# Patient Record
Sex: FEMALE | Race: WHITE | Employment: FULL TIME | URBAN - METROPOLITAN AREA
[De-identification: names, ages, dates, MRNs, and addresses within clinical notes are randomized per-mention and may not be internally consistent; named-entity substitution may affect disease eponyms.]

---

## 2017-01-12 RX ORDER — ATORVASTATIN CALCIUM 40 MG/1
TABLET, FILM COATED ORAL
Qty: 90 TABLET | Refills: 0 | Status: SHIPPED | OUTPATIENT
Start: 2017-01-12 | End: 2017-05-13

## 2017-02-02 RX ORDER — OMEPRAZOLE 20 MG/1
CAPSULE, DELAYED RELEASE ORAL
Qty: 180 CAPSULE | Refills: 3 | Status: SHIPPED | OUTPATIENT
Start: 2017-02-02 | End: 2017-10-25

## 2017-02-02 NOTE — TELEPHONE ENCOUNTER
[place], [place] - [address] [place] [address] RD. [phone], [phone]        Current Outpatient Prescriptions:  omeprazole (PRILOSEC) 20 MG Oral Capsule Delayed Release TAKE ONE CAPSULE BY MOUTH TWICE EVERY DAY BEFORE A MEAL Disp: 180 capsule Rfl: 3

## 2017-03-09 ENCOUNTER — OFFICE VISIT (OUTPATIENT)
Dept: INTERNAL MEDICINE CLINIC | Facility: CLINIC | Age: 57
End: 2017-03-09

## 2017-03-09 VITALS
SYSTOLIC BLOOD PRESSURE: 154 MMHG | DIASTOLIC BLOOD PRESSURE: 83 MMHG | HEART RATE: 80 BPM | TEMPERATURE: 98 F | HEIGHT: 63.5 IN | BODY MASS INDEX: 23.8 KG/M2 | WEIGHT: 136 LBS

## 2017-03-09 DIAGNOSIS — H81.10 BPV (BENIGN POSITIONAL VERTIGO), UNSPECIFIED LATERALITY: Primary | ICD-10-CM

## 2017-03-09 PROCEDURE — 99213 OFFICE O/P EST LOW 20 MIN: CPT | Performed by: INTERNAL MEDICINE

## 2017-03-09 PROCEDURE — 99212 OFFICE O/P EST SF 10 MIN: CPT | Performed by: INTERNAL MEDICINE

## 2017-03-09 RX ORDER — MECLIZINE HYDROCHLORIDE 25 MG/1
25 TABLET ORAL 3 TIMES DAILY PRN
Qty: 30 TABLET | Refills: 2 | Status: SHIPPED | OUTPATIENT
Start: 2017-03-09 | End: 2020-09-15

## 2017-03-09 NOTE — PROGRESS NOTES
Dizzy if standing up and changing positions  Blood pressure 154/83, pulse 80, temperature 98.2 °F (36.8 °C), temperature source Oral, height 5' 3.5\" (1.613 m), weight 136 lb (61.689 kg), not currently breastfeeding. +baronyi maneuver  1.  BPV (benign posi

## 2017-04-06 ENCOUNTER — TELEPHONE (OUTPATIENT)
Dept: INTERNAL MEDICINE CLINIC | Facility: CLINIC | Age: 57
End: 2017-04-06

## 2017-04-06 NOTE — TELEPHONE ENCOUNTER
Patient is eligible for [facility] [place] [facility]. Left message to call back [phone].

## 2017-04-17 ENCOUNTER — OFFICE VISIT (OUTPATIENT)
Dept: INTERNAL MEDICINE CLINIC | Facility: CLINIC | Age: 57
End: 2017-04-17

## 2017-04-17 VITALS
OXYGEN SATURATION: 98 % | SYSTOLIC BLOOD PRESSURE: 146 MMHG | BODY MASS INDEX: 23.1 KG/M2 | HEIGHT: 63.5 IN | HEART RATE: 71 BPM | DIASTOLIC BLOOD PRESSURE: 80 MMHG | WEIGHT: 132 LBS | TEMPERATURE: 98 F

## 2017-04-17 DIAGNOSIS — J01.90 ACUTE SINUSITIS, RECURRENCE NOT SPECIFIED, UNSPECIFIED LOCATION: Primary | ICD-10-CM

## 2017-04-17 PROCEDURE — 99212 OFFICE O/P EST SF 10 MIN: CPT | Performed by: INTERNAL MEDICINE

## 2017-04-17 PROCEDURE — 99213 OFFICE O/P EST LOW 20 MIN: CPT | Performed by: INTERNAL MEDICINE

## 2017-04-17 RX ORDER — AMOXICILLIN AND CLAVULANATE POTASSIUM 875; 125 MG/1; MG/1
1 TABLET, FILM COATED ORAL 2 TIMES DAILY WITH MEALS
Qty: 20 TABLET | Refills: 0 | Status: SHIPPED | OUTPATIENT
Start: 2017-04-17 | End: 2017-04-27

## 2017-04-17 NOTE — PROGRESS NOTES
Debora Montgomery is a 61year old female. Patient presents with:  URI    HPI:   For the past 10 days, she has had worsening symptoms of nasal congestion with green drainage, sinus pressure and pain, sore throat and cough productive of brown sputum.   No feve (17.5 mg total) by mouth once a week.  Disp: 105 tablet Rfl: 1       Adhesive Tape              Past Medical History   Diagnosis Date   • Psoriatic arthritis ([facility] [address]    • Hypercholesterolemia    • Colon polyps February 2010      Social History:    Smoking Sta

## 2017-05-12 ENCOUNTER — OFFICE VISIT (OUTPATIENT)
Dept: RHEUMATOLOGY | Facility: CLINIC | Age: 57
End: 2017-05-12

## 2017-05-12 ENCOUNTER — TELEPHONE (OUTPATIENT)
Dept: RHEUMATOLOGY | Facility: CLINIC | Age: 57
End: 2017-05-12

## 2017-05-12 ENCOUNTER — APPOINTMENT (OUTPATIENT)
Dept: LAB | Facility: HOSPITAL | Age: 57
End: 2017-05-12
Attending: INTERNAL MEDICINE
Payer: COMMERCIAL

## 2017-05-12 ENCOUNTER — OFFICE VISIT (OUTPATIENT)
Dept: INTERNAL MEDICINE CLINIC | Facility: CLINIC | Age: 57
End: 2017-05-12

## 2017-05-12 VITALS
HEIGHT: 63.5 IN | TEMPERATURE: 98 F | SYSTOLIC BLOOD PRESSURE: 142 MMHG | HEART RATE: 69 BPM | BODY MASS INDEX: 23.1 KG/M2 | WEIGHT: 132 LBS | DIASTOLIC BLOOD PRESSURE: 90 MMHG

## 2017-05-12 VITALS
DIASTOLIC BLOOD PRESSURE: 80 MMHG | SYSTOLIC BLOOD PRESSURE: 142 MMHG | BODY MASS INDEX: 23.1 KG/M2 | WEIGHT: 132 LBS | HEIGHT: 63.5 IN | HEART RATE: 75 BPM

## 2017-05-12 DIAGNOSIS — Z00.00 ANNUAL PHYSICAL EXAM: ICD-10-CM

## 2017-05-12 DIAGNOSIS — L40.50 PSORIATIC ARTHRITIS (HCC): Primary | ICD-10-CM

## 2017-05-12 DIAGNOSIS — D12.6 ADENOMATOUS POLYP OF COLON, UNSPECIFIED PART OF COLON: ICD-10-CM

## 2017-05-12 DIAGNOSIS — L40.50 PSA (PSORIATIC ARTHRITIS) (HCC): Primary | ICD-10-CM

## 2017-05-12 DIAGNOSIS — Z51.81 THERAPEUTIC DRUG MONITORING: ICD-10-CM

## 2017-05-12 DIAGNOSIS — E78.00 HYPERCHOLESTEROLEMIA: ICD-10-CM

## 2017-05-12 DIAGNOSIS — Z51.81 ENCOUNTER FOR THERAPEUTIC DRUG MONITORING: ICD-10-CM

## 2017-05-12 DIAGNOSIS — L40.50 PSA (PSORIATIC ARTHRITIS) (HCC): ICD-10-CM

## 2017-05-12 DIAGNOSIS — Z00.00 ANNUAL PHYSICAL EXAM: Primary | ICD-10-CM

## 2017-05-12 DIAGNOSIS — L40.50 PSORIATIC ARTHRITIS (HCC): Chronic | ICD-10-CM

## 2017-05-12 DIAGNOSIS — Z11.1 SCREENING-PULMONARY TB: ICD-10-CM

## 2017-05-12 DIAGNOSIS — Z00.00 ENCOUNTER FOR ANNUAL HEALTH EXAMINATION: ICD-10-CM

## 2017-05-12 PROCEDURE — 99213 OFFICE O/P EST LOW 20 MIN: CPT | Performed by: INTERNAL MEDICINE

## 2017-05-12 PROCEDURE — 99396 PREV VISIT EST AGE 40-64: CPT | Performed by: INTERNAL MEDICINE

## 2017-05-12 PROCEDURE — 86140 C-REACTIVE PROTEIN: CPT

## 2017-05-12 PROCEDURE — 85027 COMPLETE CBC AUTOMATED: CPT

## 2017-05-12 PROCEDURE — 86480 TB TEST CELL IMMUN MEASURE: CPT

## 2017-05-12 PROCEDURE — 80061 LIPID PANEL: CPT

## 2017-05-12 PROCEDURE — 80053 COMPREHEN METABOLIC PANEL: CPT

## 2017-05-12 PROCEDURE — 36415 COLL VENOUS BLD VENIPUNCTURE: CPT

## 2017-05-12 PROCEDURE — 99214 OFFICE O/P EST MOD 30 MIN: CPT | Performed by: INTERNAL MEDICINE

## 2017-05-12 PROCEDURE — G0439 PPPS, SUBSEQ VISIT: HCPCS | Performed by: INTERNAL MEDICINE

## 2017-05-12 PROCEDURE — 85652 RBC SED RATE AUTOMATED: CPT

## 2017-05-12 RX ORDER — ONDANSETRON 4 MG/1
4 TABLET, ORALLY DISINTEGRATING ORAL
Qty: 30 TABLET | Refills: 0 | Status: SHIPPED | OUTPATIENT
Start: 2017-05-12 | End: 2017-06-27

## 2017-05-12 RX ORDER — METHOTREXATE 25 MG/ML
17.5 INJECTION, SOLUTION INTRA-ARTERIAL; INTRAMUSCULAR; INTRAVENOUS
Qty: 4 VIAL | Refills: 4 | Status: SHIPPED | OUTPATIENT
Start: 2017-05-12 | End: 2017-05-26

## 2017-05-12 RX ORDER — ATORVASTATIN CALCIUM 40 MG/1
TABLET, FILM COATED ORAL
Qty: 90 TABLET | Refills: 1 | Status: SHIPPED | OUTPATIENT
Start: 2017-05-12 | End: 2018-07-14 | Stop reason: ALTCHOICE

## 2017-05-12 NOTE — PATIENT INSTRUCTIONS
1. methotrexate  7 tablets a week  - zofran given - switch to 17.5mg a week   2. Cont. enbrel 50mg a week   3. Cont. Low dose naltrexone 4.5mg a day   4.  Ok to take - prednisoen 10mg - x 1-2 weeks - helps -  Also can take naproxen 500mg twice a day - take

## 2017-05-12 NOTE — TELEPHONE ENCOUNTER
Patient here and asking for a new SORD and TB test.  Per Dr. Feng Tovar no TB test is needed. New SORD made.

## 2017-05-12 NOTE — PROGRESS NOTES
Name and  verified with patient. Patient provided with educational materials on learning to self inject methotrexate. Discussed with patient proper storage, handling, and disposal of medications. Pt informed of proper injection and aseptic technique.  Pa

## 2017-05-12 NOTE — PATIENT INSTRUCTIONS
Await results of blood tests. Please schedule mammogram.  Please schedule appointment with GI for repeat colonoscopy. Please try to exercise regularly. Remember to get a flu shot every fall. Return visit in 3 months for a blood pressure check.     [facility] Medicare Visit    Abdominal aortic aneurysm screening (once between ages 73-68) IPPE only No results found for this or any previous visit.  Limited to patients who meet one of the following criteria:   • Men who are 73-68 years old and have smoked more than needed after [address] due on 11/14/2014 Please get this Mammogram regularly   Immunizations      Influenza  Covered Annually   Orders placed or performed in visit on 10/17/14  -FLU VAC NO PRSV 4 LANDEN 3 YRS+    Please get every year    Pneumococcal [facility] regarding Advance Directives.

## 2017-05-12 NOTE — PROGRESS NOTES
Noah Christensen is a 61year old female. HPI:   Patient presents with:  Psoriatic Arthritis  Hand Pain: forearm and toe pain      I had the pleasure of seeing your patient Mrs. Christelle Traore on 5/12/2017. I had last seen her on 12/9/2016.  I had last see prednisone 5mg every other day. But normally it's every other day. She feels priobiotics is helping her has well. She has left thumb and left 2nd finger goes numb on and off. She's not getting chest pressure as much .  She needss her c scope, chest xray, an 12/9/2016  She has 7/10 pain in her fore arems and arms. She feels her elbows are the area where her flares start. She has some bony areas over her right 4th fdip that hurts and sherry her left knee. Her right elbow and her foot.    She is really fatigued into the skin once a week. Disp: 4 Syringe Rfl: 11   Ondansetron HCl (ZOFRAN) 4 mg tablet Take 1 tablet (4 mg total) by mouth every 8 (eight) hours as needed for Nausea.  Disp: 30 tablet Rfl: 0   folic acid 1 MG Oral Tab Take 2 tablets (2 mg total) by mouth 1.8-7.7 K/UL 3.9   Lymphocytes Absolute      1.0-4.0 K/UL 3.1   Monocytes Absolute      0.0-1.0 K/UL 0.5   Eosinophils Absolute      0.0-0.7 K/UL 0.3   Basophils Absolute      0.0-0.2 K/UL 0.1   CREATININE      0.50-1.50 mg/dL 0.66   GFR, Non- Ameri Summary:  1.  methotrexate  7 tablets a week  - zofran given  2. Cont. enbrel 50mg a week   3. Cont. Low dose naltrexone 4.5mg a day   4.  Ok to take - prednisoen 10mg - x 1-2 weeks - helps -  Also can take naproxen 500mg twice a day - take with food -

## 2017-05-12 NOTE — PROGRESS NOTES
HPI:   Oumar Zavala is a 61year old female who presents this morning for a [employer] physical.    She feels well this morning, and has no specific issues for discussion.   She follows regularly with Rheumatology for treatment of psoriatic arthrit (three) times daily as needed. omeprazole 20 MG Oral Capsule Delayed Release TAKE ONE CAPSULE BY MOUTH TWICE EVERY DAY BEFORE A MEAL   methotrexate 2.5 MG Oral Tab Take 7 tablets (17.5 mg total) by mouth once a week.    leflunomide 20 MG Oral Tab Take 1 t No fever  LUNGS: No cough wheezing or shortness of breath  CARDIAC: No lightheadedness palpitations or chest pain  GI: Occasional nausea when taking methotrexate.   No anorexia heartburn dysphagia vomiting abdominal pain diarrhea constipation or rectal blee lipid profile today. Order sent. Schedule screening mammogram.  Order sent. Recommend follow-up visit with GI for repeat colonoscopy. Referral completed and given to her. She will schedule. Recommend annual influenza vaccine.   Continue healthy diet, No    Vision Problems? : No    Difficulty walking?: No    Difficulty dressing or bathing?: No    Problems with daily activities? : No    Memory Problems?: No      Fall/Risk Assessment     Do you have 3 or more medical conditions?: 1-Yes    Have you fallen Screen every 10 years Colonoscopy,5 Years due on 02/12/2015 Update Health Maintenance if applicable    Flex Sigmoidoscopy Screen every 10 years No results found for this or any previous visit. No flowsheet data found.      Fecal Occult Blood Annually No res Medicare Part B No vaccine history found This may be covered with your pharmacy  prescription benefits        SPECIFIC DISEASE MONITORING Internal Lab or Procedure External Lab or Procedure   Annual Monitoring of Persistent     Medications (ACE/ARB, digoxi

## 2017-05-13 RX ORDER — ATORVASTATIN CALCIUM 40 MG/1
TABLET, FILM COATED ORAL
Qty: 90 TABLET | Refills: 3 | Status: SHIPPED | OUTPATIENT
Start: 2017-05-13 | End: 2017-05-19

## 2017-05-19 ENCOUNTER — OFFICE VISIT (OUTPATIENT)
Dept: GASTROENTEROLOGY | Facility: CLINIC | Age: 57
End: 2017-05-19

## 2017-05-19 ENCOUNTER — OFFICE VISIT (OUTPATIENT)
Dept: INTERNAL MEDICINE CLINIC | Facility: CLINIC | Age: 57
End: 2017-05-19

## 2017-05-19 ENCOUNTER — TELEPHONE (OUTPATIENT)
Dept: GASTROENTEROLOGY | Facility: CLINIC | Age: 57
End: 2017-05-19

## 2017-05-19 VITALS
SYSTOLIC BLOOD PRESSURE: 142 MMHG | OXYGEN SATURATION: 97 % | DIASTOLIC BLOOD PRESSURE: 78 MMHG | TEMPERATURE: 98 F | HEART RATE: 90 BPM | WEIGHT: 128.5 LBS | HEIGHT: 63.5 IN | BODY MASS INDEX: 22.49 KG/M2

## 2017-05-19 VITALS
HEIGHT: 64 IN | SYSTOLIC BLOOD PRESSURE: 144 MMHG | BODY MASS INDEX: 21.82 KG/M2 | WEIGHT: 127.81 LBS | DIASTOLIC BLOOD PRESSURE: 96 MMHG | HEART RATE: 66 BPM

## 2017-05-19 DIAGNOSIS — J98.01 BRONCHOSPASM: ICD-10-CM

## 2017-05-19 DIAGNOSIS — Z80.0 FAMILY HISTORY OF COLON CANCER: ICD-10-CM

## 2017-05-19 DIAGNOSIS — Z80.0 FAMILY HISTORY OF COLON CANCER IN MOTHER: ICD-10-CM

## 2017-05-19 DIAGNOSIS — Z86.010 HISTORY OF COLONIC POLYPS: ICD-10-CM

## 2017-05-19 DIAGNOSIS — Z86.010 PERSONAL HISTORY OF COLONIC POLYPS: ICD-10-CM

## 2017-05-19 DIAGNOSIS — J06.9 ACUTE URI: Primary | ICD-10-CM

## 2017-05-19 DIAGNOSIS — Z12.11 SCREENING FOR COLON CANCER: Primary | ICD-10-CM

## 2017-05-19 DIAGNOSIS — Z12.11 ENCOUNTER FOR SCREENING COLONOSCOPY: Primary | ICD-10-CM

## 2017-05-19 PROCEDURE — 99243 OFF/OP CNSLTJ NEW/EST LOW 30: CPT | Performed by: PHYSICIAN ASSISTANT

## 2017-05-19 PROCEDURE — 99212 OFFICE O/P EST SF 10 MIN: CPT | Performed by: PHYSICIAN ASSISTANT

## 2017-05-19 PROCEDURE — 99212 OFFICE O/P EST SF 10 MIN: CPT | Performed by: INTERNAL MEDICINE

## 2017-05-19 PROCEDURE — 99213 OFFICE O/P EST LOW 20 MIN: CPT | Performed by: INTERNAL MEDICINE

## 2017-05-19 RX ORDER — AZITHROMYCIN 250 MG/1
TABLET, FILM COATED ORAL
Qty: 6 TABLET | Refills: 0 | Status: SHIPPED | OUTPATIENT
Start: 2017-05-19 | End: 2017-05-31

## 2017-05-19 RX ORDER — ALBUTEROL SULFATE 90 UG/1
2 AEROSOL, METERED RESPIRATORY (INHALATION) EVERY 4 HOURS PRN
Qty: 1 INHALER | Refills: 0 | Status: SHIPPED | OUTPATIENT
Start: 2017-05-19 | End: 2017-06-01

## 2017-05-19 RX ORDER — PREDNISONE 20 MG/1
TABLET ORAL
Qty: 10 TABLET | Refills: 0 | Status: SHIPPED | OUTPATIENT
Start: 2017-05-19 | End: 2017-05-31 | Stop reason: ALTCHOICE

## 2017-05-19 NOTE — PATIENT INSTRUCTIONS
Please take Zithromax and prednisone daily for 5 days. Use albuterol when needed for wheezing. Call if not soon better.

## 2017-05-19 NOTE — PROGRESS NOTES
Mac Marshall is a 61year old female.  Patient presents with:  Cough  Chest Congestion    HPI:   Since Thursday, 8 days ago, she has had persistent symptoms of subjective fever, nasal congestion with clear drainage, sore throat, chest congestion and coug needed for Pain. Disp: 60 tablet Rfl: 0   Etanercept (ENBREL SURECLICK) 50 MG/ML Subcutaneous Solution Auto-injector Inject 50 mg into the skin once a week.  Disp: 4 Syringe Rfl: 11   Ondansetron HCl (ZOFRAN) 4 mg tablet Take 1 tablet (4 mg total) by mouth inspiratory crackles    ASSESSMENT AND PLAN:   1. Acute URI  Zithromax 250 mg 2 pills today then 1 pill daily ×4 days beginning tomorrow. Prescription sent to pharmacy. Prednisone 20 mg 2 pills daily with food for 5 days. Prescription sent to pharmacy.

## 2017-05-19 NOTE — H&P
[address] [facility] [address] Gastroenterology                                                                                                  Clinic History and Physical     Pa Medications, Allergies, ROS:      Past Medical History   Diagnosis Date   • Psoriatic arthritis ([facility] [address]    • Hypercholesterolemia    • Colon polyps February 2010          Past Surgical History    COLONOSCOPY,BIOPSY  2/12/10    Comment Performed by MICHAEL BRITO TWICE EVERY DAY BEFORE A MEAL Disp: 180 capsule Rfl: 3   methotrexate 2.5 MG Oral Tab Take 7 tablets (17.5 mg total) by mouth once a week. Disp: 105 tablet Rfl: 1   leflunomide 20 MG Oral Tab Take 1 tablet (20 mg total) by mouth daily.  Disp: 90 tablet Rfl: m), weight 127 lb 12.8 oz (57.97 kg), not currently breastfeeding. High blood pressure reading today.     Gen: patient is masked with coughing fits, non-toxic, mild discomfort 2/2 to coughing but otherwise pleasant and conversational   HEENT: +glasses; con if MAC indicated in this otherwise healthy patient   -Prep: Miralax and Gatorade   -Continue all medications for the procedure  -Follow up with Dr. Levon Sparrow re: high blood pressure and her coughing/lungs (URI)    Colonoscopy consent: I have discussed the risk

## 2017-05-19 NOTE — PATIENT INSTRUCTIONS
1. Schedule colonoscopy with Dr. Janice Reddy with IV twilight sedation. 2. Miralax and Gatorade prep. 3. Continue all medications for procedure    4. Read all bowel prep instructions carefully    5.  AVOID seeds, nuts, popcorn, raw fruits and vegetables (

## 2017-05-22 ENCOUNTER — HOSPITAL ENCOUNTER (OUTPATIENT)
Dept: GENERAL RADIOLOGY | Facility: HOSPITAL | Age: 57
Discharge: HOME | End: 2017-05-22
Attending: INTERNAL MEDICINE
Payer: COMMERCIAL

## 2017-05-22 ENCOUNTER — TELEPHONE (OUTPATIENT)
Dept: INTERNAL MEDICINE CLINIC | Facility: CLINIC | Age: 57
End: 2017-05-22

## 2017-05-22 DIAGNOSIS — R06.2 WHEEZING: ICD-10-CM

## 2017-05-22 DIAGNOSIS — R05.9 COUGH: ICD-10-CM

## 2017-05-22 DIAGNOSIS — R05.9 COUGH: Primary | ICD-10-CM

## 2017-05-22 PROCEDURE — 71020 XR CHEST PA + LAT CHEST (CPT=71020): CPT | Performed by: INTERNAL MEDICINE

## 2017-05-22 NOTE — TELEPHONE ENCOUNTER
Pt contacted and reviewed her medication list. She states she is NOT on any benzodiazepines or narcotics. She states the naltrexone 4.5mg that she uses daily is for her arthritis pain, since she is no longer using prednisone chronically for that.   Pt sched

## 2017-05-22 NOTE — TELEPHONE ENCOUNTER
Routed to [facility] to reschedule, pt currently scheduled on 7/7/17 (see TE 5/19/17).

## 2017-05-22 NOTE — TELEPHONE ENCOUNTER
Thank you, Dr. Jose Hernández, for advising. Patient denies use of narcotic x many months and states that she is taking the naltrexone for psoriatic arthritis. I wanted to ensure that this patient could safely undergo a conscious sedation.

## 2017-05-22 NOTE — TELEPHONE ENCOUNTER
Pt stts she continues to have cough and wheezing, mostly at night. Pt stts she was informed to call office if not better and request xray. Please advise.

## 2017-05-22 NOTE — TELEPHONE ENCOUNTER
Because of the naltrexone, she should be switched to MAC. Naltrexone counteracts fentanyl, so MAC would be better. Thank you !

## 2017-05-22 NOTE — TELEPHONE ENCOUNTER
Actions Requested: Pt requesting chest xray to be ordered for ongoing cough/wheezing.   Situation/Background   Problem:productive cough   Onset: 12 days ago   Associated Symptoms: wheezing, chest congestion, denies headaches, fever, nasal congestion   Histo

## 2017-05-22 NOTE — TELEPHONE ENCOUNTER
It appears that as per Bertha Sen's evaluation ( see consultation) , the patient can proceed with conscious sedation , unless she is on narcotic or benzodiazepine not listed in our med list. For some reason, she had a prescription for naloxone on our list,

## 2017-05-23 NOTE — TELEPHONE ENCOUNTER
Noted the counteraction between naltrexone and fentanyl.  Thank you for the recommendation, Dr. Abraham Public

## 2017-05-24 ENCOUNTER — TELEPHONE (OUTPATIENT)
Dept: INTERNAL MEDICINE CLINIC | Facility: CLINIC | Age: 57
End: 2017-05-24

## 2017-05-24 ENCOUNTER — TELEPHONE (OUTPATIENT)
Dept: PULMONOLOGY | Facility: CLINIC | Age: 57
End: 2017-05-24

## 2017-05-24 DIAGNOSIS — R05.9 COUGH: Primary | ICD-10-CM

## 2017-05-24 RX ORDER — BENZONATATE 100 MG/1
100 CAPSULE ORAL 3 TIMES DAILY PRN
Qty: 20 CAPSULE | Refills: 0 | OUTPATIENT
Start: 2017-05-24

## 2017-05-24 NOTE — TELEPHONE ENCOUNTER
RN calling from Dr Selam Kennedy office requesting for pt. To be seen sooner then 1st avail for consult for Coughing and wheezing, which pt. Has had for about 1 week. Pt has been on inhalers, oral steroids and Z-Pack, which did not help.   Pts phone # [phone]

## 2017-05-24 NOTE — TELEPHONE ENCOUNTER
Actions Requested: recommendation from doctor. Situation/Background   Problem: still has a cough and wheezing   Onset: 1 week   Associated Symptoms: Pt stts was sent home from work due to coughing too much and wheezing.  Inhaler and Prednisone  not helping

## 2017-05-24 NOTE — TELEPHONE ENCOUNTER
Pt stts she continues to have cough. Pt stts she was sent home from work today due to wheezing and coughing. Pt asking to speak with a nurse. Please advise.

## 2017-05-24 NOTE — TELEPHONE ENCOUNTER
Call Documentation      Reji Crisostomo LPN at 4/00/7277  3:76 PM      Status: Signed : Reji Crisostomo LPN (Licensed Practical Nurse)     Expand All Collapse All    T/E placed in [address] [place] [address] folder for review.                   Luisito Cavazos at 5/24/

## 2017-05-24 NOTE — TELEPHONE ENCOUNTER
Pulmonology department contacted and the are no appt available until 6/20/17. Message will be forwarded to Pulmonology RN for possible sooner appt with any Pulmonolgist.      Please transfer [phone] until 5pm today or [phone] anytime. Thank you.

## 2017-05-24 NOTE — TELEPHONE ENCOUNTER
Please try to get patient in sooner with Pulmonary.   I have already prescribed Zithromax, prednisone and albuterol, yet her symptoms persist.  We can try benzonatate 100 mg #20 one pill 3 times daily as needed for coughing but it will not help her wheezing

## 2017-05-24 NOTE — TELEPHONE ENCOUNTER
pt stated that she called to schedule a appt to see a pulmonologist  and the soonest that they can see her is June 22, 2017 with the first Dr available. Pt stated that she did not make a appt as that will not do her any good.  She wanted to know if

## 2017-05-25 NOTE — TELEPHONE ENCOUNTER
Lazarus Carwin, RN at 5/25/2017 10:02 AM      Status: Signed : Lazarus Carwin, RN (Registered Nurse)     Expand All Collapse All    Pt offered appt 5/31 @ 12:30 pm, pt accepted.

## 2017-05-31 ENCOUNTER — OFFICE VISIT (OUTPATIENT)
Dept: PULMONOLOGY | Facility: CLINIC | Age: 57
End: 2017-05-31

## 2017-05-31 VITALS
DIASTOLIC BLOOD PRESSURE: 72 MMHG | SYSTOLIC BLOOD PRESSURE: 120 MMHG | WEIGHT: 129.38 LBS | RESPIRATION RATE: 18 BRPM | HEIGHT: 63.5 IN | HEART RATE: 85 BPM | OXYGEN SATURATION: 99 % | BODY MASS INDEX: 22.64 KG/M2

## 2017-05-31 DIAGNOSIS — R05.9 COUGH: ICD-10-CM

## 2017-05-31 DIAGNOSIS — J98.01 BRONCHOSPASM: ICD-10-CM

## 2017-05-31 DIAGNOSIS — J42 CHRONIC BRONCHITIS, UNSPECIFIED CHRONIC BRONCHITIS TYPE (HCC): ICD-10-CM

## 2017-05-31 DIAGNOSIS — J20.9 ACUTE BRONCHITIS, UNSPECIFIED ORGANISM: Primary | ICD-10-CM

## 2017-05-31 PROCEDURE — 99213 OFFICE O/P EST LOW 20 MIN: CPT | Performed by: INTERNAL MEDICINE

## 2017-05-31 PROCEDURE — 99244 OFF/OP CNSLTJ NEW/EST MOD 40: CPT | Performed by: INTERNAL MEDICINE

## 2017-05-31 RX ORDER — AZITHROMYCIN 250 MG/1
TABLET, FILM COATED ORAL
Qty: 6 TABLET | Refills: 0 | Status: SHIPPED | OUTPATIENT
Start: 2017-05-31 | End: 2017-05-31

## 2017-05-31 RX ORDER — PREDNISONE 10 MG/1
TABLET ORAL
Qty: 18 TABLET | Refills: 0 | Status: SHIPPED | OUTPATIENT
Start: 2017-05-31 | End: 2017-08-16

## 2017-05-31 RX ORDER — AZITHROMYCIN 250 MG/1
TABLET, FILM COATED ORAL
Qty: 6 TABLET | Refills: 0 | Status: SHIPPED | OUTPATIENT
Start: 2017-05-31 | End: 2017-11-03

## 2017-05-31 RX ORDER — HYDROCODONE POLISTIREX AND CHLORPHENIRAMINE POLISTIREX 10; 8 MG/5ML; MG/5ML
5 SUSPENSION, EXTENDED RELEASE ORAL 2 TIMES DAILY PRN
Qty: 60 ML | Refills: 0 | Status: SHIPPED | OUTPATIENT
Start: 2017-05-31 | End: 2017-11-03

## 2017-05-31 RX ORDER — PREDNISONE 10 MG/1
TABLET ORAL
Qty: 18 TABLET | Refills: 0 | Status: SHIPPED | OUTPATIENT
Start: 2017-05-31 | End: 2017-05-31

## 2017-05-31 NOTE — PROGRESS NOTES
[facility], [facility], [facility]    Report of Consultation    Marsha Hutchins Patient Status:  Outpatient    1960 MRN [id number]   Location [facility], [facility] Mother    • Heart Disease Mother      CABG age 67   • Diabetes Maternal Grandmother    • Hypertension Maternal Grandmother    • Heart Disease Maternal Grandfather       MI age 28   • Heart Disease Maternal Aunt      Per NG: CAD   • Bladder Cancer [Othe 05/12/2017   ALKPHO 71 05/12/2017   TP 6.9 05/12/2017   AST 24 05/12/2017   ALT 12* 05/12/2017   TSH 1.09 05/01/2015   ESRML 9 05/12/2017   CRP <0.5 05/12/2017         Imaging:           Impression:     1– URI with acute bronchitis with bronchospasm  2– pr

## 2017-06-01 ENCOUNTER — TELEPHONE (OUTPATIENT)
Dept: PULMONOLOGY | Facility: CLINIC | Age: 57
End: 2017-06-01

## 2017-06-01 NOTE — TELEPHONE ENCOUNTER
Pt states her cough and breathing is better since last week . Pt states she woke up last night rolled onto her right side and felt pain 9/10 when breathing deeply and when bending over.   Pt states she started the prednisone 30 mg this am which has made th

## 2017-06-01 NOTE — TELEPHONE ENCOUNTER
Dr. Aminata Lizarraga notified of below. Per Dr. Aminata Lizarraga pt to give it some time as it sounds like a pulled muscle. Pt notified of this. Pt notified to call office back if symptoms persist.  Pt notified to call 911/go to ER if SOB develops.   Pt verbalized underst

## 2017-06-01 NOTE — TELEPHONE ENCOUNTER
Contacted [facility] in endo and she states that MAC is available at 1:30pm for Dr. Monik Yost on 7/7/17.   Pt contacted and rescheduled for the following time:    Scheduled for:  Colonoscopy 13933  Provider Name:  Dr. Monik Yost  Date:  7/7/17  Location:  [facility]  Sedation:  I

## 2017-06-01 NOTE — TELEPHONE ENCOUNTER
Pt states after taking inhaler and cough syrup with codine last night she has pain on rt side - cannot breathe deeply - steroids helped this morning - asking if it could be reaction to meds

## 2017-06-06 ENCOUNTER — TELEPHONE (OUTPATIENT)
Dept: RHEUMATOLOGY | Facility: CLINIC | Age: 57
End: 2017-06-06

## 2017-06-06 NOTE — TELEPHONE ENCOUNTER
[employer] calling to report that they require an ICD 10 diagnosis code for the following medication:    Current Outpatient Prescriptions:  Etanercept (ENBREL SURECLICK) 50 MG/ML Subcutaneous Solution Auto-injector Inject 50 mg into the skin once a

## 2017-06-27 ENCOUNTER — TELEPHONE (OUTPATIENT)
Dept: PULMONOLOGY | Facility: CLINIC | Age: 57
End: 2017-06-27

## 2017-06-27 RX ORDER — FOLIC ACID 1 MG/1
TABLET ORAL
Qty: 180 TABLET | Refills: 3 | Status: SHIPPED | OUTPATIENT
Start: 2017-06-27 | End: 2018-09-24

## 2017-06-27 RX ORDER — ONDANSETRON 4 MG/1
TABLET, ORALLY DISINTEGRATING ORAL
Qty: 30 TABLET | Refills: 0 | Status: SHIPPED | OUTPATIENT
Start: 2017-06-27 | End: 2017-11-03

## 2017-06-27 NOTE — TELEPHONE ENCOUNTER
LOV:5-12  Last Filled:Odansetron #30 with 0 on 3-97 and Folic Acid #782 with 3 refills on 6-17-16  Labs:   Future Appointments  Date Time Provider [place]   8/18/2017 9:10 AM Shailesh Jernigan MD [facility]       Please Advise

## 2017-06-28 RX ORDER — FLUTICASONE FUROATE AND VILANTEROL TRIFENATATE 100; 25 UG/1; UG/1
POWDER RESPIRATORY (INHALATION)
Qty: 1 EACH | Refills: 5 | Status: SHIPPED | OUTPATIENT
Start: 2017-06-28 | End: 2017-11-03

## 2017-06-28 NOTE — TELEPHONE ENCOUNTER
Dr. Bre Moreirach no record of Ursula Alejandro being prescribed, if ok with you please sign prescription.

## 2017-07-06 ENCOUNTER — TELEPHONE (OUTPATIENT)
Dept: GASTROENTEROLOGY | Facility: CLINIC | Age: 57
End: 2017-07-06

## 2017-07-06 NOTE — TELEPHONE ENCOUNTER
Pt called to confirm that it is OK to drink clear liquids up until 4 1/2 hrs before her procedure on 7/7/17. Please call.

## 2017-07-06 NOTE — TELEPHONE ENCOUNTER
Pt contacted and confirmed that she may have clear liquids up to 4 hours prior. This was cleared in our office previously with the anesthesia dept with emh and eosc.

## 2017-07-07 ENCOUNTER — ANESTHESIA (OUTPATIENT)
Dept: ENDOSCOPY | Facility: HOSPITAL | Age: 57
End: 2017-07-07
Payer: COMMERCIAL

## 2017-07-07 ENCOUNTER — ANESTHESIA EVENT (OUTPATIENT)
Dept: ENDOSCOPY | Facility: HOSPITAL | Age: 57
End: 2017-07-07
Payer: COMMERCIAL

## 2017-07-07 ENCOUNTER — SURGERY (OUTPATIENT)
Age: 57
End: 2017-07-07

## 2017-07-07 ENCOUNTER — HOSPITAL ENCOUNTER (OUTPATIENT)
Facility: HOSPITAL | Age: 57
Setting detail: HOSPITAL OUTPATIENT SURGERY
Discharge: HOME | End: 2017-07-07
Attending: INTERNAL MEDICINE | Admitting: INTERNAL MEDICINE
Payer: COMMERCIAL

## 2017-07-07 VITALS
DIASTOLIC BLOOD PRESSURE: 77 MMHG | SYSTOLIC BLOOD PRESSURE: 145 MMHG | WEIGHT: 128 LBS | HEIGHT: 63.5 IN | HEART RATE: 59 BPM | OXYGEN SATURATION: 98 % | RESPIRATION RATE: 19 BRPM | BODY MASS INDEX: 22.4 KG/M2

## 2017-07-07 DIAGNOSIS — K64.9 HEMORRHOIDS: ICD-10-CM

## 2017-07-07 DIAGNOSIS — Z12.11 SCREENING FOR MALIGNANT NEOPLASM OF COLON: Primary | ICD-10-CM

## 2017-07-07 DIAGNOSIS — K57.90 DIVERTICULOSIS: ICD-10-CM

## 2017-07-07 PROBLEM — Q43.8 TORTUOUS COLON: Status: ACTIVE | Noted: 2017-07-07

## 2017-07-07 PROBLEM — R55 VAGAL REACTION: Status: ACTIVE | Noted: 2017-07-07

## 2017-07-07 PROBLEM — K64.8 INTERNAL HEMORRHOIDS: Status: ACTIVE | Noted: 2017-07-07

## 2017-07-07 PROCEDURE — 45378 DIAGNOSTIC COLONOSCOPY: CPT | Performed by: INTERNAL MEDICINE

## 2017-07-07 PROCEDURE — 0DJD8ZZ INSPECTION OF LOWER INTESTINAL TRACT, VIA NATURAL OR ARTIFICIAL OPENING ENDOSCOPIC: ICD-10-PCS | Performed by: INTERNAL MEDICINE

## 2017-07-07 RX ORDER — SODIUM CHLORIDE, SODIUM LACTATE, POTASSIUM CHLORIDE, CALCIUM CHLORIDE 600; 310; 30; 20 MG/100ML; MG/100ML; MG/100ML; MG/100ML
INJECTION, SOLUTION INTRAVENOUS CONTINUOUS
Status: DISCONTINUED | OUTPATIENT
Start: 2017-07-07 | End: 2017-07-07

## 2017-07-07 RX ORDER — NALOXONE HYDROCHLORIDE 0.4 MG/ML
80 INJECTION, SOLUTION INTRAMUSCULAR; INTRAVENOUS; SUBCUTANEOUS AS NEEDED
Status: DISCONTINUED | OUTPATIENT
Start: 2017-07-07 | End: 2017-07-07

## 2017-07-07 RX ORDER — SODIUM CHLORIDE, SODIUM LACTATE, POTASSIUM CHLORIDE, CALCIUM CHLORIDE 600; 310; 30; 20 MG/100ML; MG/100ML; MG/100ML; MG/100ML
INJECTION, SOLUTION INTRAVENOUS CONTINUOUS PRN
Status: DISCONTINUED | OUTPATIENT
Start: 2017-07-07 | End: 2017-07-07 | Stop reason: SURG

## 2017-07-07 RX ORDER — LIDOCAINE HYDROCHLORIDE 10 MG/ML
INJECTION, SOLUTION EPIDURAL; INFILTRATION; INTRACAUDAL; PERINEURAL AS NEEDED
Status: DISCONTINUED | OUTPATIENT
Start: 2017-07-07 | End: 2017-07-07 | Stop reason: SURG

## 2017-07-07 RX ORDER — GLUCOSAMINE HCL 500 MG
TABLET ORAL
COMMUNITY
End: 2017-11-03 | Stop reason: DRUGHIGH

## 2017-07-07 RX ORDER — GLYCOPYRROLATE 0.2 MG/ML
INJECTION INTRAMUSCULAR; INTRAVENOUS AS NEEDED
Status: DISCONTINUED | OUTPATIENT
Start: 2017-07-07 | End: 2017-07-07 | Stop reason: SURG

## 2017-07-07 RX ADMIN — LIDOCAINE HYDROCHLORIDE 50 MG: 10 INJECTION, SOLUTION EPIDURAL; INFILTRATION; INTRACAUDAL; PERINEURAL at 13:45:00

## 2017-07-07 RX ADMIN — SODIUM CHLORIDE, SODIUM LACTATE, POTASSIUM CHLORIDE, CALCIUM CHLORIDE: 600; 310; 30; 20 INJECTION, SOLUTION INTRAVENOUS at 14:06:00

## 2017-07-07 RX ADMIN — SODIUM CHLORIDE, SODIUM LACTATE, POTASSIUM CHLORIDE, CALCIUM CHLORIDE: 600; 310; 30; 20 INJECTION, SOLUTION INTRAVENOUS at 13:42:00

## 2017-07-07 RX ADMIN — GLYCOPYRROLATE 0.2 MG: 0.2 INJECTION INTRAMUSCULAR; INTRAVENOUS at 13:54:00

## 2017-07-07 NOTE — H&P
History & Physical Examination    Patient Name: Susie Bah  MRN: [id number]  [facility]: [id number]  YOB: 1960    Diagnosis: Family history of colon cancer, history of colon polyps    Prescriptions Prior to Admission:  Cholecalciferol (VITAMIN each Rfl: 0    FOLIC ACID 1 MG Oral Tab TAKE 2 (2MG TOTAL) BY MOUTH ONCE DAILY Disp: 180 tablet Rfl: 3 7/6/2017 at 0600   PROAIR  (90 Base) MCG/ACT Inhalation Aero Soln INHALE 2 PUFFS INTO THE LUNGS EVERY 4 HOURS AS NEEDED FOR WHEEZING Disp: 8.5 g R date: HYSTERECTOMY  1987:  VAGINAL HYSTERECTOMY      Comment: With cystocele repair  Family History   Problem Relation Age of Onset   • Colon Cancer Mother    • Heart Disease Mother      CABG age 67   • Hypertension Mother    • Bladder Cancer Suni Cage Mother

## 2017-07-07 NOTE — BRIEF OP NOTE
Pre-Operative Diagnosis: Special screening for malignant neoplasm of colon, Family history of colon cancer, and Hx of colonic polyps     Post-Operative Diagnosis: Diverticulosis, tortuous sigmoid colon, transient bradycardia with vagal reaction, interna

## 2017-07-07 NOTE — ANESTHESIA PREPROCEDURE EVALUATION
Anesthesia PreOp Note    HPI:     Erendira Pizarro is a 61year old female who presents for preoperative consultation requested by: Malu Kimball MD    Date of Surgery: 7/7/2017    Procedure(s):  COLONOSCOPY  Indication: Special screening for Tab Take 1 tablet (20 mg total) by mouth daily.  Disp: 90 tablet Rfl: 1 7/6/2017 at 0600   Naltrexone HCl Does not apply Powder 4.5mg a day , compounded (Do not apply powder) Disp: 25 g Rfl: 3 7/7/2017 at 2200   Ondansetron HCl (ZOFRAN) 4 mg tablet Take 1 t 60 tablet Rfl: 0 Taking   Etanercept (ENBREL SURECLICK) 50 MG/ML Subcutaneous Solution Auto-injector Inject 50 mg into the skin once a week.  Disp: 4 Syringe Rfl: 11 7/3/2017 at 1945       Current Facility-Administered Medications Ordered in Epic:  lactated kg/m². height is 1.613 m (5' 3.5\") and weight is 58.1 kg (128 lb). Her blood pressure is 138/68 and her pulse is 60. Her respiration is 16 and oxygen saturation is 97%.     07/07/17  1319   BP: 138/68   BP Location: Left arm   Pulse: 60   Resp: 16   SpO2

## 2017-07-07 NOTE — ANESTHESIA POSTPROCEDURE EVALUATION
Patient: Robby Childress    Procedure Summary     Date:  07/07/17 Room / Location:  [facility] ENDOSCOPY 05 / [facility] ENDOSCOPY    Anesthesia Start:  9652 Anesthesia Stop:  1168    Procedure:  COLONOSCOPY (N/A ) Diagnosis:       Special screening for malignant neopla

## 2017-07-08 NOTE — OPERATIVE REPORT
[facility]    PATIENT'S NAME: Joselo Tavares   ATTENDING PHYSICIAN: Natty Roberts MD   OPERATING PHYSICIAN: Natty Roberts MD   PATIENT ACCOUNT#:   [de-identified]    LOCATION:  [facility] ENDO POOL ROOM 11 [address] inflammatory changes. There was moderate to severe tortuosity of the sigmoid colon. Retroflexion in the rectum showed small internal hemorrhoids. The patient tolerated the procedure well without immediate complication. IMPRESSION:    1.    Diverticula

## 2017-07-09 ENCOUNTER — TELEPHONE (OUTPATIENT)
Dept: GASTROENTEROLOGY | Facility: CLINIC | Age: 57
End: 2017-07-09

## 2017-08-07 NOTE — TELEPHONE ENCOUNTER
LOV: 5-12-17  Last Refilled:#4 syringes, 11rfs 8/11/16    Future Appointments  Date Time Provider [place]   8/18/2017 9:10 AM Denise Rhodes MD [facility]       Please advise.

## 2017-08-08 NOTE — TELEPHONE ENCOUNTER
Tried calling patient's work number; rings then turns to busy signal.  Left voice message on patient's home number advising that prescription refill was approved and put through to patient's pharmacy.

## 2017-08-16 ENCOUNTER — TELEPHONE (OUTPATIENT)
Dept: RHEUMATOLOGY | Facility: CLINIC | Age: 57
End: 2017-08-16

## 2017-08-16 RX ORDER — PREDNISONE 10 MG/1
TABLET ORAL
Qty: 22 TABLET | Refills: 0 | Status: SHIPPED | OUTPATIENT
Start: 2017-08-16 | End: 2017-09-14

## 2017-08-16 NOTE — TELEPHONE ENCOUNTER
Please see below. Reports flare up started about a month ago. She took prednisone 10 mg daily for 2 weeks which helped. Pain started to worsen yesterday in right arm/hand with swelling. Her feet are bothering her as well.  She took ibuprofen yesterday and p

## 2017-08-16 NOTE — TELEPHONE ENCOUNTER
Pt calling and is having a flare up. Pt stts her right arm is swollen her whole body hurts. Please advise.

## 2017-08-16 NOTE — TELEPHONE ENCOUNTER
Pt aware of provider's directions below. Pt agreeable with plan. Pt scheduled follow up appointment for 9/1/17.

## 2017-08-16 NOTE — TELEPHONE ENCOUNTER
Please ask her to take 30mg a day x 3 days, then 20mg a day x 3 days, then 10mg a day x 3 days.  ,  Will send in script to pharmacy -

## 2017-09-13 ENCOUNTER — TELEPHONE (OUTPATIENT)
Dept: INTERNAL MEDICINE CLINIC | Facility: CLINIC | Age: 57
End: 2017-09-13

## 2017-09-13 NOTE — TELEPHONE ENCOUNTER
Called and left message to call back.    Left message ok to try another 10mg prednisone and see if that helps - sometimes taking higher dose burst can help neck pain radiating down -

## 2017-09-13 NOTE — TELEPHONE ENCOUNTER
Please see below and advise. Pt reports a bad night and prednisone is not working. She rescheduled her 9/1 appt to 9/29 due to it being a holiday weekend.

## 2017-09-13 NOTE — TELEPHONE ENCOUNTER
Pt is calling state that she is having some pain on her left side pt state that she is having some numbness on her left side of neck to finger tips   Pt state that she took prednison 10mg and it not helping   Requesting to speak with a RN

## 2017-09-14 ENCOUNTER — LAB ENCOUNTER (OUTPATIENT)
Dept: LAB | Facility: HOSPITAL | Age: 57
End: 2017-09-14
Attending: INTERNAL MEDICINE
Payer: COMMERCIAL

## 2017-09-14 DIAGNOSIS — Z51.81 ENCOUNTER FOR THERAPEUTIC DRUG MONITORING: ICD-10-CM

## 2017-09-14 DIAGNOSIS — L40.50 PSORIATIC ARTHRITIS (HCC): ICD-10-CM

## 2017-09-14 LAB
ALBUMIN SERPL BCP-MCNC: 4 G/DL (ref 3.5–4.8)
ALT SERPL-CCNC: 69 U/L (ref 14–54)
AST SERPL-CCNC: 78 U/L (ref 15–41)
BASOPHILS # BLD: 0 K/UL (ref 0–0.2)
BASOPHILS NFR BLD: 0 %
CREAT SERPL-MCNC: 0.62 MG/DL (ref 0.5–1.5)
CRP SERPL-MCNC: <0.5 MG/DL (ref 0–0.9)
EOSINOPHIL # BLD: 0.1 K/UL (ref 0–0.7)
EOSINOPHIL NFR BLD: 1 %
ERYTHROCYTE [DISTWIDTH] IN BLOOD BY AUTOMATED COUNT: 14.5 % (ref 11–15)
ERYTHROCYTE [SEDIMENTATION RATE] IN BLOOD: 8 MM/HR (ref 0–30)
HCT VFR BLD AUTO: 37 % (ref 35–48)
HGB BLD-MCNC: 12.3 G/DL (ref 12–16)
LYMPHOCYTES # BLD: 3.9 K/UL (ref 1–4)
LYMPHOCYTES NFR BLD: 25 %
MCH RBC QN AUTO: 29.8 PG (ref 27–32)
MCHC RBC AUTO-ENTMCNC: 33.4 G/DL (ref 32–37)
MCV RBC AUTO: 89.2 FL (ref 80–100)
MONOCYTES # BLD: 1.9 K/UL (ref 0–1)
MONOCYTES NFR BLD: 12 %
NEUTROPHILS # BLD AUTO: 10 K/UL (ref 1.8–7.7)
NEUTROPHILS NFR BLD: 63 %
PLATELET # BLD AUTO: 372 K/UL (ref 140–400)
PMV BLD AUTO: 9 FL (ref 7.4–10.3)
RBC # BLD AUTO: 4.15 M/UL (ref 3.7–5.4)
WBC # BLD AUTO: 15.9 K/UL (ref 4–11)

## 2017-09-14 PROCEDURE — 85025 COMPLETE CBC W/AUTO DIFF WBC: CPT

## 2017-09-14 PROCEDURE — 82040 ASSAY OF SERUM ALBUMIN: CPT

## 2017-09-14 PROCEDURE — 84460 ALANINE AMINO (ALT) (SGPT): CPT

## 2017-09-14 PROCEDURE — 82565 ASSAY OF CREATININE: CPT

## 2017-09-14 PROCEDURE — 85652 RBC SED RATE AUTOMATED: CPT

## 2017-09-14 PROCEDURE — 86140 C-REACTIVE PROTEIN: CPT

## 2017-09-14 PROCEDURE — 36415 COLL VENOUS BLD VENIPUNCTURE: CPT

## 2017-09-14 PROCEDURE — 84450 TRANSFERASE (AST) (SGOT): CPT

## 2017-09-14 RX ORDER — PREDNISONE 10 MG/1
TABLET ORAL
Qty: 42 TABLET | Refills: 0 | Status: SHIPPED | OUTPATIENT
Start: 2017-09-14 | End: 2017-10-25

## 2017-09-14 RX ORDER — TRAMADOL HYDROCHLORIDE 50 MG/1
50 TABLET ORAL EVERY 8 HOURS PRN
Qty: 90 TABLET | Refills: 0 | OUTPATIENT
Start: 2017-09-14 | End: 2017-10-25

## 2017-09-14 NOTE — TELEPHONE ENCOUNTER
Called pt. And asked her to increase prednisone   - her back and chest hurt down her to down to her left arm -   Hurts from shoulder down to elbow -   She took prednisoen 50mg a day -   She is takign ibuporfen and heat.    She is having

## 2017-09-15 ENCOUNTER — TELEPHONE (OUTPATIENT)
Dept: RHEUMATOLOGY | Facility: CLINIC | Age: 57
End: 2017-09-15

## 2017-09-15 NOTE — TELEPHONE ENCOUNTER
Talked to pt.  - let her know that liver tests are elevated   She is still not feeling right -   Told her to Skip methotrexate -   She has some nausea with the enbrel injections -   She's not feelin better yet with the prednisone -   She took the tramadol i

## 2017-09-20 ENCOUNTER — OFFICE VISIT (OUTPATIENT)
Dept: RHEUMATOLOGY | Facility: CLINIC | Age: 57
End: 2017-09-20

## 2017-09-20 VITALS
BODY MASS INDEX: 23.45 KG/M2 | WEIGHT: 134 LBS | HEIGHT: 63.5 IN | SYSTOLIC BLOOD PRESSURE: 154 MMHG | DIASTOLIC BLOOD PRESSURE: 79 MMHG | HEART RATE: 70 BPM | TEMPERATURE: 99 F

## 2017-09-20 DIAGNOSIS — L40.50 PSORIATIC ARTHRITIS (HCC): Primary | ICD-10-CM

## 2017-09-20 DIAGNOSIS — R79.89 ELEVATED LIVER FUNCTION TESTS: ICD-10-CM

## 2017-09-20 DIAGNOSIS — M79.7 FIBROMYALGIA: ICD-10-CM

## 2017-09-20 DIAGNOSIS — M54.12 CERVICAL RADICULOPATHY: ICD-10-CM

## 2017-09-20 DIAGNOSIS — Z51.81 ENCOUNTER FOR THERAPEUTIC DRUG MONITORING: ICD-10-CM

## 2017-09-20 PROCEDURE — 99212 OFFICE O/P EST SF 10 MIN: CPT | Performed by: INTERNAL MEDICINE

## 2017-09-20 PROCEDURE — 99214 OFFICE O/P EST MOD 30 MIN: CPT | Performed by: INTERNAL MEDICINE

## 2017-09-20 RX ORDER — CYCLOBENZAPRINE HCL 5 MG
5 TABLET ORAL NIGHTLY
Qty: 30 TABLET | Refills: 0 | Status: SHIPPED | OUTPATIENT
Start: 2017-09-20 | End: 2017-09-20

## 2017-09-20 RX ORDER — CYCLOBENZAPRINE HCL 5 MG
5 TABLET ORAL NIGHTLY
Qty: 30 TABLET | Refills: 0 | Status: SHIPPED | OUTPATIENT
Start: 2017-09-20 | End: 2017-10-25

## 2017-09-20 RX ORDER — HYDROCODONE BITARTRATE AND ACETAMINOPHEN 5; 325 MG/1; MG/1
1 TABLET ORAL EVERY 12 HOURS PRN
Qty: 60 TABLET | Refills: 0 | Status: SHIPPED | OUTPATIENT
Start: 2017-09-20 | End: 2017-09-20

## 2017-09-20 RX ORDER — GABAPENTIN 300 MG/1
CAPSULE ORAL
Qty: 60 CAPSULE | Refills: 1 | Status: SHIPPED | OUTPATIENT
Start: 2017-09-20 | End: 2017-10-06

## 2017-09-20 RX ORDER — HYDROCODONE BITARTRATE AND ACETAMINOPHEN 5; 325 MG/1; MG/1
1 TABLET ORAL EVERY 12 HOURS PRN
Qty: 60 TABLET | Refills: 0 | Status: SHIPPED | OUTPATIENT
Start: 2017-09-20 | End: 2017-12-29

## 2017-09-20 RX ORDER — GABAPENTIN 300 MG/1
CAPSULE ORAL
Qty: 180 CAPSULE | Refills: 1 | OUTPATIENT
Start: 2017-09-20

## 2017-09-20 NOTE — PROGRESS NOTES
Marva Devi is a 61year old female. HPI:   Patient presents with:  Rheumatoid Arthritis      I had the pleasure of seeing your patient Mrs. Karla Vilchis on 9/20/2017. I had last seen her on 5/12/2017. I had last seen her on 12/9/2016.  I had last s burst 60mg over 6 days. She has bad pain in her right forearm and right 3rd finger. Her left 2-4th mtps are still hurting. It's better on prednisoen right now. The right 3rd finger bothers her for a while. She has 6/10 pain.      12/9/2016  She has 7/ Social Hx Reviewed   Family Hx Reviewed     Medications (Active prior to today's visit):    Current Outpatient Prescriptions:  predniSONE 10 MG Oral Tab Take 6 tabsx 2 day, take 5 tabsx 2 day, take 4 tabsx 2 day,take 3 tabsx 2 day, take 2 tabsx 2 day, 20 MG Oral Capsule Delayed Release TAKE ONE CAPSULE BY MOUTH TWICE EVERY DAY BEFORE A MEAL Disp: 180 capsule Rfl: 3   methotrexate 2.5 MG Oral Tab Take 7 tablets (17.5 mg total) by mouth once a week.  Disp: 105 tablet Rfl: 1   leflunomide 20 MG Oral Tab [facility] 48.0 % 37.0   MCV      80.0 - 100.0 fL 89.2   MCH      27.0 - 32.0 pg 29.8   MCHC      32.0 - 37.0 g/dl 33.4   RDW      11.0 - 15.0 % 14.5   Platelet Count      342 - 400 K/   MEAN PLATELET VOLUME      7.4 - 10.3 fL 9.0   Neutrophils %      % 63   Ly oral mtx -- zofran prn  - and can split for 2 days -   - stop for now low dose naltrexone 4.5mg a day   - she's been off prednisone for 2-3 months.  Until now -   - Topical steroid for rash - dermatitis over hands improving with mtx,    - cp is better - can 6. Check liver funciotn tests in 1-2 weeks. 7. Cont. leflunomdie 20mg  aday -   8. Check xray c spine - and mri c spine   9. Hydrocodone 5/325mg 1-2 tablets a day as needed. - #60  - will refer to dr. davis/esme as well -       2.  Therapeutic drug monit

## 2017-09-20 NOTE — PATIENT INSTRUCTIONS
1.  Stay off methotrexate  7 tablets a week  - zofran given - until liver tests better - then will decrease to 0.5ml a week   2. Cont. enbrel 50mg a week   3. Stop naltresxone  4. Ok to taper off prednisone - -  5.  Start gabepntin 300mg at night - 1-2 tabl

## 2017-09-22 ENCOUNTER — HOSPITAL ENCOUNTER (OUTPATIENT)
Dept: MRI IMAGING | Facility: HOSPITAL | Age: 57
Discharge: HOME | End: 2017-09-22
Attending: INTERNAL MEDICINE
Payer: COMMERCIAL

## 2017-09-22 DIAGNOSIS — M54.12 CERVICAL RADICULOPATHY: ICD-10-CM

## 2017-09-22 PROCEDURE — 72141 MRI NECK SPINE W/O DYE: CPT | Performed by: INTERNAL MEDICINE

## 2017-09-25 ENCOUNTER — TELEPHONE (OUTPATIENT)
Dept: RHEUMATOLOGY | Facility: CLINIC | Age: 57
End: 2017-09-25

## 2017-09-26 NOTE — TELEPHONE ENCOUNTER
Called pt. - d/w her about the mri c spine -   She takes 2 gabpentin at night - 600mg  - she is sleeping with this   The pian in back of neck is there - no numnbess   - she uses tiger balm.    She is going to see physiatry on Friday -   D/w her llikely not

## 2017-09-29 ENCOUNTER — OFFICE VISIT (OUTPATIENT)
Dept: NEUROLOGY | Facility: CLINIC | Age: 57
End: 2017-09-29

## 2017-09-29 VITALS
HEIGHT: 63 IN | RESPIRATION RATE: 16 BRPM | DIASTOLIC BLOOD PRESSURE: 66 MMHG | HEART RATE: 88 BPM | SYSTOLIC BLOOD PRESSURE: 120 MMHG | BODY MASS INDEX: 23.92 KG/M2 | WEIGHT: 135 LBS

## 2017-09-29 DIAGNOSIS — M54.12 CERVICAL RADICULOPATHY AT C7: Primary | ICD-10-CM

## 2017-09-29 PROCEDURE — 99203 OFFICE O/P NEW LOW 30 MIN: CPT | Performed by: PHYSICAL MEDICINE & REHABILITATION

## 2017-09-29 NOTE — PATIENT INSTRUCTIONS
1) Take gabapentin 300mg twice a day on Saturday. If this does not cause any side effects during the day then increase the dose to 300mg three times a day.  Take 300mg three times a day for one week, and if needed you can increase the nighttime dose to 600m and they must present an ID as well. • The name of the person picking up your prescription must be documented in your chart.   Scheduling Tests    If your physician has ordered radiology tests such as MRI or CT scans, do not schedule the test until this of

## 2017-09-29 NOTE — H&P
Biju Angeles MD  [facility] [address] [place], [place] [address]  Kleber Kate MD     PHYSICAL MEDICINE and REHABILITATION CONSULTATION    Chief Complaint: left sided neck pain    HPI: Mary Larry is a 61year old female who presents with [employer] affected:  Supposed to take a family vacation a week from tomorrow. The vacation is about a week long. In general she tries to keep moving to prevent stiffness.     Previous diagnostic tests:  MRI C-spine  Previous treatments: Medi predniSONE 10 MG Oral Tab Take 6 tabsx 2 day, take 5 tabsx 2 day, take 4 tabsx 2 day,take 3 tabsx 2 day, take 2 tabsx 2 day, take 2  tabsx 2 day, then off Disp: 42 tablet Rfl: 0   TraMADol HCl 50 MG Oral Tab Take 1 tablet (50 mg total) by mouth every 8 (ei methotrexate 2.5 MG Oral Tab Take 7 tablets (17.5 mg total) by mouth once a week. Disp: 105 tablet Rfl: 1   leflunomide 20 MG Oral Tab Take 1 tablet (20 mg total) by mouth daily.  Disp: 90 tablet Rfl: 1   Naltrexone HCl Does not apply Powder 4.5mg a day , c Pain Score: The pain is currently  10/10. General/Mental Status: Middle aged  female of normal body habitus. Patient appears stated age, answers my questions appropriately, alert and oriented to person, place, and time.   Skin: No rash and ulcer Patient Active Problem List:     Psoriatic arthritis ([address]     Hypercholesterolemia     Colon polyps     Diverticulosis     Internal hemorrhoids     Tortuous colon     Vagal reaction       Impression/plan:    #1 cervical radiculopathy.   She has mixed findi

## 2017-10-02 ENCOUNTER — APPOINTMENT (OUTPATIENT)
Dept: LAB | Facility: HOSPITAL | Age: 57
End: 2017-10-02
Attending: INTERNAL MEDICINE
Payer: COMMERCIAL

## 2017-10-02 DIAGNOSIS — R79.89 ELEVATED LIVER FUNCTION TESTS: ICD-10-CM

## 2017-10-02 PROCEDURE — 80076 HEPATIC FUNCTION PANEL: CPT

## 2017-10-02 PROCEDURE — 36415 COLL VENOUS BLD VENIPUNCTURE: CPT

## 2017-10-02 NOTE — TELEPHONE ENCOUNTER
Spoke to patient, states no relief from taking extra Neurontin. LOV 9/29/17 and instructed to increase dose. Patient wants to verify should be taking 300 mg TID. Patient states she took 500 mg last pm and had no relief.  Takes hydrocodone 5 mg HS, does no

## 2017-10-02 NOTE — TELEPHONE ENCOUNTER
I contacted the patient, she has already taken doses of 900mg at nighttime without any side effects. She does not have numbness and tingling in the left arm anymore but continues to have neck, upper trap, bicep and axillary pain.  She has not been taking No

## 2017-10-02 NOTE — TELEPHONE ENCOUNTER
Patient states she is following the recommenced treatment plan from Dr. Felix Calles and is having side effects  From medication stomach upset and was in intolerable pain yesterday evening.  Would like to call to change treatment plan

## 2017-10-06 RX ORDER — GABAPENTIN 300 MG/1
900 CAPSULE ORAL NIGHTLY
Qty: 90 CAPSULE | Refills: 1 | Status: SHIPPED | OUTPATIENT
Start: 2017-10-06 | End: 2018-01-09

## 2017-10-06 NOTE — TELEPHONE ENCOUNTER
Dr Brenda Espino told patient to take 900mg daily. Her previous script was from Dr Garcia Listen, she is asking for a new script from Dr Brenda Espino with the new dosage. She said she called twice yesterday to see if her script was ready.  Sundar Antonellahaja is OUT of medication an

## 2017-10-25 RX ORDER — TRAMADOL HYDROCHLORIDE 50 MG/1
TABLET ORAL
Qty: 90 TABLET | Refills: 0 | Status: SHIPPED | OUTPATIENT
Start: 2017-10-25 | End: 2017-10-27

## 2017-10-25 RX ORDER — PREDNISONE 10 MG/1
TABLET ORAL
Qty: 42 TABLET | Refills: 0 | Status: SHIPPED | OUTPATIENT
Start: 2017-10-25 | End: 2017-11-03

## 2017-10-25 RX ORDER — CYCLOBENZAPRINE HCL 5 MG
TABLET ORAL
Qty: 30 TABLET | Refills: 0 | Status: SHIPPED | OUTPATIENT
Start: 2017-10-25 | End: 2017-12-29 | Stop reason: ALTCHOICE

## 2017-10-25 RX ORDER — ETANERCEPT 50 MG/ML
SOLUTION SUBCUTANEOUS
Qty: 3.92 ML | Refills: 0 | OUTPATIENT
Start: 2017-10-25

## 2017-10-25 RX ORDER — AMOXICILLIN AND CLAVULANATE POTASSIUM 875; 125 MG/1; MG/1
TABLET, FILM COATED ORAL
Qty: 20 TABLET | Refills: 0 | OUTPATIENT
Start: 2017-10-25

## 2017-10-25 RX ORDER — OMEPRAZOLE 20 MG/1
CAPSULE, DELAYED RELEASE ORAL
Qty: 180 CAPSULE | Refills: 3 | Status: SHIPPED | OUTPATIENT
Start: 2017-10-25 | End: 2020-04-23

## 2017-10-25 NOTE — TELEPHONE ENCOUNTER
Pt calling states she is currently taking norco for pain for bulging disc. Pt states she does not need a refill on tramadol she does not find it helps with her pain as well as the norco. Pt states please do not refill it.

## 2017-10-25 NOTE — TELEPHONE ENCOUNTER
LOV:9-20  Last Filled:8-7, #4 with 6 refills was sent to [place] [id number] with pharmacist Vicki Dumont and they have RX for Enbrel from 8-7.   Future Appointments  Date Time Provider [place]   11/3/2017 9:00 AM DO Roshan Cobb

## 2017-10-25 NOTE — TELEPHONE ENCOUNTER
LOV:9-20  Last Filled:Tramadol 50mg #90 with 0 refill on 9-14, Prednisone 10mg # 42 with 0 refill on 9-14,  Cyclobenzaprine #30 with 0 refill on 9-20,Omeprazole 2-2, #180 with 3 refills  Labs:   Future Appointments  Date Time Provider [place] Romo

## 2017-10-25 NOTE — TELEPHONE ENCOUNTER
Need to call in tramadol 50mg every 8 hours with 1 refill - ok to fax it to her pharmacy - there is duplicate script - cancel one.    Thanks

## 2017-10-26 RX ORDER — PREDNISONE 20 MG/1
TABLET ORAL
Qty: 10 TABLET | Refills: 0 | OUTPATIENT
Start: 2017-10-26

## 2017-10-27 RX ORDER — AZITHROMYCIN 250 MG/1
TABLET, FILM COATED ORAL
Qty: 6 TABLET | Refills: 0 | OUTPATIENT
Start: 2017-10-27

## 2017-10-30 RX ORDER — AZITHROMYCIN 250 MG/1
TABLET, FILM COATED ORAL
Qty: 6 TABLET | Refills: 0 | OUTPATIENT
Start: 2017-10-30

## 2017-11-02 ENCOUNTER — TELEPHONE (OUTPATIENT)
Dept: RHEUMATOLOGY | Facility: CLINIC | Age: 57
End: 2017-11-02

## 2017-11-02 NOTE — TELEPHONE ENCOUNTER
Pt's pharmacy called in requesting clarification on the instructions for following medication:      Current Outpatient Prescriptions:   •  PREDNISONE 10 MG Oral Tab, TAKE 6 BY MOUTH X 2 DAYS, 5 BY MOUTH X 2 DAYS, 4 BY MOUTH X 2 DAYS, 3 X 2 DAYS, 2 X 2 DAYS

## 2017-11-03 ENCOUNTER — TELEPHONE (OUTPATIENT)
Dept: RHEUMATOLOGY | Facility: CLINIC | Age: 57
End: 2017-11-03

## 2017-11-03 ENCOUNTER — OFFICE VISIT (OUTPATIENT)
Dept: NEUROLOGY | Facility: CLINIC | Age: 57
End: 2017-11-03

## 2017-11-03 VITALS
RESPIRATION RATE: 16 BRPM | BODY MASS INDEX: 24 KG/M2 | HEART RATE: 80 BPM | SYSTOLIC BLOOD PRESSURE: 148 MMHG | WEIGHT: 135 LBS | DIASTOLIC BLOOD PRESSURE: 72 MMHG

## 2017-11-03 DIAGNOSIS — M54.12 CERVICAL RADICULOPATHY AT C7: Primary | ICD-10-CM

## 2017-11-03 DIAGNOSIS — M25.511 TRIGGER POINT OF RIGHT SHOULDER REGION: ICD-10-CM

## 2017-11-03 PROCEDURE — 99213 OFFICE O/P EST LOW 20 MIN: CPT | Performed by: PHYSICAL MEDICINE & REHABILITATION

## 2017-11-03 RX ORDER — GABAPENTIN 600 MG/1
600 TABLET ORAL NIGHTLY
Qty: 60 TABLET | Refills: 3 | Status: SHIPPED | OUTPATIENT
Start: 2017-11-03 | End: 2017-12-29 | Stop reason: ALTCHOICE

## 2017-11-03 RX ORDER — PREDNISONE 1 MG/1
1 TABLET ORAL
COMMUNITY
End: 2017-12-11 | Stop reason: CLARIF

## 2017-11-03 NOTE — TELEPHONE ENCOUNTER
Pt does not need tramadol refilled at this time. Pt changed pharmacy to mail order. She only needs daily medications refilled and prednisone.

## 2017-11-03 NOTE — PATIENT INSTRUCTIONS
1) Perform exercises 1 and 2, 5 reps per set, 2 sets. Do this once or twice a day while you are waiting to get into physical therapy. 2) You may continue gabapentin 300mg 2 tabs at nighttime.     3) Focus on improving your posture    4) Go ahead and get

## 2017-11-03 NOTE — TELEPHONE ENCOUNTER
LOV: 9/20/174  No future appointments. Requesting tramadol 50 mg 1 PO every 8 hours #90 last refill 9/14/17. At last office pt was given script for Norco. Please advise.

## 2017-11-03 NOTE — PROGRESS NOTES
No referring provider defined for this encounter. José Manuel Ochoa MD    Chief Complaint: Neck pain  HPI:  Erendira Pizarro is a 61year old female with h/o RA who presents with complaints of Neck Pain (LOV 9/29/17. Patient has not done physical therapy.  She Musculoskeletal Examination  Ambulation: Normal  Observation: No obvious atrophy in the shoulder, upper extremity, or lower extremity muscles  Palpation: Pain with palpation of the left upper traps with multiple trigger points.  No trigger points in the L c We also discussed the role of epidural steroid injections.   I instructed her to follow-up with me as needed at the completion of therapy, and the deciding factor to proceeding with a epidural steroid injection will be if she has worsening pain that interfe

## 2017-11-06 RX ORDER — PREDNISONE 10 MG/1
TABLET ORAL
Qty: 42 TABLET | Refills: 0 | Status: SHIPPED | OUTPATIENT
Start: 2017-11-06 | End: 2017-11-17

## 2017-11-10 ENCOUNTER — OFFICE VISIT (OUTPATIENT)
Dept: PHYSICAL THERAPY | Facility: HOSPITAL | Age: 57
End: 2017-11-10
Attending: PHYSICAL MEDICINE & REHABILITATION
Payer: COMMERCIAL

## 2017-11-10 DIAGNOSIS — M54.12 CERVICAL RADICULOPATHY AT C7: ICD-10-CM

## 2017-11-10 PROCEDURE — 97162 PT EVAL MOD COMPLEX 30 MIN: CPT

## 2017-11-10 PROCEDURE — 97110 THERAPEUTIC EXERCISES: CPT

## 2017-11-10 NOTE — PROGRESS NOTES
CERVICAL SPINE EVALUATION:   Referring Physician: Denilson Bravo DO    Date of Onset: 1-2 months ago  Date of Service: 11/10/17   Diagnosis: Cervical radiculopathy at C7 (M54.12)   SUBJECTIVE:   PATIENT SUMMARY:  Marian Worrell is a 61year old y/o fem Observation/Posture: Poor posture - rounded shoulders forward head    Cervical AROM:  Pain (+/-)   Flexion Min loss + B neck   Extension Mod loss + L neck   R Sidebend Mod loss + L neck   L Sidebend Mod loss + L neck   R Rotation Mod loss + L neck   L Ro difficulty. Frequency/Duration: Patient will be seen for 2x/week or a total of 8 visits over a 90 day period. Treatment will include: Manual Therapy; Therapeutic Exercises; Neuromuscular Re-education;  Therapeutic Activity; Traction; Patient education; H

## 2017-11-13 ENCOUNTER — OFFICE VISIT (OUTPATIENT)
Dept: PHYSICAL THERAPY | Facility: HOSPITAL | Age: 57
End: 2017-11-13
Attending: PHYSICAL MEDICINE & REHABILITATION
Payer: COMMERCIAL

## 2017-11-13 DIAGNOSIS — M54.12 CERVICAL RADICULOPATHY AT C7: ICD-10-CM

## 2017-11-13 PROCEDURE — 97110 THERAPEUTIC EXERCISES: CPT

## 2017-11-13 NOTE — PROGRESS NOTES
Diagnosis: Cervical radiculopathy at C7 (M54.12)  Authorized # of Visits:  2/8         Next MD visit: none scheduled  Fall Risk: standard         Precautions:  cervical radiculopathy, fibromyalgia,  psoriatic arthritis colon polyps, hypercholesterolemia, R table using own hand under head due to arthritis, used sling to support head instead  for HEP . Goals:  1. Patient will be independent with HEP, it's progression, and self-management of their symptoms  2.  Patient will demo cervical AROM min loss or bett

## 2017-11-15 NOTE — TELEPHONE ENCOUNTER
LOV: 9/20/17 LR 11/9/16 25g with 3 refills  Future Appointments  Date Time Provider [place]   11/28/2017 7:30 AM Rahat Adam, PT [facility]   12/1/2017 7:30 AM Rahat Adam, PT [facility]   12/4/2017 6:45 PM Markie Contreras

## 2017-11-17 ENCOUNTER — APPOINTMENT (OUTPATIENT)
Dept: PHYSICAL THERAPY | Facility: HOSPITAL | Age: 57
End: 2017-11-17
Attending: PHYSICAL MEDICINE & REHABILITATION
Payer: COMMERCIAL

## 2017-11-20 RX ORDER — PREDNISONE 10 MG/1
TABLET ORAL
Qty: 42 TABLET | Refills: 0 | Status: SHIPPED | OUTPATIENT
Start: 2017-11-20 | End: 2017-12-11

## 2017-11-21 ENCOUNTER — APPOINTMENT (OUTPATIENT)
Dept: PHYSICAL THERAPY | Facility: HOSPITAL | Age: 57
End: 2017-11-21
Attending: PHYSICAL MEDICINE & REHABILITATION
Payer: COMMERCIAL

## 2017-11-21 NOTE — TELEPHONE ENCOUNTER
LOV: 9-20-17  Last Refilled:#42, 0rfs 11/6/17    Future Appointments  Date Time Provider [place]one   11/28/2017 7:30 AM Jj Cardenas, PT [address] [facility] [address]   12/1/2017 7:30 AM Jj Cardenas, PT [address] [facility] [address]   12/4/2017 6:45 PM Juventino Chaney, PT [address] [facility] [address]   12/6/2017 6:45 AM Jj Cardenas, PT [facility]       Please advise.

## 2017-11-28 ENCOUNTER — OFFICE VISIT (OUTPATIENT)
Dept: PHYSICAL THERAPY | Facility: HOSPITAL | Age: 57
End: 2017-11-28
Attending: PHYSICAL MEDICINE & REHABILITATION
Payer: COMMERCIAL

## 2017-11-28 DIAGNOSIS — M54.12 CERVICAL RADICULOPATHY AT C7: ICD-10-CM

## 2017-11-28 PROCEDURE — 97110 THERAPEUTIC EXERCISES: CPT | Performed by: PHYSICAL THERAPIST

## 2017-11-28 NOTE — PROGRESS NOTES
Diagnosis: Cervical radiculopathy at C7 (M54.12)  Authorized # of Visits:  3/8         Next MD visit: none scheduled  Fall Risk: standard         Precautions:  cervical radiculopathy, fibromyalgia,  psoriatic arthritis colon polyps, hypercholesterolemia, R visit    Charges: TEx3       Total Timed Treatment: 40 min  Total Treatment Time: 40 min

## 2017-12-01 ENCOUNTER — OFFICE VISIT (OUTPATIENT)
Dept: PHYSICAL THERAPY | Facility: HOSPITAL | Age: 57
End: 2017-12-01
Attending: PHYSICAL MEDICINE & REHABILITATION
Payer: COMMERCIAL

## 2017-12-01 DIAGNOSIS — M54.12 CERVICAL RADICULOPATHY AT C7: ICD-10-CM

## 2017-12-01 PROCEDURE — 97110 THERAPEUTIC EXERCISES: CPT | Performed by: PHYSICAL THERAPIST

## 2017-12-01 NOTE — PROGRESS NOTES
Diagnosis: Cervical radiculopathy at C7 (M54.12)  Authorized # of Visits:  4/8         Next MD visit: none scheduled  Fall Risk: standard         Precautions:  cervical radiculopathy, fibromyalgia,  psoriatic arthritis colon polyps, hypercholesterolemia, R

## 2017-12-04 RX ORDER — LEFLUNOMIDE 20 MG/1
20 TABLET ORAL DAILY
Qty: 90 TABLET | Refills: 1 | Status: SHIPPED | OUTPATIENT
Start: 2017-12-04 | End: 2017-12-11

## 2017-12-04 NOTE — TELEPHONE ENCOUNTER
LOV: 9/20/17  Future Appointments  Date Time Provider [place]   12/4/2017 6:45 PM Parveen Muro, PT [facility]   12/6/2017 6:45 AM Marilin Osborne, PT [facility] PT [facility]   12/12/2017 6:00 AM Marilin Osborne, PT [facility] PT [facility]   12/15/201

## 2017-12-04 NOTE — TELEPHONE ENCOUNTER
Pt would like a refill on leflupmide 20 milligrams. Pharmacy: [facility] [phone] per pt she needs a 90 day supply. Please, call pt with any questions. Per pt she has been out of medication for a month.        Cu  leflunomide 20 MG Oral Tab Take

## 2017-12-05 ENCOUNTER — TELEPHONE (OUTPATIENT)
Dept: NEUROLOGY | Facility: CLINIC | Age: 57
End: 2017-12-05

## 2017-12-05 RX ORDER — GABAPENTIN 300 MG/1
CAPSULE ORAL
Qty: 90 CAPSULE | Refills: 0 | OUTPATIENT
Start: 2017-12-05

## 2017-12-06 ENCOUNTER — APPOINTMENT (OUTPATIENT)
Dept: PHYSICAL THERAPY | Facility: HOSPITAL | Age: 57
End: 2017-12-06
Attending: PHYSICAL MEDICINE & REHABILITATION
Payer: COMMERCIAL

## 2017-12-11 ENCOUNTER — OFFICE VISIT (OUTPATIENT)
Dept: RHEUMATOLOGY | Facility: CLINIC | Age: 57
End: 2017-12-11

## 2017-12-11 ENCOUNTER — LAB ENCOUNTER (OUTPATIENT)
Dept: LAB | Facility: HOSPITAL | Age: 57
End: 2017-12-11
Attending: INTERNAL MEDICINE
Payer: COMMERCIAL

## 2017-12-11 VITALS
HEART RATE: 81 BPM | BODY MASS INDEX: 23.39 KG/M2 | SYSTOLIC BLOOD PRESSURE: 128 MMHG | WEIGHT: 132 LBS | HEIGHT: 63 IN | DIASTOLIC BLOOD PRESSURE: 82 MMHG

## 2017-12-11 DIAGNOSIS — Z51.81 ENCOUNTER FOR THERAPEUTIC DRUG MONITORING: ICD-10-CM

## 2017-12-11 DIAGNOSIS — L40.50 PSORIATIC ARTHRITIS (HCC): Primary | ICD-10-CM

## 2017-12-11 DIAGNOSIS — L40.50 PSORIATIC ARTHRITIS (HCC): ICD-10-CM

## 2017-12-11 PROCEDURE — 82565 ASSAY OF CREATININE: CPT

## 2017-12-11 PROCEDURE — 86140 C-REACTIVE PROTEIN: CPT

## 2017-12-11 PROCEDURE — 85652 RBC SED RATE AUTOMATED: CPT

## 2017-12-11 PROCEDURE — 99214 OFFICE O/P EST MOD 30 MIN: CPT | Performed by: INTERNAL MEDICINE

## 2017-12-11 PROCEDURE — 84450 TRANSFERASE (AST) (SGOT): CPT

## 2017-12-11 PROCEDURE — 84460 ALANINE AMINO (ALT) (SGPT): CPT

## 2017-12-11 PROCEDURE — 82040 ASSAY OF SERUM ALBUMIN: CPT

## 2017-12-11 PROCEDURE — 85025 COMPLETE CBC W/AUTO DIFF WBC: CPT

## 2017-12-11 PROCEDURE — 36415 COLL VENOUS BLD VENIPUNCTURE: CPT

## 2017-12-11 PROCEDURE — 99212 OFFICE O/P EST SF 10 MIN: CPT | Performed by: INTERNAL MEDICINE

## 2017-12-11 RX ORDER — LEFLUNOMIDE 20 MG/1
20 TABLET ORAL DAILY
Qty: 90 TABLET | Refills: 1 | Status: SHIPPED | OUTPATIENT
Start: 2017-12-11 | End: 2019-02-08

## 2017-12-11 RX ORDER — PREDNISONE 10 MG/1
TABLET ORAL AS NEEDED
Refills: 0 | COMMUNITY
Start: 2017-11-20 | End: 2018-02-12

## 2017-12-12 ENCOUNTER — APPOINTMENT (OUTPATIENT)
Dept: PHYSICAL THERAPY | Facility: HOSPITAL | Age: 57
End: 2017-12-12
Attending: PHYSICAL THERAPIST
Payer: COMMERCIAL

## 2017-12-12 NOTE — PATIENT INSTRUCTIONS
1. Cont. methotrexate   0.5ml a week   2. Cont. enbrel 50mg a week   3. Stop naltresxone  4. Prednisone burst as needed. 5. Cont. gabepntin 300mg at night - 2 tablets at night - start only 1 tablet at night   6. Restart  leflunomdie 20mg  aday -   7.  Ch

## 2017-12-12 NOTE — PROGRESS NOTES
Chandnikennedi Eid is a 61year old female. HPI:   Patient presents with:  Psoriatic Arthritis  Fatigue      I had the pleasure of seeing your patient Mrs. Joaquín Cain on 12/11/2017. I had last seen her on 9/20/2017. I had last seen her on 5/12/2017.  I h called 2 days ago and started prednisoen burst 60mg over 6 days. She has bad pain in her right forearm and right 3rd finger. Her left 2-4th mtps are still hurting. It's better on prednisoen right now. The right 3rd finger bothers her for a while.    She found out the they had a script in November that wasn't sent to me to be refille. if the height of her computer can be better. She felt her left shoulder pain got better. She did get better with gabpentin and pain killers. She is really tired.  Her f SURECLICK) 50 MG/ML Subcutaneous Solution Auto-injector Inject 50 mg into the skin once a week.  Disp: 4 Syringe Rfl: 6   FOLIC ACID 1 MG Oral Tab TAKE 2 (2MG TOTAL) BY MOUTH ONCE DAILY Disp: 180 tablet Rfl: 3   atorvastatin 40 MG Oral Tab TAKE 1 TABLET BY 80.0 - 100.0 fL  89.2   MCH      27.0 - 32.0 pg  29.8   MCHC      32.0 - 37.0 g/dl  33.4   RDW      11.0 - 15.0 %  14.5   Platelet Count      124 - 400 K/UL  372   MEAN PLATELET VOLUME      7.4 - 10.3 fL  9.0   Neutrophils %      %  63   Lymphocytes % - d/w her - she is seeing dr. Jessica Daugherty - she will talk to him about this -she's better now - better after taking cholesterol medication -   She was on lanoxin b/c she has hx of SVT when her kids were little. She's fine with that for years now.    - f/u with gi

## 2017-12-15 ENCOUNTER — TELEPHONE (OUTPATIENT)
Dept: NEUROLOGY | Facility: CLINIC | Age: 57
End: 2017-12-15

## 2017-12-15 ENCOUNTER — OFFICE VISIT (OUTPATIENT)
Dept: PHYSICAL THERAPY | Facility: HOSPITAL | Age: 57
End: 2017-12-15
Attending: PHYSICAL THERAPIST
Payer: COMMERCIAL

## 2017-12-15 NOTE — PROGRESS NOTES
Pt arrives at clinic, declines treatment, stating she is feeling better and controlling symptoms well with HEP. She also states her employer is refusing to accommodate her work station per PT recommendations, so she is requesting a note from Dr Malinda Plata.  I

## 2017-12-18 ENCOUNTER — TELEPHONE (OUTPATIENT)
Dept: RHEUMATOLOGY | Facility: CLINIC | Age: 57
End: 2017-12-18

## 2017-12-18 NOTE — TELEPHONE ENCOUNTER
PATIENT DROPPED OFF [employer] FORM AND PATIENT IS REQUESTING THAT DR. SAENZ WRITE A LETTER STATING SHE CANNOT MEDICALLY SIT THROUGH [employer] DUE TO HER ILLNESS. PLEASE FAX TO THE NUMBER ON THE FRONT OF SHEET AND ADD THE JURY NUMBER ON IT ALSO.

## 2017-12-19 ENCOUNTER — APPOINTMENT (OUTPATIENT)
Dept: PHYSICAL THERAPY | Facility: HOSPITAL | Age: 57
End: 2017-12-19
Attending: PHYSICAL THERAPIST
Payer: COMMERCIAL

## 2017-12-20 ENCOUNTER — APPOINTMENT (OUTPATIENT)
Dept: PHYSICAL THERAPY | Facility: HOSPITAL | Age: 57
End: 2017-12-20
Attending: PHYSICAL THERAPIST
Payer: COMMERCIAL

## 2017-12-29 ENCOUNTER — OFFICE VISIT (OUTPATIENT)
Dept: INTERNAL MEDICINE CLINIC | Facility: CLINIC | Age: 57
End: 2017-12-29

## 2017-12-29 ENCOUNTER — APPOINTMENT (OUTPATIENT)
Dept: PHYSICAL THERAPY | Facility: HOSPITAL | Age: 57
End: 2017-12-29
Attending: PHYSICAL THERAPIST
Payer: COMMERCIAL

## 2017-12-29 VITALS
BODY MASS INDEX: 24.27 KG/M2 | SYSTOLIC BLOOD PRESSURE: 136 MMHG | HEART RATE: 78 BPM | WEIGHT: 137 LBS | HEIGHT: 63 IN | DIASTOLIC BLOOD PRESSURE: 68 MMHG

## 2017-12-29 DIAGNOSIS — L98.9 SKIN LESION: Primary | ICD-10-CM

## 2017-12-29 PROCEDURE — 99212 OFFICE O/P EST SF 10 MIN: CPT | Performed by: INTERNAL MEDICINE

## 2017-12-29 PROCEDURE — 99213 OFFICE O/P EST LOW 20 MIN: CPT | Performed by: INTERNAL MEDICINE

## 2017-12-29 NOTE — PROGRESS NOTES
Claire Grover is a 61year old female. Patient presents with:  Spot: Pt c/o a brown spot left side of her top lip for about 6 months with some bleeding     HPI:   For approximately 6 months she has had a small skin lesion above her left upper lip.   After tablet (4 mg total) by mouth every 8 (eight) hours as needed for Nausea.  Disp: 30 tablet Rfl: 0       Adhesive Tape              Past Medical History:   Diagnosis Date   • Cervical radiculopathy at C7 9/29/2017   • Colon polyps February 2010   • Hyperchole

## 2018-01-02 ENCOUNTER — APPOINTMENT (OUTPATIENT)
Dept: PHYSICAL THERAPY | Facility: HOSPITAL | Age: 58
End: 2018-01-02
Payer: COMMERCIAL

## 2018-01-02 ENCOUNTER — APPOINTMENT (OUTPATIENT)
Dept: PHYSICAL THERAPY | Facility: HOSPITAL | Age: 58
End: 2018-01-02
Attending: PHYSICAL MEDICINE & REHABILITATION
Payer: COMMERCIAL

## 2018-01-05 ENCOUNTER — APPOINTMENT (OUTPATIENT)
Dept: PHYSICAL THERAPY | Facility: HOSPITAL | Age: 58
End: 2018-01-05
Payer: COMMERCIAL

## 2018-01-09 RX ORDER — GABAPENTIN 300 MG/1
CAPSULE ORAL
Qty: 90 CAPSULE | Refills: 5 | Status: SHIPPED | OUTPATIENT
Start: 2018-01-09 | End: 2019-02-03

## 2018-01-09 NOTE — TELEPHONE ENCOUNTER
Pt calling requesting refill, 90 day supply if possible. Pt is out of meds. Current Outpatient Prescriptions:  gabapentin 300 MG Oral Cap Take 3 capsules (900 mg total) by mouth nightly. 1-2 tablets at night as needed. (Patient taking differently:  Ta

## 2018-01-09 NOTE — TELEPHONE ENCOUNTER
LOV: 12/11/17  Future Appointments  Date Time Provider [place]alyi   1/26/2018 10:20 AM Didier Georges MD [facility]   2/1/2018 8:00 AM Yared Fonseca MD [facility]     Labs:

## 2018-01-10 ENCOUNTER — TELEPHONE (OUTPATIENT)
Dept: RHEUMATOLOGY | Facility: CLINIC | Age: 58
End: 2018-01-10

## 2018-01-10 NOTE — TELEPHONE ENCOUNTER
Received a fax from [facility] that they have been trying to reach the patient for the next Enbrel delivery. Left message on her voice mail to call them at [phone].

## 2018-01-26 ENCOUNTER — OFFICE VISIT (OUTPATIENT)
Dept: OBGYN CLINIC | Facility: CLINIC | Age: 58
End: 2018-01-26

## 2018-01-26 ENCOUNTER — TELEPHONE (OUTPATIENT)
Dept: OBGYN CLINIC | Facility: CLINIC | Age: 58
End: 2018-01-26

## 2018-01-26 VITALS
SYSTOLIC BLOOD PRESSURE: 138 MMHG | WEIGHT: 134 LBS | DIASTOLIC BLOOD PRESSURE: 77 MMHG | BODY MASS INDEX: 24 KG/M2 | HEART RATE: 64 BPM

## 2018-01-26 DIAGNOSIS — Z87.448 HISTORY OF CYSTOCELE: Primary | ICD-10-CM

## 2018-01-26 DIAGNOSIS — N81.12 CYSTOCELE, LATERAL: Primary | ICD-10-CM

## 2018-01-26 PROCEDURE — 99203 OFFICE O/P NEW LOW 30 MIN: CPT | Performed by: OBSTETRICS & GYNECOLOGY

## 2018-01-26 NOTE — PROGRESS NOTES
Charlette Guajardo is a 61year old female  No LMP recorded. Patient is not currently having periods (Reason: Partial Hysterectomy).  Patient presents with:  Gyn Exam: Pt says that she had a Cystoseal about 30 years ago and she has noticed urinary issues,u Number of children: N/A     Occupational History  None on file     Social History Main Topics   Smoking status: Former Smoker  0.50 Packs/day  For 37.00 Years     Types: Cigarettes    Quit date: 8/16/2012    Smokeless tobacco: Former User    Comment: [address] 30 tablet, Rfl: 2    ALLERGIES:    Adhesive Tape                 Review of Systems:  Constitutional:  Denies fevers or chills   Gastrointestinal:  denies nausea, vomiting, diarrhea or constipation  Genitourinary:  denies dysuria, incontinence, abnormal vag

## 2018-01-29 NOTE — TELEPHONE ENCOUNTER
Lmtcb. Patient's stat referral in process. Patient can monitor the approval via my chart. Info routed via [facility].

## 2018-02-01 ENCOUNTER — OFFICE VISIT (OUTPATIENT)
Dept: DERMATOLOGY CLINIC | Facility: CLINIC | Age: 58
End: 2018-02-01

## 2018-02-01 ENCOUNTER — TELEPHONE (OUTPATIENT)
Dept: DERMATOLOGY CLINIC | Facility: CLINIC | Age: 58
End: 2018-02-01

## 2018-02-01 DIAGNOSIS — D48.5 NEOPLASM OF UNCERTAIN BEHAVIOR OF SKIN: Primary | ICD-10-CM

## 2018-02-01 DIAGNOSIS — L57.8 SUN-DAMAGED SKIN: ICD-10-CM

## 2018-02-01 PROCEDURE — 99212 OFFICE O/P EST SF 10 MIN: CPT | Performed by: DERMATOLOGY

## 2018-02-01 PROCEDURE — 88305 TISSUE EXAM BY PATHOLOGIST: CPT | Performed by: DERMATOLOGY

## 2018-02-01 PROCEDURE — 11100 BIOPSY OF SKIN LESION: CPT | Performed by: DERMATOLOGY

## 2018-02-01 PROCEDURE — 99201 OFFICE/OUTPT VISIT,NEW,LEVL I: CPT | Performed by: DERMATOLOGY

## 2018-02-01 RX ORDER — METHOTREXATE 25 MG/ML
INJECTION, SOLUTION INTRA-ARTERIAL; INTRAMUSCULAR; INTRAVENOUS
Refills: 3 | COMMUNITY
Start: 2017-12-28 | End: 2018-06-07

## 2018-02-01 NOTE — TELEPHONE ENCOUNTER
Pt contacted. Pt had shave bx done today near lip and pt is having trouble keeping bandaid in place.  Advised pt to try to keep area covered with bandaid ([place] bandaid cut in half) for at least 24 hours and after 24 hours she can use thick layer of vaseli

## 2018-02-01 NOTE — PROGRESS NOTES
HPI:     Chief Complaint     Lesion        HPI     Lesion    Additional comments: Former pt of [place]- Pt presenting with red pigmented lesion to upper lip that tends to get bigger at times and will bleed.  Pt notes had precancer moles removed in the past. Leroy Mayfield Delayed Release TAKE ONE CAPSULE BY MOUTH TWICE DAILY BEFORE A MEAL Disp: 180 capsule Rfl: 3   Etanercept (ENBREL SURECLICK) 50 MG/ML Subcutaneous Solution Auto-injector Inject 50 mg into the skin once a week.  Disp: 4 Syringe Rfl: 6   FOLIC ACID 1 MG Oral Sexual activity: Not on file     Other Topics Concern    Pt has a pacemaker No    Pt has a defibrillator No    Reaction to local anesthetic No     Social History Narrative   None on file     Family History   Problem Relation Age of Onset   • Colon Cancer M

## 2018-02-01 NOTE — PROGRESS NOTES
Past Medical History:   Diagnosis Date   • Cervical radiculopathy at C7 9/29/2017   • Colon polyps February 2010   • Hypercholesterolemia    • Psoriatic arthritis [facility])    Past Surgical History:  No date: COLONOSCOPY  7/7/2017: COLONOSCOPY N/A      Comment

## 2018-02-05 NOTE — PROGRESS NOTES
2018 derm path report result logged in log book and in [address].  Copy of path report was faxed to Dr. Bhavana Barrios office with pt's name,  and contact information (address and telephone number)

## 2018-02-12 RX ORDER — PREDNISONE 10 MG/1
TABLET ORAL
Qty: 42 TABLET | Refills: 0 | Status: SHIPPED | OUTPATIENT
Start: 2018-02-12 | End: 2018-02-16

## 2018-02-16 RX ORDER — PREDNISONE 10 MG/1
TABLET ORAL
Qty: 42 TABLET | Refills: 0 | Status: SHIPPED | OUTPATIENT
Start: 2018-02-16 | End: 2018-09-15

## 2018-02-16 NOTE — TELEPHONE ENCOUNTER
LOV: 12/11/17  Last Refilled:#25g, 3rfs 11/15/17    Future Appointments  Date Time Provider [place]   4/27/2018 9:00 AM Omaira Vaughn DO [address]       Please advise.

## 2018-02-26 RX ORDER — ETANERCEPT 50 MG/ML
SOLUTION SUBCUTANEOUS
Qty: 4 ML | Refills: 6 | Status: SHIPPED | OUTPATIENT
Start: 2018-02-26 | End: 2018-05-21

## 2018-02-26 NOTE — TELEPHONE ENCOUNTER
LOV:12-11  Last Filled:8-7, #4 with 6 refills  Labs:   Future Appointments  Date Time Provider [place]   4/27/2018 9:00 AM Neli Ramos DO [address]       Please Advise

## 2018-03-27 ENCOUNTER — TELEPHONE (OUTPATIENT)
Dept: INTERNAL MEDICINE CLINIC | Facility: CLINIC | Age: 58
End: 2018-03-27

## 2018-03-27 DIAGNOSIS — C44.90 SKIN CANCER: Primary | ICD-10-CM

## 2018-03-27 NOTE — TELEPHONE ENCOUNTER
Dr Tho Moreland office called requesting a referral for pt's upcoming viist r/t skin cx. Dr Tho Moreland office is requesting CPT codes [id number], 25625, and [phone] be added to the pt's referral. Please sign referral if you agree.  Thank you, Managed Care

## 2018-03-28 RX ORDER — ONDANSETRON 4 MG/1
4 TABLET, FILM COATED ORAL EVERY 8 HOURS PRN
Qty: 30 TABLET | Refills: 0 | Status: SHIPPED | OUTPATIENT
Start: 2018-03-28 | End: 2019-09-13

## 2018-03-28 NOTE — TELEPHONE ENCOUNTER
Please see below and advise.     LOV: 12/11/17  Future Appointments  Date Time Provider [place]   4/19/2018 3:10 PM Leah Whitley MD [facility] [place]   4/27/2018 9:00 AM Kelsie Pereyra DO [facility] [address] [place] [address]

## 2018-03-28 NOTE — TELEPHONE ENCOUNTER
Pt requesting refill on meds, melt away, dissolvable tablet  below and states out of meds and would like meds to be refill at the [facility] in [place] on [address] on file.     •  Ondansetron HCl (ZOFRAN) 4 mg tablet, Take 1 tablet (4 mg total) by mouth ever

## 2018-04-18 ENCOUNTER — LAB ENCOUNTER (OUTPATIENT)
Dept: LAB | Facility: HOSPITAL | Age: 58
End: 2018-04-18
Attending: INTERNAL MEDICINE
Payer: COMMERCIAL

## 2018-04-18 DIAGNOSIS — L40.50 PSORIATIC ARTHRITIS (HCC): ICD-10-CM

## 2018-04-18 DIAGNOSIS — Z51.81 ENCOUNTER FOR THERAPEUTIC DRUG MONITORING: ICD-10-CM

## 2018-04-18 PROCEDURE — 82040 ASSAY OF SERUM ALBUMIN: CPT

## 2018-04-18 PROCEDURE — 86140 C-REACTIVE PROTEIN: CPT

## 2018-04-18 PROCEDURE — 36415 COLL VENOUS BLD VENIPUNCTURE: CPT

## 2018-04-18 PROCEDURE — 85652 RBC SED RATE AUTOMATED: CPT

## 2018-04-18 PROCEDURE — 84460 ALANINE AMINO (ALT) (SGPT): CPT

## 2018-04-18 PROCEDURE — 84450 TRANSFERASE (AST) (SGOT): CPT

## 2018-04-18 PROCEDURE — 82565 ASSAY OF CREATININE: CPT

## 2018-04-18 PROCEDURE — 85025 COMPLETE CBC W/AUTO DIFF WBC: CPT

## 2018-04-19 ENCOUNTER — OFFICE VISIT (OUTPATIENT)
Dept: RHEUMATOLOGY | Facility: CLINIC | Age: 58
End: 2018-04-19

## 2018-04-19 VITALS
HEART RATE: 79 BPM | WEIGHT: 135 LBS | BODY MASS INDEX: 23.92 KG/M2 | HEIGHT: 63 IN | DIASTOLIC BLOOD PRESSURE: 95 MMHG | SYSTOLIC BLOOD PRESSURE: 166 MMHG

## 2018-04-19 DIAGNOSIS — Z51.81 ENCOUNTER FOR THERAPEUTIC DRUG MONITORING: ICD-10-CM

## 2018-04-19 DIAGNOSIS — L40.50 PSORIATIC ARTHRITIS (HCC): Primary | ICD-10-CM

## 2018-04-19 DIAGNOSIS — M79.7 FIBROMYALGIA: ICD-10-CM

## 2018-04-19 DIAGNOSIS — M54.12 CERVICAL RADICULOPATHY: ICD-10-CM

## 2018-04-19 PROCEDURE — 99212 OFFICE O/P EST SF 10 MIN: CPT | Performed by: INTERNAL MEDICINE

## 2018-04-19 PROCEDURE — 99214 OFFICE O/P EST MOD 30 MIN: CPT | Performed by: INTERNAL MEDICINE

## 2018-04-19 RX ORDER — ONDANSETRON 4 MG/1
TABLET, ORALLY DISINTEGRATING ORAL AS NEEDED
Refills: 0 | COMMUNITY
Start: 2018-03-28 | End: 2018-07-14 | Stop reason: ALTCHOICE

## 2018-04-19 RX ORDER — CEPHALEXIN 250 MG/1
CAPSULE ORAL
Refills: 0 | COMMUNITY
Start: 2018-04-13 | End: 2018-05-21

## 2018-04-19 NOTE — PATIENT INSTRUCTIONS
1. Cont. methotrexate   0.5ml sq a week   2. Stop enbrel 50mg a week will see how you do on this   3. Cont. Naltrexone -   4. Prednisone burst as needed. 5. Cont. gabepntin 200mg at night -  3 tablets of 100mg at night   6.cont.   leflunomdie 20mg  aday

## 2018-04-19 NOTE — PROGRESS NOTES
Eve Lockwood is a 61year old female. HPI:   Patient presents with:  Psoriatic Arthritis       I had the pleasure of seeing your patient Mrs. Eduin Mirza on 4/19/2018. I had last seen her on 12/11/2017. I had last seen her on 9/20/2017.  I had last some neck pain    She hurst all over and she feels that her fibromyalgia. 12/11/2017  She's been off leflunomide for 4-5 weeks.  No refill was requested before 12/4 -- but she found out the they had a script in November that wasn't sent to me to be refi EVERY 7 DAYS Disp:  Rfl: 3   gabapentin 300 MG Oral Cap Take 3 PO Q HS. Disp: 90 capsule Rfl: 5   leflunomide 20 MG Oral Tab Take 1 tablet (20 mg total) by mouth daily. Disp: 90 tablet Rfl: 1   Ergocalciferol (VITAMIN D OR) Take by mouth.  One 5,000unit tab hand -     Component      Latest Ref Rng & Units 10/2/2017 9/14/2017   WBC      4.0 - 11.0 K/UL  15.9 (H)   RBC      3.70 - 5.40 M/UL  4.15   Hemoglobin      12.0 - 16.0 g/dL  12.3   Hematocrit      35.0 - 48.0 %  37.0   MCV      80.0 - 100.0 fL  89.2   MC not this onoe    - asked her to stop methotrexaet 17.mg a week sub cu - b/c of elevated liver funtion tests - asked her to check lfts in 10 days - when normal -     - Topical steroid for rash - dermatitis over hands improving with mtx,    2.  Intermittent -

## 2018-04-26 ENCOUNTER — TELEPHONE (OUTPATIENT)
Dept: RHEUMATOLOGY | Facility: CLINIC | Age: 58
End: 2018-04-26

## 2018-04-26 NOTE — TELEPHONE ENCOUNTER
Phoned patient and left a message on her voice mail that the form is ready for  at the [facility] .

## 2018-04-27 ENCOUNTER — OFFICE VISIT (OUTPATIENT)
Dept: UROLOGY | Facility: HOSPITAL | Age: 58
End: 2018-04-27
Attending: OBSTETRICS & GYNECOLOGY
Payer: COMMERCIAL

## 2018-04-27 VITALS
SYSTOLIC BLOOD PRESSURE: 116 MMHG | HEIGHT: 63 IN | BODY MASS INDEX: 23.92 KG/M2 | WEIGHT: 135 LBS | DIASTOLIC BLOOD PRESSURE: 78 MMHG

## 2018-04-27 DIAGNOSIS — N81.84 PELVIC MUSCLE WASTING: ICD-10-CM

## 2018-04-27 DIAGNOSIS — N95.2 POSTMENOPAUSAL ATROPHIC VAGINITIS: Primary | ICD-10-CM

## 2018-04-27 DIAGNOSIS — N99.3 PROLAPSE OF VAGINAL VAULT AFTER HYSTERECTOMY: ICD-10-CM

## 2018-04-27 DIAGNOSIS — R35.1 NOCTURIA: ICD-10-CM

## 2018-04-27 PROCEDURE — 87086 URINE CULTURE/COLONY COUNT: CPT | Performed by: OBSTETRICS & GYNECOLOGY

## 2018-04-27 PROCEDURE — 99201 HC OUTPT EVAL AND MGNT NEW PT LEVEL 1: CPT

## 2018-04-27 PROCEDURE — 81002 URINALYSIS NONAUTO W/O SCOPE: CPT

## 2018-04-27 RX ORDER — ESTRADIOL 0.1 MG/G
CREAM VAGINAL
Qty: 1 TUBE | Refills: 3 | Status: SHIPPED | OUTPATIENT
Start: 2018-04-27 | End: 2021-04-02 | Stop reason: ALTCHOICE

## 2018-04-27 NOTE — PATIENT INSTRUCTIONS
[facility]    PELVIC MUSCLE EXERCISES [facility])    The muscles that surround the vagina help to support the pelvic organs and maintain bladder and bowel control are called the “pelvic floor muscles”.   Exercises for these musc effort in an active squeeze in order to strengthen the muscles. It is better to maintain a strong active squeeze for a short period of time that to flick the muscle on and off for any period of time.   You will need to work up to a full 10-second squeeze g

## 2018-04-27 NOTE — PROGRESS NOTES
Brandon Navy,   4/27/2018     Referred by Dr. Clifford Justice    Patient presents with:  Prolapse: noticed 6 months ago      HPI:  Some YASMANI  +UUI, increased urinary urgency & nocturia  No sense of incomplete emptying  +prolapse, bulge - feels opening expanding sometimes less than 1/2 pk day  Alcohol use: Yes           0.0 oz/week     Comment: Occasional       Allergies:    Adhesive Tape               Medications:    Outpatient Medications Prior to Visit:  Ondansetron HCl (ZOFRAN) 4 mg tablet Take 1 tablet (4 mg facility-administered medications prior to visit.      Urogynecology Summary:  Urogynecology Summary  Prolapse: Yes  YASMANI: Yes  Urge Incontinence: Yes  Nocturia Frequency: 3  Frequency: 2 - 3 hours  Constipation: No (daily BM)  Wears pad day?:  (\"not yet\") pelvic organ damage, recurrent prolapse, recurrent stress incontinence, de josé miguel OAB, pain, sexual dysfunction, voiding dysfunction, long-term catheterization and re-operation. Post-op restrictions and expectations reviewed.   Discussed dietary and behavior

## 2018-05-18 ENCOUNTER — TELEPHONE (OUTPATIENT)
Dept: RHEUMATOLOGY | Facility: CLINIC | Age: 58
End: 2018-05-18

## 2018-05-18 NOTE — TELEPHONE ENCOUNTER
Pt called and states Dr Christiano Pickens had pt stop taking Enbrel   Because she had developed some skin cancer  Pt states Dr was going to talk to her colleges and see what alternative might work   Pt states she hates taking the prednisone   But states she will

## 2018-05-21 ENCOUNTER — TELEPHONE (OUTPATIENT)
Dept: OBGYN CLINIC | Facility: CLINIC | Age: 58
End: 2018-05-21

## 2018-05-21 ENCOUNTER — OFFICE VISIT (OUTPATIENT)
Dept: UROLOGY | Facility: HOSPITAL | Age: 58
End: 2018-05-21
Attending: OBSTETRICS & GYNECOLOGY
Payer: COMMERCIAL

## 2018-05-21 VITALS
BODY MASS INDEX: 23.92 KG/M2 | HEIGHT: 63 IN | DIASTOLIC BLOOD PRESSURE: 78 MMHG | SYSTOLIC BLOOD PRESSURE: 120 MMHG | WEIGHT: 135 LBS

## 2018-05-21 DIAGNOSIS — N39.41 URGE INCONTINENCE: ICD-10-CM

## 2018-05-21 DIAGNOSIS — R35.1 NOCTURIA: ICD-10-CM

## 2018-05-21 DIAGNOSIS — N95.2 POSTMENOPAUSAL ATROPHIC VAGINITIS: Primary | ICD-10-CM

## 2018-05-21 DIAGNOSIS — N99.3 PROLAPSE OF VAGINAL VAULT AFTER HYSTERECTOMY: ICD-10-CM

## 2018-05-21 DIAGNOSIS — N81.84 PELVIC MUSCLE WASTING: Primary | ICD-10-CM

## 2018-05-21 DIAGNOSIS — N39.3 FEMALE STRESS INCONTINENCE: ICD-10-CM

## 2018-05-21 DIAGNOSIS — N81.12 LATERAL CYSTOCELE: ICD-10-CM

## 2018-05-21 PROCEDURE — 51729 CYSTOMETROGRAM W/VP&UP: CPT

## 2018-05-21 PROCEDURE — 51797 INTRAABDOMINAL PRESSURE TEST: CPT

## 2018-05-21 PROCEDURE — 51784 ANAL/URINARY MUSCLE STUDY: CPT

## 2018-05-21 PROCEDURE — 51741 ELECTRO-UROFLOWMETRY FIRST: CPT

## 2018-05-21 NOTE — PROCEDURES
Patient here for urodynamic testing. Procedure explained and confirmed by patient. See evaluation form for results. Both verbal and written discharge instructions were given.   Patient tolerated procedure well and will follow up with Dr. Edouard Doe TWICE DAILY BEFORE A MEAL Disp: 180 capsule Rfl: 3   FOLIC ACID 1 MG Oral Tab TAKE 2 (2MG TOTAL) BY MOUTH ONCE DAILY Disp: 180 tablet Rfl: 3   atorvastatin 40 MG Oral Tab TAKE 1 TABLET BY MOUTH EVERY NIGHT AT BEDTIME Disp: 90 tablet Rfl: 1   Tuberculin-All Coughing  [x]  Filling  []  Heel Bounce  []  Running Water [x]  Valsalva  []  Hand washing []  Other    Additional Notes:  After first detrusor contraction, bladder was filled at a slower rate of 30mL/min.  Pt did better with filling after this, but showed

## 2018-05-21 NOTE — PATIENT INSTRUCTIONS
[facility]  [address] [place] [address],[address]  [place], [address] [place]  Office: [phone]      Urodynamic Testing Discharge Instructions: There are NO dietary or activity restrictions. You may resume your normal schedule.       You may hav drive after 1-2 weeks.     Please call us for any of the following:  · Temperature above 100.5 for 4 hours or above 101.0 at any time  · Chest pain or trouble breathing  · Vaginal bleeding heavier than a period  · Redness, tenderness or swelling of your leg up.  · Drink plenty of water (6-8 glasses) slowly throughout the day. · Avoid liquids containing caffeine. · Continue with any pain medications (Motrin) as directed by the nurse. · Continue with your current bowel regime; avoid constipation.     What if

## 2018-05-24 NOTE — TELEPHONE ENCOUNTER
D/w her that ok to restart enbrel - even after the basal cell cancer -   She will make a follow up with dr. Madonna Guerrero -   She want to hold for right now and do ketogenic diet

## 2018-05-29 ENCOUNTER — OFFICE VISIT (OUTPATIENT)
Dept: UROLOGY | Facility: HOSPITAL | Age: 58
End: 2018-05-29
Attending: OBSTETRICS & GYNECOLOGY
Payer: COMMERCIAL

## 2018-05-29 VITALS
WEIGHT: 135 LBS | DIASTOLIC BLOOD PRESSURE: 64 MMHG | HEIGHT: 63 IN | SYSTOLIC BLOOD PRESSURE: 102 MMHG | BODY MASS INDEX: 23.92 KG/M2

## 2018-05-29 DIAGNOSIS — N39.3 FEMALE STRESS INCONTINENCE: ICD-10-CM

## 2018-05-29 DIAGNOSIS — N32.81 DETRUSOR INSTABILITY OF BLADDER: Primary | ICD-10-CM

## 2018-05-29 DIAGNOSIS — R35.1 NOCTURIA: ICD-10-CM

## 2018-05-29 DIAGNOSIS — N39.41 URGE INCONTINENCE: ICD-10-CM

## 2018-05-29 DIAGNOSIS — N99.3 PROLAPSE OF VAGINAL VAULT AFTER HYSTERECTOMY: ICD-10-CM

## 2018-05-29 PROCEDURE — 99211 OFF/OP EST MAY X REQ PHY/QHP: CPT

## 2018-05-29 RX ORDER — OXYBUTYNIN CHLORIDE 5 MG/1
5 TABLET, EXTENDED RELEASE ORAL DAILY
Qty: 90 TABLET | Refills: 3 | Status: SHIPPED | OUTPATIENT
Start: 2018-05-29 | End: 2019-09-13

## 2018-05-29 NOTE — PROGRESS NOTES
Pt presents w/ initial c/o bulge, UI  Urodynamic testing undergone without complication.   Results reviewed with patient  471/70cc & 329/4cc  + DO @ 45cc  jail 153cc  + YASMANI @100cc    Discussed with patient mgmt options for POP, DO, YASMANI  She is most bothered

## 2018-06-07 RX ORDER — METHOTREXATE 25 MG/ML
INJECTION, SOLUTION INTRA-ARTERIAL; INTRAMUSCULAR; INTRAVENOUS
Qty: 4 VIAL | Refills: 3 | Status: SHIPPED | OUTPATIENT
Start: 2018-06-07 | End: 2020-02-27

## 2018-06-07 NOTE — TELEPHONE ENCOUNTER
Please see message below about Enbrel, request not rec'd at this time.     LOV: 4/19/18  Future Appointments  Date Time Provider [place]   8/23/2018 3:45 PM Yefri Bowman MD [facility]     Labs:   Component      Latest Ref Rng & Units

## 2018-06-07 NOTE — TELEPHONE ENCOUNTER
Pt kathy she received her Enbrel but pt's daughter put medication in the freezer. Pt [employer] company will be contacting office for a new replacement prescription.  Pt is also requesting script for Methotrexate Sodium 50 MG/2ML Injection Solution

## 2018-06-11 NOTE — TELEPHONE ENCOUNTER
[employer] calling to get 4 Enbrel SureClicks prescriptions replaced due to defective products and states can be faxed to .

## 2018-06-14 RX ORDER — METHOTREXATE 25 MG/ML
INJECTION INTRA-ARTERIAL; INTRAMUSCULAR; INTRATHECAL; INTRAVENOUS
Refills: 3 | OUTPATIENT
Start: 2018-06-14

## 2018-06-22 RX ORDER — ATORVASTATIN CALCIUM 40 MG/1
TABLET, FILM COATED ORAL
Qty: 90 TABLET | Refills: 0 | Status: SHIPPED | OUTPATIENT
Start: 2018-06-22 | End: 2018-09-18

## 2018-07-03 ENCOUNTER — TELEPHONE (OUTPATIENT)
Dept: RHEUMATOLOGY | Facility: CLINIC | Age: 58
End: 2018-07-03

## 2018-07-03 NOTE — TELEPHONE ENCOUNTER
Pharmacy requesting one time replacement for QTY 4 ENBREL SURECLICK 50 MG/ML     Last ones  were defective        FAX: [phone]    Confirmed [address]  Pt out of meds

## 2018-07-14 ENCOUNTER — APPOINTMENT (OUTPATIENT)
Dept: LAB | Facility: HOSPITAL | Age: 58
End: 2018-07-14
Attending: INTERNAL MEDICINE
Payer: COMMERCIAL

## 2018-07-14 ENCOUNTER — OFFICE VISIT (OUTPATIENT)
Dept: INTERNAL MEDICINE CLINIC | Facility: CLINIC | Age: 58
End: 2018-07-14

## 2018-07-14 VITALS
BODY MASS INDEX: 23.14 KG/M2 | TEMPERATURE: 98 F | WEIGHT: 130.63 LBS | DIASTOLIC BLOOD PRESSURE: 79 MMHG | HEIGHT: 63 IN | HEART RATE: 65 BPM | SYSTOLIC BLOOD PRESSURE: 116 MMHG

## 2018-07-14 DIAGNOSIS — D12.6 ADENOMATOUS POLYP OF COLON, UNSPECIFIED PART OF COLON: ICD-10-CM

## 2018-07-14 DIAGNOSIS — Z00.00 ANNUAL PHYSICAL EXAM: Primary | ICD-10-CM

## 2018-07-14 DIAGNOSIS — Z00.00 ANNUAL PHYSICAL EXAM: ICD-10-CM

## 2018-07-14 DIAGNOSIS — Z00.00 ENCOUNTER FOR ANNUAL HEALTH EXAMINATION: ICD-10-CM

## 2018-07-14 DIAGNOSIS — E78.00 HYPERCHOLESTEROLEMIA: ICD-10-CM

## 2018-07-14 DIAGNOSIS — L40.50 PSORIATIC ARTHRITIS (HCC): Chronic | ICD-10-CM

## 2018-07-14 PROBLEM — Q43.8 TORTUOUS COLON: Status: RESOLVED | Noted: 2017-07-07 | Resolved: 2018-07-14

## 2018-07-14 PROBLEM — R55 VAGAL REACTION: Status: RESOLVED | Noted: 2017-07-07 | Resolved: 2018-07-14

## 2018-07-14 PROBLEM — K64.8 INTERNAL HEMORRHOIDS: Status: RESOLVED | Noted: 2017-07-07 | Resolved: 2018-07-14

## 2018-07-14 LAB
ALBUMIN SERPL BCP-MCNC: 3.9 G/DL (ref 3.5–4.8)
ALBUMIN/GLOB SERPL: 1.4 {RATIO} (ref 1–2)
ALP SERPL-CCNC: 68 U/L (ref 32–100)
ALT SERPL-CCNC: 11 U/L (ref 14–54)
ANION GAP SERPL CALC-SCNC: 8 MMOL/L (ref 0–18)
AST SERPL-CCNC: 21 U/L (ref 15–41)
BILIRUB SERPL-MCNC: 0.5 MG/DL (ref 0.3–1.2)
BUN SERPL-MCNC: 10 MG/DL (ref 8–20)
BUN/CREAT SERPL: 14.7 (ref 10–20)
CALCIUM SERPL-MCNC: 9.5 MG/DL (ref 8.5–10.5)
CHLORIDE SERPL-SCNC: 104 MMOL/L (ref 95–110)
CHOLEST SERPL-MCNC: 173 MG/DL (ref 110–200)
CO2 SERPL-SCNC: 28 MMOL/L (ref 22–32)
CREAT SERPL-MCNC: 0.68 MG/DL (ref 0.5–1.5)
GLOBULIN PLAS-MCNC: 2.8 G/DL (ref 2.5–3.7)
GLUCOSE SERPL-MCNC: 90 MG/DL (ref 70–99)
HDLC SERPL-MCNC: 48 MG/DL
LDLC SERPL CALC-MCNC: 99 MG/DL (ref 0–99)
NONHDLC SERPL-MCNC: 125 MG/DL
OSMOLALITY UR CALC.SUM OF ELEC: 289 MOSM/KG (ref 275–295)
PATIENT FASTING: YES
POTASSIUM SERPL-SCNC: 4.4 MMOL/L (ref 3.3–5.1)
PROT SERPL-MCNC: 6.7 G/DL (ref 5.9–8.4)
SODIUM SERPL-SCNC: 140 MMOL/L (ref 136–144)
TRIGL SERPL-MCNC: 129 MG/DL (ref 1–149)

## 2018-07-14 PROCEDURE — 99396 PREV VISIT EST AGE 40-64: CPT | Performed by: INTERNAL MEDICINE

## 2018-07-14 PROCEDURE — 80053 COMPREHEN METABOLIC PANEL: CPT

## 2018-07-14 PROCEDURE — 80061 LIPID PANEL: CPT

## 2018-07-14 PROCEDURE — 36415 COLL VENOUS BLD VENIPUNCTURE: CPT

## 2018-07-14 RX ORDER — METHOTREXATE 25 MG/ML
INJECTION INTRA-ARTERIAL; INTRAMUSCULAR; INTRATHECAL; INTRAVENOUS
Refills: 2 | COMMUNITY
Start: 2018-07-11 | End: 2018-07-14

## 2018-07-14 NOTE — PATIENT INSTRUCTIONS
Await results of today's blood tests. Please schedule mammogram.  Continue current medications. Continue follow-up with Rheumatology and with Dermatology. Remember to get a flu shot every fall. Continue healthy diet and regular exercise.   Annual physic non-screening if indicated for medical reasons Electrocardiogram date Routine EKG is not a screening covered service except at the [place] to Medicare Visit    Abdominal aortic aneurysm screening (once between ages 73-68) IPPE only No results found for [facility] results found for: CHLAMYDIA No flowsheet data found.     Screening Mammogram      Mammogram    Recommend Annually to at least age 76, and as needed after 76 Mammogram due on 11/14/2014 Please get this Mammogram regularly   Immunizations      Influenza  Cov home computer and printer. (the forms are also available in [address])  [email or web address]  This link also has information from the [facility] regarding Advance Directives.

## 2018-07-14 NOTE — PROGRESS NOTES
HPI:   Debora Montgomery is a 61year old female who presents this morning for a [employer] physical.    She feels well. No specific issues for discussion today.   She follows regularly with Rheumatology for treatment of psoriatic arthritis, and will beg She has difficulties Shopping for Groceries based on screening of functional status. She has difficulties Taking Meds as Rx'd based on screening of functional status.        She has difficulties Affording Meds based on screening of functional status Lab Results  Component Value Date   AST 19 04/18/2018   ALT 11 (L) 04/18/2018   CA 9.4 05/12/2017   ALB 4.1 04/18/2018   TSH 1.09 05/01/2015   CREATSERUM 0.87 04/18/2018   GLU 74 05/12/2017        CBC  (most recent labs)     Lab Results  Component Valu 25 MG Oral Tab Take 1 tablet (25 mg total) by mouth 3 (three) times daily as needed. MEDICAL INFORMATION:   She  has a past medical history of Basal cell carcinoma (02/01/2018); Cervical radiculopathy at C7 (9/29/2017); Colon polyps (February 2010);  H AWV/SWV)           Visual Acuity                             EXAM:   GENERAL: Pleasant female appearing well in no distress  /79 (BP Location: Right arm, Cuff Size: adult)   Pulse 65   Temp 98.1 °F (36.7 °C) (Oral)   Ht 5' 3\" (1.6 m)   Wt 130 lb 9.6 unspecified part of colon  Up-to-date on colonoscopy         Diet assessment: good     PLAN:  The patient indicates understanding of these issues and agrees to the plan. Reinforced healthy diet, lifestyle, and exercise. No Follow-up on file.      Virginia Veliz CHLAMYDIA No flowsheet data found.     Screening Mammogram      Mammogram Annually to 75, then as discussed Mammogram due on 11/14/2014 Update Health Maintenance if applicable     Immunizations (Update Immunization Activity if applicable)     Influenza  Cov [address] [[id number]]

## 2018-08-23 ENCOUNTER — OFFICE VISIT (OUTPATIENT)
Dept: DERMATOLOGY CLINIC | Facility: CLINIC | Age: 58
End: 2018-08-23

## 2018-08-23 DIAGNOSIS — L57.8 SUN-DAMAGED SKIN: ICD-10-CM

## 2018-08-23 DIAGNOSIS — Z85.828 HISTORY OF BASAL CELL CARCINOMA (BCC): ICD-10-CM

## 2018-08-23 DIAGNOSIS — D48.5 NEOPLASM OF UNCERTAIN BEHAVIOR OF SKIN: Primary | ICD-10-CM

## 2018-08-23 DIAGNOSIS — D23.60 BENIGN NEOPLASM OF SKIN OF UPPER EXTREMITY AND SHOULDER, UNSPECIFIED LATERALITY: ICD-10-CM

## 2018-08-23 DIAGNOSIS — D23.5 BENIGN NEOPLASM OF SKIN OF TRUNK, EXCEPT SCROTUM: ICD-10-CM

## 2018-08-23 DIAGNOSIS — L82.1 SEBORRHEIC KERATOSES: ICD-10-CM

## 2018-08-23 DIAGNOSIS — D23.4 BENIGN NEOPLASM OF SCALP AND SKIN OF NECK: ICD-10-CM

## 2018-08-23 DIAGNOSIS — D23.30 BENIGN NEOPLASM OF SKIN OF FACE: ICD-10-CM

## 2018-08-23 DIAGNOSIS — D23.70 BENIGN NEOPLASM OF SKIN OF LOWER EXTREMITY, INCLUDING HIP, UNSPECIFIED LATERALITY: ICD-10-CM

## 2018-08-23 PROCEDURE — 99213 OFFICE O/P EST LOW 20 MIN: CPT | Performed by: DERMATOLOGY

## 2018-08-23 PROCEDURE — 11100 BIOPSY OF SKIN LESION: CPT | Performed by: DERMATOLOGY

## 2018-08-23 PROCEDURE — 88305 TISSUE EXAM BY PATHOLOGIST: CPT | Performed by: DERMATOLOGY

## 2018-08-23 PROCEDURE — 99212 OFFICE O/P EST SF 10 MIN: CPT | Performed by: DERMATOLOGY

## 2018-08-23 RX ORDER — METHOTREXATE 25 MG/ML
INJECTION INTRA-ARTERIAL; INTRAMUSCULAR; INTRATHECAL; INTRAVENOUS
Refills: 2 | COMMUNITY
Start: 2018-08-07 | End: 2018-10-29

## 2018-08-23 RX ORDER — ETANERCEPT 50 MG/ML
SOLUTION SUBCUTANEOUS WEEKLY
Refills: 5 | COMMUNITY
Start: 2018-08-07 | End: 2019-02-06

## 2018-08-23 RX ORDER — DESONIDE 0.5 MG/G
CREAM TOPICAL
Qty: 30 G | Refills: 2 | Status: SHIPPED | OUTPATIENT
Start: 2018-08-23 | End: 2019-09-13 | Stop reason: ALTCHOICE

## 2018-08-23 NOTE — PROGRESS NOTES
HPI:     Chief Complaint     Full Skin Exam        HPI     Full Skin Exam    Additional comments: LOV: 02/01/18. Pt presents for a full skin exam. Pt has personal hx of BCC.         Last edited by Arjnu Boyd on 8/23/2018  3:45 PM. (History) AS DIRECTED EVERY 7 DAYS Disp: 4 vial Rfl: 3   Oxybutynin Chloride ER 5 MG Oral Tablet 24 Hr Take 1 tablet (5 mg total) by mouth daily. Disp: 90 tablet Rfl: 3   Estradiol (ESTRACE) 0.1 MG/GM Vaginal Cream Apply 1/2 gram vaginally 2 times per week.  Disp: 1 Hesham Barbosa MD;  Location: [address] [place] [address]                ENDOSCOPY  2/12/10: Costa Yanez      Comment: [facility] Right      Comment: Carole Gan  03/2018: OTHER      Comment lip  - melanocytic nevi are mostly uniform in color, shape and borders.   There is however a 6 mm pigmented macule at the right sacral area just above the buttock that does have an irregular upper edge as well as some asymmetry on dermoscopy.  -there are sc unspecified laterality  Benign neoplasm of skin of lower extremity, including hip, unspecified laterality    No orders of the defined types were placed in this encounter.       Results From Past 48 Hours:  No results found for this or any previous visit (fr

## 2018-09-14 ENCOUNTER — TELEPHONE (OUTPATIENT)
Dept: RHEUMATOLOGY | Facility: CLINIC | Age: 58
End: 2018-09-14

## 2018-09-14 NOTE — TELEPHONE ENCOUNTER
Pt states she would like Dr to know she is in pain and is now using a compression sleeve. Pt states she is taking prednisone for a week.  Please advise, pt would like to speak with RN

## 2018-09-14 NOTE — TELEPHONE ENCOUNTER
Please see below. Pt reports right elbow pain in has been going on for a while. . She cannot lift, it is extremely difficult. She is having difficulty lifting and grasping objects. Reports she had to take prednisone 20 mg for a few days last week.  She took

## 2018-09-15 RX ORDER — PREDNISONE 1 MG/1
TABLET ORAL
Qty: 63 TABLET | Refills: 0 | Status: SHIPPED | OUTPATIENT
Start: 2018-09-15 | End: 2019-09-13 | Stop reason: ALTCHOICE

## 2018-09-15 NOTE — TELEPHONE ENCOUNTER
Talked to pt. - her right forearm is hurting - tendonitis -it's going on for a couple ofmonths - it's bothering her.    D/w her to take prednisone 5mg = Take 6 tabsx 1 day, take 5 tabsx 1 day, take 4 tabsx 1 day,take 3 tabsx 1 day,  Then stay on 2 tablets a

## 2018-09-18 RX ORDER — ATORVASTATIN CALCIUM 40 MG/1
TABLET, FILM COATED ORAL
Qty: 90 TABLET | Refills: 3 | Status: SHIPPED | OUTPATIENT
Start: 2018-09-18 | End: 2019-09-13

## 2018-09-24 ENCOUNTER — TELEPHONE (OUTPATIENT)
Dept: RHEUMATOLOGY | Facility: CLINIC | Age: 58
End: 2018-09-24

## 2018-09-24 RX ORDER — FOLIC ACID 1 MG/1
TABLET ORAL
Qty: 180 TABLET | Refills: 3 | Status: SHIPPED | OUTPATIENT
Start: 2018-09-24 | End: 2020-09-15

## 2018-09-24 RX ORDER — PREDNISONE 10 MG/1
TABLET ORAL
Qty: 21 TABLET | Refills: 1 | Status: SHIPPED | OUTPATIENT
Start: 2018-09-24 | End: 2020-09-15

## 2018-09-24 NOTE — TELEPHONE ENCOUNTER
Pt states she is still having flares up on her right elbow and both feet would like to speak to Rn in regards to this. Per pt the 10mg is not helping her needs to be taking the 20mg medication to help her flare up.   Pt is able to talk after 7p.m. this is t

## 2018-09-24 NOTE — TELEPHONE ENCOUNTER
Pt reports she is experiencing pain in both feet and right forearm since tapering down on steroid 'burst'. She reports throbbing, aching pain in feet; she denies swelling, redness, and warmth to feet.  Pt states she is having some swelling and warmth to rig

## 2018-09-24 NOTE — TELEPHONE ENCOUNTER
Attempted to contact pt to follow up on phone call. [phone]

## 2018-09-25 NOTE — TELEPHONE ENCOUNTER
When she tapered the prednisone 30mg to 20mg a day - she fetl more elbow pain - the 10mg wasn' tdoing anything. The forearm is tender and swollen. Her fingernails are looking more psoriatic.    She  Is good with trying another burst - prednisone 10mg -

## 2018-10-29 ENCOUNTER — TELEPHONE (OUTPATIENT)
Dept: RHEUMATOLOGY | Facility: CLINIC | Age: 58
End: 2018-10-29

## 2018-10-29 ENCOUNTER — OFFICE VISIT (OUTPATIENT)
Dept: INTERNAL MEDICINE CLINIC | Facility: CLINIC | Age: 58
End: 2018-10-29

## 2018-10-29 VITALS
SYSTOLIC BLOOD PRESSURE: 144 MMHG | HEIGHT: 63 IN | BODY MASS INDEX: 23.74 KG/M2 | HEART RATE: 63 BPM | DIASTOLIC BLOOD PRESSURE: 78 MMHG | WEIGHT: 134 LBS

## 2018-10-29 DIAGNOSIS — L40.50 PSORIATIC ARTHRITIS (HCC): Primary | ICD-10-CM

## 2018-10-29 DIAGNOSIS — Z51.81 ENCOUNTER FOR THERAPEUTIC DRUG MONITORING: ICD-10-CM

## 2018-10-29 DIAGNOSIS — L02.213 CUTANEOUS ABSCESS OF CHEST WALL: Primary | ICD-10-CM

## 2018-10-29 PROCEDURE — 99212 OFFICE O/P EST SF 10 MIN: CPT | Performed by: INTERNAL MEDICINE

## 2018-10-29 PROCEDURE — 99213 OFFICE O/P EST LOW 20 MIN: CPT | Performed by: INTERNAL MEDICINE

## 2018-10-29 RX ORDER — CLINDAMYCIN HYDROCHLORIDE 300 MG/1
300 CAPSULE ORAL 2 TIMES DAILY
Qty: 6 CAPSULE | Refills: 0 | Status: SHIPPED | OUTPATIENT
Start: 2018-10-29 | End: 2018-11-01

## 2018-10-29 NOTE — PROGRESS NOTES
Eve Lockwood is a 61year old female.  Patient presents with:  Abscess (integumentary): C/o of an abscess on the right breast since Oct 1st.  Also, another abscess on the upper part of the abdomen    HPI:   About 4 weeks ago, she developed a \"boil\" on Methotrexate Sodium 50 MG/2ML Injection Solution INJECT 17.5MG AS DIRECTED EVERY 7 DAYS Disp: 4 vial Rfl: 3   Oxybutynin Chloride ER 5 MG Oral Tablet 24 Hr Take 1 tablet (5 mg total) by mouth daily.  Disp: 90 tablet Rfl: 3   Estradiol (ESTRACE) 0.1 MG/GM Performed by Essence Murrell MD at [facility]   • [facility]  2/12/10    Performed by Nanette Bob at [facility]   • OTHER  03/2018    Mohs surgery left upper lip BCCa   • VAGINAL

## 2018-10-29 NOTE — PATIENT INSTRUCTIONS
Please apply a warm compress twice daily to the affected areas. Please take clindamycin 300 mg twice daily for 3 days. Call if no better.

## 2018-11-02 ENCOUNTER — OFFICE VISIT (OUTPATIENT)
Dept: RHEUMATOLOGY | Facility: CLINIC | Age: 58
End: 2018-11-02

## 2018-11-02 VITALS
SYSTOLIC BLOOD PRESSURE: 179 MMHG | WEIGHT: 135 LBS | DIASTOLIC BLOOD PRESSURE: 105 MMHG | BODY MASS INDEX: 23.92 KG/M2 | HEIGHT: 63 IN | HEART RATE: 55 BPM

## 2018-11-02 DIAGNOSIS — L40.50 PSORIATIC ARTHRITIS (HCC): Primary | ICD-10-CM

## 2018-11-02 DIAGNOSIS — M77.11 LATERAL EPICONDYLITIS OF RIGHT ELBOW: ICD-10-CM

## 2018-11-02 DIAGNOSIS — Z51.81 ENCOUNTER FOR THERAPEUTIC DRUG MONITORING: ICD-10-CM

## 2018-11-02 DIAGNOSIS — M79.7 FIBROMYALGIA: ICD-10-CM

## 2018-11-02 PROCEDURE — 99212 OFFICE O/P EST SF 10 MIN: CPT | Performed by: INTERNAL MEDICINE

## 2018-11-02 PROCEDURE — 20551 NJX 1 TENDON ORIGIN/INSJ: CPT | Performed by: INTERNAL MEDICINE

## 2018-11-02 PROCEDURE — 99214 OFFICE O/P EST MOD 30 MIN: CPT | Performed by: INTERNAL MEDICINE

## 2018-11-02 RX ORDER — TRIAMCINOLONE ACETONIDE 40 MG/ML
20 INJECTION, SUSPENSION INTRA-ARTICULAR; INTRAMUSCULAR ONCE
Status: COMPLETED | OUTPATIENT
Start: 2018-11-02 | End: 2018-11-02

## 2018-11-02 RX ADMIN — TRIAMCINOLONE ACETONIDE 20 MG: 40 INJECTION, SUSPENSION INTRA-ARTICULAR; INTRAMUSCULAR at 15:57:00

## 2018-11-02 NOTE — PATIENT INSTRUCTIONS
1. Cont. methotrexate   0.5ml sq a week   2. Stop enbrel 50mg a week  3. Cont. Naltrexone -   4. Prednisone burst as needed. 5. Cont. gabepntin 200mg at night -  3 tablets of 100mg at night   6.cont. leflunomdie 20mg  aday -   7.  Check labs every 3 mon

## 2018-11-02 NOTE — PROGRESS NOTES
Ramirez Torres is a 61year old female. HPI:   Patient presents with:  Psoriatic Arthritis  Elbow Pain: right, x2 months       I had the pleasure of seeing your patient Mrs. Juve Greer on 11/2/2018. I had last seen her on 4/19/2018.  I had last seen acid 1 MG Oral Tab TAKE 2 (2MG TOTAL) BY MOUTH ONCE DAILY Disp: 180 tablet Rfl: 3   ATORVASTATIN 40 MG Oral Tab TAKE 1 TABLET BY MOUTH EVERY NIGHT AT BEDTIME Disp: 90 tablet Rfl: 3   ENBREL SURECLICK 50 MG/ML Subcutaneous Solution Auto-injector once a week Disp: 21 tablet Rfl: 1   predniSONE 5 MG Oral Tab Take 6 tabs x 1 day, take 5 tabs x 1 day, take 4 tabs x 1 day, take 3 tabs x 1 day, then stay on 2 tablets a day Disp: 63 tablet Rfl: 0       Allergies:    Adhesive Tape                 ROS:   All other ROS mg/dL 173   Triglycerides      1 - 149 mg/dL 129   NON HDL CHOL      <130 mg/dL 125   LDL Cholesterol Calc      0 - 99 mg/dL 99     ASSESSMENT/PLAN:     1. Psoriatic arthritis ([facility] [address]  1. psoriatic arthritis -  Stable, doing well now   - cont.  enbrel 50mg a quanitferon gold neg  5/2017 - quantiferon gold negaitve. Got flu shot - 10/2017 -     5. Gastritis/ibs - on probiotics - will renew her VSL #3 daily - seems to really help her be off steroids.    6. Fibromyalgia - was controlled with low dose LDN - off t

## 2018-11-02 NOTE — PROCEDURES
With  consent, I injected the patient's right lateral epicondylitis  with 0.5ml lidocaine  1% and 0.5ml kenalog 40. It was under sterile technique using iodine and alcohol swabs and ethyl chloride was used as an anaesthetic spray. Pt.  tolerated it well.

## 2018-12-07 NOTE — TELEPHONE ENCOUNTER
LOV: 11/2/18  Last Refilled:#90, 1rf 12/11/17  Labs:AST 21  ALT 11  7/14/18      Future Appointments   Date Time Provider [place]   3/1/2019  8:40 AM Michael Zee MD [facility]   8/26/2019  8:15 AM Brandi Olivares MD [facility]

## 2018-12-10 RX ORDER — LEFLUNOMIDE 20 MG/1
TABLET ORAL
Qty: 90 TABLET | Refills: 1 | Status: SHIPPED | OUTPATIENT
Start: 2018-12-10 | End: 2019-09-13 | Stop reason: ALTCHOICE

## 2019-01-25 ENCOUNTER — LAB ENCOUNTER (OUTPATIENT)
Dept: LAB | Facility: HOSPITAL | Age: 59
End: 2019-01-25
Attending: INTERNAL MEDICINE
Payer: COMMERCIAL

## 2019-01-25 DIAGNOSIS — L40.50 PSORIATIC ARTHRITIS (HCC): ICD-10-CM

## 2019-01-25 DIAGNOSIS — Z51.81 ENCOUNTER FOR THERAPEUTIC DRUG MONITORING: ICD-10-CM

## 2019-01-25 LAB
ALBUMIN SERPL BCP-MCNC: 4.1 G/DL (ref 3.5–4.8)
ALT SERPL-CCNC: 13 U/L (ref 14–54)
AST SERPL-CCNC: 27 U/L (ref 15–41)
BASOPHILS # BLD: 0.1 K/UL (ref 0–0.2)
BASOPHILS NFR BLD: 1 %
CREAT SERPL-MCNC: 0.98 MG/DL (ref 0.5–1.5)
CRP SERPL-MCNC: 1 MG/DL (ref 0–0.9)
EOSINOPHIL # BLD: 0.3 K/UL (ref 0–0.7)
EOSINOPHIL NFR BLD: 3 %
ERYTHROCYTE [DISTWIDTH] IN BLOOD BY AUTOMATED COUNT: 13.4 % (ref 11–15)
ERYTHROCYTE [SEDIMENTATION RATE] IN BLOOD: 9 MM/HR (ref 0–30)
HCT VFR BLD AUTO: 37.9 % (ref 35–48)
HGB BLD-MCNC: 12.9 G/DL (ref 12–16)
LYMPHOCYTES # BLD: 2.5 K/UL (ref 1–4)
LYMPHOCYTES NFR BLD: 28 %
MCH RBC QN AUTO: 29.7 PG (ref 27–32)
MCHC RBC AUTO-ENTMCNC: 34.1 G/DL (ref 32–37)
MCV RBC AUTO: 87.2 FL (ref 80–100)
MONOCYTES # BLD: 0.8 K/UL (ref 0–1)
MONOCYTES NFR BLD: 9 %
NEUTROPHILS # BLD AUTO: 5.5 K/UL (ref 1.8–7.7)
NEUTROPHILS NFR BLD: 60 %
PLATELET # BLD AUTO: 304 K/UL (ref 140–400)
PMV BLD AUTO: 9.4 FL (ref 7.4–10.3)
RBC # BLD AUTO: 4.35 M/UL (ref 3.7–5.4)
WBC # BLD AUTO: 9.2 K/UL (ref 4–11)

## 2019-01-25 PROCEDURE — 85652 RBC SED RATE AUTOMATED: CPT

## 2019-01-25 PROCEDURE — 82565 ASSAY OF CREATININE: CPT

## 2019-01-25 PROCEDURE — 86140 C-REACTIVE PROTEIN: CPT

## 2019-01-25 PROCEDURE — 36415 COLL VENOUS BLD VENIPUNCTURE: CPT

## 2019-01-25 PROCEDURE — 84460 ALANINE AMINO (ALT) (SGPT): CPT

## 2019-01-25 PROCEDURE — 82040 ASSAY OF SERUM ALBUMIN: CPT

## 2019-01-25 PROCEDURE — 85025 COMPLETE CBC W/AUTO DIFF WBC: CPT

## 2019-01-25 PROCEDURE — 84450 TRANSFERASE (AST) (SGOT): CPT

## 2019-01-25 RX ORDER — ONDANSETRON 4 MG/1
4 TABLET, ORALLY DISINTEGRATING ORAL EVERY 8 HOURS PRN
Qty: 30 TABLET | Refills: 0 | Status: SHIPPED | OUTPATIENT
Start: 2019-01-25 | End: 2019-02-08

## 2019-01-25 NOTE — TELEPHONE ENCOUNTER
Current Outpatient Medications:  Ondansetron HCl (ZOFRAN) 4 mg tablet Take 1 tablet (4 mg total) by mouth every 8 (eight) hours as needed for Nausea. Disp: 30 tablet Rfl: 0     Patient states she doesn't take the pill.

## 2019-01-25 NOTE — TELEPHONE ENCOUNTER
Pt requesting refill for Zofran; however, she states she would like sublingual formulation. Pt states she only has 2 pills left and is experiencing nausea after methotrexate.  Pt aware Dr. Patricia Alcantara is out of office today and request will be routed to on-call physi

## 2019-02-04 RX ORDER — GABAPENTIN 300 MG/1
CAPSULE ORAL
Qty: 90 CAPSULE | Refills: 1 | Status: SHIPPED | OUTPATIENT
Start: 2019-02-04 | End: 2019-06-26

## 2019-02-04 NOTE — TELEPHONE ENCOUNTER
LOV:11-2  Last Filled:1-9-18, #90 with 5 refills  Labs:   Future Appointments   Date Time Provider [place]   3/1/2019  8:40 AM Kenji Gregory MD [address] [place] [address]   8/26/2019  8:15 AM Maria Teresa Mckeon MD [facility]       Please Advise

## 2019-02-06 ENCOUNTER — TELEPHONE (OUTPATIENT)
Dept: RHEUMATOLOGY | Facility: CLINIC | Age: 59
End: 2019-02-06

## 2019-02-06 ENCOUNTER — OFFICE VISIT (OUTPATIENT)
Dept: RHEUMATOLOGY | Facility: CLINIC | Age: 59
End: 2019-02-06

## 2019-02-06 VITALS
HEIGHT: 63 IN | BODY MASS INDEX: 24.45 KG/M2 | DIASTOLIC BLOOD PRESSURE: 100 MMHG | HEART RATE: 82 BPM | SYSTOLIC BLOOD PRESSURE: 161 MMHG | WEIGHT: 138 LBS

## 2019-02-06 DIAGNOSIS — M77.11 RIGHT LATERAL EPICONDYLITIS: ICD-10-CM

## 2019-02-06 DIAGNOSIS — L40.50 PSORIATIC ARTHRITIS (HCC): Primary | ICD-10-CM

## 2019-02-06 DIAGNOSIS — Z51.81 ENCOUNTER FOR THERAPEUTIC DRUG MONITORING: ICD-10-CM

## 2019-02-06 PROCEDURE — 99212 OFFICE O/P EST SF 10 MIN: CPT | Performed by: INTERNAL MEDICINE

## 2019-02-06 PROCEDURE — 20551 NJX 1 TENDON ORIGIN/INSJ: CPT | Performed by: INTERNAL MEDICINE

## 2019-02-06 PROCEDURE — 99214 OFFICE O/P EST MOD 30 MIN: CPT | Performed by: INTERNAL MEDICINE

## 2019-02-06 RX ORDER — TRIAMCINOLONE ACETONIDE 40 MG/ML
20 INJECTION, SUSPENSION INTRA-ARTICULAR; INTRAMUSCULAR ONCE
Status: COMPLETED | OUTPATIENT
Start: 2019-02-06 | End: 2019-02-06

## 2019-02-06 NOTE — PROGRESS NOTES
Patient came in for [facility] mini teaching. Patient has been using the Enbrel Sure Click pen. Patient name and  identified. Patient educated on proper handling/storage/diposal of medications. Patient informed of proper injection and aseptic technique.  Jessi Zambrano

## 2019-02-06 NOTE — TELEPHONE ENCOUNTER
Spoke with pt and she reported her injection worked well until last night. Her pain returned and she is having pain, swelling and difficulty with movement due to her elbow.  Pt given appointment for today at 2:50 pm [facility] with Dr. Cesia Steel. Pt to call office back if

## 2019-02-06 NOTE — PROGRESS NOTES
Robby Childress is a 60year old female. HPI:   Patient presents with:  Psoriatic Arthritis       I had the pleasure of seeing your patient Mrs. Sadaf Castillo on 11/2/2018. I had last seen her on 4/19/2018. I had last seen her on 12/11/2017.  I had last 02/01/2018    Skin, left cutaneous lip   • Cervical radiculopathy at C7 9/29/2017   • Colon polyps February 2010   • Hypercholesterolemia    • Psoriatic arthritis [facility])       Social Hx Reviewed   Family Hx Reviewed     Medications (Active prior to today's MOUTH TWICE DAILY BEFORE A MEAL (Patient taking differently: TAKE ONE CAPSULE BY MOUTH EVERY OTHER DAY) Disp: 180 capsule Rfl: 3   Tuberculin-Allergy Syringes 27G X 1/2\" 1 ML Does not apply Misc Use as directed with methotrexate Disp: 50 each Rfl: 1   Mec ALKALINE PHOSPHATASE      32 - 100 U/L 68   Total Bilirubin      0.3 - 1.2 mg/dL 0.5   TOTAL PROTEIN      5.9 - 8.4 g/dL 6.7   Albumin      3.5 - 4.8 g/dL 3.9   GLOBULIN, TOTAL      2.5 - 3.7 g/dL 2.8   A/G RATIO      1.0 - 2.0 1.4   ANION GAP      0 - 1 needs an EKG then if normal can say that her left arm pain is musculskeletal - d/w her - she is seeing dr. Rayo Monroy - she will talk to him about this -she's better now - better after taking cholesterol medication -   She was on lanoxin b/c she has hx of SVT w

## 2019-02-06 NOTE — PATIENT INSTRUCTIONS
1. Cont. methotrexate   0.5ml sq a week   2. Switch to enbrel 50mg a week MINI   3. Cont. Naltrexone -   4. Prednisone burst as needed. 5. Cont. gabepntin 200mg at night -  3 tablets of 100mg at night   6.cont. leflunomdie 20mg  aday -   7.  Check labs

## 2019-02-08 ENCOUNTER — OFFICE VISIT (OUTPATIENT)
Dept: INTERNAL MEDICINE CLINIC | Facility: CLINIC | Age: 59
End: 2019-02-08

## 2019-02-08 VITALS
WEIGHT: 136 LBS | SYSTOLIC BLOOD PRESSURE: 160 MMHG | BODY MASS INDEX: 24.1 KG/M2 | HEIGHT: 63 IN | HEART RATE: 56 BPM | DIASTOLIC BLOOD PRESSURE: 84 MMHG

## 2019-02-08 DIAGNOSIS — I10 ESSENTIAL HYPERTENSION: Primary | ICD-10-CM

## 2019-02-08 PROCEDURE — 99214 OFFICE O/P EST MOD 30 MIN: CPT | Performed by: INTERNAL MEDICINE

## 2019-02-08 PROCEDURE — 99212 OFFICE O/P EST SF 10 MIN: CPT | Performed by: INTERNAL MEDICINE

## 2019-02-08 RX ORDER — LISINOPRIL AND HYDROCHLOROTHIAZIDE 12.5; 1 MG/1; MG/1
1 TABLET ORAL DAILY
Qty: 30 TABLET | Refills: 3 | Status: SHIPPED | OUTPATIENT
Start: 2019-02-08 | End: 2019-05-12

## 2019-02-08 NOTE — PROGRESS NOTES
Marie Eid is a 60year old female. Patient presents with:  Hypertension  Lightheadedness    HPI:   Mohamud Cristine presents this morning with concerns about elevated blood pressure.     At her office visit 2 days ago with Rheumatology, BP was elevated at 161/10 Methotrexate Sodium 50 MG/2ML Injection Solution INJECT 17.5MG AS DIRECTED EVERY 7 DAYS Disp: 4 vial Rfl: 3   Oxybutynin Chloride ER 5 MG Oral Tablet 24 Hr Take 1 tablet (5 mg total) by mouth daily.  Disp: 90 tablet Rfl: 3   Estradiol (ESTRACE) 0.1 MG/GM 0.50        Years: 37.00        Pack years: 18.5        Types: Cigarettes        Quit date: 2012        Years since quittin.4      Smokeless tobacco: Never Used      Tobacco comment: sometimes less than 1/2 pk day    Alcohol use:  Yes      Alcohol/

## 2019-02-08 NOTE — PATIENT INSTRUCTIONS
Please begin lisinopril/HCTZ 10/12.5 mg 1 pill daily. Return in 4-6 weeks for a blood pressure check.

## 2019-02-11 ENCOUNTER — TELEPHONE (OUTPATIENT)
Dept: RHEUMATOLOGY | Facility: CLINIC | Age: 59
End: 2019-02-11

## 2019-02-11 NOTE — TELEPHONE ENCOUNTER
PA completed via Cover My Meds: Your information has been submitted to [employer]. [employer] is reviewing the PA request and you will receive an electronic response.  You may check for the updated outcome later by reopening this request. The standard fa

## 2019-02-21 NOTE — TELEPHONE ENCOUNTER
Spoke with pharmacist, Vic Kamara, and clarification given for Enbrel mini. Claim was process by insurance.

## 2019-02-21 NOTE — TELEPHONE ENCOUNTER
Sharon/Kellen Calling for clarification on Rx    Cartridge or pen    Current Outpatient Medications:     •  Etanercept (ENBREL MINI) 50 MG/ML Subcutaneous Solution Cartridge, Inject 50 mg into the skin once a week., Disp: 4 Cartridge, Rfl: 11

## 2019-03-01 ENCOUNTER — HOSPITAL ENCOUNTER (OUTPATIENT)
Dept: MAMMOGRAPHY | Facility: HOSPITAL | Age: 59
Discharge: HOME | End: 2019-03-01
Attending: INTERNAL MEDICINE
Payer: COMMERCIAL

## 2019-03-01 DIAGNOSIS — Z00.00 ANNUAL PHYSICAL EXAM: ICD-10-CM

## 2019-03-01 PROCEDURE — 77063 BREAST TOMOSYNTHESIS BI: CPT | Performed by: INTERNAL MEDICINE

## 2019-03-01 PROCEDURE — 77067 SCR MAMMO BI INCL CAD: CPT | Performed by: INTERNAL MEDICINE

## 2019-03-08 ENCOUNTER — OFFICE VISIT (OUTPATIENT)
Dept: INTERNAL MEDICINE CLINIC | Facility: CLINIC | Age: 59
End: 2019-03-08

## 2019-03-08 VITALS
WEIGHT: 126 LBS | DIASTOLIC BLOOD PRESSURE: 66 MMHG | SYSTOLIC BLOOD PRESSURE: 122 MMHG | HEART RATE: 79 BPM | BODY MASS INDEX: 22.32 KG/M2 | HEIGHT: 63 IN

## 2019-03-08 DIAGNOSIS — I10 ESSENTIAL HYPERTENSION: Primary | ICD-10-CM

## 2019-03-08 PROCEDURE — 99213 OFFICE O/P EST LOW 20 MIN: CPT | Performed by: INTERNAL MEDICINE

## 2019-03-08 PROCEDURE — 99212 OFFICE O/P EST SF 10 MIN: CPT | Performed by: INTERNAL MEDICINE

## 2019-03-08 NOTE — PATIENT INSTRUCTIONS
Your blood pressure today was excellent. Continue lisinopril/HCTZ. Please schedule a physical in July.

## 2019-03-08 NOTE — PROGRESS NOTES
Noah Christensen is a 60year old female. Patient presents with:  Hypertension    HPI:   Ms. Debbie León presents this morning for follow-up of hypertension. Lisinopril/HCTZ was begun at her visit 1 month ago.     She notes that she feels better since beginning Ondansetron HCl (ZOFRAN) 4 mg tablet Take 1 tablet (4 mg total) by mouth every 8 (eight) hours as needed for Nausea.  Disp: 30 tablet Rfl: 0   Naltrexone HCl Does not apply Powder 4.5mg a day , compounded (Do not apply powder) Disp: 25 g Rfl: 6   Ergocalc Pleasant female appearing well in no distress  /66   Pulse 79   Ht 5' 3\" (1.6 m)   Wt 126 lb (57.2 kg)   BMI 22.32 kg/m²   LUNGS: Resonant to percussion and clear to auscultation  CARDIAC: Rhythm regular S1 S2 normal without murmur   ABDOMEN: Bowel

## 2019-04-09 ENCOUNTER — TELEPHONE (OUTPATIENT)
Dept: DERMATOLOGY CLINIC | Facility: CLINIC | Age: 59
End: 2019-04-09

## 2019-04-09 NOTE — TELEPHONE ENCOUNTER
2/1/18 -ASKING FOR PATH REPORT FOR THIS DATE FROM [facility]. PLEASE FAX TO SECURE FAX ONLY HER AROUND [phone] ATTENTION:VICENTA ROMEROAY TO LEAVE MESSAGE ON VM IS NEEDED.

## 2019-05-08 ENCOUNTER — TELEPHONE (OUTPATIENT)
Dept: OTHER | Age: 59
End: 2019-05-08

## 2019-05-08 NOTE — TELEPHONE ENCOUNTER
Received a call from the patient and she needs a Naltrexone refill. Has been out for one and one half weeks, due to her pharmacy being busy. Would like Rx faxed to a new compounding pharmacy called [facility] fax# [phone]. LOV 2-6 last refill 2-16-18 25g with 6 refills.  Please advise

## 2019-05-13 RX ORDER — LISINOPRIL AND HYDROCHLOROTHIAZIDE 12.5; 1 MG/1; MG/1
1 TABLET ORAL DAILY
Qty: 90 TABLET | Refills: 1 | Status: SHIPPED | OUTPATIENT
Start: 2019-05-13 | End: 2019-08-22

## 2019-05-15 ENCOUNTER — TELEPHONE (OUTPATIENT)
Dept: RHEUMATOLOGY | Facility: CLINIC | Age: 59
End: 2019-05-15

## 2019-05-15 NOTE — TELEPHONE ENCOUNTER
Pt called in requesting an order for a TENS Unit for pain. Pt is requesting to have the order sent to [address] [place] [address].   Please advise

## 2019-05-16 NOTE — TELEPHONE ENCOUNTER
Pt called in to f/u on her request,  Pt stated that if she is not available it is ok to leave a detailed voicemail.   Please advise

## 2019-05-16 NOTE — TELEPHONE ENCOUNTER
Talked to pt.  For shoulders and neck -   She has been on TENS unit in the past   She wants one with 4 leads , 2 leads   She is sore in that area   - had long d/w her that never prescribed it for this and I dont' know how to adjust this -     Sent in

## 2019-06-20 RX ORDER — LISINOPRIL AND HYDROCHLOROTHIAZIDE 12.5; 1 MG/1; MG/1
1 TABLET ORAL DAILY
Qty: 30 TABLET | Refills: 0 | OUTPATIENT
Start: 2019-06-20 | End: 2020-06-14

## 2019-06-26 RX ORDER — GABAPENTIN 300 MG/1
CAPSULE ORAL
Qty: 90 CAPSULE | Refills: 1 | Status: SHIPPED | OUTPATIENT
Start: 2019-06-26 | End: 2020-09-15

## 2019-06-26 NOTE — TELEPHONE ENCOUNTER
Last filled: 2/4/19 #90 cap with 1 refill  LOV:  2/6/19   Future Appointments   Date Time Provider [place]   7/19/2019  8:50 AM Kumar Fabian MD [facility]   8/26/2019  8:15 AM Jessica Esparza MD [facility]     Labs:  1/25/19

## 2019-07-13 RX ORDER — LISINOPRIL AND HYDROCHLOROTHIAZIDE 12.5; 1 MG/1; MG/1
1 TABLET ORAL DAILY
Qty: 30 TABLET | Refills: 0 | OUTPATIENT
Start: 2019-07-13 | End: 2020-07-07

## 2019-07-20 RX ORDER — LISINOPRIL AND HYDROCHLOROTHIAZIDE 12.5; 1 MG/1; MG/1
1 TABLET ORAL DAILY
Qty: 30 TABLET | Refills: 0 | OUTPATIENT
Start: 2019-07-20 | End: 2020-07-14

## 2019-07-29 RX ORDER — LISINOPRIL AND HYDROCHLOROTHIAZIDE 12.5; 1 MG/1; MG/1
1 TABLET ORAL DAILY
Qty: 30 TABLET | Refills: 0 | OUTPATIENT
Start: 2019-07-29 | End: 2020-07-23

## 2019-08-21 ENCOUNTER — LAB ENCOUNTER (OUTPATIENT)
Dept: LAB | Facility: HOSPITAL | Age: 59
End: 2019-08-21
Attending: INTERNAL MEDICINE
Payer: COMMERCIAL

## 2019-08-21 DIAGNOSIS — Z51.81 ENCOUNTER FOR THERAPEUTIC DRUG MONITORING: ICD-10-CM

## 2019-08-21 DIAGNOSIS — L40.50 PSORIATIC ARTHRITIS (HCC): ICD-10-CM

## 2019-08-21 LAB
ALBUMIN SERPL-MCNC: 3.7 G/DL (ref 3.4–5)
ALT SERPL-CCNC: 13 U/L (ref 13–56)
AST SERPL-CCNC: 13 U/L (ref 15–37)
BASOPHILS # BLD AUTO: 0.08 X10(3) UL (ref 0–0.2)
BASOPHILS NFR BLD AUTO: 0.7 %
CREAT BLD-MCNC: 0.75 MG/DL (ref 0.55–1.02)
CRP SERPL-MCNC: <0.29 MG/DL (ref ?–0.3)
DEPRECATED RDW RBC AUTO: 43.8 FL (ref 35.1–46.3)
EOSINOPHIL # BLD AUTO: 0.32 X10(3) UL (ref 0–0.7)
EOSINOPHIL NFR BLD AUTO: 2.9 %
ERYTHROCYTE [DISTWIDTH] IN BLOOD BY AUTOMATED COUNT: 13.3 % (ref 11–15)
ERYTHROCYTE [SEDIMENTATION RATE] IN BLOOD: 8 MM/HR (ref 0–30)
HCT VFR BLD AUTO: 38.2 % (ref 35–48)
HGB BLD-MCNC: 12.4 G/DL (ref 12–16)
IMM GRANULOCYTES # BLD AUTO: 0.03 X10(3) UL (ref 0–1)
IMM GRANULOCYTES NFR BLD: 0.3 %
LYMPHOCYTES # BLD AUTO: 3.47 X10(3) UL (ref 1–4)
LYMPHOCYTES NFR BLD AUTO: 31.4 %
MCH RBC QN AUTO: 29.2 PG (ref 26–34)
MCHC RBC AUTO-ENTMCNC: 32.5 G/DL (ref 31–37)
MCV RBC AUTO: 89.9 FL (ref 80–100)
MONOCYTES # BLD AUTO: 0.81 X10(3) UL (ref 0.1–1)
MONOCYTES NFR BLD AUTO: 7.3 %
NEUTROPHILS # BLD AUTO: 6.33 X10 (3) UL (ref 1.5–7.7)
NEUTROPHILS # BLD AUTO: 6.33 X10(3) UL (ref 1.5–7.7)
NEUTROPHILS NFR BLD AUTO: 57.4 %
PLATELET # BLD AUTO: 324 10(3)UL (ref 150–450)
RBC # BLD AUTO: 4.25 X10(6)UL (ref 3.8–5.3)
WBC # BLD AUTO: 11 X10(3) UL (ref 4–11)

## 2019-08-21 PROCEDURE — 84450 TRANSFERASE (AST) (SGOT): CPT

## 2019-08-21 PROCEDURE — 82565 ASSAY OF CREATININE: CPT

## 2019-08-21 PROCEDURE — 82040 ASSAY OF SERUM ALBUMIN: CPT

## 2019-08-21 PROCEDURE — 86140 C-REACTIVE PROTEIN: CPT

## 2019-08-21 PROCEDURE — 36415 COLL VENOUS BLD VENIPUNCTURE: CPT

## 2019-08-21 PROCEDURE — 85025 COMPLETE CBC W/AUTO DIFF WBC: CPT

## 2019-08-21 PROCEDURE — 85652 RBC SED RATE AUTOMATED: CPT

## 2019-08-21 PROCEDURE — 84460 ALANINE AMINO (ALT) (SGPT): CPT

## 2019-08-21 NOTE — TELEPHONE ENCOUNTER
Current Outpatient Medications:  LISINOPRIL-HYDROCHLOROTHIAZIDE 10-12.5 MG Oral Tab TAKE 1 TABLET BY MOUTH DAILY Disp: 90 tablet Rfl: 1       Patient says shes been out for a month now.

## 2019-08-23 RX ORDER — LISINOPRIL AND HYDROCHLOROTHIAZIDE 12.5; 1 MG/1; MG/1
1 TABLET ORAL DAILY
Qty: 90 TABLET | Refills: 0 | Status: SHIPPED | OUTPATIENT
Start: 2019-08-23 | End: 2019-09-13

## 2019-09-13 ENCOUNTER — OFFICE VISIT (OUTPATIENT)
Dept: INTERNAL MEDICINE CLINIC | Facility: CLINIC | Age: 59
End: 2019-09-13

## 2019-09-13 VITALS
DIASTOLIC BLOOD PRESSURE: 72 MMHG | BODY MASS INDEX: 23.18 KG/M2 | HEART RATE: 72 BPM | HEIGHT: 63 IN | SYSTOLIC BLOOD PRESSURE: 134 MMHG | WEIGHT: 130.81 LBS

## 2019-09-13 DIAGNOSIS — D12.6 ADENOMATOUS POLYP OF COLON, UNSPECIFIED PART OF COLON: ICD-10-CM

## 2019-09-13 DIAGNOSIS — E78.00 HYPERCHOLESTEROLEMIA: ICD-10-CM

## 2019-09-13 DIAGNOSIS — Z00.00 ANNUAL PHYSICAL EXAM: Primary | ICD-10-CM

## 2019-09-13 DIAGNOSIS — L40.50 PSORIATIC ARTHRITIS (HCC): ICD-10-CM

## 2019-09-13 DIAGNOSIS — I10 ESSENTIAL HYPERTENSION: ICD-10-CM

## 2019-09-13 PROCEDURE — 90471 IMMUNIZATION ADMIN: CPT | Performed by: INTERNAL MEDICINE

## 2019-09-13 PROCEDURE — 90686 IIV4 VACC NO PRSV 0.5 ML IM: CPT | Performed by: INTERNAL MEDICINE

## 2019-09-13 PROCEDURE — 99396 PREV VISIT EST AGE 40-64: CPT | Performed by: INTERNAL MEDICINE

## 2019-09-13 RX ORDER — ONDANSETRON 4 MG/1
4 TABLET, FILM COATED ORAL EVERY 8 HOURS PRN
Qty: 30 TABLET | Refills: 0 | Status: SHIPPED | OUTPATIENT
Start: 2019-09-13 | End: 2020-07-17

## 2019-09-13 RX ORDER — ATORVASTATIN CALCIUM 40 MG/1
TABLET, FILM COATED ORAL
Qty: 90 TABLET | Refills: 3 | Status: SHIPPED | OUTPATIENT
Start: 2019-09-13 | End: 2021-02-16

## 2019-09-13 RX ORDER — OXYBUTYNIN CHLORIDE 5 MG/1
5 TABLET, EXTENDED RELEASE ORAL DAILY
Qty: 90 TABLET | Refills: 3 | Status: SHIPPED | OUTPATIENT
Start: 2019-09-13 | End: 2021-01-24

## 2019-09-13 RX ORDER — LISINOPRIL AND HYDROCHLOROTHIAZIDE 12.5; 1 MG/1; MG/1
1 TABLET ORAL DAILY
Qty: 90 TABLET | Refills: 3 | Status: SHIPPED | OUTPATIENT
Start: 2019-09-13 | End: 2020-08-13 | Stop reason: ALTCHOICE

## 2019-09-13 NOTE — H&P
Abram Smalls is a 60year old female who presents for a complete physical exam.  HPI:   Her last physical was July 2018. Lately she has been feeling well. No specific issues for discussion today.   She follows regularly with Rheumatology and Dermato (2MG TOTAL) BY MOUTH ONCE DAILY Disp: 180 tablet Rfl: 3   PREDNISONE 10 MG Oral Tab Take 6 tabsx 1 day, take 5 tabsx 1 day, take 4 tabsx 1 day,take 3 tabsx 1 day, take 2 tabsx 1 day, take 1 tabsx 1 day, then off Disp: 21 tablet Rfl: 1   Methotrexate Sodium Maternal Grandmother    • Hypertension Maternal Grandmother    • Cancer Maternal Grandmother         hx of skin cancer    • Heart Disease Maternal Grandfather          MI age 28   • Heart Disease Maternal Aunt         Per NG: CAD      Social History: sent to pharmacy. Reinforced healthy diet and continued regular activity, maintaining current body weight. Anticipate next colonoscopy 2022. Annual physical.  Return visit in 6 months.  - COMP METABOLIC PANEL (14); Future  - LIPID PANEL;  Future  - INFLU

## 2019-09-13 NOTE — PATIENT INSTRUCTIONS
You received an influenza vaccine today. Please obtain blood tests soon when you can. Continue current medication  Continue healthy diet and regular exercise. Return visit in 6 months.

## 2020-01-10 ENCOUNTER — TELEPHONE (OUTPATIENT)
Dept: RHEUMATOLOGY | Facility: CLINIC | Age: 60
End: 2020-01-10

## 2020-01-10 NOTE — TELEPHONE ENCOUNTER
PA completed via covermymeds, awaiting outcome. Your information has been submitted to [employer]. [employer] is reviewing the PA request and you will receive an electronic response.  You may check for the updated outcome later by reopening this reque

## 2020-01-27 NOTE — PROGRESS NOTES
Past Medical History:   Diagnosis Date   • Basal cell carcinoma 02/01/2018    Skin, left cutaneous lip   • Cervical radiculopathy at C7 9/29/2017   • Colon polyps February 2010   • Hypercholesterolemia    • Psoriatic arthritis [facility])      Past Surgical Hist Detail Level: Detailed Render Note In Bullet Format When Appropriate: No Consent: The patient's consent was obtained including but not limited to risks of crusting, scabbing, blistering, scarring, darker or lighter pigmentary change, recurrence, incomplete removal and infection. Post-Care Instructions: I reviewed with the patient in detail post-care instructions. Patient is to wear sunprotection, and avoid picking at any of the treated lesions. Pt may apply Vaseline to crusted or scabbing areas. Duration Of Freeze Thaw-Cycle (Seconds): 0

## 2020-02-12 ENCOUNTER — TELEPHONE (OUTPATIENT)
Dept: INTERNAL MEDICINE CLINIC | Facility: CLINIC | Age: 60
End: 2020-02-12

## 2020-02-12 DIAGNOSIS — Z12.83 SKIN EXAM, SCREENING FOR CANCER: Primary | ICD-10-CM

## 2020-02-12 NOTE — TELEPHONE ENCOUNTER
Dr. Naun Ceron, patient is requesting a referral for Dr. Ann Tejeda. Referral has been pended, please advise.      Please reply to pool: EM TRIAGE SUPPORT

## 2020-02-12 NOTE — TELEPHONE ENCOUNTER
Patient is requesting for two different types for referral for Dr. Pepito Delacruz (Dermatology). Patient is requesting referral for full body scan and has an appointment scheduled with Dr. Joaquina Powers 03/17/2020.  Patient states she is also getting a spot check

## 2020-02-27 ENCOUNTER — OFFICE VISIT (OUTPATIENT)
Dept: DERMATOLOGY CLINIC | Facility: CLINIC | Age: 60
End: 2020-02-27

## 2020-02-27 DIAGNOSIS — Z85.828 HISTORY OF BASAL CELL CARCINOMA (BCC): ICD-10-CM

## 2020-02-27 DIAGNOSIS — D48.5 NEOPLASM OF UNCERTAIN BEHAVIOR OF SKIN: Primary | ICD-10-CM

## 2020-02-27 PROCEDURE — 11102 TANGNTL BX SKIN SINGLE LES: CPT | Performed by: DERMATOLOGY

## 2020-02-27 PROCEDURE — 99212 OFFICE O/P EST SF 10 MIN: CPT | Performed by: DERMATOLOGY

## 2020-02-27 PROCEDURE — 88305 TISSUE EXAM BY PATHOLOGIST: CPT | Performed by: DERMATOLOGY

## 2020-02-27 NOTE — PROGRESS NOTES
HPI:     Chief Complaint     Lesion        HPI     Lesion      Additional comments: LOV 8/23/2018 - **Add on per LSS - Pt presenting for spot of concern on left arm. Pt states she noticed spot about two weeks ago.   Pt states it started as bright ried, the 1 tablet (4 mg total) by mouth every 8 (eight) hours as needed for Nausea.  (Patient not taking: Reported on 2/27/2020 ) 30 tablet 0   • GABAPENTIN 300 MG Oral Cap TAKE 3 CAPSULES BY MOUTH EVERY NIGHT AT BEDTIME (Patient not taking: Reported on 2/27/2020) 9 arthritis [facility])      Past Surgical History:   Procedure Laterality Date   • COLONOSCOPY N/A 7/7/2017    Performed by Blanca Peterson MD at [facility] ENDOSCOPY   • COLONOSCOPY, POSSIBLE BIOPSY, POSSIBLE POLYPECTOMY 71178 N/A 2/12/2010    Performed by abused: Not on file        Forced sexual activity: Not on file    Other Topics      Concerns:        Grew up on a farm: Not Asked        History of tanning: Not Asked        Outdoor occupation: Not Asked        Pt has a pacemaker: No        Pt has a defibr Orders:  None     Referral Orders:  No orders of the defined types were placed in this encounter.         2/27/2020  Johnson Delacruzr

## 2020-03-11 ENCOUNTER — TELEPHONE (OUTPATIENT)
Dept: RHEUMATOLOGY | Facility: CLINIC | Age: 60
End: 2020-03-11

## 2020-03-11 DIAGNOSIS — L40.50 PSORIATIC ARTHROPATHY (HCC): ICD-10-CM

## 2020-03-11 DIAGNOSIS — Z51.81 ENCOUNTER FOR THERAPEUTIC DRUG MONITORING: ICD-10-CM

## 2020-03-11 DIAGNOSIS — M79.641 RIGHT HAND PAIN: Primary | ICD-10-CM

## 2020-03-11 NOTE — TELEPHONE ENCOUNTER
Pt is experiencing pain to right palm, wrist, and pinky. There is some swelling to the palm. Pain is constant, worse with movement of hand. Affected area feels tender to touch.  She injured her pinky cleaning her car a few days ago and is wondering if xray

## 2020-03-11 NOTE — TELEPHONE ENCOUNTER
Unable to reach pt. Left message to call back or respond via [employer]. Additional questions sent via [address] Ext [phone] today.

## 2020-03-11 NOTE — TELEPHONE ENCOUNTER
Patient states she has a right hand flare up from top of her pinky going down to her wrist. She stated it hurts more in the middle area of wrist. Patient unsure if an x-ray is needed to rule out a fracture.  She is requesting medication but would not prefer

## 2020-03-11 NOTE — TELEPHONE ENCOUNTER
Please let her know and ask her  - Ordered right hand xray - she can get it prior to her appt.    I am not sure what pain med is best with medical marijuana - probably an anti inflammatory - I can send in naproxen 500mg bid or celelbrex b/c of her gastirits

## 2020-03-11 NOTE — TELEPHONE ENCOUNTER
Pt was informed of recommendations below. She declines naproxen and Celebrex at this time. Stts she will continue ibuprofen as needed, CBD oil, and an ACE bandage to stabilize hand. She will call back if she needs different NSAID.      Pt will complete xray

## 2020-03-12 ENCOUNTER — HOSPITAL ENCOUNTER (OUTPATIENT)
Dept: GENERAL RADIOLOGY | Facility: HOSPITAL | Age: 60
Discharge: HOME | End: 2020-03-12
Attending: INTERNAL MEDICINE
Payer: COMMERCIAL

## 2020-03-12 DIAGNOSIS — M79.641 RIGHT HAND PAIN: ICD-10-CM

## 2020-03-12 PROCEDURE — 73130 X-RAY EXAM OF HAND: CPT | Performed by: INTERNAL MEDICINE

## 2020-03-17 ENCOUNTER — NURSE TRIAGE (OUTPATIENT)
Dept: INTERNAL MEDICINE CLINIC | Facility: CLINIC | Age: 60
End: 2020-03-17

## 2020-03-17 ENCOUNTER — OFFICE VISIT (OUTPATIENT)
Dept: DERMATOLOGY CLINIC | Facility: CLINIC | Age: 60
End: 2020-03-17

## 2020-03-17 DIAGNOSIS — Z85.828 HISTORY OF BASAL CELL CARCINOMA (BCC): Primary | ICD-10-CM

## 2020-03-17 DIAGNOSIS — D23.70 BENIGN NEOPLASM OF SKIN OF LOWER EXTREMITY, INCLUDING HIP, UNSPECIFIED LATERALITY: ICD-10-CM

## 2020-03-17 DIAGNOSIS — L57.8 SUN-DAMAGED SKIN: ICD-10-CM

## 2020-03-17 DIAGNOSIS — L82.1 SEBORRHEIC KERATOSES: ICD-10-CM

## 2020-03-17 DIAGNOSIS — D23.5 BENIGN NEOPLASM OF SKIN OF TRUNK, EXCEPT SCROTUM: ICD-10-CM

## 2020-03-17 DIAGNOSIS — L71.9 ROSACEA: ICD-10-CM

## 2020-03-17 DIAGNOSIS — D23.60 BENIGN NEOPLASM OF SKIN OF UPPER EXTREMITY AND SHOULDER, UNSPECIFIED LATERALITY: ICD-10-CM

## 2020-03-17 DIAGNOSIS — D23.30 BENIGN NEOPLASM OF SKIN OF FACE: ICD-10-CM

## 2020-03-17 DIAGNOSIS — D23.4 BENIGN NEOPLASM OF SCALP AND SKIN OF NECK: ICD-10-CM

## 2020-03-17 PROCEDURE — 99213 OFFICE O/P EST LOW 20 MIN: CPT | Performed by: DERMATOLOGY

## 2020-03-17 RX ORDER — AMOXICILLIN 875 MG/1
875 TABLET, COATED ORAL 2 TIMES DAILY
Qty: 14 TABLET | Refills: 0 | Status: SHIPPED | OUTPATIENT
Start: 2020-03-17 | End: 2020-03-24

## 2020-03-17 NOTE — PROGRESS NOTES
HPI:     Chief Complaint     Full Skin Exam        HPI     Full Skin Exam      Additional comments: [id number] Patient present for full body skin exam .Patient has hx of [facility]          Last edited by Arjun Egan Walker Carin on 3/17/2020  8:10 AM. (History) 2/27/2020 ) 90 tablet 3   • Ondansetron HCl (ZOFRAN) 4 mg tablet Take 1 tablet (4 mg total) by mouth every 8 (eight) hours as needed for Nausea.  (Patient not taking: Reported on 2/27/2020 ) 30 tablet 0   • GABAPENTIN 300 MG Oral Cap TAKE 3 CAPSULES BY MOUT Ruth   • FOOT SURGERY Right 1990    Bunion   • OTHER  03/2018    Mohs surgery left upper lip BCCa   • VAGINAL HYSTERECTOMY  1987    With cystocele repair     Social History    Socioeconomic History      Marital status:       Spouse name: Not Asked        Reaction to local anesthetic: No    Social History Narrative      Not on file    Family History   Problem Relation Age of Onset   • Colon Cancer Mother    • Heart Disease Mother         CABG age 67   • Hypertension Mother    • Other (Bladder C patient is to follow up yearly. Monthly self exams. The patient will come sooner should they note  anything new or changing.   Proper sunblock use  of SPF 30 or higher, hats, discussed  Benign neoplasm of scalp and skin of neck  Benign neoplasm of skin of f

## 2020-03-17 NOTE — TELEPHONE ENCOUNTER
Prescription for amoxicillin 875 mg twice daily for 7 days sent to her pharmacy. She should follow-up with her dentist for further dental care.

## 2020-03-17 NOTE — TELEPHONE ENCOUNTER
Action Requested: Summary for Provider     []  Critical Lab, Recommendations Needed  [x] Need Additional Advice  []   FYI    []   Need Orders  [x] Need Medications Sent to Pharmacy  []  Other     SUMMARY: Patient requesting antibiotic for concerns of tooth

## 2020-04-23 ENCOUNTER — PATIENT MESSAGE (OUTPATIENT)
Dept: RHEUMATOLOGY | Facility: CLINIC | Age: 60
End: 2020-04-23

## 2020-04-23 RX ORDER — OMEPRAZOLE 20 MG/1
20 CAPSULE, DELAYED RELEASE ORAL
Qty: 180 CAPSULE | Refills: 3 | Status: SHIPPED | OUTPATIENT
Start: 2020-04-23

## 2020-04-23 NOTE — TELEPHONE ENCOUNTER
Pt agreeable to video appt tomorrow. Instructions for joining video sent to pt via [employer].      Future Appointments   Date Time Provider [place]   4/24/2020 12:20 PM Leah Whitley MD 2014 [facility]

## 2020-04-23 NOTE — TELEPHONE ENCOUNTER
From: Robby Childress  To: Carolynn Sanches MD  Sent: 4/23/2020 6:15 AM CDT  Subject: Prescription Question    I need my stomach medicine refilled please send it to [facility] on [facility].  Thank you

## 2020-04-23 NOTE — TELEPHONE ENCOUNTER
Pt. Needs video visit - follow up or telephone visi t- since it's been one year - please ask pt.  To schedule

## 2020-04-24 ENCOUNTER — TELEMEDICINE (OUTPATIENT)
Dept: RHEUMATOLOGY | Facility: CLINIC | Age: 60
End: 2020-04-24

## 2020-04-24 DIAGNOSIS — M79.7 FIBROMYALGIA: ICD-10-CM

## 2020-04-24 DIAGNOSIS — L40.50 PSORIATIC ARTHRITIS (HCC): Primary | ICD-10-CM

## 2020-04-24 DIAGNOSIS — Z51.81 ENCOUNTER FOR THERAPEUTIC DRUG MONITORING: ICD-10-CM

## 2020-04-24 PROCEDURE — 99214 OFFICE O/P EST MOD 30 MIN: CPT | Performed by: INTERNAL MEDICINE

## 2020-04-24 NOTE — PATIENT INSTRUCTIONS
1. Cont. pred burst as needed  2. Cont. Legal medical marijuana. 3. Return to clinic in 6-12 months as needed.

## 2020-04-24 NOTE — TELEPHONE ENCOUNTER
Patient states she is trying to get her MyChart to work but if she does not, can she get regular phone call instead.

## 2020-04-24 NOTE — TELEPHONE ENCOUNTER
Spoke with patient and guided her though the steps to be ready for video visit. Patient successfully entered in the virtual waiting room.

## 2020-04-24 NOTE — PROGRESS NOTES
Char Shultz is a 60year old female. HPI:   No chief complaint on file. She is a plesasnt 63year old old with psoriatic arthritis and fibromyalgia. She was on  enbrel 50mg weekly and   methotrexate weekly.    She is now off since 6/2019 Delayed Release Take 1 capsule (20 mg total) by mouth 2 (two) times daily before meals. 180 capsule 3   • Oxybutynin Chloride ER 5 MG Oral Tablet 24 Hr Take 1 tablet (5 mg total) by mouth daily.  (Patient not taking: Reported on 2/27/2020 ) 90 tablet 3   • ROS are negative. PHYSICAL EXAM:    pleasant, no acute distress, no CAD,  Malar rash - none now   Hands not swollen. Moving them easily   Can close both hands. Moving neck much more easily. No rash on her palms. , no psoriasis.        Component a day  And mtx 0.5ml subcu injction     2.  Intermittent - cp -  better -   -can caome and go - hold on ct chest b/c she's better - but she likely needs an EKG then if normal can say that her left arm pain is musculskeletal - d/w her - she is seeing dr. Natalie Cobian

## 2020-07-17 ENCOUNTER — TELEPHONE (OUTPATIENT)
Dept: INTERNAL MEDICINE CLINIC | Facility: CLINIC | Age: 60
End: 2020-07-17

## 2020-07-17 RX ORDER — ONDANSETRON 4 MG/1
4 TABLET, FILM COATED ORAL EVERY 8 HOURS PRN
Qty: 90 TABLET | Refills: 0 | Status: SHIPPED | OUTPATIENT
Start: 2020-07-17

## 2020-07-17 RX ORDER — ONDANSETRON 4 MG/1
TABLET, FILM COATED ORAL
Qty: 30 TABLET | Refills: 0 | Status: SHIPPED | OUTPATIENT
Start: 2020-07-17 | End: 2020-07-17

## 2020-07-17 NOTE — TELEPHONE ENCOUNTER
Pt would like a 3 month supply of her Zofran medication. Per pt she is out of  Medication.  Pharmacy: [facility] (listed)     Current Outpatient Medications   Medication Sig Dispense Refill   • Ondansetron HCl (ZOFRAN) 4 mg tablet Take 1 tablet (4 mg

## 2020-07-17 NOTE — TELEPHONE ENCOUNTER
Please see below a 30 day supply was approved for today but pt is requesting 90 day supply. Please advise.

## 2020-07-21 NOTE — TELEPHONE ENCOUNTER
Spoke with Ladonna Rodriguez pharmacist and relayed Dr. Rigo Carlisle message below--he verbalizes understanding and agreement and will change 7/17/2020 Rx to ODT.     Left message to call back for Dr. Rigo Carlisle message below--sent MyChart message of same

## 2020-08-13 ENCOUNTER — TELEPHONE (OUTPATIENT)
Dept: INTERNAL MEDICINE CLINIC | Facility: CLINIC | Age: 60
End: 2020-08-13

## 2020-08-13 RX ORDER — HYDROCHLOROTHIAZIDE 12.5 MG/1
12.5 TABLET ORAL DAILY
Qty: 90 TABLET | Refills: 0 | Status: SHIPPED | OUTPATIENT
Start: 2020-08-13 | End: 2021-02-16

## 2020-08-13 RX ORDER — LISINOPRIL 10 MG/1
10 TABLET ORAL DAILY
Qty: 90 TABLET | Refills: 0 | Status: SHIPPED | OUTPATIENT
Start: 2020-08-13 | End: 2021-02-16

## 2020-08-13 NOTE — TELEPHONE ENCOUNTER
Please contact patient and assist on scheduling appt with Dr. Natalee Mueller for physical and labs.

## 2020-09-10 NOTE — TELEPHONE ENCOUNTER
1. Acute URI  Zithromax 250 mg 2 pills today then 1 pill daily ×4 days beginning tomorrow.  Prescription sent to pharmacy. Prednisone 20 mg 2 pills daily with food for 5 days.  Prescription sent to pharmacy.   Albuterol 2 puffs every 4 hours as needed.  Pr Yes

## 2020-09-15 ENCOUNTER — OFFICE VISIT (OUTPATIENT)
Dept: INTERNAL MEDICINE CLINIC | Facility: CLINIC | Age: 60
End: 2020-09-15

## 2020-09-15 VITALS
BODY MASS INDEX: 25.16 KG/M2 | HEART RATE: 67 BPM | HEIGHT: 63 IN | DIASTOLIC BLOOD PRESSURE: 66 MMHG | WEIGHT: 142 LBS | SYSTOLIC BLOOD PRESSURE: 130 MMHG

## 2020-09-15 DIAGNOSIS — Z85.828 HISTORY OF BASAL CELL CARCINOMA (BCC): ICD-10-CM

## 2020-09-15 DIAGNOSIS — I10 ESSENTIAL HYPERTENSION: ICD-10-CM

## 2020-09-15 DIAGNOSIS — E78.00 HYPERCHOLESTEROLEMIA: ICD-10-CM

## 2020-09-15 DIAGNOSIS — D12.6 ADENOMATOUS POLYP OF COLON, UNSPECIFIED PART OF COLON: ICD-10-CM

## 2020-09-15 DIAGNOSIS — L40.50 PSORIATIC ARTHRITIS (HCC): ICD-10-CM

## 2020-09-15 DIAGNOSIS — Z00.00 ANNUAL PHYSICAL EXAM: Primary | ICD-10-CM

## 2020-09-15 PROCEDURE — 90471 IMMUNIZATION ADMIN: CPT | Performed by: INTERNAL MEDICINE

## 2020-09-15 PROCEDURE — 90686 IIV4 VACC NO PRSV 0.5 ML IM: CPT | Performed by: INTERNAL MEDICINE

## 2020-09-15 PROCEDURE — 3008F BODY MASS INDEX DOCD: CPT | Performed by: INTERNAL MEDICINE

## 2020-09-15 PROCEDURE — 3078F DIAST BP <80 MM HG: CPT | Performed by: INTERNAL MEDICINE

## 2020-09-15 PROCEDURE — G0438 PPPS, INITIAL VISIT: HCPCS | Performed by: INTERNAL MEDICINE

## 2020-09-15 PROCEDURE — 3075F SYST BP GE 130 - 139MM HG: CPT | Performed by: INTERNAL MEDICINE

## 2020-09-15 PROCEDURE — 99396 PREV VISIT EST AGE 40-64: CPT | Performed by: INTERNAL MEDICINE

## 2020-09-15 NOTE — PATIENT INSTRUCTIONS
Your blood pressure and physical today were normal.  You received a flu shot today. Please schedule routine labs and a mammogram.  Continue healthy diet and regular physical activity. Return visit in 6 months.

## 2020-09-15 NOTE — H&P
Abram Smalls is a 60year old female who presents for a complete physical exam.  HPI:   Her last visit here was September 2019 for a physical.  Labs ordered at that time were not done. Lately she has been feeling well.   No specific issues for discus Oral Tab TAKE 1 TABLET BY MOUTH EVERY NIGHT AT BEDTIME 90 tablet 3   • Estradiol (ESTRACE) 0.1 MG/GM Vaginal Cream Apply 1/2 gram vaginally 2 times per week.  1 Tube 3   • Nerve Stimulator (TENS THERAPY PAIN RELIEF) Does not apply Device 1 Application by Do drinks      Comment: Occasional    Drug use: No         REVIEW OF SYSTEMS:   GENERAL: No fever  LUNGS: No cough wheezing or shortness of breath  CARDIAC: No lightheadedness palpitations or chest pain  GI: Infrequent heartburn.   No anorexia dysphagia nausea Psoriatic arthritis ([facility] [address]  Ongoing follow-up with Rheumatology    6.  History of basal cell carcinoma (BCC)  Continue regular follow-up with Dermatology      Rosa Coombs MD  9/15/2020  9:24 AM

## 2020-11-30 ENCOUNTER — TELEPHONE (OUTPATIENT)
Dept: RHEUMATOLOGY | Facility: CLINIC | Age: 60
End: 2020-11-30

## 2020-11-30 RX ORDER — PREDNISONE 10 MG/1
TABLET ORAL
Qty: 21 TABLET | Refills: 0 | Status: SHIPPED | OUTPATIENT
Start: 2020-11-30 | End: 2021-04-02 | Stop reason: ALTCHOICE

## 2020-11-30 NOTE — TELEPHONE ENCOUNTER
Patient, states that she is in extreme pain. Patient states that she did not sleep last night. Patient, states that it is hard for her to walk and she is limping. Per nurse she will call the patient back.  Please, leave a  Message if the patient does not an

## 2020-11-30 NOTE — TELEPHONE ENCOUNTER
Shefali Segal yes - will send in a prednisone burst - if pain doesn't get better ask her to schedule a visit - come in or video

## 2020-11-30 NOTE — TELEPHONE ENCOUNTER
Please see below. Reports pain started in right hip last week and into thigh, reports swelling and inflammation in thigh. She reports taking prednisone 20 mg for 3 days last week, her symptoms improved but returned yesterday.  She could not sleep last night

## 2020-11-30 NOTE — TELEPHONE ENCOUNTER
Patient contacted and aware of provider's message below. Pt verbalized understanding and agreeable with plan.

## 2020-12-16 ENCOUNTER — HOSPITAL ENCOUNTER (OUTPATIENT)
Dept: GENERAL RADIOLOGY | Facility: HOSPITAL | Age: 60
Discharge: HOME | End: 2020-12-16
Attending: INTERNAL MEDICINE
Payer: COMMERCIAL

## 2020-12-16 ENCOUNTER — LAB ENCOUNTER (OUTPATIENT)
Dept: LAB | Facility: HOSPITAL | Age: 60
End: 2020-12-16
Attending: INTERNAL MEDICINE
Payer: COMMERCIAL

## 2020-12-16 ENCOUNTER — TELEPHONE (OUTPATIENT)
Dept: RHEUMATOLOGY | Facility: CLINIC | Age: 60
End: 2020-12-16

## 2020-12-16 ENCOUNTER — OFFICE VISIT (OUTPATIENT)
Dept: RHEUMATOLOGY | Facility: CLINIC | Age: 60
End: 2020-12-16

## 2020-12-16 VITALS
SYSTOLIC BLOOD PRESSURE: 130 MMHG | HEIGHT: 63 IN | WEIGHT: 145 LBS | BODY MASS INDEX: 25.69 KG/M2 | RESPIRATION RATE: 16 BRPM | HEART RATE: 73 BPM | DIASTOLIC BLOOD PRESSURE: 78 MMHG

## 2020-12-16 DIAGNOSIS — M25.551 RIGHT HIP PAIN: ICD-10-CM

## 2020-12-16 DIAGNOSIS — L40.50 PSORIATIC ARTHRITIS (HCC): Primary | ICD-10-CM

## 2020-12-16 DIAGNOSIS — L40.50 PSORIATIC ARTHROPATHY (HCC): ICD-10-CM

## 2020-12-16 DIAGNOSIS — M70.61 GREATER TROCHANTERIC BURSITIS OF RIGHT HIP: ICD-10-CM

## 2020-12-16 DIAGNOSIS — L40.50 PSORIATIC ARTHRITIS (HCC): ICD-10-CM

## 2020-12-16 DIAGNOSIS — Z51.81 ENCOUNTER FOR THERAPEUTIC DRUG MONITORING: ICD-10-CM

## 2020-12-16 PROCEDURE — 73502 X-RAY EXAM HIP UNI 2-3 VIEWS: CPT | Performed by: INTERNAL MEDICINE

## 2020-12-16 PROCEDURE — 85025 COMPLETE CBC W/AUTO DIFF WBC: CPT

## 2020-12-16 PROCEDURE — 96372 THER/PROPH/DIAG INJ SC/IM: CPT | Performed by: INTERNAL MEDICINE

## 2020-12-16 PROCEDURE — 84460 ALANINE AMINO (ALT) (SGPT): CPT

## 2020-12-16 PROCEDURE — 36415 COLL VENOUS BLD VENIPUNCTURE: CPT

## 2020-12-16 PROCEDURE — 3078F DIAST BP <80 MM HG: CPT | Performed by: INTERNAL MEDICINE

## 2020-12-16 PROCEDURE — 82565 ASSAY OF CREATININE: CPT

## 2020-12-16 PROCEDURE — 86140 C-REACTIVE PROTEIN: CPT

## 2020-12-16 PROCEDURE — 85652 RBC SED RATE AUTOMATED: CPT

## 2020-12-16 PROCEDURE — 84450 TRANSFERASE (AST) (SGOT): CPT

## 2020-12-16 PROCEDURE — 82040 ASSAY OF SERUM ALBUMIN: CPT

## 2020-12-16 PROCEDURE — 99214 OFFICE O/P EST MOD 30 MIN: CPT | Performed by: INTERNAL MEDICINE

## 2020-12-16 PROCEDURE — 3008F BODY MASS INDEX DOCD: CPT | Performed by: INTERNAL MEDICINE

## 2020-12-16 PROCEDURE — 20610 DRAIN/INJ JOINT/BURSA W/O US: CPT | Performed by: INTERNAL MEDICINE

## 2020-12-16 PROCEDURE — 3075F SYST BP GE 130 - 139MM HG: CPT | Performed by: INTERNAL MEDICINE

## 2020-12-16 RX ORDER — TRIAMCINOLONE ACETONIDE 40 MG/ML
40 INJECTION, SUSPENSION INTRA-ARTICULAR; INTRAMUSCULAR ONCE
Status: COMPLETED | OUTPATIENT
Start: 2020-12-16 | End: 2020-12-16

## 2020-12-16 RX ORDER — KETOROLAC TROMETHAMINE 30 MG/ML
30 INJECTION, SOLUTION INTRAMUSCULAR; INTRAVENOUS ONCE
Status: COMPLETED | OUTPATIENT
Start: 2020-12-16 | End: 2020-12-16

## 2020-12-16 RX ADMIN — TRIAMCINOLONE ACETONIDE 40 MG: 40 INJECTION, SUSPENSION INTRA-ARTICULAR; INTRAMUSCULAR at 17:00:00

## 2020-12-16 RX ADMIN — KETOROLAC TROMETHAMINE 30 MG: 30 INJECTION, SOLUTION INTRAMUSCULAR; INTRAVENOUS at 17:09:00

## 2020-12-16 NOTE — PROGRESS NOTES
Patient receive today a Ketorolac IM injection given on her Left Deltoid. Patient tolerated well with no complications.

## 2020-12-16 NOTE — PATIENT INSTRUCTIONS
1. Rest right hip x 3 days   2. Check labs   3. Check righ thip xray   4. ketoralac shot 30mg x 1   2. Cont. Legal medical marijuana. 3. Return to clinic in 6-12 months as needed.

## 2020-12-16 NOTE — TELEPHONE ENCOUNTER
Left message provider will see her today at 4:30 at the [place] [phone]. Call office back if there are any issues.

## 2020-12-16 NOTE — PROGRESS NOTES
Antoni Sotelo is a 60year old female. HPI:   Patient presents with:  Psoriatic Arthritis  Hip Pain: Right  Leg Pain: Right       She is a plesasnt 63year old old with psoriatic arthritis and fibromyalgia.     She was on  enbrel 50mg weekly and   metho \    HISTORY:  Past Medical History:   Diagnosis Date   • Basal cell carcinoma 02/01/2018    Skin, left cutaneous lip   • Cervical radiculopathy at C7 9/29/2017   • Colon polyps February 2010   • Essential hypertension 2019   • Hypercholesterolemia psoriasis.    Right hip tender , decreased ext rotaiton   Right trochanteric bursitis   Right pretibial tenderness  Right biceps         Component      Latest Ref Rng & Units 8/21/2019   WBC      4.0 - 11.0 x10(3) uL 11.0   RBC      3.80 - 5.30 x10(6)uL 4.2 steroid injection - 11/2018 and 2/2019   - off naproxen  - Topical steroid for rash - dermatitis over hands improving with mtx,    -  been off   methotrexaet 17.mg a week sub cu - b/c of elevated liver funtion tests   - off  Enbrel,  leflunomdie 20mg a day

## 2020-12-16 NOTE — TELEPHONE ENCOUNTER
Patient contacted and advise provider did want to see her for follow up if pain continued. Pt agreeable to schedule appointment. Pt given telephonic appointment for today at 4:30 pm but she prefers to come into the office.  Please advise on pt's request.

## 2020-12-16 NOTE — TELEPHONE ENCOUNTER
Patient states Dr. Nicholas Mercado prescribed her prednisone recently, however, patient states she is experiencing pain in her right hip and leg. Patient states pain never went away and would like to discuss this with the nurse.

## 2020-12-23 ENCOUNTER — TELEPHONE (OUTPATIENT)
Dept: RHEUMATOLOGY | Facility: CLINIC | Age: 60
End: 2020-12-23

## 2020-12-23 NOTE — TELEPHONE ENCOUNTER
Patient states she had an appointment with Dr Zayra Taylor on 12/16. She had a few injections. After that she started having a few GI symptoms but then her son tested positive for covid. she did get tested and was negative. Doesn't know if the symptoms came from the injections. Patient is on quarantine until 12/30. She is requesting a call back to discuss her next steps.  Please advise

## 2020-12-23 NOTE — PROCEDURES
With paitent's consent, I injected pt's right  trochanteric bursa  with 1ml lidocaine 1 % and 1 ml kenalog 40. It was done under sterile technique using iodine and alcohol swabs and ethyl chloride was used as an anaesthetic spray. Pt.  tolerated it well.

## 2020-12-23 NOTE — TELEPHONE ENCOUNTER
2 days later after the shot -she got nausea and vomitting   - her son was having sore throat on Thursday  And everyone at his work has covid   -  She doenst' have taste or smell and she also tested negative - felt bad thrusday , Friday. She gets headaches on and off. So she went to get a 2nd test yesterday - and was negative - her headache on and off. She tested negative for work -   Her hip is better.    She is going to [facility] til 12/30 -   She donest' need another test.

## 2020-12-23 NOTE — TELEPHONE ENCOUNTER
Please see patient's message below. Pt reports having N/V and HA early Friday morning after injection. Pt reports son tested Covid positive along with his co-workers at [employer]. She stated she will need a note to return to work and should she schedule a video visit with you. Please advise. She has tested negative but is in quarantine.

## 2020-12-29 ENCOUNTER — TELEPHONE (OUTPATIENT)
Dept: RHEUMATOLOGY | Facility: CLINIC | Age: 60
End: 2020-12-29

## 2020-12-29 NOTE — TELEPHONE ENCOUNTER
Patient is requesting a note to return to work. Patient is planning on returning on 01/04/20 and works Monday's, [address], and [address] [employer] only. Patient is requesting note to be sent to her MyChart. Please advise.

## 2021-01-24 RX ORDER — OXYBUTYNIN CHLORIDE 5 MG/1
TABLET, EXTENDED RELEASE ORAL
Qty: 90 TABLET | Refills: 3 | Status: SHIPPED | OUTPATIENT
Start: 2021-01-24 | End: 2021-04-02 | Stop reason: ALTCHOICE

## 2021-02-16 RX ORDER — LISINOPRIL 10 MG/1
10 TABLET ORAL DAILY
Qty: 90 TABLET | Refills: 0 | Status: SHIPPED | OUTPATIENT
Start: 2021-02-16 | End: 2021-03-15

## 2021-02-16 RX ORDER — ATORVASTATIN CALCIUM 40 MG/1
TABLET, FILM COATED ORAL
Qty: 90 TABLET | Refills: 0 | Status: SHIPPED | OUTPATIENT
Start: 2021-02-16 | End: 2021-05-28

## 2021-02-16 RX ORDER — HYDROCHLOROTHIAZIDE 12.5 MG/1
12.5 TABLET ORAL DAILY
Qty: 90 TABLET | Refills: 0 | Status: SHIPPED | OUTPATIENT
Start: 2021-02-16 | End: 2021-04-03

## 2021-02-16 NOTE — TELEPHONE ENCOUNTER
Current Outpatient Medications   Medication Sig Dispense Refill   • lisinopril 10 MG Oral Tab Take 1 tablet (10 mg total) by mouth daily. 90 tablet 0   • hydrochlorothiazide 12.5 MG Oral Tab Take 1 tablet (12.5 mg total) by mouth daily.  90 tablet 0       L

## 2021-02-16 NOTE — TELEPHONE ENCOUNTER
Refill sent.   Please remind patient to have labs ordered at her physical in September done as soon as possible

## 2021-02-23 NOTE — ADDENDUM NOTE
Addended by: Nathan Armendariz on: 5/29/2018 09:42 AM     Modules accepted: Darrius I have reviewed the history, physical exam, assessment and management plans.  I concur with or have edited all elements of her note.

## 2021-03-15 ENCOUNTER — PATIENT MESSAGE (OUTPATIENT)
Dept: RHEUMATOLOGY | Facility: CLINIC | Age: 61
End: 2021-03-15

## 2021-03-15 RX ORDER — LISINOPRIL 10 MG/1
10 TABLET ORAL DAILY
Qty: 90 TABLET | Refills: 0 | Status: SHIPPED | OUTPATIENT
Start: 2021-03-15 | End: 2021-04-07 | Stop reason: ALTCHOICE

## 2021-03-15 NOTE — TELEPHONE ENCOUNTER
From: Marie Eid  To: Rossy Mcelroy MD  Sent: 3/15/2021 3:19 PM CDT  Subject: Other    Hi Dr Cesia Steel I was wondering when I will be able to get the covid vaccine .  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

## 2021-03-16 ENCOUNTER — HOSPITAL ENCOUNTER (OUTPATIENT)
Dept: MAMMOGRAPHY | Facility: HOSPITAL | Age: 61
Discharge: HOME | End: 2021-03-16
Attending: INTERNAL MEDICINE
Payer: COMMERCIAL

## 2021-03-16 DIAGNOSIS — Z00.00 ANNUAL PHYSICAL EXAM: ICD-10-CM

## 2021-03-16 PROCEDURE — 77067 SCR MAMMO BI INCL CAD: CPT | Performed by: INTERNAL MEDICINE

## 2021-03-16 PROCEDURE — 77063 BREAST TOMOSYNTHESIS BI: CPT | Performed by: INTERNAL MEDICINE

## 2021-03-17 ENCOUNTER — IMMUNIZATION (OUTPATIENT)
Dept: LAB | Age: 61
End: 2021-03-17
Attending: HOSPITALIST
Payer: COMMERCIAL

## 2021-03-17 DIAGNOSIS — Z23 NEED FOR VACCINATION: Primary | ICD-10-CM

## 2021-03-17 PROCEDURE — 0001A SARSCOV2 VAC 30MCG/0.3ML IM: CPT

## 2021-04-02 ENCOUNTER — TELEPHONE (OUTPATIENT)
Dept: FAMILY MEDICINE CLINIC | Facility: CLINIC | Age: 61
End: 2021-04-02

## 2021-04-02 ENCOUNTER — OFFICE VISIT (OUTPATIENT)
Dept: FAMILY MEDICINE CLINIC | Facility: CLINIC | Age: 61
End: 2021-04-02

## 2021-04-02 ENCOUNTER — NURSE TRIAGE (OUTPATIENT)
Dept: INTERNAL MEDICINE CLINIC | Facility: CLINIC | Age: 61
End: 2021-04-02

## 2021-04-02 VITALS
HEIGHT: 63 IN | HEART RATE: 92 BPM | BODY MASS INDEX: 26.05 KG/M2 | DIASTOLIC BLOOD PRESSURE: 76 MMHG | TEMPERATURE: 98 F | SYSTOLIC BLOOD PRESSURE: 175 MMHG | WEIGHT: 147 LBS

## 2021-04-02 DIAGNOSIS — Z80.0 FHX: COLON CANCER: ICD-10-CM

## 2021-04-02 DIAGNOSIS — K64.8 INTERNAL HEMORRHOID: ICD-10-CM

## 2021-04-02 DIAGNOSIS — R19.7 BLOODY DIARRHEA: Primary | ICD-10-CM

## 2021-04-02 PROCEDURE — 36416 COLLJ CAPILLARY BLOOD SPEC: CPT | Performed by: STUDENT IN AN ORGANIZED HEALTH CARE EDUCATION/TRAINING PROGRAM

## 2021-04-02 PROCEDURE — 85018 HEMOGLOBIN: CPT | Performed by: STUDENT IN AN ORGANIZED HEALTH CARE EDUCATION/TRAINING PROGRAM

## 2021-04-02 PROCEDURE — 3077F SYST BP >= 140 MM HG: CPT | Performed by: STUDENT IN AN ORGANIZED HEALTH CARE EDUCATION/TRAINING PROGRAM

## 2021-04-02 PROCEDURE — 3078F DIAST BP <80 MM HG: CPT | Performed by: STUDENT IN AN ORGANIZED HEALTH CARE EDUCATION/TRAINING PROGRAM

## 2021-04-02 PROCEDURE — 99203 OFFICE O/P NEW LOW 30 MIN: CPT | Performed by: STUDENT IN AN ORGANIZED HEALTH CARE EDUCATION/TRAINING PROGRAM

## 2021-04-02 PROCEDURE — 3008F BODY MASS INDEX DOCD: CPT | Performed by: STUDENT IN AN ORGANIZED HEALTH CARE EDUCATION/TRAINING PROGRAM

## 2021-04-02 NOTE — TELEPHONE ENCOUNTER
Action Requested: Summary for Provider     []  Critical Lab, Recommendations Needed  [] Need Additional Advice  [x]   FYI    []   Need Orders  [] Need Medications Sent to Pharmacy  []  Other     SUMMARY: Patient woke up last night and felt lightheaded, diz

## 2021-04-02 NOTE — PROGRESS NOTES
HPI:    Patient ID: Ji Espinoza is a 63year old female. HPI  Pt presenting with bloody stool. She reports loose stool overnight, with two episodes of bright red bloody stool this morning. She denies any abd pain, nausea, vomiting.  She complains of lb (66.7 kg)   Height: 5' 3\" (1.6 m)       Body mass index is 26.04 kg/m². PHYSICAL EXAM:   Physical Exam  Vitals reviewed. Constitutional:       General: She is not in acute distress. Appearance: Normal appearance. She is well-developed.    HENT: cancer  H/o polyps seen in [address]  2012, last Cscope 2017 notable for internal hemorrhoids  - GASTRO - INTERNAL    Discussed ER precautions. Pt verbalized understanding and agrees with plan.     Orders Placed This Encounter      POC Hemoglobin [04023]      M

## 2021-04-02 NOTE — TELEPHONE ENCOUNTER
Patient states since she was advised to see a gastroenterologist, they can not get her an appoointment until 06/2021, requesting a callback to discuss what should she do.

## 2021-04-02 NOTE — TELEPHONE ENCOUNTER
Please let pt know that I reached out to Dr. Jorge Bruner and am awaiting response re: appt next week. Will update with information when available.

## 2021-04-02 NOTE — TELEPHONE ENCOUNTER
Called back pt to inform of dr Arnie Glynn message below. Pt will accept any day/ any time to see GI doctor.

## 2021-04-03 RX ORDER — HYDROCHLOROTHIAZIDE 12.5 MG/1
12.5 TABLET ORAL DAILY
Qty: 90 TABLET | Refills: 0 | Status: SHIPPED | OUTPATIENT
Start: 2021-04-03 | End: 2021-04-07 | Stop reason: ALTCHOICE

## 2021-04-03 NOTE — TELEPHONE ENCOUNTER
Lisinopril filled on 3/15  hydrochlorothiazide script of 2/16 was never filled. Pharmacy needs new rx. Rx pended.

## 2021-04-05 ENCOUNTER — TELEPHONE (OUTPATIENT)
Dept: GASTROENTEROLOGY | Facility: CLINIC | Age: 61
End: 2021-04-05

## 2021-04-05 NOTE — TELEPHONE ENCOUNTER
Patient returned RN's call. Verified . Scheduled office visit appt with Dr. Gala Hart for 2021 at 1:00 PM. OK per provider to double book. Verified time, location and to arrive 15 minutes early.  Pt verbalized understanding with no fu

## 2021-04-05 NOTE — TELEPHONE ENCOUNTER
----- Message from Cindy Lofton MD sent at 4/2/2021 12:55 PM CDT -----  Regarding: bloody stool  Hi Dr. Japser Rawls,    I referred a new patient to you. She was seen today in the office for painless bloody stool.  She is clinically stable but provided ER

## 2021-04-06 ENCOUNTER — TELEPHONE (OUTPATIENT)
Dept: INTERNAL MEDICINE CLINIC | Facility: CLINIC | Age: 61
End: 2021-04-06

## 2021-04-07 ENCOUNTER — IMMUNIZATION (OUTPATIENT)
Dept: LAB | Age: 61
End: 2021-04-07
Attending: HOSPITALIST
Payer: COMMERCIAL

## 2021-04-07 ENCOUNTER — OFFICE VISIT (OUTPATIENT)
Dept: GASTROENTEROLOGY | Facility: CLINIC | Age: 61
End: 2021-04-07

## 2021-04-07 ENCOUNTER — TELEPHONE (OUTPATIENT)
Dept: GASTROENTEROLOGY | Facility: CLINIC | Age: 61
End: 2021-04-07

## 2021-04-07 VITALS
BODY MASS INDEX: 26.16 KG/M2 | SYSTOLIC BLOOD PRESSURE: 143 MMHG | HEIGHT: 63 IN | WEIGHT: 147.63 LBS | HEART RATE: 67 BPM | DIASTOLIC BLOOD PRESSURE: 83 MMHG | TEMPERATURE: 98 F

## 2021-04-07 DIAGNOSIS — D12.6 ADENOMATOUS POLYP OF COLON, UNSPECIFIED PART OF COLON: ICD-10-CM

## 2021-04-07 DIAGNOSIS — K57.90 DIVERTICULOSIS: ICD-10-CM

## 2021-04-07 DIAGNOSIS — Z80.0 FAMILY HISTORY OF COLON CANCER IN MOTHER: ICD-10-CM

## 2021-04-07 DIAGNOSIS — Z86.010 PERSONAL HISTORY OF COLONIC POLYPS: ICD-10-CM

## 2021-04-07 DIAGNOSIS — Z23 NEED FOR VACCINATION: Primary | ICD-10-CM

## 2021-04-07 DIAGNOSIS — K62.5 BRBPR (BRIGHT RED BLOOD PER RECTUM): Primary | ICD-10-CM

## 2021-04-07 DIAGNOSIS — Z80.0 FAMILY HISTORY OF COLON CANCER: ICD-10-CM

## 2021-04-07 DIAGNOSIS — R12 BURNING REFLUX: ICD-10-CM

## 2021-04-07 DIAGNOSIS — K62.5 BRIGHT RED BLOOD PER RECTUM: Primary | ICD-10-CM

## 2021-04-07 PROCEDURE — 0002A SARSCOV2 VAC 30MCG/0.3ML IM: CPT

## 2021-04-07 PROCEDURE — 99244 OFF/OP CNSLTJ NEW/EST MOD 40: CPT | Performed by: INTERNAL MEDICINE

## 2021-04-07 PROCEDURE — 3008F BODY MASS INDEX DOCD: CPT | Performed by: INTERNAL MEDICINE

## 2021-04-07 PROCEDURE — 3077F SYST BP >= 140 MM HG: CPT | Performed by: INTERNAL MEDICINE

## 2021-04-07 PROCEDURE — 3079F DIAST BP 80-89 MM HG: CPT | Performed by: INTERNAL MEDICINE

## 2021-04-07 RX ORDER — LISINOPRIL AND HYDROCHLOROTHIAZIDE 12.5; 1 MG/1; MG/1
1 TABLET ORAL DAILY
Qty: 90 TABLET | Refills: 0 | Status: SHIPPED | OUTPATIENT
Start: 2021-04-07 | End: 2023-02-16

## 2021-04-07 NOTE — H&P
[facility] [address] Gastroenterology                                                                                                  Clinic History and Physical     Pa Disease Mother         CABG age 67   • Hypertension Mother    • Cancer Mother 64        Colon Cancer   • Other (Bladder Cancer) Mother    • Other (Cervical Cancer) Mother    • Other (Peripheral Vascular Disease) Mother    • Diabetes Maternal Grandmother hay fever  ENDOCRINE:  negative for cold intolerance and heat intolerance  MUSCULOSKELETAL:  negative for joint effusion/severe erythema  BEHAVIOR/PSYCH:  negative for psychotic behavior      PHYSICAL EXAM:   Blood pressure 143/83, pulse 67, temperature 98 07/14/2018    K 4.4 07/14/2018     07/14/2018    CO2 28 07/14/2018     No results found for: PTP, PT, INR  . ASSESSMENT/PLAN:   No anemia noted on lab work.     # Rectal bleeding:   # history of polyps  # lactose intolerance  # acid reflux    Recomme

## 2021-04-07 NOTE — PATIENT INSTRUCTIONS
1. Schedule colonoscopy with MAC sedation [Diagnosis: blood per rectum, history of polyps and mother with history of colon CA]    2.  bowel prep from pharmacy (split suprep or trilyte)    3.  Continue all medications for procedure except    ** If MAC

## 2021-04-07 NOTE — TELEPHONE ENCOUNTER
Scheduled for:  Colonoscopy 78097  Provider Name:  Duncan Bonilla  Date:  4/13/21  Location:  [facility]  Sedation:  Mac  Time:  10:00 Am   Prep:  Alma Lindsey or Trilyte Prep instructions were given to pt in the office, pt verbalized understanding.   Meds/Allergies Reconcil

## 2021-04-08 ENCOUNTER — TELEPHONE (OUTPATIENT)
Dept: INTERNAL MEDICINE CLINIC | Facility: CLINIC | Age: 61
End: 2021-04-08

## 2021-04-08 NOTE — TELEPHONE ENCOUNTER
Pt gris she had her mammo done in march of this year but continues to get reminder in [facility]. Pt said she contacted [facility] and they are un able to remove.  Please advise

## 2021-04-10 ENCOUNTER — LAB REQUISITION (OUTPATIENT)
Dept: LAB | Facility: HOSPITAL | Age: 61
End: 2021-04-10
Payer: COMMERCIAL

## 2021-04-10 DIAGNOSIS — Z01.818 ENCOUNTER FOR OTHER PREPROCEDURAL EXAMINATION: ICD-10-CM

## 2021-04-10 RX ORDER — SODIUM, POTASSIUM,MAG SULFATES 17.5-3.13G
SOLUTION, RECONSTITUTED, ORAL ORAL
Qty: 1 BOTTLE | Refills: 0 | Status: SHIPPED | OUTPATIENT
Start: 2021-04-10 | End: 2021-04-20 | Stop reason: ALTCHOICE

## 2021-04-13 ENCOUNTER — SURGERY CENTER DOCUMENTATION (OUTPATIENT)
Dept: SURGERY | Age: 61
End: 2021-04-13

## 2021-04-13 ENCOUNTER — LAB REQUISITION (OUTPATIENT)
Dept: LAB | Facility: HOSPITAL | Age: 61
DRG: 919 | End: 2021-04-13
Payer: COMMERCIAL

## 2021-04-13 ENCOUNTER — APPOINTMENT (OUTPATIENT)
Dept: GENERAL RADIOLOGY | Facility: HOSPITAL | Age: 61
DRG: 919 | End: 2021-04-13
Attending: EMERGENCY MEDICINE
Payer: COMMERCIAL

## 2021-04-13 ENCOUNTER — APPOINTMENT (OUTPATIENT)
Dept: CT IMAGING | Facility: HOSPITAL | Age: 61
DRG: 919 | End: 2021-04-13
Attending: EMERGENCY MEDICINE
Payer: COMMERCIAL

## 2021-04-13 ENCOUNTER — HOSPITAL ENCOUNTER (INPATIENT)
Facility: HOSPITAL | Age: 61
LOS: 3 days | Discharge: HOME | DRG: 919 | End: 2021-04-16
Attending: EMERGENCY MEDICINE | Admitting: HOSPITALIST
Payer: COMMERCIAL

## 2021-04-13 DIAGNOSIS — Z86.010 PERSONAL HISTORY OF COLONIC POLYPS: ICD-10-CM

## 2021-04-13 DIAGNOSIS — K62.5 HEMORRHAGE OF ANUS AND RECTUM: ICD-10-CM

## 2021-04-13 DIAGNOSIS — D12.6 BENIGN NEOPLASM OF COLON, UNSPECIFIED: ICD-10-CM

## 2021-04-13 DIAGNOSIS — Z80.0 FAMILY HISTORY OF MALIGNANT NEOPLASM OF DIGESTIVE ORGANS: ICD-10-CM

## 2021-04-13 DIAGNOSIS — K66.1 HEMOPERITONEUM: Primary | ICD-10-CM

## 2021-04-13 PROCEDURE — 45385 COLONOSCOPY W/LESION REMOVAL: CPT | Performed by: INTERNAL MEDICINE

## 2021-04-13 PROCEDURE — 88305 TISSUE EXAM BY PATHOLOGIST: CPT | Performed by: INTERNAL MEDICINE

## 2021-04-13 PROCEDURE — 99254 IP/OBS CNSLTJ NEW/EST MOD 60: CPT | Performed by: SURGERY

## 2021-04-13 PROCEDURE — 71045 X-RAY EXAM CHEST 1 VIEW: CPT | Performed by: EMERGENCY MEDICINE

## 2021-04-13 PROCEDURE — 74177 CT ABD & PELVIS W/CONTRAST: CPT | Performed by: EMERGENCY MEDICINE

## 2021-04-13 PROCEDURE — 99223 1ST HOSP IP/OBS HIGH 75: CPT | Performed by: HOSPITALIST

## 2021-04-13 PROCEDURE — 45380 COLONOSCOPY AND BIOPSY: CPT | Performed by: INTERNAL MEDICINE

## 2021-04-13 RX ORDER — ONDANSETRON 2 MG/ML
4 INJECTION INTRAMUSCULAR; INTRAVENOUS EVERY 6 HOURS PRN
Status: DISCONTINUED | OUTPATIENT
Start: 2021-04-13 | End: 2021-04-16

## 2021-04-13 RX ORDER — SODIUM CHLORIDE 9 MG/ML
INJECTION, SOLUTION INTRAVENOUS CONTINUOUS
Status: DISCONTINUED | OUTPATIENT
Start: 2021-04-13 | End: 2021-04-16

## 2021-04-13 RX ORDER — HYDROMORPHONE HYDROCHLORIDE 1 MG/ML
1 INJECTION, SOLUTION INTRAMUSCULAR; INTRAVENOUS; SUBCUTANEOUS EVERY 2 HOUR PRN
Status: DISCONTINUED | OUTPATIENT
Start: 2021-04-13 | End: 2021-04-16

## 2021-04-13 RX ORDER — LISINOPRIL AND HYDROCHLOROTHIAZIDE 12.5; 1 MG/1; MG/1
1 TABLET ORAL DAILY
Status: DISCONTINUED | OUTPATIENT
Start: 2021-04-13 | End: 2021-04-13 | Stop reason: RX

## 2021-04-13 RX ORDER — HYDROMORPHONE HYDROCHLORIDE 1 MG/ML
0.4 INJECTION, SOLUTION INTRAMUSCULAR; INTRAVENOUS; SUBCUTANEOUS EVERY 2 HOUR PRN
Status: DISCONTINUED | OUTPATIENT
Start: 2021-04-13 | End: 2021-04-16

## 2021-04-13 RX ORDER — PROCHLORPERAZINE EDISYLATE 5 MG/ML
5 INJECTION INTRAMUSCULAR; INTRAVENOUS EVERY 8 HOURS PRN
Status: DISCONTINUED | OUTPATIENT
Start: 2021-04-13 | End: 2021-04-16

## 2021-04-13 RX ORDER — ACETAMINOPHEN 325 MG/1
650 TABLET ORAL EVERY 6 HOURS PRN
Status: DISCONTINUED | OUTPATIENT
Start: 2021-04-13 | End: 2021-04-16

## 2021-04-13 RX ORDER — HYDROMORPHONE HYDROCHLORIDE 1 MG/ML
0.5 INJECTION, SOLUTION INTRAMUSCULAR; INTRAVENOUS; SUBCUTANEOUS EVERY 2 HOUR PRN
Status: DISCONTINUED | OUTPATIENT
Start: 2021-04-13 | End: 2021-04-16

## 2021-04-13 RX ORDER — MORPHINE SULFATE 4 MG/ML
4 INJECTION, SOLUTION INTRAMUSCULAR; INTRAVENOUS ONCE
Status: COMPLETED | OUTPATIENT
Start: 2021-04-13 | End: 2021-04-13

## 2021-04-13 NOTE — ED QUICK NOTES
Pt NPO but can have swabs for mouth, provided instruction to not drink but can swab mouth to help with dry mouth

## 2021-04-13 NOTE — ED QUICK NOTES
63year old female here from [address] [place] [address] Way after outpatient colonoscopy, pt reports Left shoulder and back pain

## 2021-04-13 NOTE — PROGRESS NOTES
Saw patient and son present in room  Reviewed results and discussed CT results with Dr. Payton Sun  Patient is being admitted with surgery consult  Will treat conservatively at this time -- monitor abdominal exam and H/H Q 8

## 2021-04-13 NOTE — ED QUICK NOTES
Received patient with PIV in R hand/wrist pt reports pain at site, able to flush and draw back blood, pt asking PIV to be replaced, hand PIV removed, replaced with R 22 gauge in [facility]

## 2021-04-13 NOTE — H&P
[facility] [place] [place]    PATIENT'S NAME: Segundo Keating   ATTENDING PHYSICIAN: Eder Shipley MD   PATIENT ACCOUNT#:   [id number]    LOCATION:  [address] RECORD #:   [id number]       YOB: 1960  ADMISSION DATE:       04/13/2021 pulse ox 97% on room air. HEENT:  Atraumatic. Oropharynx clear. Moist mucous membranes. Normal hard and soft palate. Eyes:  Anicteric sclerae. NECK:  Supple. No lymphadenopathy. Trachea midline. Full range of motion.    LUNGS:  Clear to auscultat

## 2021-04-13 NOTE — PROGRESS NOTES
Patient with hypertension and pain in the LUQ radiating to chest and L shoulder.  No tachycardia noted   Given recent procedure, though no difficulty encountered, concern for perforation vs cardiac etiology vs splenic laceration  Would take patient to ER an

## 2021-04-13 NOTE — PROCEDURES
COLONOSCOPY REPORT    Ramirez Torres     1960 Age 63year old   PCP Yani Simpson MD Endoscopist Krysten Og MD     Date of procedure: 21    Procedure: Colonoscopy w/ cold snare and cold biopsy polypectomy    Pre-operative diagnosis: fami descending colon; flat morphology; cold snare and cold biopsy polypectomy and retrieved. 2. Diverticulosis: left sided. 3. Terminal ileum: the visualized mucosa appeared normal.    4. A retroflexed view of the rectum revealed hemorrhoids.     5. The c

## 2021-04-13 NOTE — ED NOTES
Orders for admission, patient is aware of plan and ready to go upstairs.  Any questions, please call ED TAE Tian  at extension [phone]  Type of COVID test sent:rapid  COVID Suspicion level: Low    Titratable drug(s) infusing:  Rate:    LOC at time of transpo

## 2021-04-13 NOTE — PLAN OF CARE
Problem: Patient Centered Care  Goal: Patient preferences are identified and integrated in the patient's plan of care  Description: Interventions:  - What would you like us to know as we care for you?  \"I was getting an outpatient colonoscopy\"  - Provid physical limitations  - Instruct pt to call for assistance with activity based on assessment  - Modify environment to reduce risk of injury  - Provide assistive devices as appropriate  - Consider OT/PT consult to assist with strengthening/mobility  - Encou

## 2021-04-13 NOTE — ED PROVIDER NOTES
Patient Seen in: [facility] AND [facility] Emergency Department      History   Patient presents with:  Abdomen/Flank Pain  Postop/Procedure Problem    Stated Complaint: Sent by GI Rylee s/p Colonoscopy for RUQ radiating to CP    HPI/Subjective:   HPI    60-year- since quittin.6      Smokeless tobacco: Never Used    Vaping Use      Vaping Use: Never used    Alcohol use:  Yes      Alcohol/week: 0.0 standard drinks      Comment: Occasional    Drug use: Yes      Types: Cannabis             Review of Systems    Posi ---------                               -----------         ------                     CBC W/ DIFFERENTIAL[[id number]]                              Final result                 Please view results for these tests on the individual orders.    RAINBOW DRAW B

## 2021-04-14 ENCOUNTER — TELEPHONE (OUTPATIENT)
Dept: GASTROENTEROLOGY | Facility: CLINIC | Age: 61
End: 2021-04-14

## 2021-04-14 PROCEDURE — 99232 SBSQ HOSP IP/OBS MODERATE 35: CPT | Performed by: INTERNAL MEDICINE

## 2021-04-14 PROCEDURE — 99233 SBSQ HOSP IP/OBS HIGH 50: CPT | Performed by: HOSPITALIST

## 2021-04-14 NOTE — CONSULTS
[facility]    PATIENT'S NAME: Alex Ponce   ATTENDING PHYSICIAN: Moshe Duarte MD   CONSULTING PHYSICIAN: Jef Martinez MD   PATIENT ACCOUNT#:   [id number]    LOCATION:  [address] [place] [address] RECORD #:   [id number]       DATE OF BIRTH:  [birth year] By Chuck Lundberg MD  d: 04/14/2021 07:33:28  t: 04/14/2021 08:37:03  [facility] [id number]  /

## 2021-04-14 NOTE — PROGRESS NOTES
Surgery Note    Discussed with ED attending and reviewed CT scan.   Full consult to follow    #splenic capsule tear  - NPO, serial Hb  - currently stable with normal Hb

## 2021-04-14 NOTE — PROGRESS NOTES
[facility]    Progress Note    [facility]:  [facility]    1960 MRN [id number]   Location [facility] Attending Bushra Roca MD   Hosp Day # 1 PCP Hernan Beard MD     Assessment and Plan:     Splenic significant left upper quadrant pain no rebound or guarding    Results:     Lab Results   Component Value Date    WBC 11.4 (H) 04/14/2021    HGB 11.9 (L) 04/14/2021    HCT 36.7 04/14/2021    .0 04/14/2021    CREATSERUM 0.54 (L) 04/13/2021    BUN 10 MD Demond Gray MD  4/14/2021

## 2021-04-14 NOTE — TELEPHONE ENCOUNTER
Entered into Epic. Recall CLN in 3 years per Dr. Giovanni Nicole. Last CLN done 04/13/2021. Recall entered into Patient Outreach for 04/13/2024. HM updated. Per result note, Dr. Giovanni Nicole discussed colonoscopy results and recommendations inpatient.

## 2021-04-14 NOTE — PLAN OF CARE
Problem: Patient Centered Care  Goal: Patient preferences are identified and integrated in the patient's plan of care  Description: Interventions:  - What would you like us to know as we care for you?  \"I was getting an outpatient colonoscopy\"  - Provid PLANNING  Goal: Discharge to home or other facility with appropriate resources  Description: INTERVENTIONS:  - Identify barriers to discharge w/pt and caregiver  - Include patient/family/discharge partner in discharge planning  - Arrange for needed dischar Utilize distraction and/or relaxation techniques  - Monitor for opioid side effects  - Notify MD/LIP if interventions unsuccessful or patient reports new pain  - Anticipate increased pain with activity and pre-medicate as appropriate  Outcome: Not Progress

## 2021-04-14 NOTE — PLAN OF CARE
Problem: Patient Centered Care  Goal: Patient preferences are identified and integrated in the patient's plan of care  Description: Interventions:  - What would you like us to know as we care for you?  \"I was getting an outpatient colonoscopy\"  - Provid INFECTION - ADULT  Goal: Absence of fever/infection during anticipated neutropenic period  Description: INTERVENTIONS  - Monitor WBC, temp  - Administer antibiotics as ordered  - Implement neutropenic guidelines  Outcome: Progressing     Problem: SAFETY AD document skin integrity  - Monitor for areas of redness and/or skin breakdown  - Initiate interventions, skin care algorithm/standards of care as needed  Outcome: Progressing     Problem: GASTROINTESTINAL - ADULT  Goal: Minimal or absence of nausea and vom

## 2021-04-14 NOTE — CM/SW NOTE
MDO to SW for right advanced directives. SW met w/pt in her room and provided her with POA paperwork. SW/CM to remain available for support and/or discharge planning.      DAMON Gresham, [facility]  Social Work   ZRA:#[id number]

## 2021-04-14 NOTE — TELEPHONE ENCOUNTER
----- Message from Liv Patiño MD sent at 4/14/2021 11:29 AM CDT -----  GI staff: please place recall for colonoscopy in 3 years

## 2021-04-14 NOTE — PROGRESS NOTES
[facility] [address]     Gastroenterology Progress Note    Mary Conrad Patient Status:  Inpatient    1960 MRN [id number]   Location [facility] Attending Alexandr Ingram MD   Hosp Day # 1 PCP MD Noelle Dodd TROP <0.045 04/13/2021       CT ABDOMEN+PELVIS(CONTRAST ONLY)(CPT=74177)    Result Date: 4/13/2021  CONCLUSION:  Mild hemoperitoneum including perisplenic hemoperitoneum.   These findings appear consistent with splenic capsular injury related to colonosc

## 2021-04-14 NOTE — PROGRESS NOTES
[facility]    Progress Note    Mary Larry Patient Status:  Inpatient    1960 MRN [id number]   Location [facility]5W Attending Adair Stevens MD   Hosp Day # 1 PCP Kleber Kate MD       SUBJECTIVE:    Abd pain cont Hysterectomy. Findings were discussed with Dr. Kirsty Pradhan at 2:42 p.m. 04/13/2021.       Dictated by (CST): Eb Jackson MD on 4/13/2021 at 2:26 PM     Finalized by (CST): Eb Jackson MD on 4/13/2021 at 2:49 PM          XR CHEST AP PORTABLE  (CPT=7 35 minutes spent, >50% spent counseling re: treatment plan and workup      Jim Davis MD  4/14/2021  3:11 PM

## 2021-04-15 PROCEDURE — 99233 SBSQ HOSP IP/OBS HIGH 50: CPT | Performed by: SURGERY

## 2021-04-15 PROCEDURE — 99232 SBSQ HOSP IP/OBS MODERATE 35: CPT | Performed by: INTERNAL MEDICINE

## 2021-04-15 PROCEDURE — 99233 SBSQ HOSP IP/OBS HIGH 50: CPT | Performed by: HOSPITALIST

## 2021-04-15 RX ORDER — HYDROCODONE BITARTRATE AND ACETAMINOPHEN 10; 325 MG/1; MG/1
1 TABLET ORAL EVERY 4 HOURS PRN
Status: DISCONTINUED | OUTPATIENT
Start: 2021-04-15 | End: 2021-04-16

## 2021-04-15 NOTE — PROGRESS NOTES
[facility] [address]     Gastroenterology Progress Note    Mary Larry Patient Status:  Inpatient    1960 MRN [id number]   Location [facility]5 [facility] Attending Adair Stevens MD   Hosp Day # 2 PCP MD Luiza Valerio 04/13/2021       CT ABDOMEN+PELVIS(CONTRAST ONLY)(CPT=74177)    Result Date: 4/13/2021  CONCLUSION:  Mild hemoperitoneum including perisplenic hemoperitoneum. These findings appear consistent with splenic capsular injury related to colonoscopy.   No eviden

## 2021-04-15 NOTE — PROGRESS NOTES
[facility]    Progress Note    Vester Stairs Patient Status:  Inpatient    1960 MRN [id number]   Location [facility]5W Attending Jess Matias MD   Hosp Day # 2 PCP Jayy Kong MD       SUBJECTIVE:    Still with si Colonic diverticulosis. Hysterectomy. Findings were discussed with Dr. Lucy Anton at 2:42 p.m. 04/13/2021.       Dictated by (CST): Neli Johnston MD on 4/13/2021 at 2:26 PM     Finalized by (CST): Neli Johnston MD on 4/13/2021 at 2:49 PM          XR arthritis    Prophylaxis:   DVT with SCDs    Dispo: pending better pain control - counseled on activity as tolerated    Greater than 35 minutes spent, >50% spent counseling re: treatment plan and work up.

## 2021-04-15 NOTE — PROGRESS NOTES
[facility]    Progress Note    Charlette Guajardo Patient Status:  Inpatient    1960 MRN [id number]   Location [facility] 5SW/SE Attending Meir Hancock MD   Hosp Day # 2 PCP Josh Stein MD     Assessment and Plan:     Pe 12/16/2020    TROP <0.045 04/13/2021       CT ABDOMEN+PELVIS(CONTRAST ONLY)(CPT=74177)    Result Date: 4/13/2021  CONCLUSION:  Mild hemoperitoneum including perisplenic hemoperitoneum.   These findings appear consistent with splenic capsular injury related

## 2021-04-16 VITALS
HEIGHT: 63 IN | WEIGHT: 144.19 LBS | RESPIRATION RATE: 18 BRPM | SYSTOLIC BLOOD PRESSURE: 141 MMHG | DIASTOLIC BLOOD PRESSURE: 68 MMHG | BODY MASS INDEX: 25.55 KG/M2 | OXYGEN SATURATION: 96 % | HEART RATE: 70 BPM | TEMPERATURE: 98 F

## 2021-04-16 PROCEDURE — 99232 SBSQ HOSP IP/OBS MODERATE 35: CPT | Performed by: INTERNAL MEDICINE

## 2021-04-16 PROCEDURE — 99239 HOSP IP/OBS DSCHRG MGMT >30: CPT | Performed by: HOSPITALIST

## 2021-04-16 RX ORDER — TRAMADOL HYDROCHLORIDE 50 MG/1
100 TABLET ORAL EVERY 6 HOURS PRN
Status: DISCONTINUED | OUTPATIENT
Start: 2021-04-16 | End: 2021-04-16

## 2021-04-16 RX ORDER — HYDROCODONE BITARTRATE AND ACETAMINOPHEN 10; 325 MG/1; MG/1
1-2 TABLET ORAL EVERY 4 HOURS PRN
Qty: 20 TABLET | Refills: 0 | Status: SHIPPED | OUTPATIENT
Start: 2021-04-16 | End: 2021-04-22

## 2021-04-16 RX ORDER — TRAMADOL HYDROCHLORIDE 50 MG/1
100 TABLET ORAL EVERY 4 HOURS PRN
Status: DISCONTINUED | OUTPATIENT
Start: 2021-04-16 | End: 2021-04-16

## 2021-04-16 NOTE — CM/SW NOTE
SW received call from RN who reports that patient is requesting to complete POA Faraz paperwork. SW met with patient at bedside. SW discussed POA HC and documentation. Patient agreeable to completing documentation and electing POA HC.  Patient completed POA

## 2021-04-16 NOTE — DISCHARGE SUMMARY
[facility]    Discharge Summary    Marsha Hutchins Patient Status:  Inpatient    1960 MRN [id number]   Location [facility] 5SW/SE Attending Rocky Ugalde MD   Hosp Day # 3 PCP Masha Aranda MD     Date of Admission:  Splenic hematoma  - after colonoscopy  - seen by surgery and GI  - advancing diet as tolerated  - can hold off on surgery at this time  - monitor hgb - stable  - d/w GI   - can stop abx, started oral pain meds - home with norco     Other PMH   HTN  HL preparation and coordination of this discharge

## 2021-04-16 NOTE — PROGRESS NOTES
[facility] [address]     Gastroenterology Progress Note    Claire Grover Patient Status:  Inpatient    1960 MRN [id number]   Location [facility]5 [facility] Attending Mike Mccormick MD   Hosp Day # 3 PCP MD Perico Chun CA 8.6 04/15/2021    ALB 3.6 12/16/2020    ALKPHO 68 07/14/2018    BILT 0.5 07/14/2018    TP 6.7 07/14/2018    AST 11 (L) 12/16/2020    ALT 14 12/16/2020    TSH 1.09 05/01/2015    DDIMER <0.27 04/13/2021    ESRML 10 12/16/2020    CRP <2.90 (H) 12/16/202

## 2021-04-16 NOTE — PLAN OF CARE
Problem: Patient Centered Care  Goal: Patient preferences are identified and integrated in the patient's plan of care  Description: Interventions:  - What would you like us to know as we care for you?  \"I was getting an outpatient colonoscopy\"  - Provid pre-medicate as appropriate  Outcome: Progressing     Problem: RISK FOR INFECTION - ADULT  Goal: Absence of fever/infection during anticipated neutropenic period  Description: INTERVENTIONS  - Monitor WBC, temp  - Administer antibiotics as ordered  - Imple and document risk factors for pressure ulcer development  - Assess and document skin integrity  - Monitor for areas of redness and/or skin breakdown  - Initiate interventions, skin care algorithm/standards of care as needed  Outcome: Progressing     Proble

## 2021-04-19 ENCOUNTER — PATIENT OUTREACH (OUTPATIENT)
Dept: CASE MANAGEMENT | Age: 61
End: 2021-04-19

## 2021-04-19 ENCOUNTER — TELEPHONE (OUTPATIENT)
Dept: INTERNAL MEDICINE CLINIC | Facility: CLINIC | Age: 61
End: 2021-04-19

## 2021-04-19 DIAGNOSIS — Z02.9 ENCOUNTERS FOR UNSPECIFIED ADMINISTRATIVE PURPOSE: ICD-10-CM

## 2021-04-19 DIAGNOSIS — K66.1 HEMOPERITONEUM: ICD-10-CM

## 2021-04-19 PROCEDURE — 1111F DSCHRG MED/CURRENT MED MERGE: CPT

## 2021-04-19 RX ORDER — ONDANSETRON 4 MG/1
4 TABLET, ORALLY DISINTEGRATING ORAL EVERY 12 HOURS PRN
Qty: 20 TABLET | Refills: 1 | Status: SHIPPED | OUTPATIENT
Start: 2021-04-19 | End: 2021-04-20

## 2021-04-19 NOTE — TELEPHONE ENCOUNTER
Spoke to pt for TCM today. [facility] scheduled pt to see Dr. Debra Morris tomorrow, 4/20/21. During TCM call, pt stated she thought she was to receive a script for Zofran 4 mg ODT as needed every 8 hours however she did not.  Pt is requesting a refill for the Zofran ODT

## 2021-04-19 NOTE — PROGRESS NOTES
Initial Post Discharge Follow Up   Discharge Date: 4/16/21  Contact Date: 4/19/2021    Consent Verification:  Assessment Completed With: Patient  HIPAA Verified?   Yes    Discharge Dx:     Hemoperitoneum    General:   • How have you been since your disch to the bathroom, etc)? yes  • (NCM) Was patient given a different diet per AVS? no    Medications: Reviewed medication list with the patient. Medications are up to date.    Current Outpatient Medications   Medication Sig Dispense Refill   • HYDROcodone-acet refill for Zofran, TE will be sent to PCP office to f/u on request.     Follow up appointments:  NCM scheduled HFU appt tomorrow, 4/20/21. Pt plans to not following up with the Gastroenterologist after appt with Dr. Sintia Gamez if needed.  Pt denies any barriers

## 2021-04-20 ENCOUNTER — OFFICE VISIT (OUTPATIENT)
Dept: INTERNAL MEDICINE CLINIC | Facility: CLINIC | Age: 61
End: 2021-04-20

## 2021-04-20 VITALS
DIASTOLIC BLOOD PRESSURE: 72 MMHG | SYSTOLIC BLOOD PRESSURE: 130 MMHG | HEIGHT: 63 IN | HEART RATE: 67 BPM | BODY MASS INDEX: 24.96 KG/M2 | WEIGHT: 140.88 LBS

## 2021-04-20 DIAGNOSIS — R91.1 PULMONARY NODULE, LEFT: ICD-10-CM

## 2021-04-20 DIAGNOSIS — D73.5 PERISPLENIC HEMATOMA: Primary | ICD-10-CM

## 2021-04-20 DIAGNOSIS — D62 ANEMIA DUE TO ACUTE BLOOD LOSS: ICD-10-CM

## 2021-04-20 DIAGNOSIS — I10 ESSENTIAL HYPERTENSION: ICD-10-CM

## 2021-04-20 PROCEDURE — 99495 TRANSJ CARE MGMT MOD F2F 14D: CPT | Performed by: INTERNAL MEDICINE

## 2021-04-20 PROCEDURE — 3075F SYST BP GE 130 - 139MM HG: CPT | Performed by: INTERNAL MEDICINE

## 2021-04-20 PROCEDURE — 3078F DIAST BP <80 MM HG: CPT | Performed by: INTERNAL MEDICINE

## 2021-04-20 PROCEDURE — 3008F BODY MASS INDEX DOCD: CPT | Performed by: INTERNAL MEDICINE

## 2021-04-20 NOTE — PROGRESS NOTES
HPI:    Antoni Sotelo is a 63year old female here today for hospital follow up.    She was discharged from Inpatient hospital[facility]  to Home   Admission Date: 4/13/21   Discharge Date: 4/16/21  Hospital Discharge Diagnoses (since 3/21/2021) possible 0.6 cm left upper lobe pulmonary nodule for which CT scan was recommended. CT scan abdomen and pelvis revealed mild hemoperitoneum including perisplenic hemoperitoneum.   EKG revealed sinus rhythm, rate 62, normal except for 1st degree AV block, u prior to visit.         HISTORY: reconciled and reviewed with patient  She  has a past medical history of Basal cell carcinoma (02/01/2018), Cervical radiculopathy at C7 (9/29/2017), Colon polyps (February 2010), Essential hypertension (2019), Hypercholeste right upper quadrant tenderness to palpation without guarding or rebound and without mass or hepatosplenomegaly      ASSESSMENT/ PLAN:   Diagnoses and all orders for this visit:    Perisplenic hematoma  With associated hemoperitoneum. Serial CBCs stable.

## 2021-04-20 NOTE — PATIENT INSTRUCTIONS
Please continue hydrocodone as needed, although hopefully dose can be reduced and medication stopped soon. Use Colace or Senokot once or twice daily, and magnesium citrate or milk of magnesia as needed for constipation.   Please schedule a CT scan of your

## 2021-04-22 ENCOUNTER — HOSPITAL ENCOUNTER (OUTPATIENT)
Dept: CT IMAGING | Facility: HOSPITAL | Age: 61
Discharge: HOME | End: 2021-04-22
Attending: INTERNAL MEDICINE
Payer: COMMERCIAL

## 2021-04-22 DIAGNOSIS — R91.1 PULMONARY NODULE, LEFT: ICD-10-CM

## 2021-04-22 PROCEDURE — 71260 CT THORAX DX C+: CPT | Performed by: INTERNAL MEDICINE

## 2021-04-26 ENCOUNTER — PATIENT MESSAGE (OUTPATIENT)
Dept: INTERNAL MEDICINE CLINIC | Facility: CLINIC | Age: 61
End: 2021-04-26

## 2021-04-26 NOTE — TELEPHONE ENCOUNTER
Please advise on request for a return to work letter. I didn't pend it as I didn't know if she could return to work without restriction.

## 2021-05-13 ENCOUNTER — TELEPHONE (OUTPATIENT)
Dept: INTERNAL MEDICINE CLINIC | Facility: CLINIC | Age: 61
End: 2021-05-13

## 2021-05-13 ENCOUNTER — TELEPHONE (OUTPATIENT)
Dept: UROLOGY | Facility: HOSPITAL | Age: 61
End: 2021-05-13

## 2021-05-13 DIAGNOSIS — Z87.448 HISTORY OF CYSTOCELE: Primary | ICD-10-CM

## 2021-05-13 NOTE — TELEPHONE ENCOUNTER
Patient was called. [facility] Dr. Nolan [employer] office informed her to go to ER but she declines to go due to cost.     She requested appointment with physician she saw back in April, Dr. Carlos Greer. Appointment made for 05/14 at 10:30.  Informed her that if an

## 2021-05-13 NOTE — TELEPHONE ENCOUNTER
Patient recently discharge after having a bleed from her colonoscopy. Patient has returned back to work    Woke up Monday feeling shooting pain 2 inches below her belly button. Pain travels around to back side. Patient has a history of cystocele.  Mckenzie Dixon

## 2021-05-13 NOTE — TELEPHONE ENCOUNTER
Patient called with complaints of lower abdominal pain which started on Monday. Patient states pain begins in lower abdomen and then travels toward her back. She describes pain as a shooting/constant pain which travels to her back.  Denies UTI symptoms, n

## 2021-05-14 ENCOUNTER — OFFICE VISIT (OUTPATIENT)
Dept: FAMILY MEDICINE CLINIC | Facility: CLINIC | Age: 61
End: 2021-05-14

## 2021-05-14 VITALS
HEART RATE: 80 BPM | BODY MASS INDEX: 24.8 KG/M2 | WEIGHT: 140 LBS | DIASTOLIC BLOOD PRESSURE: 80 MMHG | HEIGHT: 63 IN | SYSTOLIC BLOOD PRESSURE: 135 MMHG | TEMPERATURE: 98 F

## 2021-05-14 DIAGNOSIS — I71.2 THORACIC AORTIC ANEURYSM WITHOUT RUPTURE (HCC): ICD-10-CM

## 2021-05-14 DIAGNOSIS — R10.2 SUPRAPUBIC PAIN: Primary | ICD-10-CM

## 2021-05-14 DIAGNOSIS — R31.9 HEMATURIA, UNSPECIFIED TYPE: ICD-10-CM

## 2021-05-14 DIAGNOSIS — K66.1 HEMOPERITONEUM: ICD-10-CM

## 2021-05-14 PROCEDURE — 99214 OFFICE O/P EST MOD 30 MIN: CPT | Performed by: STUDENT IN AN ORGANIZED HEALTH CARE EDUCATION/TRAINING PROGRAM

## 2021-05-14 PROCEDURE — 81002 URINALYSIS NONAUTO W/O SCOPE: CPT | Performed by: STUDENT IN AN ORGANIZED HEALTH CARE EDUCATION/TRAINING PROGRAM

## 2021-05-14 PROCEDURE — 3075F SYST BP GE 130 - 139MM HG: CPT | Performed by: STUDENT IN AN ORGANIZED HEALTH CARE EDUCATION/TRAINING PROGRAM

## 2021-05-14 PROCEDURE — 3079F DIAST BP 80-89 MM HG: CPT | Performed by: STUDENT IN AN ORGANIZED HEALTH CARE EDUCATION/TRAINING PROGRAM

## 2021-05-14 PROCEDURE — 3008F BODY MASS INDEX DOCD: CPT | Performed by: STUDENT IN AN ORGANIZED HEALTH CARE EDUCATION/TRAINING PROGRAM

## 2021-05-14 RX ORDER — CEPHALEXIN 500 MG/1
500 CAPSULE ORAL 2 TIMES DAILY
Qty: 14 CAPSULE | Refills: 0 | Status: SHIPPED | OUTPATIENT
Start: 2021-05-14

## 2021-05-14 NOTE — PROGRESS NOTES
HPI:    Patient ID: Antoni Sotelo is a 63year old female. HPI  Pt presenting with suprapubic pain. She was recently seen for bloody stools, completed Cscope on 2/19 complicated by hemoperitoneum for which she was hospitalized 4/13 through 4/16/2021. not taking: Reported on 5/14/2021 ) 15 tablet 0     Allergies:  Adhesive Tape           HIVES    05/14/21  1020   BP: 135/80   Pulse: 80   Temp: 98 °F (36.7 °C)   TempSrc: Temporal   Weight: 140 lb (63.5 kg)   Height: 5' 3\" (1.6 m)       Body mass index i recent Cscope and  history.   - will check microscopy, UCx  - will start empiric Keflex  - increase hydration and rest as tolerated  - discussed red flags for urgent reevaluation  - plan to follow-up with UroGyne  - possible Uro eval based on microscopy

## 2021-05-16 DIAGNOSIS — Z80.52 FAMILY HISTORY OF BLADDER CANCER: ICD-10-CM

## 2021-05-16 DIAGNOSIS — R31.9 HEMATURIA, UNSPECIFIED TYPE: Primary | ICD-10-CM

## 2021-05-18 ENCOUNTER — OFFICE VISIT (OUTPATIENT)
Dept: UROLOGY | Facility: HOSPITAL | Age: 61
End: 2021-05-18
Attending: OBSTETRICS & GYNECOLOGY
Payer: COMMERCIAL

## 2021-05-18 VITALS — TEMPERATURE: 98 F | HEIGHT: 63 IN | WEIGHT: 140 LBS | RESPIRATION RATE: 16 BRPM | BODY MASS INDEX: 24.8 KG/M2

## 2021-05-18 DIAGNOSIS — N39.3 FEMALE STRESS INCONTINENCE: ICD-10-CM

## 2021-05-18 DIAGNOSIS — N81.11 CYSTOCELE, MIDLINE: ICD-10-CM

## 2021-05-18 DIAGNOSIS — N32.81 DETRUSOR INSTABILITY: ICD-10-CM

## 2021-05-18 DIAGNOSIS — R10.2 SUPRAPUBIC PAIN: ICD-10-CM

## 2021-05-18 DIAGNOSIS — N81.84 PELVIC MUSCLE WASTING: Primary | ICD-10-CM

## 2021-05-18 PROCEDURE — 81002 URINALYSIS NONAUTO W/O SCOPE: CPT | Performed by: OBSTETRICS & GYNECOLOGY

## 2021-05-18 PROCEDURE — 81002 URINALYSIS NONAUTO W/O SCOPE: CPT

## 2021-05-18 PROCEDURE — 99212 OFFICE O/P EST SF 10 MIN: CPT

## 2021-05-18 RX ORDER — OXYBUTYNIN CHLORIDE 5 MG/1
5 TABLET, EXTENDED RELEASE ORAL DAILY
COMMUNITY

## 2021-05-29 RX ORDER — ATORVASTATIN CALCIUM 40 MG/1
TABLET, FILM COATED ORAL
Qty: 90 TABLET | Refills: 1 | Status: SHIPPED | OUTPATIENT
Start: 2021-05-29

## 2021-05-30 ENCOUNTER — HOSPITAL ENCOUNTER (OUTPATIENT)
Dept: ULTRASOUND IMAGING | Age: 61
Discharge: HOME | End: 2021-05-30
Attending: OBSTETRICS & GYNECOLOGY
Payer: COMMERCIAL

## 2021-05-30 DIAGNOSIS — R10.2 SUPRAPUBIC PAIN: ICD-10-CM

## 2021-05-30 PROCEDURE — 76775 US EXAM ABDO BACK WALL LIM: CPT | Performed by: OBSTETRICS & GYNECOLOGY

## 2021-05-30 PROCEDURE — 76830 TRANSVAGINAL US NON-OB: CPT | Performed by: OBSTETRICS & GYNECOLOGY

## 2021-05-30 PROCEDURE — 76856 US EXAM PELVIC COMPLETE: CPT | Performed by: OBSTETRICS & GYNECOLOGY

## 2021-06-01 ENCOUNTER — TELEPHONE (OUTPATIENT)
Dept: UROLOGY | Facility: HOSPITAL | Age: 61
End: 2021-06-01

## 2021-06-01 NOTE — TELEPHONE ENCOUNTER
Patient aware of normal pelvic and kidney ultrasound. Confirmed Cysto for 6/18/2021 at [facility] Replaced by [facility]. No further questions at this time.

## 2021-06-18 ENCOUNTER — OFFICE VISIT (OUTPATIENT)
Dept: UROLOGY | Facility: HOSPITAL | Age: 61
End: 2021-06-18
Attending: OBSTETRICS & GYNECOLOGY
Payer: COMMERCIAL

## 2021-06-18 VITALS
HEIGHT: 63 IN | SYSTOLIC BLOOD PRESSURE: 132 MMHG | TEMPERATURE: 98 F | RESPIRATION RATE: 20 BRPM | BODY MASS INDEX: 24.8 KG/M2 | WEIGHT: 140 LBS | DIASTOLIC BLOOD PRESSURE: 64 MMHG

## 2021-06-18 DIAGNOSIS — N32.9 LESION OF BLADDER: Primary | ICD-10-CM

## 2021-06-18 DIAGNOSIS — N32.81 DETRUSOR INSTABILITY: ICD-10-CM

## 2021-06-18 DIAGNOSIS — R10.2 SUPRAPUBIC PAIN: Primary | ICD-10-CM

## 2021-06-18 DIAGNOSIS — R31.29 MICROHEMATURIA: ICD-10-CM

## 2021-06-18 PROCEDURE — 52000 CYSTOURETHROSCOPY: CPT

## 2021-06-18 PROCEDURE — 81002 URINALYSIS NONAUTO W/O SCOPE: CPT

## 2021-06-18 PROCEDURE — 88108 CYTOPATH CONCENTRATE TECH: CPT | Performed by: OBSTETRICS & GYNECOLOGY

## 2021-06-18 PROCEDURE — 99212 OFFICE O/P EST SF 10 MIN: CPT

## 2021-06-18 RX ORDER — LIDOCAINE HYDROCHLORIDE 20 MG/ML
10 JELLY TOPICAL ONCE
Status: COMPLETED | OUTPATIENT
Start: 2021-06-18 | End: 2021-06-18

## 2021-06-18 RX ADMIN — LIDOCAINE HYDROCHLORIDE 10 ML: 20 JELLY TOPICAL at 14:02:00

## 2021-06-18 NOTE — PROGRESS NOTES
[address], [employer]. at  [address] Altaf Friedman [address] [place] [address] 30: [phone]     Date Patient MRN   6/18/2021 [address] [place] [address]  [id number]      Dr. Alvina Simmons Negative Final     Blood Urine   Date Value Ref Range Status   05/18/2021 Negative Negative Final         Procedure        PRE-OP DIAGNOSIS: suprapubic pain    POST-OP DIAGNOSIS: same + bladder lesion    ANESTHESIA: 2% Lidocaine gel    PROCEDURES:  After s pursue urology eval/biopsy  Pt will proceed A&P repair, poss MUS, cysto (after urology eval)  Cont oxybutynin    Follow up pending urology eval    Dr. Stephanie Ng

## 2021-06-21 ENCOUNTER — TELEPHONE (OUTPATIENT)
Dept: UROLOGY | Facility: HOSPITAL | Age: 61
End: 2021-06-21

## 2021-06-21 NOTE — TELEPHONE ENCOUNTER
TC to pt following up test results  Urine cytology negative for high grade cells, pt informed  Encouraged to follow up with urology referral for biopsy as planned  Pt states she is going to call to schedule, then will call us with the name of the urologist

## 2021-07-16 ENCOUNTER — TELEPHONE (OUTPATIENT)
Dept: UROLOGY | Facility: HOSPITAL | Age: 61
End: 2021-07-16

## 2021-07-16 NOTE — TELEPHONE ENCOUNTER
Patient called requesting that photos of bladder be sent to Dr. Dorota Almonte . Please send photos to Dr. Dorota Almonte out of [facility]. Office phone [phone].

## 2023-02-07 ENCOUNTER — TELEPHONE (OUTPATIENT)
Dept: RHEUMATOLOGY | Facility: CLINIC | Age: 63
End: 2023-02-07

## 2023-02-07 RX ORDER — PREDNISONE 10 MG/1
TABLET ORAL
Qty: 21 TABLET | Refills: 0 | Status: SHIPPED | OUTPATIENT
Start: 2023-02-07

## 2023-02-07 NOTE — TELEPHONE ENCOUNTER
Let her know will send in a prednisone burst starting at10mg pills - starting at  60mg and tapered over 6 days

## 2023-02-07 NOTE — TELEPHONE ENCOUNTER
Last visit 12/16/20, patient had to switch over to [address] [place] [address], but has recently changed insurance and returning to [address] [place] [address]. Patient c/o of arthritis flare up on lower back x 2 weeks. Felt when going from a sitting to standing position the most. States stops her on her tracks when she feels the pain. Rating pain a 10/10. States was taking  20 mg of prednisone for the last week and a half and for the last 4 days has been taking 40 mg with small amount of improvement. She had prednisone script as a back up. Patient has a virtual visit scheduled on 2/21 as she needs to see Dr. Joseph Lopes first to get referral. Patient is inquiring if you would be able to send script to pharmacy to help with back pain. Please advise. Thank you.

## 2023-02-07 NOTE — TELEPHONE ENCOUNTER
Patient is experiencing an arthritis flare up with back pain. Per patient, it is difficult to get out of bed in the morning due to stiffness.

## 2023-02-16 ENCOUNTER — OFFICE VISIT (OUTPATIENT)
Facility: CLINIC | Age: 63
End: 2023-02-16

## 2023-02-16 VITALS
HEART RATE: 82 BPM | SYSTOLIC BLOOD PRESSURE: 118 MMHG | RESPIRATION RATE: 14 BRPM | WEIGHT: 140 LBS | HEIGHT: 63 IN | BODY MASS INDEX: 24.8 KG/M2 | OXYGEN SATURATION: 98 % | DIASTOLIC BLOOD PRESSURE: 60 MMHG

## 2023-02-16 DIAGNOSIS — Z12.31 BREAST CANCER SCREENING BY MAMMOGRAM: ICD-10-CM

## 2023-02-16 DIAGNOSIS — I25.10 CORONARY ARTERY DISEASE INVOLVING NATIVE CORONARY ARTERY OF NATIVE HEART WITHOUT ANGINA PECTORIS: ICD-10-CM

## 2023-02-16 DIAGNOSIS — E78.00 HYPERCHOLESTEROLEMIA: ICD-10-CM

## 2023-02-16 DIAGNOSIS — I10 ESSENTIAL HYPERTENSION: ICD-10-CM

## 2023-02-16 DIAGNOSIS — L40.50 PSORIATIC ARTHRITIS (HCC): Primary | Chronic | ICD-10-CM

## 2023-02-16 PROBLEM — K21.9 GASTROESOPHAGEAL REFLUX DISEASE WITHOUT ESOPHAGITIS: Status: ACTIVE | Noted: 2023-02-16

## 2023-02-16 PROCEDURE — 99214 OFFICE O/P EST MOD 30 MIN: CPT | Performed by: INTERNAL MEDICINE

## 2023-02-16 PROCEDURE — 3074F SYST BP LT 130 MM HG: CPT | Performed by: INTERNAL MEDICINE

## 2023-02-16 PROCEDURE — 3078F DIAST BP <80 MM HG: CPT | Performed by: INTERNAL MEDICINE

## 2023-02-16 PROCEDURE — 3008F BODY MASS INDEX DOCD: CPT | Performed by: INTERNAL MEDICINE

## 2023-02-16 RX ORDER — ASPIRIN 81 MG/1
81 TABLET ORAL DAILY
COMMUNITY

## 2023-02-16 RX ORDER — ATORVASTATIN CALCIUM 80 MG/1
80 TABLET, FILM COATED ORAL NIGHTLY
Qty: 90 TABLET | Refills: 1 | Status: SHIPPED | OUTPATIENT
Start: 2023-02-16

## 2023-02-16 RX ORDER — OMEPRAZOLE 20 MG/1
20 CAPSULE, DELAYED RELEASE ORAL
Qty: 180 CAPSULE | Refills: 3 | Status: SHIPPED | OUTPATIENT
Start: 2023-02-16

## 2023-02-16 RX ORDER — ATORVASTATIN CALCIUM 80 MG/1
80 TABLET, FILM COATED ORAL NIGHTLY
COMMUNITY
End: 2023-02-16

## 2023-03-16 ENCOUNTER — APPOINTMENT (OUTPATIENT)
Dept: GENERAL RADIOLOGY | Facility: HOSPITAL | Age: 63
End: 2023-03-16
Attending: EMERGENCY MEDICINE
Payer: COMMERCIAL

## 2023-03-16 ENCOUNTER — APPOINTMENT (OUTPATIENT)
Dept: MRI IMAGING | Facility: HOSPITAL | Age: 63
End: 2023-03-16
Attending: EMERGENCY MEDICINE
Payer: COMMERCIAL

## 2023-03-16 ENCOUNTER — HOSPITAL ENCOUNTER (EMERGENCY)
Facility: HOSPITAL | Age: 63
Discharge: HOME | End: 2023-03-16
Attending: EMERGENCY MEDICINE
Payer: COMMERCIAL

## 2023-03-16 VITALS
SYSTOLIC BLOOD PRESSURE: 156 MMHG | HEART RATE: 62 BPM | BODY MASS INDEX: 23.92 KG/M2 | HEIGHT: 63 IN | RESPIRATION RATE: 17 BRPM | WEIGHT: 135 LBS | OXYGEN SATURATION: 98 % | DIASTOLIC BLOOD PRESSURE: 62 MMHG | TEMPERATURE: 98 F

## 2023-03-16 DIAGNOSIS — R42 VERTIGO: Primary | ICD-10-CM

## 2023-03-16 LAB
ALBUMIN SERPL-MCNC: 3.4 G/DL (ref 3.4–5)
ALBUMIN/GLOB SERPL: 1 {RATIO} (ref 1–2)
ALP LIVER SERPL-CCNC: 76 U/L
ALT SERPL-CCNC: 19 U/L
ANION GAP SERPL CALC-SCNC: 8 MMOL/L (ref 0–18)
AST SERPL-CCNC: 21 U/L (ref 15–37)
ATRIAL RATE: 52 BPM
BASOPHILS # BLD AUTO: 0.05 X10(3) UL (ref 0–0.2)
BASOPHILS NFR BLD AUTO: 0.5 %
BILIRUB SERPL-MCNC: 0.6 MG/DL (ref 0.1–2)
BUN BLD-MCNC: 15 MG/DL (ref 7–18)
BUN/CREAT SERPL: 19 (ref 10–20)
CALCIUM BLD-MCNC: 9 MG/DL (ref 8.5–10.1)
CHLORIDE SERPL-SCNC: 108 MMOL/L (ref 98–112)
CO2 SERPL-SCNC: 28 MMOL/L (ref 21–32)
CREAT BLD-MCNC: 0.79 MG/DL
DEPRECATED RDW RBC AUTO: 40.4 FL (ref 35.1–46.3)
EOSINOPHIL # BLD AUTO: 0.25 X10(3) UL (ref 0–0.7)
EOSINOPHIL NFR BLD AUTO: 2.4 %
ERYTHROCYTE [DISTWIDTH] IN BLOOD BY AUTOMATED COUNT: 12.8 % (ref 11–15)
GFR SERPLBLD BASED ON 1.73 SQ M-ARVRAT: 84 ML/MIN/1.73M2 (ref 60–?)
GLOBULIN PLAS-MCNC: 3.3 G/DL (ref 2.8–4.4)
GLUCOSE BLD-MCNC: 93 MG/DL (ref 70–99)
HCT VFR BLD AUTO: 38.7 %
HGB BLD-MCNC: 12.8 G/DL
IMM GRANULOCYTES # BLD AUTO: 0.03 X10(3) UL (ref 0–1)
IMM GRANULOCYTES NFR BLD: 0.3 %
LYMPHOCYTES # BLD AUTO: 1.47 X10(3) UL (ref 1–4)
LYMPHOCYTES NFR BLD AUTO: 13.9 %
MCH RBC QN AUTO: 28.6 PG (ref 26–34)
MCHC RBC AUTO-ENTMCNC: 33.1 G/DL (ref 31–37)
MCV RBC AUTO: 86.6 FL
MONOCYTES # BLD AUTO: 0.74 X10(3) UL (ref 0.1–1)
MONOCYTES NFR BLD AUTO: 7 %
NEUTROPHILS # BLD AUTO: 8.01 X10 (3) UL (ref 1.5–7.7)
NEUTROPHILS # BLD AUTO: 8.01 X10(3) UL (ref 1.5–7.7)
NEUTROPHILS NFR BLD AUTO: 75.9 %
OSMOLALITY SERPL CALC.SUM OF ELEC: 299 MOSM/KG (ref 275–295)
P AXIS: 18 DEGREES
P-R INTERVAL: 214 MS
PLATELET # BLD AUTO: 317 10(3)UL (ref 150–450)
POTASSIUM SERPL-SCNC: 4.3 MMOL/L (ref 3.5–5.1)
PROT SERPL-MCNC: 6.7 G/DL (ref 6.4–8.2)
Q-T INTERVAL: 426 MS
QRS DURATION: 84 MS
QTC CALCULATION (BEZET): 396 MS
R AXIS: -24 DEGREES
RBC # BLD AUTO: 4.47 X10(6)UL
SODIUM SERPL-SCNC: 144 MMOL/L (ref 136–145)
T AXIS: 36 DEGREES
TROPONIN I HIGH SENSITIVITY: 5 NG/L
VENTRICULAR RATE: 52 BPM
WBC # BLD AUTO: 10.6 X10(3) UL (ref 4–11)

## 2023-03-16 PROCEDURE — 93010 ELECTROCARDIOGRAM REPORT: CPT

## 2023-03-16 PROCEDURE — 93005 ELECTROCARDIOGRAM TRACING: CPT

## 2023-03-16 PROCEDURE — 99285 EMERGENCY DEPT VISIT HI MDM: CPT

## 2023-03-16 PROCEDURE — 80053 COMPREHEN METABOLIC PANEL: CPT | Performed by: EMERGENCY MEDICINE

## 2023-03-16 PROCEDURE — 96375 TX/PRO/DX INJ NEW DRUG ADDON: CPT

## 2023-03-16 PROCEDURE — 96361 HYDRATE IV INFUSION ADD-ON: CPT

## 2023-03-16 PROCEDURE — 96374 THER/PROPH/DIAG INJ IV PUSH: CPT

## 2023-03-16 PROCEDURE — 70551 MRI BRAIN STEM W/O DYE: CPT | Performed by: EMERGENCY MEDICINE

## 2023-03-16 PROCEDURE — 71045 X-RAY EXAM CHEST 1 VIEW: CPT | Performed by: EMERGENCY MEDICINE

## 2023-03-16 PROCEDURE — 84484 ASSAY OF TROPONIN QUANT: CPT | Performed by: EMERGENCY MEDICINE

## 2023-03-16 PROCEDURE — 85025 COMPLETE CBC W/AUTO DIFF WBC: CPT | Performed by: EMERGENCY MEDICINE

## 2023-03-16 RX ORDER — DIAZEPAM 5 MG/ML
5 INJECTION, SOLUTION INTRAMUSCULAR; INTRAVENOUS ONCE
Status: COMPLETED | OUTPATIENT
Start: 2023-03-16 | End: 2023-03-16

## 2023-03-16 RX ORDER — MECLIZINE HYDROCHLORIDE 25 MG/1
25 TABLET ORAL ONCE
Status: COMPLETED | OUTPATIENT
Start: 2023-03-16 | End: 2023-03-16

## 2023-03-16 RX ORDER — MECLIZINE HYDROCHLORIDE 25 MG/1
25 TABLET ORAL EVERY 6 HOURS PRN
Qty: 20 TABLET | Refills: 0 | Status: SHIPPED | OUTPATIENT
Start: 2023-03-16 | End: 2023-03-23

## 2023-03-16 RX ORDER — ONDANSETRON 2 MG/ML
4 INJECTION INTRAMUSCULAR; INTRAVENOUS ONCE
Status: COMPLETED | OUTPATIENT
Start: 2023-03-16 | End: 2023-03-16

## 2023-03-16 RX ORDER — ONDANSETRON 4 MG/1
4 TABLET, ORALLY DISINTEGRATING ORAL EVERY 8 HOURS PRN
Qty: 20 TABLET | Refills: 0 | Status: SHIPPED | OUTPATIENT
Start: 2023-03-16

## 2023-03-16 RX ORDER — ONDANSETRON 2 MG/ML
4 INJECTION INTRAMUSCULAR; INTRAVENOUS ONCE
Status: DISCONTINUED | OUTPATIENT
Start: 2023-03-16 | End: 2023-03-16

## 2023-03-16 RX ORDER — ONDANSETRON 2 MG/ML
INJECTION INTRAMUSCULAR; INTRAVENOUS
Status: COMPLETED
Start: 2023-03-16 | End: 2023-03-16

## 2023-03-16 NOTE — DISCHARGE INSTRUCTIONS
Drink plenty of fluids  Meclizine every 6 hours as needed for dizziness  Zofran every 8 hours for nausea  Do not drive until dizziness has resolved  Follow-up with your doctor

## 2023-03-16 NOTE — ED INITIAL ASSESSMENT (HPI)
Pt states having chest pressure. States her Pulse oximeter was showing her heart rate was low  (lowest 47). Also having dizziness.

## 2023-03-17 ENCOUNTER — PATIENT OUTREACH (OUTPATIENT)
Dept: CASE MANAGEMENT | Age: 63
End: 2023-03-17

## 2023-03-17 NOTE — PROGRESS NOTES
1st attempt; pt had recent ED visit, calling to offer PCP f/u apt (dc 3/16)      Dr. Guero Rosas PCP---Office to call patient to schedule appt as there are no available appts within needed timeframe.    Internal Medicine  [address] [facility]  [phone]    Pt notified office will cl her to schedule    Closing encounter

## 2023-03-22 ENCOUNTER — OFFICE VISIT (OUTPATIENT)
Dept: INTERNAL MEDICINE CLINIC | Facility: CLINIC | Age: 63
End: 2023-03-22

## 2023-03-22 VITALS
HEART RATE: 55 BPM | BODY MASS INDEX: 24.45 KG/M2 | OXYGEN SATURATION: 97 % | RESPIRATION RATE: 20 BRPM | SYSTOLIC BLOOD PRESSURE: 120 MMHG | DIASTOLIC BLOOD PRESSURE: 80 MMHG | HEIGHT: 63 IN | WEIGHT: 138 LBS | TEMPERATURE: 98 F

## 2023-03-22 DIAGNOSIS — M25.842 CYST OF JOINT OF LEFT HAND: Primary | ICD-10-CM

## 2023-03-22 DIAGNOSIS — R42 VERTIGO: ICD-10-CM

## 2023-03-22 PROCEDURE — 3008F BODY MASS INDEX DOCD: CPT | Performed by: PHYSICIAN ASSISTANT

## 2023-03-22 PROCEDURE — 3074F SYST BP LT 130 MM HG: CPT | Performed by: PHYSICIAN ASSISTANT

## 2023-03-22 PROCEDURE — 99214 OFFICE O/P EST MOD 30 MIN: CPT | Performed by: PHYSICIAN ASSISTANT

## 2023-03-22 PROCEDURE — 3079F DIAST BP 80-89 MM HG: CPT | Performed by: PHYSICIAN ASSISTANT

## 2023-03-22 RX ORDER — MECLIZINE HYDROCHLORIDE 25 MG/1
25 TABLET ORAL 3 TIMES DAILY PRN
Qty: 30 TABLET | Refills: 0 | Status: SHIPPED | OUTPATIENT
Start: 2023-03-22

## 2023-04-06 ENCOUNTER — OFFICE VISIT (OUTPATIENT)
Dept: SURGERY | Facility: CLINIC | Age: 63
End: 2023-04-06

## 2023-04-06 DIAGNOSIS — M67.442 GANGLION OF LEFT HAND: Primary | ICD-10-CM

## 2023-04-06 PROCEDURE — 99204 OFFICE O/P NEW MOD 45 MIN: CPT | Performed by: PLASTIC SURGERY

## 2023-04-06 RX ORDER — HYDROCODONE BITARTRATE AND ACETAMINOPHEN 7.5; 325 MG/1; MG/1
1 TABLET ORAL
Qty: 10 TABLET | Refills: 0 | Status: SHIPPED | OUTPATIENT
Start: 2023-04-06

## 2023-04-06 NOTE — PROGRESS NOTES
Patient request for surgery signed by patient and witnessed and signed by RN. Prescription for Norco and narcotic prescription electronically sent to pharmacy per Dr. Candelario Cashing order and patient instructed to  prescription before surgery. Pre-Surgical Instruction Handout, Hand Elevation Handout, Dressing Protector Handout, and Post-Operative Instruction Handout given to and reviewed w/patient. All questions and concerns answered; pt verbalized an understanding of all pre-operative teaching. Patient instructed to call the office with any further questions and/or concerns. Patient escorted to surgery scheduling to schedule surgery and post-operative appointments.

## 2023-04-07 ENCOUNTER — TELEPHONE (OUTPATIENT)
Dept: SURGERY | Facility: CLINIC | Age: 63
End: 2023-04-07

## 2023-04-07 NOTE — TELEPHONE ENCOUNTER
Pt had left voice message in office's mailbox today to cancel surgery for 4/21/23. Attempted to call pt back to confirm but no answer,left voice message to please call the office to confirm cancellation of surgery 4/21/23 and to give her info on rescheduling. Surgery scheduler Saurav Hanks notified. xanax      Last Written Prescription Date:  9/22/20  Last Fill Quantity: 60,   # refills: 1  Last Office Visit: 11/4/20  Future Office visit:       Routing refill request to provider for review/approval because:  Drug not on the [facility], P or [facility] refill protocol or controlled substance

## 2023-04-10 DIAGNOSIS — M67.442 GANGLION OF LEFT HAND: Primary | ICD-10-CM

## 2023-04-11 ENCOUNTER — OFFICE VISIT (OUTPATIENT)
Dept: RHEUMATOLOGY | Facility: CLINIC | Age: 63
End: 2023-04-11

## 2023-04-11 VITALS
HEART RATE: 65 BPM | DIASTOLIC BLOOD PRESSURE: 70 MMHG | WEIGHT: 138.38 LBS | SYSTOLIC BLOOD PRESSURE: 120 MMHG | BODY MASS INDEX: 24.52 KG/M2 | HEIGHT: 63 IN | RESPIRATION RATE: 16 BRPM

## 2023-04-11 DIAGNOSIS — L40.50 PSORIATIC ARTHRITIS (HCC): Primary | Chronic | ICD-10-CM

## 2023-04-11 DIAGNOSIS — E55.9 VITAMIN D DEFICIENCY: ICD-10-CM

## 2023-04-11 PROCEDURE — 99214 OFFICE O/P EST MOD 30 MIN: CPT | Performed by: INTERNAL MEDICINE

## 2023-04-11 PROCEDURE — 3008F BODY MASS INDEX DOCD: CPT | Performed by: INTERNAL MEDICINE

## 2023-04-11 PROCEDURE — 3078F DIAST BP <80 MM HG: CPT | Performed by: INTERNAL MEDICINE

## 2023-04-11 PROCEDURE — 3074F SYST BP LT 130 MM HG: CPT | Performed by: INTERNAL MEDICINE

## 2023-04-11 NOTE — PATIENT INSTRUCTIONS
1. Check labs   2. Can call for steroids if any flare up there. 3.  Cont. Legal medical marijuana. 4.. Return to clinic in 6-12 months as needed.

## 2023-04-12 NOTE — TELEPHONE ENCOUNTER
I spoke with patient and canceled surgery at this time.  Will call back to reschedule in September 2023

## 2023-05-08 ENCOUNTER — PATIENT OUTREACH (OUTPATIENT)
Dept: FAMILY MEDICINE CLINIC | Facility: CLINIC | Age: 63
End: 2023-05-08

## 2023-05-08 NOTE — PROGRESS NOTES
Patient was assigned to Dr. Debbie Flanagan. Spoke with patient, patient sees Dr. Payton Ward. Sarah Lai. Patient did not authorized change to Dr. Debbie Flanagan.  She will call insurance today and change PCP back to Dr. Sarah Lai.

## 2023-05-19 ENCOUNTER — OFFICE VISIT (OUTPATIENT)
Facility: CLINIC | Age: 63
End: 2023-05-19

## 2023-05-19 VITALS
RESPIRATION RATE: 14 BRPM | WEIGHT: 140 LBS | OXYGEN SATURATION: 98 % | HEIGHT: 63 IN | BODY MASS INDEX: 24.8 KG/M2 | DIASTOLIC BLOOD PRESSURE: 60 MMHG | SYSTOLIC BLOOD PRESSURE: 110 MMHG | HEART RATE: 56 BPM

## 2023-05-19 DIAGNOSIS — I25.10 CORONARY ARTERY DISEASE INVOLVING NATIVE CORONARY ARTERY OF NATIVE HEART WITHOUT ANGINA PECTORIS: Primary | ICD-10-CM

## 2023-05-19 DIAGNOSIS — Z12.31 BREAST CANCER SCREENING BY MAMMOGRAM: ICD-10-CM

## 2023-05-19 DIAGNOSIS — E78.00 HYPERCHOLESTEROLEMIA: ICD-10-CM

## 2023-05-19 DIAGNOSIS — I10 ESSENTIAL HYPERTENSION: ICD-10-CM

## 2023-05-19 DIAGNOSIS — Z23 NEED FOR PNEUMOCOCCAL VACCINATION: ICD-10-CM

## 2023-05-19 PROBLEM — N32.81 OVERACTIVE BLADDER: Status: ACTIVE | Noted: 2023-05-19

## 2023-05-19 PROCEDURE — 90471 IMMUNIZATION ADMIN: CPT | Performed by: INTERNAL MEDICINE

## 2023-05-19 PROCEDURE — 90677 PCV20 VACCINE IM: CPT | Performed by: INTERNAL MEDICINE

## 2023-05-19 PROCEDURE — 3008F BODY MASS INDEX DOCD: CPT | Performed by: INTERNAL MEDICINE

## 2023-05-19 PROCEDURE — 3078F DIAST BP <80 MM HG: CPT | Performed by: INTERNAL MEDICINE

## 2023-05-19 PROCEDURE — 3074F SYST BP LT 130 MM HG: CPT | Performed by: INTERNAL MEDICINE

## 2023-05-19 PROCEDURE — 99214 OFFICE O/P EST MOD 30 MIN: CPT | Performed by: INTERNAL MEDICINE

## 2023-05-19 RX ORDER — OMEPRAZOLE 20 MG/1
20 CAPSULE, DELAYED RELEASE ORAL
Qty: 180 CAPSULE | Refills: 3 | Status: SHIPPED | OUTPATIENT
Start: 2023-05-19

## 2023-05-19 RX ORDER — ONDANSETRON 4 MG/1
4 TABLET, ORALLY DISINTEGRATING ORAL EVERY 8 HOURS PRN
Qty: 20 TABLET | Refills: 0 | Status: SHIPPED | OUTPATIENT
Start: 2023-05-19

## 2023-05-19 RX ORDER — MECLIZINE HYDROCHLORIDE 25 MG/1
25 TABLET ORAL 3 TIMES DAILY PRN
Qty: 30 TABLET | Refills: 0 | Status: SHIPPED | OUTPATIENT
Start: 2023-05-19

## 2023-05-19 RX ORDER — ATORVASTATIN CALCIUM 80 MG/1
80 TABLET, FILM COATED ORAL NIGHTLY
Qty: 90 TABLET | Refills: 3 | Status: SHIPPED | OUTPATIENT
Start: 2023-05-19

## 2023-05-19 RX ORDER — OXYBUTYNIN CHLORIDE 5 MG/1
5 TABLET, EXTENDED RELEASE ORAL DAILY
Qty: 90 TABLET | Refills: 1 | Status: SHIPPED | OUTPATIENT
Start: 2023-05-19

## 2023-05-20 ENCOUNTER — OFFICE VISIT (OUTPATIENT)
Dept: RHEUMATOLOGY | Facility: CLINIC | Age: 63
End: 2023-05-20

## 2023-05-20 VITALS
DIASTOLIC BLOOD PRESSURE: 67 MMHG | SYSTOLIC BLOOD PRESSURE: 119 MMHG | HEIGHT: 63 IN | RESPIRATION RATE: 16 BRPM | BODY MASS INDEX: 24.8 KG/M2 | HEART RATE: 53 BPM | WEIGHT: 140 LBS

## 2023-05-20 DIAGNOSIS — L40.50 PSORIATIC ARTHRITIS (HCC): Primary | ICD-10-CM

## 2023-05-20 DIAGNOSIS — M75.82 TENDONITIS OF LEFT ROTATOR CUFF: ICD-10-CM

## 2023-05-20 DIAGNOSIS — E55.9 VITAMIN D DEFICIENCY: ICD-10-CM

## 2023-05-20 DIAGNOSIS — M25.512 ACUTE PAIN OF LEFT SHOULDER: ICD-10-CM

## 2023-05-20 PROCEDURE — 20610 DRAIN/INJ JOINT/BURSA W/O US: CPT | Performed by: INTERNAL MEDICINE

## 2023-05-20 PROCEDURE — 3008F BODY MASS INDEX DOCD: CPT | Performed by: INTERNAL MEDICINE

## 2023-05-20 PROCEDURE — 3078F DIAST BP <80 MM HG: CPT | Performed by: INTERNAL MEDICINE

## 2023-05-20 PROCEDURE — 3074F SYST BP LT 130 MM HG: CPT | Performed by: INTERNAL MEDICINE

## 2023-05-20 PROCEDURE — 99214 OFFICE O/P EST MOD 30 MIN: CPT | Performed by: INTERNAL MEDICINE

## 2023-05-20 RX ORDER — TRIAMCINOLONE ACETONIDE 40 MG/ML
40 INJECTION, SUSPENSION INTRA-ARTICULAR; INTRAMUSCULAR ONCE
Status: COMPLETED | OUTPATIENT
Start: 2023-05-20 | End: 2023-05-20

## 2023-05-20 RX ADMIN — TRIAMCINOLONE ACETONIDE 40 MG: 40 INJECTION, SUSPENSION INTRA-ARTICULAR; INTRAMUSCULAR at 10:04:00

## 2023-05-20 NOTE — PATIENT INSTRUCTIONS
1. Check labs in June  2. Can call for steroids if any flare up there. 3.  Cont. Legal medical marijuana. 4. Rest left shoulder x 3 days   5.. Return to clinic in 6-12 months as needed.

## 2023-05-20 NOTE — PROCEDURES
With paitent's consent, I injected pt's left shoulder ac joint with 1ml lidocaine 1 % and 1 ml kenalog 40. It was done under sterile technique using iodine and alcohol swabs and ethyl chloride was used as an anaesthetic spray. Pt.  tolerated it well.

## 2023-05-25 RX ORDER — MECLIZINE HYDROCHLORIDE 25 MG/1
25 TABLET ORAL 3 TIMES DAILY PRN
Qty: 30 TABLET | Refills: 0 | OUTPATIENT
Start: 2023-05-25

## 2023-05-25 NOTE — TELEPHONE ENCOUNTER
Have patient call office for more information about how often patient is taking medication so request for prescription quantity adjustment can be sent to Dr. Cece Ruiz.

## 2023-06-17 ENCOUNTER — TELEPHONE (OUTPATIENT)
Dept: RHEUMATOLOGY | Facility: CLINIC | Age: 63
End: 2023-06-17

## 2023-06-19 RX ORDER — PREDNISONE 10 MG/1
TABLET ORAL
Qty: 21 TABLET | Refills: 0 | Status: SHIPPED | OUTPATIENT
Start: 2023-06-19

## 2023-06-24 ENCOUNTER — HOSPITAL ENCOUNTER (OUTPATIENT)
Dept: MAMMOGRAPHY | Facility: HOSPITAL | Age: 63
Discharge: HOME | End: 2023-06-24
Attending: INTERNAL MEDICINE
Payer: COMMERCIAL

## 2023-06-24 DIAGNOSIS — Z12.31 BREAST CANCER SCREENING BY MAMMOGRAM: ICD-10-CM

## 2023-06-24 PROCEDURE — 77067 SCR MAMMO BI INCL CAD: CPT | Performed by: INTERNAL MEDICINE

## 2023-06-24 PROCEDURE — 77063 BREAST TOMOSYNTHESIS BI: CPT | Performed by: INTERNAL MEDICINE

## 2023-07-07 ENCOUNTER — NURSE TRIAGE (OUTPATIENT)
Dept: INTERNAL MEDICINE CLINIC | Facility: CLINIC | Age: 63
End: 2023-07-07

## 2023-07-07 NOTE — TELEPHONE ENCOUNTER
Action Requested: Summary for Provider     []  Critical Lab, Recommendations Needed  [x] Need Additional Advice  []   FYI    []   Need Orders  [] Need Medications Sent to Pharmacy  []  Other     SUMMARY:  While going over her mammogram results, patient asked if Dr Kathy Oconnor can order imaging to follow up on her history of aortic aneurysm  and pulmonary nodules. Asking if she needs to see Pulmonology, would need referral due to her HMO. States she has been noticing mild shortness of breath with exertion. States her chest always hurts since she had bypass surgery. Advised that Dr Kathy Oconnor not in office this or next week. She declines alternate provider or IC/ER. Dr Kathy Oconnor, please advise if you can add her to schedule as there are no open bookings for months. Reason for call: Acute  Intermittent shortness of breath  Onset: Noticing recenty    Spoke with patient,  verified. As summarized above. Denies annabelle chest pain or shortness of breath today. Last chest CT 2021:  Impression   CONCLUSION:  1. Noncalcified subpleural 7-8 mm left upper lobe lung nodule, which corresponds to previously described radiographic abnormality. Follow-up chest CT in 3-6 months is advised to assess stability. 2. Additional wedge-shaped nodularity in the medial left lower lobe, which is favored to reflect atelectasis or scarring. This finding should be reassessed on anticipated follow-up. 3. Mild biapical paraseptal emphysema. 4. Mild 3.8 cm aneurysmal dilation of the descending thoracic aorta. Continued imaging surveillance advised. 5. Partially imaged right upper pole renal cortical scarring. 6. Subcentimeter low-density lesion within the left hepatic lobe, which likely relates to a cyst.  7. Lesser incidental findings as above.       Garth Clark MD  2021  1:45 PM CDT       CT scan chest with noncalcified subpleural 7-8 mm left upper lobe lung nodule, atelectasis/scarring medial left lower lobe, mild 3.8 cm aneurysmal dilatation descending thoracic aorta.   Will send [employer] message recommending follow-up chest CT scan in 3-6 months         Reason for Disposition   Patient wants to be seen    Protocols used: Breathing Difficulty-A-OH

## 2023-07-11 ENCOUNTER — OFFICE VISIT (OUTPATIENT)
Facility: CLINIC | Age: 63
End: 2023-07-11

## 2023-07-11 VITALS
OXYGEN SATURATION: 97 % | DIASTOLIC BLOOD PRESSURE: 60 MMHG | SYSTOLIC BLOOD PRESSURE: 102 MMHG | RESPIRATION RATE: 14 BRPM | HEART RATE: 64 BPM | WEIGHT: 135 LBS | BODY MASS INDEX: 23.92 KG/M2 | HEIGHT: 63 IN

## 2023-07-11 DIAGNOSIS — R91.1 LUNG NODULE: ICD-10-CM

## 2023-07-11 DIAGNOSIS — I71.20 THORACIC AORTIC ANEURYSM WITHOUT RUPTURE, UNSPECIFIED PART (HCC): Primary | ICD-10-CM

## 2023-07-11 PROCEDURE — 3074F SYST BP LT 130 MM HG: CPT | Performed by: INTERNAL MEDICINE

## 2023-07-11 PROCEDURE — 3078F DIAST BP <80 MM HG: CPT | Performed by: INTERNAL MEDICINE

## 2023-07-11 PROCEDURE — 3008F BODY MASS INDEX DOCD: CPT | Performed by: INTERNAL MEDICINE

## 2023-07-11 PROCEDURE — 99214 OFFICE O/P EST MOD 30 MIN: CPT | Performed by: INTERNAL MEDICINE

## 2023-08-12 ENCOUNTER — TELEPHONE (OUTPATIENT)
Dept: RHEUMATOLOGY | Facility: CLINIC | Age: 63
End: 2023-08-12

## 2023-08-12 NOTE — TELEPHONE ENCOUNTER
Patient calling to inform that her left arm by her shoulder is really painful, and swollen. She would like a prednisone blast, nurse line not available at time of call.

## 2023-08-14 RX ORDER — PREDNISONE 10 MG/1
TABLET ORAL
Qty: 21 TABLET | Refills: 0 | Status: SHIPPED | OUTPATIENT
Start: 2023-08-14

## 2023-08-18 ENCOUNTER — TELEPHONE (OUTPATIENT)
Dept: CASE MANAGEMENT | Age: 63
End: 2023-08-18

## 2023-08-18 DIAGNOSIS — H53.149 EYES SENSITIVE TO LIGHT, UNSPECIFIED LATERALITY: ICD-10-CM

## 2023-08-18 DIAGNOSIS — H53.8 BLURRED VISION: ICD-10-CM

## 2023-08-18 DIAGNOSIS — H26.9 CATARACT, UNSPECIFIED CATARACT TYPE, UNSPECIFIED LATERALITY: Primary | ICD-10-CM

## 2023-08-18 NOTE — TELEPHONE ENCOUNTER
Dr. Timothy Bosworth,     The patient is having eye problems:  Cataracts, blurred vision and sensitivity to light. Patient is requesting your recommendation on who she should see. Pended referral please review diagnosis and sign off if you agree. Thank you.   Tad Elias

## 2023-08-22 ENCOUNTER — TELEPHONE (OUTPATIENT)
Dept: RHEUMATOLOGY | Facility: CLINIC | Age: 63
End: 2023-08-22

## 2023-08-22 RX ORDER — PREDNISONE 10 MG/1
TABLET ORAL
Qty: 21 TABLET | Refills: 0 | Status: SHIPPED | OUTPATIENT
Start: 2023-08-22

## 2023-08-22 NOTE — TELEPHONE ENCOUNTER
Patient finished her prednisone blast on 8/20. Per patient, arm and left hip pain returned shortly after she finished the medication. No answer when attempting to transfer patient to RN.

## 2023-08-22 NOTE — TELEPHONE ENCOUNTER
Please advise    Patient states has heat in hip and arm joints due to arthritis. No other redness or swelling. Pain in joints getting worse as the day goes on. States starts to fill like it did before she started the prednisone.      Future Appointments   Date Time Provider [place]   9/16/2023  9:00 AM Meli Aaron MD [facility]

## 2023-08-29 LAB — AMB EXT COVID-19 RESULT: DETECTED

## 2023-08-30 ENCOUNTER — TELEPHONE (OUTPATIENT)
Dept: INTERNAL MEDICINE CLINIC | Facility: CLINIC | Age: 63
End: 2023-08-30

## 2023-09-16 ENCOUNTER — LAB ENCOUNTER (OUTPATIENT)
Dept: LAB | Facility: HOSPITAL | Age: 63
End: 2023-09-16
Attending: INTERNAL MEDICINE
Payer: COMMERCIAL

## 2023-09-16 ENCOUNTER — OFFICE VISIT (OUTPATIENT)
Dept: RHEUMATOLOGY | Facility: CLINIC | Age: 63
End: 2023-09-16

## 2023-09-16 VITALS
WEIGHT: 135.38 LBS | BODY MASS INDEX: 23.99 KG/M2 | DIASTOLIC BLOOD PRESSURE: 68 MMHG | SYSTOLIC BLOOD PRESSURE: 137 MMHG | HEIGHT: 63 IN | HEART RATE: 52 BPM | RESPIRATION RATE: 16 BRPM

## 2023-09-16 DIAGNOSIS — M25.512 CHRONIC LEFT SHOULDER PAIN: ICD-10-CM

## 2023-09-16 DIAGNOSIS — R73.03 PREDIABETES: ICD-10-CM

## 2023-09-16 DIAGNOSIS — G89.29 CHRONIC LEFT SHOULDER PAIN: ICD-10-CM

## 2023-09-16 DIAGNOSIS — L40.50 PSORIATIC ARTHRITIS (HCC): Primary | ICD-10-CM

## 2023-09-16 DIAGNOSIS — E55.9 VITAMIN D DEFICIENCY: ICD-10-CM

## 2023-09-16 DIAGNOSIS — I10 ESSENTIAL HYPERTENSION: ICD-10-CM

## 2023-09-16 DIAGNOSIS — E78.00 HYPERCHOLESTEROLEMIA: ICD-10-CM

## 2023-09-16 DIAGNOSIS — L40.50 PSORIATIC ARTHRITIS (HCC): ICD-10-CM

## 2023-09-16 LAB
ALBUMIN SERPL-MCNC: 3.4 G/DL (ref 3.4–5)
ALBUMIN/GLOB SERPL: 1 {RATIO} (ref 1–2)
ALP LIVER SERPL-CCNC: 75 U/L
ALT SERPL-CCNC: 20 U/L
ANION GAP SERPL CALC-SCNC: 3 MMOL/L (ref 0–18)
AST SERPL-CCNC: 19 U/L (ref 15–37)
BILIRUB SERPL-MCNC: 0.6 MG/DL (ref 0.1–2)
BUN BLD-MCNC: 16 MG/DL (ref 7–18)
BUN/CREAT SERPL: 19.5 (ref 10–20)
CALCIUM BLD-MCNC: 8.8 MG/DL (ref 8.5–10.1)
CHLORIDE SERPL-SCNC: 110 MMOL/L (ref 98–112)
CHOLEST SERPL-MCNC: 167 MG/DL (ref ?–200)
CO2 SERPL-SCNC: 29 MMOL/L (ref 21–32)
CREAT BLD-MCNC: 0.82 MG/DL
CRP SERPL-MCNC: <0.29 MG/DL (ref ?–0.3)
DEPRECATED RDW RBC AUTO: 41.4 FL (ref 35.1–46.3)
EGFRCR SERPLBLD CKD-EPI 2021: 80 ML/MIN/1.73M2 (ref 60–?)
ERYTHROCYTE [DISTWIDTH] IN BLOOD BY AUTOMATED COUNT: 12.7 % (ref 11–15)
ERYTHROCYTE [SEDIMENTATION RATE] IN BLOOD: 4 MM/HR
EST. AVERAGE GLUCOSE BLD GHB EST-MCNC: 114 MG/DL (ref 68–126)
FASTING PATIENT LIPID ANSWER: YES
FASTING STATUS PATIENT QL REPORTED: YES
GLOBULIN PLAS-MCNC: 3.4 G/DL (ref 2.8–4.4)
GLUCOSE BLD-MCNC: 94 MG/DL (ref 70–99)
HBA1C MFR BLD: 5.6 % (ref ?–5.7)
HBV CORE AB SERPL QL IA: NONREACTIVE
HBV SURFACE AG SER-ACNC: <0.1 [IU]/L
HBV SURFACE AG SERPL QL IA: NONREACTIVE
HCT VFR BLD AUTO: 38.4 %
HCV AB SERPL QL IA: NONREACTIVE
HDLC SERPL-MCNC: 56 MG/DL (ref 40–59)
HGB BLD-MCNC: 12.7 G/DL
LDLC SERPL CALC-MCNC: 85 MG/DL (ref ?–100)
MCH RBC QN AUTO: 29.1 PG (ref 26–34)
MCHC RBC AUTO-ENTMCNC: 33.1 G/DL (ref 31–37)
MCV RBC AUTO: 88.1 FL
NONHDLC SERPL-MCNC: 111 MG/DL (ref ?–130)
OSMOLALITY SERPL CALC.SUM OF ELEC: 295 MOSM/KG (ref 275–295)
PLATELET # BLD AUTO: 397 10(3)UL (ref 150–450)
POTASSIUM SERPL-SCNC: 4.6 MMOL/L (ref 3.5–5.1)
PROT SERPL-MCNC: 6.8 G/DL (ref 6.4–8.2)
RBC # BLD AUTO: 4.36 X10(6)UL
SODIUM SERPL-SCNC: 142 MMOL/L (ref 136–145)
TRIGL SERPL-MCNC: 150 MG/DL (ref 30–149)
TSI SER-ACNC: 0.91 MIU/ML (ref 0.36–3.74)
VIT D+METAB SERPL-MCNC: 17.1 NG/ML (ref 30–100)
VLDLC SERPL CALC-MCNC: 24 MG/DL (ref 0–30)
WBC # BLD AUTO: 9.1 X10(3) UL (ref 4–11)

## 2023-09-16 PROCEDURE — 3008F BODY MASS INDEX DOCD: CPT | Performed by: INTERNAL MEDICINE

## 2023-09-16 PROCEDURE — 86803 HEPATITIS C AB TEST: CPT

## 2023-09-16 PROCEDURE — 80053 COMPREHEN METABOLIC PANEL: CPT

## 2023-09-16 PROCEDURE — 84443 ASSAY THYROID STIM HORMONE: CPT

## 2023-09-16 PROCEDURE — 99214 OFFICE O/P EST MOD 30 MIN: CPT | Performed by: INTERNAL MEDICINE

## 2023-09-16 PROCEDURE — 36415 COLL VENOUS BLD VENIPUNCTURE: CPT

## 2023-09-16 PROCEDURE — 87340 HEPATITIS B SURFACE AG IA: CPT

## 2023-09-16 PROCEDURE — 86140 C-REACTIVE PROTEIN: CPT

## 2023-09-16 PROCEDURE — 85027 COMPLETE CBC AUTOMATED: CPT

## 2023-09-16 PROCEDURE — 3078F DIAST BP <80 MM HG: CPT | Performed by: INTERNAL MEDICINE

## 2023-09-16 PROCEDURE — 85652 RBC SED RATE AUTOMATED: CPT

## 2023-09-16 PROCEDURE — 3075F SYST BP GE 130 - 139MM HG: CPT | Performed by: INTERNAL MEDICINE

## 2023-09-16 PROCEDURE — 86480 TB TEST CELL IMMUN MEASURE: CPT

## 2023-09-16 PROCEDURE — 20610 DRAIN/INJ JOINT/BURSA W/O US: CPT | Performed by: INTERNAL MEDICINE

## 2023-09-16 PROCEDURE — 83036 HEMOGLOBIN GLYCOSYLATED A1C: CPT

## 2023-09-16 PROCEDURE — 82306 VITAMIN D 25 HYDROXY: CPT

## 2023-09-16 PROCEDURE — 80061 LIPID PANEL: CPT

## 2023-09-16 PROCEDURE — 86704 HEP B CORE ANTIBODY TOTAL: CPT

## 2023-09-16 RX ORDER — PREDNISONE 10 MG/1
10 TABLET ORAL DAILY
Qty: 30 TABLET | Refills: 3 | Status: SHIPPED | OUTPATIENT
Start: 2023-09-16

## 2023-09-16 RX ORDER — TRIAMCINOLONE ACETONIDE 40 MG/ML
40 INJECTION, SUSPENSION INTRA-ARTICULAR; INTRAMUSCULAR ONCE
Status: COMPLETED | OUTPATIENT
Start: 2023-09-16 | End: 2023-09-16

## 2023-09-16 NOTE — PROGRESS NOTES
Alyse Borden is a 60year old female. HPI:   Patient presents with:  Psoriatic Arthritis  Medication Follow-Up  Back Pain: lower  Arm Pain: Left        She is a plesasnt 63year old old with psoriatic arthritis and fibromyalgia. She was on  enbrel 50mg weekly and   methotrexate weekly. She is now off since 2019  The injection Enbrel hurts really bad. She efeels it helps her thigh. It's a swollen area over her right thigh. She has 8/10 pain in her right elbows. She stopped the enbrel temporarily for hte basal cell cancer but then her right elbows started botehring her again last night. So the last injeciton lasted 3 months.     2020  VIDEO VISIT  She was furloughedx 3 weeks. She's stressed. Her son's girlfriend  at 28 from hepatic failure. Her mom also passed from cancer recurrence 3 weeks after Lorri pass away. Ambrocio mom also passed away from cancer. 2 months after. She started medical marijuana in  . She has used prednisone only twice in the last 10 months. She uses 1/2 gummy at night. She still has pain in her hands. Her pain hasn't flared with stress. She's done better than in the last 10 years. She has been off all her medications. She's painting around the house and her hands are getting sore but its' not like something she can tolerate. Her neck is better. She was off enbrel - it was hurting so bad when she tried it even with the new formulation. She is alos off methtorexate. It's amazing to her. She takes prednisone - if seh has a bad flare. 2020  She is lousy for the last two weeks her right hip an dright leg is hurting. It's radiating down to her right shin. She denies back pain. Lifting her leg - she has right hip pain. She took the pred nisone burst on 2020. She uses the topical pain med. Today she felt ok - she took three ibuporfen and that's helping. Driving to work.    She's back now for 3 months - 20 hours a week     4/11/2023    She is re-establihsing. Had medrol andrea sent in 2/2023 - for back pain. She had a CABG in 12/2021. She was in the hospital 14 days - severe MEDINA. She had sternotomy to get wires removed. In 12/2022. She had a lot of pain with movemetn of her chest and had wirse removed. She feles much better. Had several cardiac aneurysm-   It's still not 100% but it was a lot better. She has left 4th finger ganglion cyst - and planning on removed. or could be a dupytren's contracture. - dr. Kyle Saunders. She is overall doing well - only using medical marijuana. Her right hip is doing ok. Her feet are swelling more. She has about 5/10 pain. Her son is on methotrexate , and leflunomide - reactive arthritis - not worked for 1 1/2 years . For covid long haul    5/20/2023  Her left shoulder is swelling again - it's toleratble for the last 1 1 2/ months but now she has trouble lifting again. She had a pneumonia shot in her right arm. Last time her left hip and left arm was hurting. Now her left hip is better. Her left arm is still hurting. She has consant pain in her left arm - 9/10 pain. It' swaking her up at night. 9/16/2023  Her left arm is still really hurting. Her back is bad again. Was in the skyrizi - but got lichen planus for 3 months - needed to get light therapy and everything to get rid of it. This was after her sternotomy   So she is afraid to restart biologics.        HISTORY:  Past Medical History:   Diagnosis Date    Basal cell carcinoma 02/01/2018    Skin, left cutaneous lip    Cervical radiculopathy at C7 09/29/2017    Colon polyps 02/2010    Essential hypertension 2019    Fibromyalgia     Hypercholesterolemia     Psoriatic arthritis ([facility] [address]       Social Hx Reviewed   Family Hx Reviewed     Medications (Active prior to today's visit):  Current Outpatient Medications   Medication Sig Dispense Refill    atorvastatin 80 MG Oral Tab Take 1 tablet (80 mg total) by mouth nightly. 90 tablet 3    meclizine 25 MG Oral Tab Take 1 tablet (25 mg total) by mouth 3 (three) times daily as needed. 30 tablet 0    metoprolol tartrate 25 MG Oral Tab Take 1 tablet (25 mg total) by mouth 2 (two) times daily. 90 tablet 3    omeprazole 20 MG Oral Capsule Delayed Release Take 1 capsule (20 mg total) by mouth 2 (two) times daily before meals. 180 capsule 3    oxybutynin ER 5 MG Oral Tablet 24 Hr Take 1 tablet (5 mg total) by mouth daily. 90 tablet 1    ondansetron 4 MG Oral Tablet Dispersible Take 1 tablet (4 mg total) by mouth every 8 (eight) hours as needed for Nausea. 20 tablet 0    aspirin 81 MG Oral Tab EC Take 1 tablet (81 mg total) by mouth daily. predniSONE 10 MG Oral Tab Take 6 tabsx 1 day, take 5 tabsx 1 day, take 4 tabsx 1 day,take 3 tabsx 1 day, take 2 tabsx 1 day, take 1 tabsx 1 day, then off (Patient not taking: Reported on 9/16/2023) 21 tablet 0       Allergies:    Adhesive Tape           HIVES      ROS:   All other ROS are negative. PHYSICAL EXAM:    pleasant, no acute distress, no CAD,  Malar rash - none now   CVS: RRR  RS: CTAB  ABD: soft nont lizz   Hands not swollen. Moving them easily   Can close both hands. Moving neck much more easily. No rash on her palms. , no psoriasis. Left shoulder ac joint - tender -with abduction and in the ac joint . Right hip not  tender , decreased ext rotaiton   Right trochanteric bursitis not tender. Right pretibial tendernessnot tender.              Component      Latest Ref Rng 3/16/2023   WBC      4.0 - 11.0 x10(3) uL 10.6    RBC      3.80 - 5.30 x10(6)uL 4.47    Hemoglobin      12.0 - 16.0 g/dL 12.8    Hematocrit      35.0 - 48.0 % 38.7    MCV      80.0 - 100.0 fL 86.6    MCH      26.0 - 34.0 pg 28.6    MCHC      31.0 - 37.0 g/dL 33.1    RDW-SD      35.1 - 46.3 fL 40.4    RDW      11.0 - 15.0 % 12.8    Platelet Count      328.4 - 450.0 10(3)uL 317.0    Prelim Neutrophil Abs      1.50 - 7.70 x10 (3) uL 8.01 (H) Neutrophils Absolute      1.50 - 7.70 x10(3) uL 8.01 (H)    Lymphocytes Absolute      1.00 - 4.00 x10(3) uL 1.47    Monocytes Absolute      0.10 - 1.00 x10(3) uL 0.74    Eosinophils Absolute      0.00 - 0.70 x10(3) uL 0.25    Basophils Absolute      0.00 - 0.20 x10(3) uL 0.05    Immature Granulocyte Absolute      0.00 - 1.00 x10(3) uL 0.03    Neutrophils %      % 75.9    Lymphocytes %      % 13.9    Monocytes %      % 7.0    Eosinophils %      % 2.4    Basophils %      % 0.5    Immature Granulocyte %      % 0.3    Glucose      70 - 99 mg/dL 93    Sodium      136 - 145 mmol/L 144    Potassium      3.5 - 5.1 mmol/L 4.3    Chloride      98 - 112 mmol/L 108    Carbon Dioxide, Total      21.0 - 32.0 mmol/L 28.0    ANION GAP      0 - 18 mmol/L 8    BUN      7 - 18 mg/dL 15    CREATININE      0.55 - 1.02 mg/dL 0.79    BUN/CREATININE RATIO      10.0 - 20.0  19.0    CALCIUM      8.5 - 10.1 mg/dL 9.0    CALCULATED OSMOLALITY      275 - 295 mOsm/kg 299 (H)    eGFR-Cr      >=60 mL/min/1.73m2 84    ALT (SGPT)      13 - 56 U/L 19    AST (SGOT)      15 - 37 U/L 21    ALKALINE PHOSPHATASE      50 - 130 U/L 76    Total Bilirubin      0.1 - 2.0 mg/dL 0.6    PROTEIN, TOTAL      6.4 - 8.2 g/dL 6.7    Albumin      3.4 - 5.0 g/dL 3.4    Globulin      2.8 - 4.4 g/dL 3.3    A/G Ratio      1.0 - 2.0  1.0    Troponin I (High Sensitivity)      <=54 ng/L 5           ASSESSMENT/PLAN:     1. Psoriatic arthritis ([address]  1. psoriatic arthritis -  Stable except for left shoulder    hip pain resolved   Left shoulder/biceps tenderness -   S/p left shoulder injection 5/2023  With paitent's consent, I injected pt's left shoulder ac joint with 1ml lidocaine 1 % and 1 ml kenalog 40. It was done under sterile technique using iodine and alcohol swabs and ethyl chloride was used as an anaesthetic spray. Pt.  tolerated it well.     Getting monthly steroid bursts -     She declines other meds at this time   Off her meds   - on medical marijuana,- uses sativa and cbd/cbd and thc for sleep -  and prednsone burst prn -    s/p right trochanteric burisits in 12/2020 -   Check labs in 1 month   - rtc in 6-12 months. Right lateral epicodnylitis steroid injection - 11/2018 and 2/2019   - off naproxen  - Topical steroid for rash - dermatitis over hands improving with mtx,    -  been off   methotrexaet 17.mg a week sub cu - b/c of elevated liver funtion tests   - off  Enbrel,  leflunomdie 20mg a day  And mtx 0.5ml subcu injction     2. Intermittent - cp -  better -   Had CABG in 12/2021 - ahd had sternotomy for wire removal in 12/2022 . Follow up with pulmonary as wlel -   She was on lanoxin b/c she has hx of SVT when her kids were little. She's fine with that for years now. - f/u with gi - cont. probitiotics helping -   quantiferon gold 5/2017 - negative   3. Cervical radiculopathy - much better   4. . chest xray 2012 5/2017 - quantiferon gold negaitve. 5. Gastritis/ibs - on probiotics - .   6. Fibromyalgia - better   - off  low dose LDN - off   low dose gabpentin 300mg at night and cyclobenzparine 5mg at night prn   7. new basal cell ca  = dx in 2/2018     Summary:  1. Check labs today   2. Rest left shoulder  x 3 days   3. Cont. Legal medical marijuana. 4. Start prednisone 10mg a day   5.. Return to clinic in 3-4  months as needed.    - d/w her to     Vijay Brown MD  9/16/2023   9:17 AM  - Reviewed [place]- information  through [employer]

## 2023-09-18 LAB
M TB IFN-G CD4+ T-CELLS BLD-ACNC: 0.04 IU/ML
M TB TUBERC IFN-G BLD QL: NEGATIVE
M TB TUBERC IGNF/MITOGEN IGNF CONTROL: >10 IU/ML
QFT TB1 AG MINUS NIL: 0.02 IU/ML
QFT TB2 AG MINUS NIL: 0.03 IU/ML

## 2023-09-29 ENCOUNTER — HOSPITAL ENCOUNTER (OUTPATIENT)
Dept: CT IMAGING | Facility: HOSPITAL | Age: 63
Discharge: HOME | End: 2023-09-29
Attending: INTERNAL MEDICINE
Payer: COMMERCIAL

## 2023-09-29 DIAGNOSIS — I71.20 THORACIC AORTIC ANEURYSM WITHOUT RUPTURE, UNSPECIFIED PART (HCC): ICD-10-CM

## 2023-09-29 DIAGNOSIS — R91.1 LUNG NODULE: ICD-10-CM

## 2023-09-29 PROCEDURE — 71260 CT THORAX DX C+: CPT | Performed by: INTERNAL MEDICINE

## 2023-10-11 ENCOUNTER — TELEMEDICINE (OUTPATIENT)
Dept: INTERNAL MEDICINE CLINIC | Facility: CLINIC | Age: 63
End: 2023-10-11

## 2023-10-11 ENCOUNTER — NURSE TRIAGE (OUTPATIENT)
Dept: INTERNAL MEDICINE CLINIC | Facility: CLINIC | Age: 63
End: 2023-10-11

## 2023-10-11 DIAGNOSIS — R91.1 LUNG NODULE: Primary | ICD-10-CM

## 2023-10-11 DIAGNOSIS — R06.02 SOB (SHORTNESS OF BREATH): ICD-10-CM

## 2023-10-11 PROCEDURE — 99213 OFFICE O/P EST LOW 20 MIN: CPT | Performed by: INTERNAL MEDICINE

## 2023-10-11 NOTE — TELEPHONE ENCOUNTER
Please reply to pool: EM RN TRIAGE  Action Requested: Summary for Provider     []  Critical Lab, Recommendations Needed  [] Need Additional Advice  [x]   FYI    []   Need Orders  [] Need Medications Sent to Pharmacy  []  Other     SUMMARY: Patient contacts clinic reporting intermittent shortness of breath, difficulty taking a deep breath. Recent CT showed improved pulmonary nodules, stable aneurysm and some left lower atelectasis. Speech is calm, clear and non labored. Denies fever, body aches, chills, head or chest congestion. Denies chest pain. Would like to discuss symptoms and results with Dr. Marely Carcamo in person visit, patient prefers virtual with Dr. Nancy Colin.  Acute virtual visit booked today. Reason for call: No chief complaint on file.   Onset: Data Unavailable                       Reason for Disposition   MILD difficulty breathing (e.g., minimal/no SOB at rest, SOB with walking, pulse < 100) of new-onset or worse than normal    Protocols used: Breathing Difficulty-A-[place]

## 2023-10-16 ENCOUNTER — TELEPHONE (OUTPATIENT)
Dept: INTERNAL MEDICINE CLINIC | Facility: CLINIC | Age: 63
End: 2023-10-16

## 2023-10-16 DIAGNOSIS — H25.9 SENILE CATARACT, UNSPECIFIED AGE-RELATED CATARACT TYPE, UNSPECIFIED LATERALITY: Primary | ICD-10-CM

## 2023-10-17 NOTE — TELEPHONE ENCOUNTER
Hello     Patient needs to be seen by ophthalmologist and then the ophthalmologist will request a surgical referral.     Thank you,  [facility]  Referral specialist

## 2023-10-19 ENCOUNTER — TELEPHONE (OUTPATIENT)
Dept: RHEUMATOLOGY | Facility: CLINIC | Age: 63
End: 2023-10-19

## 2023-10-19 DIAGNOSIS — R21 FACIAL RASH: Primary | ICD-10-CM

## 2023-10-19 RX ORDER — PREDNISONE 5 MG/1
10 TABLET ORAL DAILY
Qty: 60 TABLET | Refills: 1 | Status: SHIPPED | OUTPATIENT
Start: 2023-10-19

## 2023-10-19 NOTE — TELEPHONE ENCOUNTER
Talked to pt. She has b/l leg pain - and he's having   She's having more facial rash -   She's still having bicpes pain . She feels it may be like a lupus rash   She fnished prednisone 10mg a day - 3 days ago. Will restart pred 10mga day x 1 month, 5mg a day x 1 month -     She' can have an appointment on 1/20 at 11:00 am - have the Saturday appt.  Break up into follow up slots - can you help put her in

## 2023-10-19 NOTE — TELEPHONE ENCOUNTER
Patient calling to inform that she is experiencing fatigue and redness on face and leg pain, nurse line not available at time of call.

## 2023-10-19 NOTE — TELEPHONE ENCOUNTER
Done.  Future Appointments   Date Time Provider [place]   1/20/2024 11:00 AM Mary Navarro MD 2014 [facility]

## 2023-10-19 NOTE — TELEPHONE ENCOUNTER
Please see below. Pt reports her legs have been aching for a couple of day but the pain worsened last night. It feels like it is in her bones. Reports her son, a medic, noticed the redness on her face and stated she has a \"butterfly rash on her face like lupus\". Pt stated she saved pictures for the provider. Pt advised to upload them via My Chart for provider to review. Pt stated she came off of prednisone 5 days ago; she really hopes not to have to go back on it. Please advise. Pharmacy verified. DISPLAY PLAN FREE TEXT

## 2023-10-23 NOTE — PROGRESS NOTES
Virtual Virtual Check-In    Chaka Meyer verbally consents to a Virtual Video Check-In service on 10/23/23. Patient understands and accepts financial responsibility for any deductible, co-insurance and/or co-pays associated with this service. This visit is conducted using Telemedicine with live, interactive video and audio. I spoke with Chaka Meyer by secure video chat, verified date of birth, and discussed their current concerns:   Duration: 12 minutes    HPI  62-year-old pleasant lady requesting a telemedicine consultation for follow-up of CT scan of the chest concerning for lung nodule. She denies any cough or hemoptysis. Breathing is at baseline. Review of Systems:   Review of Systems   Constitutional:  Negative for activity change, appetite change and fever. HENT:  Negative for congestion and voice change. Respiratory:  Negative for cough and shortness of breath. Cardiovascular:  Negative for chest pain. Gastrointestinal:  Negative for abdominal distention, abdominal pain and vomiting. Genitourinary:  Negative for hematuria. Skin:  Negative for wound. Psychiatric/Behavioral:  Negative for behavioral problems.            Reviewed Active Problems:  Patient Active Problem List    Overactive bladder      Gastroesophageal reflux disease without esophagitis      CAD (coronary artery disease)            CABG- follows up with cards      Hemoperitoneum      Burning reflux      Essential hypertension      History of basal cell carcinoma (BCC)      Neoplasm of uncertain behavior of skin      Sun-damaged skin      Cervical radiculopathy at C7      Diverticulosis      Colon polyps      Hypercholesterolemia      Psoriatic arthritis ([facility] [address]            Follows up with Dr Moreno Willis       Reviewed Past Medical History:  Past Medical History:   Diagnosis Date    Basal cell carcinoma 02/01/2018    Skin, left cutaneous lip    Cervical radiculopathy at C7 09/29/2017    Colon polyps 02/2010    Essential hypertension 2019    Fibromyalgia     Hypercholesterolemia     Psoriatic arthritis (HCC)       Reviewed Family History:  Family History   Problem Relation Age of Onset    Colon Cancer Mother     Heart Disease Mother         CABG age 67    Hypertension Mother     Cancer Mother 64        Colon Cancer    Other (Bladder Cancer) Mother     Other (Cervical Cancer) Mother     Other (Peripheral Vascular Disease) Mother     Diabetes Maternal Grandmother     Hypertension Maternal Grandmother     Cancer Maternal Grandmother         hx of skin cancer     Heart Disease Maternal Grandfather          MI age 28    Heart Disease Maternal Aunt         Per NG: CAD    Breast Cancer Neg        Reviewed Social History:  Social History     Socioeconomic History    Marital status:    Tobacco Use    Smoking status: Former     Packs/day: 0.50     Years: 37.00     Additional pack years: 0.00     Total pack years: 18.50     Types: Cigarettes     Quit date: 2012     Years since quittin.1     Passive exposure: Never    Smokeless tobacco: Never   Vaping Use    Vaping Use: Never used   Substance and Sexual Activity    Alcohol use: Yes     Alcohol/week: 0.0 standard drinks of alcohol     Comment: Occasional    Drug use: Yes     Types: Cannabis     Comment: Medical Gummies/Topical Cream   Other Topics Concern    Pt has a pacemaker No    Pt has a defibrillator No    Reaction to local anesthetic No    Right Handed Yes      Reviewed Current Medications:  Current Outpatient Medications   Medication Sig Dispense Refill    predniSONE 5 MG Oral Tab Take 2 tablets (10 mg total) by mouth daily. 60 tablet 1    predniSONE 10 MG Oral Tab Take 1 tablet (10 mg total) by mouth daily. 30 tablet 3    ergocalciferol 1.25 MG (80932 UT) Oral Cap Take 1 capsule (50,000 Units total) by mouth once a week. 12 capsule 0    atorvastatin 80 MG Oral Tab Take 1 tablet (80 mg total) by mouth nightly.  90 tablet 3    meclizine 25 MG Oral Tab Take 1 tablet (25 mg total) by mouth 3 (three) times daily as needed. 30 tablet 0    metoprolol tartrate 25 MG Oral Tab Take 1 tablet (25 mg total) by mouth 2 (two) times daily. 90 tablet 3    omeprazole 20 MG Oral Capsule Delayed Release Take 1 capsule (20 mg total) by mouth 2 (two) times daily before meals. 180 capsule 3    oxybutynin ER 5 MG Oral Tablet 24 Hr Take 1 tablet (5 mg total) by mouth daily. 90 tablet 1    ondansetron 4 MG Oral Tablet Dispersible Take 1 tablet (4 mg total) by mouth every 8 (eight) hours as needed for Nausea. 20 tablet 0    aspirin 81 MG Oral Tab EC Take 1 tablet (81 mg total) by mouth daily. Physical Exam  There were no vitals filed for this visit. Physical Exam   Constitutional:       General: He is not in acute distress. Appearance: Normal appearance. HENT:      Head: Normocephalic and atraumatic. Nose: Nose normal.   Neurological:      Mental Status: He is alert and oriented to person, place, and time. Psychiatric:         Behavior: Behavior normal.         Thought Content: Thought content normal.         Judgment: Judgment normal.       Diagnosis:  1. Lung nodule  I have reviewed the CT scan report with her. Her lung nodule has decreased in size. She has some atelectasis. Encouraged breathing exercises. Her aneurysm has been stable. - Pulmonary Referral - In Network    2. SOB (shortness of breath)  She is requesting for a pulmonary referral.  Her breathing is at baseline. It is not exertional.  During the last visit, I have encouraged her to follow-up with cardiology because of her previous history of CABG. - Pulmonary Referral - In Network     Plan: As above. Follow up: No follow-ups on file. Please note that the following visit was completed using two-way, real-time interactive audio and/or video communication.   This has been done in good june to provide continuity of care in the best interest of the provider-patient relationship, due to the ongoing public health crisis/national emergency and because of restrictions of visitation. There are limitations of this visit as no physical exam could be performed. Every conscious effort was taken to allow for sufficient and adequate time. This billing was spent on reviewing labs, medications, radiology tests and decision making. Appropriate medical decision-making and tests are ordered as detailed in the plan of care above. Ana Loco understands video evaluation is not a substitute for in person examination or emergency care. Patient advised to go to ER or call 911 for worsening symptoms or acute distress. This note was prepared using [employer] voice recognition dictation software. As a result errors may occur. When identified these errors have been corrected.  While every attempt is made to correct errors during dictation discrepancies may still exist.  Ltitle Aviles MD

## 2023-10-31 NOTE — TELEPHONE ENCOUNTER
Called pt. And she's off enbrel and she's hurting - left ankle is the worst. Her wrist hurt   She was on 10 extra days of antibiotics. She was worried about prednisone b/c the lip part is not healed. She has to goto see him again.    She has to see dermat - pt on Eliquis for paroxysmal a fib, holding for procedure   - Continue Toprol and Amiodarone

## 2023-11-02 ENCOUNTER — LAB ENCOUNTER (OUTPATIENT)
Dept: LAB | Facility: HOSPITAL | Age: 63
End: 2023-11-02
Attending: INTERNAL MEDICINE
Payer: COMMERCIAL

## 2023-11-02 DIAGNOSIS — R21 FACIAL RASH: ICD-10-CM

## 2023-11-02 PROCEDURE — 36415 COLL VENOUS BLD VENIPUNCTURE: CPT

## 2023-11-02 PROCEDURE — 86038 ANTINUCLEAR ANTIBODIES: CPT

## 2023-11-03 ENCOUNTER — TELEPHONE (OUTPATIENT)
Dept: INTERNAL MEDICINE CLINIC | Facility: CLINIC | Age: 63
End: 2023-11-03

## 2023-11-03 NOTE — TELEPHONE ENCOUNTER
Spoke with patient (confirmed full name and ) advised patient of instructions per Dr. Mckenzie Rodríguez.    Patient states was able to make an appointment with cardiology for 23 at 1030 am.    Patient states \"probably won't go to the ER because of the rise in covid and wait times. \"    This RN stressed the importance that if she has increased/worsening SOB or difficulty breathing to put on a mask and go to the ER as it is better to wait there than at home. Patient agreed.

## 2023-11-03 NOTE — TELEPHONE ENCOUNTER
Because of her cardiac history, I highly recommend she follows with cardiology ASAP. If she is getting very short of breath, she should be seen in the emergency room.

## 2023-11-03 NOTE — TELEPHONE ENCOUNTER
Action Requested: Summary for Provider     []  Critical Lab, Recommendations Needed  [x] Need Additional Advice  []   FYI    []   Need Orders  [] Need Medications Sent to Pharmacy  []  Other     SUMMARY:   Patient stated only date they have available for both PCP & APN     Was already place on wait list     Feels like her sob may be increasing and more fatigue    Did advised to also reach out to Dr. Rodolfo Hoyos    Reviewed red flags    Please advise    Reason for call: Condition Update  Onset: Data Unavailable

## 2023-11-06 LAB — NUCLEAR IGG TITR SER IF: NEGATIVE {TITER}

## 2023-11-20 ENCOUNTER — MED REC SCAN ONLY (OUTPATIENT)
Dept: INTERNAL MEDICINE CLINIC | Facility: CLINIC | Age: 63
End: 2023-11-20

## 2023-12-04 ENCOUNTER — LAB ENCOUNTER (OUTPATIENT)
Dept: LAB | Facility: HOSPITAL | Age: 63
End: 2023-12-04
Attending: NURSE PRACTITIONER
Payer: COMMERCIAL

## 2023-12-04 DIAGNOSIS — Z01.818 PRE-OP TESTING: Primary | ICD-10-CM

## 2023-12-04 LAB
ANION GAP SERPL CALC-SCNC: 9 MMOL/L (ref 0–18)
BASOPHILS # BLD AUTO: 0.06 X10(3) UL (ref 0–0.2)
BASOPHILS NFR BLD AUTO: 0.5 %
BUN BLD-MCNC: 16 MG/DL (ref 9–23)
BUN/CREAT SERPL: 20.8 (ref 10–20)
CALCIUM BLD-MCNC: 9.2 MG/DL (ref 8.7–10.4)
CHLORIDE SERPL-SCNC: 107 MMOL/L (ref 98–112)
CO2 SERPL-SCNC: 25 MMOL/L (ref 21–32)
CREAT BLD-MCNC: 0.77 MG/DL
DEPRECATED RDW RBC AUTO: 43.1 FL (ref 35.1–46.3)
EGFRCR SERPLBLD CKD-EPI 2021: 87 ML/MIN/1.73M2 (ref 60–?)
EOSINOPHIL # BLD AUTO: 0.15 X10(3) UL (ref 0–0.7)
EOSINOPHIL NFR BLD AUTO: 1.3 %
ERYTHROCYTE [DISTWIDTH] IN BLOOD BY AUTOMATED COUNT: 13.2 % (ref 11–15)
FASTING STATUS PATIENT QL REPORTED: NO
GLUCOSE BLD-MCNC: 127 MG/DL (ref 70–99)
HCT VFR BLD AUTO: 38.3 %
HGB BLD-MCNC: 12.6 G/DL
IMM GRANULOCYTES # BLD AUTO: 0.05 X10(3) UL (ref 0–1)
IMM GRANULOCYTES NFR BLD: 0.4 %
LYMPHOCYTES # BLD AUTO: 2.53 X10(3) UL (ref 1–4)
LYMPHOCYTES NFR BLD AUTO: 21.9 %
MCH RBC QN AUTO: 29.1 PG (ref 26–34)
MCHC RBC AUTO-ENTMCNC: 32.9 G/DL (ref 31–37)
MCV RBC AUTO: 88.5 FL
MONOCYTES # BLD AUTO: 0.68 X10(3) UL (ref 0.1–1)
MONOCYTES NFR BLD AUTO: 5.9 %
NEUTROPHILS # BLD AUTO: 8.08 X10 (3) UL (ref 1.5–7.7)
NEUTROPHILS # BLD AUTO: 8.08 X10(3) UL (ref 1.5–7.7)
NEUTROPHILS NFR BLD AUTO: 70 %
OSMOLALITY SERPL CALC.SUM OF ELEC: 295 MOSM/KG (ref 275–295)
PLATELET # BLD AUTO: 363 10(3)UL (ref 150–450)
POTASSIUM SERPL-SCNC: 3.8 MMOL/L (ref 3.5–5.1)
RBC # BLD AUTO: 4.33 X10(6)UL
SODIUM SERPL-SCNC: 141 MMOL/L (ref 136–145)
WBC # BLD AUTO: 11.6 X10(3) UL (ref 4–11)

## 2023-12-04 PROCEDURE — 36415 COLL VENOUS BLD VENIPUNCTURE: CPT

## 2023-12-04 PROCEDURE — 85025 COMPLETE CBC W/AUTO DIFF WBC: CPT

## 2023-12-04 PROCEDURE — 80048 BASIC METABOLIC PNL TOTAL CA: CPT

## 2023-12-05 NOTE — TELEPHONE ENCOUNTER
LOV: 9/16/23  Last Refilled:#12, 0rfs 9/16/23  Labs:Vitamin D 17.1   Future Appointments   Date Time Provider [place]   12/15/2023  1:00 PM [address], [place] [id number] CATH LAB [address] [place] [address]   1/20/2024 11:00 AM Monique Green MD [address] [place] [address]   2/2/2024 10:00 AM DOTTIE [facility]       Please advise.

## 2023-12-06 ENCOUNTER — TELEPHONE (OUTPATIENT)
Dept: RHEUMATOLOGY | Facility: CLINIC | Age: 63
End: 2023-12-06

## 2023-12-06 RX ORDER — ERGOCALCIFEROL 1.25 MG/1
50000 CAPSULE ORAL WEEKLY
Qty: 12 CAPSULE | Refills: 0 | Status: SHIPPED | OUTPATIENT
Start: 2023-12-06

## 2023-12-06 NOTE — TELEPHONE ENCOUNTER
Patient calling to inform that she is having pain and swelling in her left arm and trouble moving arm as well, nurse line not available at time of call

## 2023-12-06 NOTE — TELEPHONE ENCOUNTER
Returned pt call; verified name and . Pt reports left arm pain up by the bicep where the arm rounds down to the back side. Currently pain is 8/10. Also has swelling in the area and feelings of heat and some numbness. States pain is waking her up in the night - last night 4 times. Is using Lidocaine patches 24 hrs/d; states it's the only way she can get enough relief to go to work. Acetaminophen provides no relief. Has used Ibuprofen but only once a day due to stomach upset it causes her. She has 3 more days of taking Prednisone 5mg with taper recently prescribed.  Pt wants Dr Mirna Fuentes to be aware she is scheduled for an angiogram on 12/15/23 so not sure if taking more Prednisone now will interfer with the angiogram.

## 2023-12-06 NOTE — TELEPHONE ENCOUNTER
Refill passed per [facility] protocol.     Requested Prescriptions   Pending Prescriptions Disp Refills    METOPROLOL TARTRATE 25 MG Oral Tab [Pharmacy Med Name: Metoprolol Tartrate 25 Mg Tab Adva] 90 tablet 0     Sig: Take 1 tablet (25 mg total) by mouth 2 (two) times daily.       Hypertensive Medications Protocol Passed - 12/5/2023  1:31 AM        Passed - In person appointment in the past 12 or next 3 months     Recent Outpatient Visits              1 month ago Lung nodule    [facility] Josep Valdez MD    Telemedicine    2 months ago Psoriatic arthritis ([facility])    [facility] Maria Del Carmen Dye MD    Office Visit    4 months ago Thoracic aortic aneurysm without rupture, unspecified part ([facility])    [facility] Josep Valdez MD    Office Visit    6 months ago Psoriatic arthritis ([facility])    [facility] Maria Del Carmen Dye MD    Office Visit    6 months ago Coronary artery disease involving native coronary artery of native heart without angina pectoris    [facility] Josep Valdez MD    Office Visit          Future Appointments         Provider Department Appt Notes    In 1 week Matt Mendez MD; [facility] IVS RM2 CATH LAB [facility] Interventional [facility]     In 1 month Maria Del Carmen Dye MD [facility] F/U per Dr. HAINES- SEE TE 10/19/23    In 1 month Alon Mantilla PA-C [facility] Lung nodule               Passed - Last BP reading less than 140/90     BP Readings from Last 1 Encounters:   09/16/23 137/68               Passed - CMP or BMP in past 6 months     Recent Results (from the past 4392 hour(s))   Basic Metabolic Panel (8)    Collection Time: 12/04/23  5:56 PM   Result Value Ref Range    Glucose  127 (H) 70 - 99 mg/dL    Sodium 141 136 - 145 mmol/L    Potassium 3.8 3.5 - 5.1 mmol/L    Chloride 107 98 - 112 mmol/L    CO2 25.0 21.0 - 32.0 mmol/L    Anion Gap 9 0 - 18 mmol/L    BUN 16 9 - 23 mg/dL    Creatinine 0.77 0.55 - 1.02 mg/dL    BUN/CREA Ratio 20.8 (H) 10.0 - 20.0    Calcium, Total 9.2 8.7 - 10.4 mg/dL    Calculated Osmolality 295 275 - 295 mOsm/kg    eGFR-Cr 87 >=60 mL/min/1.73m2    Patient Fasting for BMP? No      *Note: Due to a large number of results and/or encounters for the requested time period, some results have not been displayed. A complete set of results can be found in Results Review.               Passed - In person appointment or virtual visit in the past 6 months     Recent Outpatient Visits              1 month ago Lung nodule    [facility] Josep Valdez MD    Telemedicine    2 months ago Psoriatic arthritis ([facility])    [facility] Maria Del Carmen Dye MD    Office Visit    4 months ago Thoracic aortic aneurysm without rupture, unspecified part ([facility])    [facility] Josep Valdez MD    Office Visit    6 months ago Psoriatic arthritis ([facility])    [facility] Maria Del Carmen Dye MD    Office Visit    6 months ago Coronary artery disease involving native coronary artery of native heart without angina pectoris    [facility] Josep Valdez MD    Office Visit          Future Appointments         Provider Department Appt Notes    In 1 week Matt Mendez MD; [facility] IVS RM2 CATH LAB [facility] Interventional Suites     In 1 month Maria Del Carmen Dye MD [facility] F/U per Dr. HAINES- SEE TE 10/19/23    In 1 month Alon Mantilla PA-C [facility] Lung nodule               Passed - EGFRCR or  GFRNAA > 50     GFR Evaluation  EGFRCR: 87 , resulted on 12/4/2023               [unfilled]      @[email or web address]

## 2023-12-07 NOTE — TELEPHONE ENCOUNTER
Phoned pt. Discussed arranging appt with Dr Idell Goldmann for left shoulder injection; appt made for 12/12/23 at 9:30am in [facility]. Reviewed details of office location with patient; now on 3rd floor, [address].

## 2023-12-07 NOTE — TELEPHONE ENCOUNTER
Having flare of the left shoulder   Asking for a note that she is not able to do compression stocking fittings -   She thinks's its hurting her left shoulder     Willtry to get her an appointment to give an injection - can we find an opening if any one falls off or with dr. Arleen Banda

## 2023-12-12 ENCOUNTER — TELEPHONE (OUTPATIENT)
Dept: INTERNAL MEDICINE CLINIC | Facility: CLINIC | Age: 63
End: 2023-12-12

## 2023-12-12 ENCOUNTER — TELEPHONE (OUTPATIENT)
Dept: CASE MANAGEMENT | Age: 63
End: 2023-12-12

## 2023-12-12 DIAGNOSIS — L40.50 PSORIATIC ARTHRITIS (HCC): Primary | ICD-10-CM

## 2023-12-12 NOTE — TELEPHONE ENCOUNTER
Dr. Timothy Bosworth,     Patient called requesting referral to Dr. Tiffany Avila. Pended referral please review diagnosis and sign off if you agree. Thank you.   Tad Elias

## 2023-12-12 NOTE — TELEPHONE ENCOUNTER
Patient is requesting referral.     Name of specialist and specialty department : Dr. Dye  Reason for visit with the specialist: psoriatic arthritis, fibromyalgia  Address of the specialist office:[place]  Appointment date: 1/20/2024     Patient requesting more than 3 visits in the referral     CSS informed patient the turnaround time for referral is 5-7 business days.  Patient was informed to check their [employer] account for referral status.

## 2023-12-15 ENCOUNTER — HOSPITAL ENCOUNTER (OUTPATIENT)
Dept: INTERVENTIONAL RADIOLOGY/VASCULAR | Facility: HOSPITAL | Age: 63
Discharge: HOME | End: 2023-12-15
Attending: INTERNAL MEDICINE | Admitting: INTERNAL MEDICINE
Payer: COMMERCIAL

## 2023-12-15 VITALS
OXYGEN SATURATION: 99 % | WEIGHT: 140 LBS | BODY MASS INDEX: 24.8 KG/M2 | TEMPERATURE: 98 F | RESPIRATION RATE: 12 BRPM | HEIGHT: 63 IN | DIASTOLIC BLOOD PRESSURE: 86 MMHG | SYSTOLIC BLOOD PRESSURE: 142 MMHG | HEART RATE: 70 BPM

## 2023-12-15 DIAGNOSIS — Z95.1 HX OF CABG: ICD-10-CM

## 2023-12-15 DIAGNOSIS — I25.10 CAD (CORONARY ARTERY DISEASE), NATIVE CORONARY ARTERY: Primary | ICD-10-CM

## 2023-12-15 DIAGNOSIS — R06.02 SOB (SHORTNESS OF BREATH) ON EXERTION: ICD-10-CM

## 2023-12-15 DIAGNOSIS — I10 HYPERTENSION: Primary | ICD-10-CM

## 2023-12-15 LAB — ISTAT ACTIVATED CLOTTING TIME: 228 SECONDS (ref 125–137)

## 2023-12-15 PROCEDURE — 85347 COAGULATION TIME ACTIVATED: CPT

## 2023-12-15 PROCEDURE — 4A023N7 MEASUREMENT OF CARDIAC SAMPLING AND PRESSURE, LEFT HEART, PERCUTANEOUS APPROACH: ICD-10-PCS | Performed by: INTERNAL MEDICINE

## 2023-12-15 PROCEDURE — 93459 L HRT ART/GRFT ANGIO: CPT | Performed by: INTERNAL MEDICINE

## 2023-12-15 PROCEDURE — 99152 MOD SED SAME PHYS/QHP 5/>YRS: CPT | Performed by: INTERNAL MEDICINE

## 2023-12-15 PROCEDURE — B2111ZZ FLUOROSCOPY OF MULTIPLE CORONARY ARTERIES USING LOW OSMOLAR CONTRAST: ICD-10-PCS | Performed by: INTERNAL MEDICINE

## 2023-12-15 PROCEDURE — B2121ZZ FLUOROSCOPY OF SINGLE CORONARY ARTERY BYPASS GRAFT USING LOW OSMOLAR CONTRAST: ICD-10-PCS | Performed by: INTERNAL MEDICINE

## 2023-12-15 PROCEDURE — B2181ZZ FLUOROSCOPY OF LEFT INTERNAL MAMMARY BYPASS GRAFT USING LOW OSMOLAR CONTRAST: ICD-10-PCS | Performed by: INTERNAL MEDICINE

## 2023-12-15 PROCEDURE — 99153 MOD SED SAME PHYS/QHP EA: CPT | Performed by: INTERNAL MEDICINE

## 2023-12-15 PROCEDURE — 93799 UNLISTED CV SVC/PROCEDURE: CPT | Performed by: INTERNAL MEDICINE

## 2023-12-15 PROCEDURE — 4A033BC MEASUREMENT OF ARTERIAL PRESSURE, CORONARY, PERCUTANEOUS APPROACH: ICD-10-PCS | Performed by: INTERNAL MEDICINE

## 2023-12-15 PROCEDURE — 36415 COLL VENOUS BLD VENIPUNCTURE: CPT

## 2023-12-15 PROCEDURE — 027034Z DILATION OF CORONARY ARTERY, ONE ARTERY WITH DRUG-ELUTING INTRALUMINAL DEVICE, PERCUTANEOUS APPROACH: ICD-10-PCS | Performed by: INTERNAL MEDICINE

## 2023-12-15 RX ORDER — PRASUGREL 10 MG/1
TABLET, FILM COATED ORAL
Status: COMPLETED
Start: 2023-12-15 | End: 2023-12-15

## 2023-12-15 RX ORDER — SODIUM CHLORIDE 9 MG/ML
INJECTION, SOLUTION INTRAVENOUS CONTINUOUS
Status: ACTIVE | OUTPATIENT
Start: 2023-12-15 | End: 2023-12-15

## 2023-12-15 RX ORDER — BUMETANIDE 1 MG/1
2 TABLET ORAL DAILY
Qty: 90 TABLET | Refills: 3 | Status: SHIPPED | OUTPATIENT
Start: 2023-12-15 | End: 2023-12-15

## 2023-12-15 RX ORDER — PRASUGREL 10 MG/1
10 TABLET, FILM COATED ORAL DAILY
Qty: 90 TABLET | Refills: 3 | Status: SHIPPED | OUTPATIENT
Start: 2023-12-16 | End: 2023-12-15

## 2023-12-15 RX ORDER — ASPIRIN 81 MG/1
324 TABLET, CHEWABLE ORAL ONCE
Status: DISCONTINUED | OUTPATIENT
Start: 2023-12-15 | End: 2023-12-15

## 2023-12-15 RX ORDER — LIDOCAINE HYDROCHLORIDE 20 MG/ML
INJECTION, SOLUTION EPIDURAL; INFILTRATION; INTRACAUDAL; PERINEURAL
Status: COMPLETED
Start: 2023-12-15 | End: 2023-12-15

## 2023-12-15 RX ORDER — BUMETANIDE 1 MG/1
1 TABLET ORAL DAILY
Qty: 90 TABLET | Refills: 3 | Status: SHIPPED | OUTPATIENT
Start: 2023-12-15

## 2023-12-15 RX ORDER — MIDAZOLAM HYDROCHLORIDE 1 MG/ML
INJECTION INTRAMUSCULAR; INTRAVENOUS
Status: COMPLETED
Start: 2023-12-15 | End: 2023-12-15

## 2023-12-15 RX ORDER — PRASUGREL 10 MG/1
10 TABLET, FILM COATED ORAL DAILY
Qty: 90 TABLET | Refills: 3 | Status: SHIPPED | OUTPATIENT
Start: 2023-12-15

## 2023-12-15 RX ORDER — SODIUM CHLORIDE 9 MG/ML
3 INJECTION, SOLUTION INTRAVENOUS
Status: COMPLETED | OUTPATIENT
Start: 2023-12-15 | End: 2023-12-15

## 2023-12-15 RX ORDER — ACETAMINOPHEN 325 MG/1
TABLET ORAL
Status: DISCONTINUED
Start: 2023-12-15 | End: 2023-12-15

## 2023-12-15 RX ORDER — HEPARIN SODIUM 1000 [USP'U]/ML
INJECTION, SOLUTION INTRAVENOUS; SUBCUTANEOUS
Status: COMPLETED
Start: 2023-12-15 | End: 2023-12-15

## 2023-12-15 RX ORDER — PRASUGREL 10 MG/1
10 TABLET, FILM COATED ORAL DAILY
Status: DISCONTINUED | OUTPATIENT
Start: 2023-12-16 | End: 2023-12-15

## 2023-12-15 RX ORDER — ASPIRIN 81 MG/1
81 TABLET ORAL DAILY
Status: DISCONTINUED | OUTPATIENT
Start: 2023-12-16 | End: 2023-12-15

## 2023-12-15 RX ADMIN — SODIUM CHLORIDE 3 ML/KG/HR: 9 INJECTION, SOLUTION INTRAVENOUS at 10:45:00

## 2023-12-15 NOTE — IVS NOTE
Hand-Off     Procedure hand off report given to [facility]. Pt's vital signs are stable. Procedural access site is dry and intact, bruising noted, with no signs and symptoms of bleeding and hematoma  Dr. Cm Godwin spoke with patient/family post procedure.      Bumex and Effient sent to patient's pharmacy of choice and are available for

## 2023-12-15 NOTE — INTERVAL H&P NOTE
Pre-op Diagnosis: * No pre-op diagnosis entered *    The above referenced H&P was reviewed by Luz Maria Price MD on 12/15/2023, the patient was examined and no significant changes have occurred in the patient's condition since the H&P was performed. I discussed with the patient and/or legal representative the potential benefits, risks and side effects of this procedure; the likelihood of the patient achieving goals; and potential problems that might occur during recuperation. I discussed reasonable alternatives to the procedure, including risks, benefits and side effects related to the alternatives and risks related to not receiving this procedure. We will proceed with procedure as planned.

## 2023-12-15 NOTE — DIETARY NOTE
NUTRITION EDUCATION NOTE     Received consult for cardiac nutrition education. Verbally reviewed basic cardiac diet restrictions. Provided with Eating Heart-Healthy and NCM handout to reinforce. Receptive to instruction. Would benefit from outpt f/u. Expect fair compliance.         Anusha Hall RD, LDN  Clinical Dietitian  P: [phone]

## 2023-12-15 NOTE — CARDIAC REHAB
Cardiac Rehab Phase I    Activity:  Distance   Assistance needed   Patient tolerated activity . Education:  Handouts provided and reviewed: [address] Diet: Healthy Cardiac diet reviewed. Disease Process: Disease process reviewed.     Reviewed the following: SITE CARE: Reviewed      RISK FACTORS: Reviewed      SMOKING CESSATION: Reviewed      HOME EXERCISE ACTIVITY: Reviewed      OUTPATIENT CARDIAC REHAB: Referred to Cardiac Rehabilitation

## 2023-12-15 NOTE — PROCEDURES
[facility]    Cardiac Cath Procedure Note    Carlos Diggs Patient Status:  Outpatient    1960 MRN [id number]   Location [facility] Attending Michelle Ochoa MD   Hosp Day # 0 PCP Valery East MD       Cardiologist: Michael Armstrong MD  Primary Proceduralist: Michael Armstrong MD  Procedure Performed: LHC, COR, LV, and FFR and PCI LAD  Date of Procedure: 12/15/2023   Indication: Unstable angina    Summary of procedure:  LIMA atretic, FFR positive LAD secondary to proximal lesion as well as mid intramyocardial segment. SVG-OM () and PDA unremarkable. LV EDP 25 mmHg, HFpEF contributing to MEDINA      Recommendations:  DAPT aspirin and effient  Bumex 1 mg daily  BMP next week      Left Ventriculography and hemodynamics:   LV EF not done  LV EDP 25 mmHg  No gradient across aortic valve      Coronary Angiography  RCA:  Severely diseased throughout proximal and mid segment  -SVG-OM2-PDA non-obstructive. PDA retrograde flow to PL unremarkable     Left main:  Free of obstructive disease    Left anterior descendin% proximal lesion, mid intramyocardial segment, atretic LIMA. Circumflex:  Proximal vessel and OM unremarkable. Mid Cx 100% chronically occluded. SVG-OM1 patent, however small vessel. LAD FFR and PCI  Lesion Characteristics- not torturous, not calcified. Type non-C lesion. Pre-intervention stenosis 80%, Post intervention stenosis 0%. Pre GIANNA 3, Post GIANNA 3.      Guide Catheter: EBU 3.75  Wire: Comet II  Resting gradient 0.89, upon pull back combination of bridge and prox lesion  Pre-dil:  none  Stent: 4.0 x 24 mm SYNERGY NAGI  Post-dil:  none          Description of Procedure:   After written informed consent was obtained from the patient, patient was brought to the cardiac catheterization laboratory. Patient was prepped and draped in the usual sterile fashion.  Lidocaine 1% was used to infiltrate the right groin for local anesthesia and a [address] [place] [address] introducer sheath was inserted into the right femoral artery via ultrasound guidance and micropuncture kit. Selective coronary angiography performed with JR4 catheter for RCA, SVG, IM catheter for LIMA and JL4 catheter for LCA. Angiography performed in standard projections. 6 Tristanian JR4 catheter placed in LV for hemodynamics. Selective right femoral angiogram done assess anatomy for closure. Specimen sent to: No specimen collected  Estimated blood loss: 10 cc  Closure:  Perclose       IV was maintained by RN and moderate conscious sedation of versed and fentanyl was given. Patient was assessed and monitoring of oxygen, heart rate and blood pressure by nurse and myself during the exam 35 minutes.       Simona Barnett MD  12/15/23

## 2023-12-18 NOTE — PROGRESS NOTES
Pt has complaints of rashes after starting bumex and effient. Pt consulted Pharmacist and per pharmacist most likely from bumex, rashes per patient is not horrible. RN advised to check with MD on her follow up.

## 2023-12-21 ENCOUNTER — LAB ENCOUNTER (OUTPATIENT)
Dept: LAB | Facility: HOSPITAL | Age: 63
End: 2023-12-21
Attending: INTERNAL MEDICINE
Payer: COMMERCIAL

## 2023-12-21 DIAGNOSIS — I25.10 CAD (CORONARY ARTERY DISEASE), NATIVE CORONARY ARTERY: ICD-10-CM

## 2023-12-21 LAB
ANION GAP SERPL CALC-SCNC: 4 MMOL/L (ref 0–18)
BUN BLD-MCNC: 16 MG/DL (ref 9–23)
BUN/CREAT SERPL: 21.9 (ref 10–20)
CALCIUM BLD-MCNC: 9.3 MG/DL (ref 8.7–10.4)
CHLORIDE SERPL-SCNC: 104 MMOL/L (ref 98–112)
CO2 SERPL-SCNC: 30 MMOL/L (ref 21–32)
CREAT BLD-MCNC: 0.73 MG/DL
EGFRCR SERPLBLD CKD-EPI 2021: 92 ML/MIN/1.73M2 (ref 60–?)
FASTING STATUS PATIENT QL REPORTED: NO
GLUCOSE BLD-MCNC: 87 MG/DL (ref 70–99)
OSMOLALITY SERPL CALC.SUM OF ELEC: 287 MOSM/KG (ref 275–295)
POTASSIUM SERPL-SCNC: 3.9 MMOL/L (ref 3.5–5.1)
SODIUM SERPL-SCNC: 138 MMOL/L (ref 136–145)

## 2023-12-21 PROCEDURE — 36415 COLL VENOUS BLD VENIPUNCTURE: CPT

## 2023-12-21 PROCEDURE — 80048 BASIC METABOLIC PNL TOTAL CA: CPT

## 2023-12-22 ENCOUNTER — TELEPHONE (OUTPATIENT)
Facility: CLINIC | Age: 63
End: 2023-12-22

## 2023-12-22 NOTE — TELEPHONE ENCOUNTER
Patient outreach message received:    Recall CLN in 3 years per Dr. Mckee. Last CLN done 04/13/2021. Recall entered into Patient Outreach for 04/13/2024     Recall reminder letter mailed out to patient.

## 2024-01-05 RX ORDER — OXYBUTYNIN CHLORIDE 5 MG/1
5 TABLET, EXTENDED RELEASE ORAL DAILY
Qty: 90 TABLET | Refills: 3 | Status: SHIPPED | OUTPATIENT
Start: 2024-01-05

## 2024-01-05 NOTE — TELEPHONE ENCOUNTER
Refill passed per [facility] protocol.  Requested Prescriptions   Pending Prescriptions Disp Refills    OXYBUTYNIN ER 5 MG Oral Tablet 24 Hr [Pharmacy Med Name: Oxybutynin Chloride Er 24hr 5 Mg Tab Amne] 90 tablet 0     Sig: Take 1 tablet (5 mg total) by mouth daily.       Genitourinary Medications Passed - 1/4/2024  1:31 AM        Passed - Patient does not have pulmonary hypertension on problem list        Passed - In person appointment or virtual visit in the past 12 mos or appointment in next 3 mos     Recent Outpatient Visits              2 months ago Lung nodule    [facility] Josep Valdez MD    Telemedicine    3 months ago Psoriatic arthritis ([facility])    [facility] Maria Del Carmen Dye MD    Office Visit    5 months ago Thoracic aortic aneurysm without rupture, unspecified part ([facility])    [facility]    Office Visit    7 months ago Psoriatic arthritis ([facility]    Office Visit    7 months ago Coronary artery disease involving native coronary artery of native heart without angina pectoris    [facility] Josep Valdez MD    Office Visit          Future Appointments         Provider Department Appt Notes    In 2 weeks Maria Del Carmen D[facility] MD [facility] F/U per Dr. HAINES- SEE TE 10/19/23    In 4 weeks Alon Mantilla PA-C [facility] Lung nodule                  Recent Out[facility]its              2 months ago [facility]    3 months ago Psoriatic arthritis ([facility])    [facility] Maria Del Carmen Dye MD    Office Visit     5 months ago Thoracic aortic aneurysm without rupture, unspecified part ([facility])    [facility]    Office Visit    7 months ago Psoriatic arthritis ([facility])    [facility] Maria Del Carmen Dye MD    Office Visit    7 months ago Coronary artery disease involving native coronary artery of native heart without angina pectoris    [facility]    Office Visit          Future Appointments         Provider Department Appt Notes    In 2 weeks Maria Del Carmen Dye MD [facility] F/U per Dr. HAINES- SEE TE 10/19/23    In 4 weeks Alon Mantilla PA-C [facility] Lung nodule

## 2024-01-20 ENCOUNTER — OFFICE VISIT (OUTPATIENT)
Dept: RHEUMATOLOGY | Facility: CLINIC | Age: 64
End: 2024-01-20

## 2024-01-20 VITALS
SYSTOLIC BLOOD PRESSURE: 151 MMHG | HEART RATE: 64 BPM | HEIGHT: 63 IN | BODY MASS INDEX: 24.45 KG/M2 | WEIGHT: 138 LBS | DIASTOLIC BLOOD PRESSURE: 85 MMHG

## 2024-01-20 DIAGNOSIS — G89.29 CHRONIC LEFT SHOULDER PAIN: ICD-10-CM

## 2024-01-20 DIAGNOSIS — M25.512 CHRONIC LEFT SHOULDER PAIN: ICD-10-CM

## 2024-01-20 DIAGNOSIS — M25.512 ACUTE PAIN OF LEFT SHOULDER: ICD-10-CM

## 2024-01-20 DIAGNOSIS — L40.50 PSORIATIC ARTHRITIS (HCC): Primary | ICD-10-CM

## 2024-01-20 PROCEDURE — 3008F BODY MASS INDEX DOCD: CPT | Performed by: INTERNAL MEDICINE

## 2024-01-20 PROCEDURE — 3077F SYST BP >= 140 MM HG: CPT | Performed by: INTERNAL MEDICINE

## 2024-01-20 PROCEDURE — 99214 OFFICE O/P EST MOD 30 MIN: CPT | Performed by: INTERNAL MEDICINE

## 2024-01-20 PROCEDURE — 3079F DIAST BP 80-89 MM HG: CPT | Performed by: INTERNAL MEDICINE

## 2024-01-20 PROCEDURE — 20610 DRAIN/INJ JOINT/BURSA W/O US: CPT | Performed by: INTERNAL MEDICINE

## 2024-01-20 RX ORDER — TRIAMCINOLONE ACETONIDE 40 MG/ML
40 INJECTION, SUSPENSION INTRA-ARTICULAR; INTRAMUSCULAR ONCE
Status: COMPLETED | OUTPATIENT
Start: 2024-01-20 | End: 2024-01-20

## 2024-01-20 RX ORDER — PREDNISONE 10 MG/1
10 TABLET ORAL DAILY
Qty: 30 TABLET | Refills: 3 | Status: SHIPPED | OUTPATIENT
Start: 2024-01-20

## 2024-01-20 RX ORDER — LISINOPRIL AND HYDROCHLOROTHIAZIDE 12.5; 1 MG/1; MG/1
1 TABLET ORAL DAILY
COMMUNITY

## 2024-01-20 RX ADMIN — TRIAMCINOLONE ACETONIDE 40 MG: 40 INJECTION, SUSPENSION INTRA-ARTICULAR; INTRAMUSCULAR at 11:59:00

## 2024-01-20 NOTE — PROGRESS NOTES
No Saucedo is a 63 year old female.    HPI:     Chief Complaint   Patient presents with    Psoriatic Arthritis        She is a plesasnt 59year old old with psoriatic arthritis and fibromyalgia.    She was on  enbrel 50mg weekly and   methotrexate weekly.   She is now off since 2019  The injection Enbrel hurts really bad. She efeels it helps her thigh. It's a swollen area over her right thigh.   She has 8/10 pain in her right elbows.   She stopped the enbrel temporarily for hte basal cell cancer but then her right elbows started botehring her again last night.   So the last injeciton lasted 3 months.     2020  VIDEO VISIT  She was furloughedx 3 weeks.   She's stressed.   Her son's girlfriend  at 32 from hepatic failure.   Her mom also passed from cancer recurrence 3 weeks after Lorri pass away.   Ambrocio mom also passed away from cancer. 2 months after.   She started medical marijuana in  . She has used prednisone only twice in the last 10 months.   She uses 1/2 gummy at night.   She still has pain in her hands.   Her pain hasn't flared with stress. She's done better than in the last 10 years. She has been off all her medications.   She's painting around the house and her hands are getting sore but its' not like something she can tolerate.   Her neck is better.   She was off enbrel - it was hurting so bad when she tried it even with the new formulation.   She is alos off methtorexate. It's amazing to her.   She takes prednisone - if seh has a bad flare.     2020  She is lousy for the last two weeks her right hip an dright leg is hurting.   It's radiating down to her right shin.   She denies back pain.   Lifting her leg - she has right hip pain.   She took the pred nisone burst on 2020.   She uses the topical pain med.   Today she felt ok - she took three ibuporfen and that's helping.   Driving to work.   She's back now for 3 months - 20 hours a week     2023    She is  re-karon.   Had medrol andrea sent in 2/2023 - for back pain.   She had a CABG in 12/2021. She was in the hospital 14 days - severe MEDINA. She had sternotomy to get wires removed. In 12/2022. She had a lot of pain with movemetn of her chest and had wirse removed. She feles much better. Had several cardiac aneurysm-   It's still not 100% but it was a lot better.   She has left 4th finger ganglion cyst - and planning on removed.or could be a dupytren's contracture. - dr. Ricci.   She is overall doing well - only using medical marijuana.   Her right hip is doing ok.   Her feet are swelling more.   She has about 5/10 pain.     Her son is on methotrexate , and leflunomide - reactive arthritis - not worked for 1 1/2 years . For covid long haul    5/20/2023  Her left shoulder is swelling again - it's toleratble for the last 1 1 2/ months but now she has trouble lifting again.   She had a pneumonia shot in her right arm.   Last time her left hip and left arm was hurting.   Now her left hip is better.   Her left arm is still hurting.     She has consant pain in her left arm - 9/10 pain. It' swaking her up at night.     9/16/2023  Her left arm is still really hurting. Her back is bad again.   Was in the skyrizi - but got lichen planus for 3 months - needed to get light therapy and everything to get rid of it. This was after her sternotomy   So she is afraid to restart biologics.     1/20/2024  Left shoulder is still really hurting.   She had another blockage in her heart - so she had another stent in 12/2023 -   She is having more right lower back pain - it's burning - more fibro pain   Her right ankle and right foot is painful   Then right 3rd pip is very tender again.  She is getting winded on and off post stent.   She had a bypass 3 years ago .       HISTORY:  Past Medical History:   Diagnosis Date    Basal cell carcinoma 02/01/2018    Skin, left cutaneous lip    Cervical radiculopathy at C7 09/29/2017    Colon  polyps 02/2010    Coronary atherosclerosis     Essential hypertension 2019    Fibromyalgia     High blood pressure     High cholesterol     Hypercholesterolemia     Psoriatic arthritis (HCC)       Social Hx Reviewed   Family Hx Reviewed     Medications (Active prior to today's visit):  Current Outpatient Medications   Medication Sig Dispense Refill    lisinopril-hydroCHLOROthiazide 10-12.5 MG Oral Tab Take 1 tablet by mouth daily.      prasugrel 10 MG Oral Tab Take 1 tablet (10 mg total) by mouth daily. 90 tablet 3    bumetanide 1 MG Oral Tab Take 1 tablet (1 mg total) by mouth daily. 90 tablet 3    NON FORMULARY 5 mg  in the morning and 5 mg before bedtime. Takes cannabis gummy 5 mg after work and 10 mg at bedtime.      ergocalciferol 1.25 MG (13892 UT) Oral Cap Take 1 capsule (50,000 Units total) by mouth once a week. 12 capsule 0    metoprolol tartrate 25 MG Oral Tab Take 1 tablet (25 mg total) by mouth 2 (two) times daily. 90 tablet 3    predniSONE 5 MG Oral Tab Take 2 tablets (10 mg total) by mouth daily. (Patient taking differently: Take 1 tablet (5 mg total) by mouth daily. Last dose if 12/12) 60 tablet 1    predniSONE 10 MG Oral Tab Take 1 tablet (10 mg total) by mouth daily. 30 tablet 3    atorvastatin 80 MG Oral Tab Take 1 tablet (80 mg total) by mouth nightly. 90 tablet 3    omeprazole 20 MG Oral Capsule Delayed Release Take 1 capsule (20 mg total) by mouth 2 (two) times daily before meals. 180 capsule 3    ondansetron 4 MG Oral Tablet Dispersible Take 1 tablet (4 mg total) by mouth every 8 (eight) hours as needed for Nausea. 20 tablet 0    aspirin 81 MG Oral Tab EC Take 1 tablet (81 mg total) by mouth daily.      oxybutynin ER 5 MG Oral Tablet 24 Hr Take 1 tablet (5 mg total) by mouth daily. (Patient not taking: Reported on 1/20/2024) 90 tablet 3       Allergies:  Allergies   Allergen Reactions    Adhesive Tape HIVES         ROS:   All other ROS are negative.     PHYSICAL EXAM:    pleasant, no acute  distress, no CAD,  Malar rash - none now   CVS: RRR  RS: CTAB  ABD: soft nont lizz   Hands not swollen. Moving them easily   Can close both hands.   Moving neck is huring   Right 3rd dip and pip is very tender - mild swelling   No rash on her palms. , no psoriasis.   Left shoulder ac joint - sonya tender -decreased  abduction and in the ac joint .   Right hip not  tender , decreased ext rotaiton   Right trochanteric bursitis not tender.   Right pretibial tendernessnot tender.   Right ankle tender   Right  with squeeze test -               Component      Latest Ref Rng 3/16/2023   WBC      4.0 - 11.0 x10(3) uL 10.6    RBC      3.80 - 5.30 x10(6)uL 4.47    Hemoglobin      12.0 - 16.0 g/dL 12.8    Hematocrit      35.0 - 48.0 % 38.7    MCV      80.0 - 100.0 fL 86.6    MCH      26.0 - 34.0 pg 28.6    MCHC      31.0 - 37.0 g/dL 33.1    RDW-SD      35.1 - 46.3 fL 40.4    RDW      11.0 - 15.0 % 12.8    Platelet Count      150.0 - 450.0 10(3)uL 317.0    Prelim Neutrophil Abs      1.50 - 7.70 x10 (3) uL 8.01 (H)    Neutrophils Absolute      1.50 - 7.70 x10(3) uL 8.01 (H)    Lymphocytes Absolute      1.00 - 4.00 x10(3) uL 1.47    Monocytes Absolute      0.10 - 1.00 x10(3) uL 0.74    Eosinophils Absolute      0.00 - 0.70 x10(3) uL 0.25    Basophils Absolute      0.00 - 0.20 x10(3) uL 0.05    Immature Granulocyte Absolute      0.00 - 1.00 x10(3) uL 0.03    Neutrophils %      % 75.9    Lymphocytes %      % 13.9    Monocytes %      % 7.0    Eosinophils %      % 2.4    Basophils %      % 0.5    Immature Granulocyte %      % 0.3    Glucose      70 - 99 mg/dL 93    Sodium      136 - 145 mmol/L 144    Potassium      3.5 - 5.1 mmol/L 4.3    Chloride      98 - 112 mmol/L 108    Carbon Dioxide, Total      21.0 - 32.0 mmol/L 28.0    ANION GAP      0 - 18 mmol/L 8    BUN      7 - 18 mg/dL 15    CREATININE      0.55 - 1.02 mg/dL 0.79    BUN/CREATININE RATIO      10.0 - 20.0  19.0    CALCIUM      8.5 - 10.1 mg/dL 9.0    CALCULATED  OSMOLALITY      275 - 295 mOsm/kg 299 (H)    eGFR-Cr      >=60 mL/min/1.73m2 84    ALT (SGPT)      13 - 56 U/L 19    AST (SGOT)      15 - 37 U/L 21    ALKALINE PHOSPHATASE      50 - 130 U/L 76    Total Bilirubin      0.1 - 2.0 mg/dL 0.6    PROTEIN, TOTAL      6.4 - 8.2 g/dL 6.7    Albumin      3.4 - 5.0 g/dL 3.4    Globulin      2.8 - 4.4 g/dL 3.3    A/G Ratio      1.0 - 2.0  1.0    Troponin I (High Sensitivity)      <=54 ng/L 5           ASSESSMENT/PLAN:     1. Psoriatic arthritis (HCC)  1. psoriatic arthritis -  Stable except for left shoulder    hip pain resolved   Left shoulder/biceps tenderness -   S/p left shoulder injection 5/2023  With paitent's consent, I injected pt's left shoulder ac joint with 1ml lidocaine 1 % and 1 ml kenalog 40. It was done under sterile technique using iodine and alcohol swabs and ethyl chloride was used as an anaesthetic spray. Pt.  tolerated it well.  Note for work to stay off of it.   Can use prednisone for her right 3rd finger pain - topical and pressure on it as well     She declines other meds at this time   Off her meds   - on medical marijuana,- uses sativa and cbd/cbd and thc for sleep -  and prednsone burst prn -    s/p right trochanteric burisits in 12/2020 -   Check labs in 1 month   - rtc in 6-12 months.     Right lateral epicodnylitis steroid injection - 11/2018 and 2/2019   - off naproxen  - Topical steroid for rash - dermatitis over hands improving with mtx,    -  been off   methotrexaet 17.mg a week sub cu - b/c of elevated liver funtion tests   - off  Enbrel,  leflunomdie 20mg a day  And mtx 0.5ml subcu injction     2. CAD /sp cabg in 12/2021 and stent  12/2023 - in Intermittent - cp -  better -   Had CABG in 12/2021 - ahd had sternotomy for wire removal in 12/2022 .   Follow up with pulmonary as wlel -   She was on lanoxin b/c she has hx of SVT when her kids were little. She's fine with that for years now.   - f/u with gi - cont. probitiotics helping -    quantiferon gold 5/2017 - negative   3. Cervical radiculopathy - much better   4. . chest xray 2012 5/2017 - quantiferon gold negaitve.   5. Gastritis/ibs - on probiotics - .   6. Fibromyalgia - better   - off  low dose LDN - off   low dose gabpentin 300mg at night and cyclobenzparine 5mg at night prn   7. new basal cell ca  = dx in 2/2018     Summary:  1.  Note for work  2. Rest left shoulder  x 3 days   3.  Cont. Legal medical marijuana.   4.  prednisone 10mg a day for flares   5.. Return to clinic in 3-4  months as needed.   - d/w her to     Maria Del Carmen Dye MD  1/20/2024   11:36 AM

## 2024-01-20 NOTE — PATIENT INSTRUCTIONS
1.  Note for work  2. Rest left shoulder  x 3 days   3.  Cont. Legal medical marijuana.   4.  prednisone 10mg a day for flares   5.. Return to clinic in 3-4  months as needed.

## 2024-02-13 NOTE — TELEPHONE ENCOUNTER
Patient called back, states does to want to receive any information for Dr Mckee states does not want to see her. Requests to not be sent any information for this

## 2024-02-26 ENCOUNTER — OFFICE VISIT (OUTPATIENT)
Dept: INTERNAL MEDICINE CLINIC | Facility: CLINIC | Age: 64
End: 2024-02-26

## 2024-02-26 VITALS
DIASTOLIC BLOOD PRESSURE: 79 MMHG | WEIGHT: 140 LBS | HEART RATE: 60 BPM | SYSTOLIC BLOOD PRESSURE: 144 MMHG | BODY MASS INDEX: 24.8 KG/M2 | RESPIRATION RATE: 16 BRPM | HEIGHT: 63 IN

## 2024-02-26 DIAGNOSIS — R41.840 DIFFICULTY CONCENTRATING: Primary | ICD-10-CM

## 2024-02-26 DIAGNOSIS — Z12.11 COLON CANCER SCREENING: ICD-10-CM

## 2024-02-26 DIAGNOSIS — Z01.00 ENCOUNTER FOR EYE EXAM: ICD-10-CM

## 2024-02-26 DIAGNOSIS — R31.29 MICROHEMATURIA: ICD-10-CM

## 2024-02-26 NOTE — PROGRESS NOTES
No Saucedo is a 63 year old female.  Chief Complaint   Patient presents with    Other     Possible ADHD Having trouble focusing and finishing task that was started     HPI:   She is having problems focusing and finishing tasks. She states she has notices this the past 6 months. People at work are telling her she had ADHD.     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 She states she is under stress and has a lot going on currently.     She also needs a referral for ophthalmology, GI and uro/gyn.       Current Outpatient Medications   Medication Sig Dispense Refill    lisinopril-hydroCHLOROthiazide 10-12.5 MG Oral Tab Take 1 tablet by mouth daily.      predniSONE 10 MG Oral Tab Take 1 tablet (10 mg total) by mouth daily. 30 tablet 3    prasugrel 10 MG Oral Tab Take 1 tablet (10 mg total) by mouth daily. 90 tablet 3    bumetanide 1 MG Oral Tab Take 1 tablet (1 mg total) by mouth daily. 90 tablet 3    NON FORMULARY 5 mg  in the morning and 5 mg before bedtime. Takes cannabis gummy 5 mg after work and 10 mg at bedtime.      ergocalciferol 1.25 MG (31054 UT) Oral Cap Take 1 capsule (50,000 Units total) by mouth once a week. 12 capsule 0    metoprolol tartrate 25 MG Oral Tab Take 1 tablet (25 mg total) by mouth 2 (two) times daily. 90 tablet 3    atorvastatin 80 MG Oral Tab Take 1 tablet (80 mg total) by mouth nightly. 90 tablet 3    omeprazole 20 MG Oral Capsule Delayed Release Take 1 capsule (20 mg total) by mouth 2 (two) times daily before meals. 180 capsule 3    aspirin 81 MG Oral Tab EC Take 1 tablet (81 mg total) by mouth daily.      oxybutynin ER 5 MG Oral Tablet 24 Hr Take 1 tablet (5 mg total) by mouth daily. (Patient not taking: Reported on 2/26/2024) 90 tablet 3    predniSONE 5 MG Oral Tab Take 2 tablets (10 mg total) by mouth daily. (Patient not taking: Reported on 2/26/2024) 60 tablet 1    ondansetron 4 MG Oral Tablet Dispersible Take 1 tablet  (4 mg total) by mouth every 8 (eight) hours as needed for Nausea. (Patient not taking: Reported on 2024) 20 tablet 0      Past Medical History:   Diagnosis Date    Basal cell carcinoma 2018    Skin, left cutaneous lip    Cervical radiculopathy at C7 2017    Colon polyps 2010    Coronary atherosclerosis     Essential hypertension 2019    Fibromyalgia     High blood pressure     High cholesterol     Hypercholesterolemia     Psoriatic arthritis (HCC)       Past Surgical History:   Procedure Laterality Date    ANESTH,OPEN HEART SURGERY      CABG      COLONOSCOPY N/A 2017    Procedure: COLONOSCOPY;  Surgeon: Selina Timmons MD;  [facility] ENDOSCOPY    COLONOSCOPY,BIOPSY  2010    Performed by GIOVANNI BRITO at [facility], [facility]    FOOT SURGERY Right     Bunion    OTHER  2018    Mohs surgery left upper lip BCCa    VAGINAL HYSTERECTOMY      With cystocele repair      Social History:  Social History     Socioeconomic History    Marital status:    Tobacco Use    Smoking status: Former     Packs/day: 0.50     Years: 37.00     Additional pack years: 0.00     Total pack years: 18.50     Types: Cigarettes     Quit date: 2012     Years since quittin.5     Passive exposure: Never    Smokeless tobacco: Never   Vaping Use    Vaping Use: Never used   Substance and Sexual Activity    Alcohol use: Yes     Alcohol/week: 0.0 standard drinks of alcohol     Comment: Occasional    Drug use: Yes     Types: Cannabis     Comment: Medical Gummies/Topical Cream   Other Topics Concern    Pt has a pacemaker No    Pt has a defibrillator No    Reaction to local anesthetic No    Right Handed Yes      Family History   Problem Relation Age of Onset    Colon Cancer Mother     Heart Disease Mother         CABG age 72    Hypertension Mother     Cancer Mother 61        Colon Cancer    Other (Bladder Cancer) Mother     Other (Cervical Cancer) Mother     Other (Peripheral  Vascular Disease) Mother     Diabetes Maternal Grandmother     Hypertension Maternal Grandmother     Cancer Maternal Grandmother         hx of skin cancer     Heart Disease Maternal Grandfather          MI age 32    Heart Disease Maternal Aunt         Per NG: CAD    Breast Cancer Neg       Allergies   Allergen Reactions    Adhesive Tape HIVES        REVIEW OF SYSTEMS:     Review of Systems   Constitutional:  Negative for fever.   HENT: Negative.     Respiratory:  Negative for cough, shortness of breath and wheezing.    Cardiovascular:  Negative for chest pain.   Gastrointestinal:  Negative for abdominal pain.   Genitourinary: Negative.    Musculoskeletal: Negative.    Skin: Negative.    Neurological: Negative.    Psychiatric/Behavioral:  Positive for decreased concentration.       Wt Readings from Last 5 Encounters:   24 140 lb (63.5 kg)   24 138 lb (62.6 kg)   23 140 lb (63.5 kg)   23 135 lb 6.4 oz (61.4 kg)   23 135 lb (61.2 kg)     Body mass index is 24.8 kg/m².      EXAM:   /79   Pulse 60   Resp 16   Ht 5' 3\" (1.6 m)   Wt 140 lb (63.5 kg)   BMI 24.80 kg/m²     Physical Exam  Constitutional:       Appearance: Normal appearance.   HENT:      Head: Normocephalic.   Cardiovascular:      Rate and Rhythm: Normal rate and regular rhythm.      Pulses: Normal pulses.   Pulmonary:      Breath sounds: Normal breath sounds. No wheezing.   Musculoskeletal:         General: No swelling. Normal range of motion.   Skin:     General: Skin is warm and dry.   Neurological:      Mental Status: She is alert and oriented to person, place, and time.   Psychiatric:         Mood and Affect: Mood normal.         Behavior: Behavior normal.            ASSESSMENT AND PLAN:   1. Difficulty concentrating  - dw patient that she can follow up with psychiatry for further testing.   - she would like to know if she has ADHD.   - [facility] Referral - In Network    2. Colon cancer screening  - Gastro  Referral - Keith [facility])    3. Microhematuria  - Urogynecology Referral - In Network    4. Encounter for eye exam  - OPHTHALMOLOGY - INTERNAL      The patient indicates understanding of these issues and agrees to the plan.  Return for if symptoms do not resolve.

## 2024-02-27 ENCOUNTER — TELEPHONE (OUTPATIENT)
Dept: INTERNAL MEDICINE CLINIC | Facility: CLINIC | Age: 64
End: 2024-02-27

## 2024-02-27 DIAGNOSIS — N32.81 OVERACTIVE BLADDER: Primary | ICD-10-CM

## 2024-02-27 DIAGNOSIS — Z01.00 ENCOUNTER FOR EYE EXAM: ICD-10-CM

## 2024-02-27 NOTE — TELEPHONE ENCOUNTER
Dr. Michelle Rivera is advising patient needs an updated referral for a procedure cysto.  Patient is scheduled with the referral authorized for 3/22/24 with this provider but will need a new one for the procedure.      Patient is requesting referral.     Name of specialist and specialty department :  Dr. Michelle Rowe, Urogynecology    Reason for visit with the specialist:  cysto procedure    Address of the specialist office: [address] [place] [address]    Appointment date: 3/22/24         CSS informed patient the turnaround time for referral is 5-7 business days.  Patient was informed to [facility] for referral status.

## 2024-02-27 NOTE — TELEPHONE ENCOUNTER
Patient is calling to update PCP from office visit on 2/26/24    Dr. Mays no longer does cataract surgeries.  Patient is being referred to Dr. Yessenia German, Opthalmology to meet and greet.  No referral is needed at this time only if a procedure/surgery is advised.  Appt is on 2/29/24 for the meet and greet with Dr. Maxi Rivera is advising she needs an updated referral for a procedure cysto.  Patient is scheduled with the referral authorized for 3/22/24 with this provider but will need a new one for the procedure.  New TE for referral will be placed.    Please adviise.

## 2024-02-28 ENCOUNTER — MED REC SCAN ONLY (OUTPATIENT)
Dept: INTERNAL MEDICINE CLINIC | Facility: CLINIC | Age: 64
End: 2024-02-28

## 2024-03-01 ENCOUNTER — TELEPHONE (OUTPATIENT)
Age: 64
End: 2024-03-01

## 2024-03-04 RX ORDER — ERGOCALCIFEROL 1.25 MG/1
50000 CAPSULE ORAL WEEKLY
Qty: 12 CAPSULE | Refills: 0 | Status: SHIPPED | OUTPATIENT
Start: 2024-03-04

## 2024-03-04 NOTE — TELEPHONE ENCOUNTER
LOV: 1/29/24  Last Refilled:#12, 0rfs 12/6/23  Labs:Vitamin D 17.1  9/16/23  Summary:  1.  Note for work  2. Rest left shoulder  x 3 days   3.  Cont. Legal medical marijuana.   4.  prednisone 10mg a day for flares   5.. Return to clinic in 3-4  months as needed.   - d/w her to      Maria Del Carmen Dye MD  1/20/2024   11:36 AM  Please advise.

## 2024-03-06 ENCOUNTER — TELEPHONE (OUTPATIENT)
Age: 64
End: 2024-03-06

## 2024-03-06 ENCOUNTER — MED REC SCAN ONLY (OUTPATIENT)
Dept: INTERNAL MEDICINE CLINIC | Facility: CLINIC | Age: 64
End: 2024-03-06

## 2024-03-11 ENCOUNTER — LAB ENCOUNTER (OUTPATIENT)
Dept: LAB | Facility: HOSPITAL | Age: 64
End: 2024-03-11
Attending: INTERNAL MEDICINE
Payer: COMMERCIAL

## 2024-03-11 DIAGNOSIS — I10 HYPERTENSION: ICD-10-CM

## 2024-03-11 LAB
ANION GAP SERPL CALC-SCNC: 7 MMOL/L (ref 0–18)
BUN BLD-MCNC: 22 MG/DL (ref 9–23)
BUN/CREAT SERPL: 27.2 (ref 10–20)
CALCIUM BLD-MCNC: 9.5 MG/DL (ref 8.7–10.4)
CHLORIDE SERPL-SCNC: 106 MMOL/L (ref 98–112)
CO2 SERPL-SCNC: 27 MMOL/L (ref 21–32)
CREAT BLD-MCNC: 0.81 MG/DL
EGFRCR SERPLBLD CKD-EPI 2021: 81 ML/MIN/1.73M2 (ref 60–?)
FASTING STATUS PATIENT QL REPORTED: NO
GLUCOSE BLD-MCNC: 104 MG/DL (ref 70–99)
OSMOLALITY SERPL CALC.SUM OF ELEC: 294 MOSM/KG (ref 275–295)
POTASSIUM SERPL-SCNC: 3.4 MMOL/L (ref 3.5–5.1)
SODIUM SERPL-SCNC: 140 MMOL/L (ref 136–145)

## 2024-03-11 PROCEDURE — 80048 BASIC METABOLIC PNL TOTAL CA: CPT

## 2024-03-11 PROCEDURE — 36415 COLL VENOUS BLD VENIPUNCTURE: CPT

## 2024-03-14 ENCOUNTER — NURSE TRIAGE (OUTPATIENT)
Dept: INTERNAL MEDICINE CLINIC | Facility: CLINIC | Age: 64
End: 2024-03-14

## 2024-03-14 ENCOUNTER — OFFICE VISIT (OUTPATIENT)
Dept: INTERNAL MEDICINE CLINIC | Facility: CLINIC | Age: 64
End: 2024-03-14

## 2024-03-14 ENCOUNTER — HOSPITAL ENCOUNTER (OUTPATIENT)
Dept: CT IMAGING | Facility: HOSPITAL | Age: 64
Discharge: HOME | End: 2024-03-14
Attending: INTERNAL MEDICINE
Payer: COMMERCIAL

## 2024-03-14 VITALS
HEART RATE: 75 BPM | BODY MASS INDEX: 24.98 KG/M2 | SYSTOLIC BLOOD PRESSURE: 121 MMHG | OXYGEN SATURATION: 97 % | DIASTOLIC BLOOD PRESSURE: 73 MMHG | TEMPERATURE: 98 F | WEIGHT: 141 LBS | HEIGHT: 63 IN

## 2024-03-14 DIAGNOSIS — R10.32 ABDOMINAL PAIN, BILATERAL LOWER QUADRANT: Primary | ICD-10-CM

## 2024-03-14 DIAGNOSIS — R10.31 ABDOMINAL PAIN, BILATERAL LOWER QUADRANT: ICD-10-CM

## 2024-03-14 DIAGNOSIS — R10.32 ABDOMINAL PAIN, BILATERAL LOWER QUADRANT: ICD-10-CM

## 2024-03-14 DIAGNOSIS — R10.31 ABDOMINAL PAIN, BILATERAL LOWER QUADRANT: Primary | ICD-10-CM

## 2024-03-14 PROCEDURE — 3008F BODY MASS INDEX DOCD: CPT | Performed by: INTERNAL MEDICINE

## 2024-03-14 PROCEDURE — 3078F DIAST BP <80 MM HG: CPT | Performed by: INTERNAL MEDICINE

## 2024-03-14 PROCEDURE — 3074F SYST BP LT 130 MM HG: CPT | Performed by: INTERNAL MEDICINE

## 2024-03-14 PROCEDURE — 74177 CT ABD & PELVIS W/CONTRAST: CPT | Performed by: INTERNAL MEDICINE

## 2024-03-14 PROCEDURE — 99214 OFFICE O/P EST MOD 30 MIN: CPT | Performed by: INTERNAL MEDICINE

## 2024-03-14 NOTE — PROGRESS NOTES
Subjective:     Patient ID: No Saucedo is a 63 year old female.    Abdominal Pain        History/Other: He complaining of abdominal pain.  According to her this started on Monday.  Is mostly in lower abdomen initially was mostly on the left lower quadrant radiating to her right lower quadrant.  She denies any nausea vomiting.  She denies any diarrhea or constipation.  Review of Systems   Constitutional: Negative.    HENT: Negative.     Eyes: Negative.    Respiratory: Negative.     Cardiovascular: Negative.    Gastrointestinal:  Positive for abdominal pain.   Genitourinary: Negative.    Musculoskeletal: Negative.    Neurological: Negative.    Hematological: Negative.    Psychiatric/Behavioral: Negative.       Current Outpatient Medications   Medication Sig Dispense Refill    ergocalciferol 1.25 MG (01719 UT) Oral Cap Take 1 capsule (50,000 Units total) by mouth once a week. 12 capsule 0    atorvastatin 80 MG Oral Tab Take 1 tablet (80 mg total) by mouth nightly. 90 tablet 3    omeprazole 20 MG Oral Capsule Delayed Release Take 1 capsule (20 mg total) by mouth 2 (two) times daily before meals. 180 capsule 3    aspirin 81 MG Oral Tab EC Take 1 tablet (81 mg total) by mouth daily.      lisinopril-hydroCHLOROthiazide 10-12.5 MG Oral Tab Take 1 tablet by mouth daily.      predniSONE 10 MG Oral Tab Take 1 tablet (10 mg total) by mouth daily. 30 tablet 3    oxybutynin ER 5 MG Oral Tablet 24 Hr Take 1 tablet (5 mg total) by mouth daily. (Patient not taking: Reported on 2/26/2024) 90 tablet 3    prasugrel 10 MG Oral Tab Take 1 tablet (10 mg total) by mouth daily. 90 tablet 3    bumetanide 1 MG Oral Tab Take 1 tablet (1 mg total) by mouth daily. 90 tablet 3    NON FORMULARY 5 mg  in the morning and 5 mg before bedtime. Takes cannabis gummy 5 mg after work and 10 mg at bedtime.      metoprolol tartrate 25 MG Oral Tab Take 1 tablet (25 mg total) by mouth 2 (two) times daily. 90 tablet 3    predniSONE 5 MG Oral Tab Take 2  tablets (10 mg total) by mouth daily. (Patient not taking: Reported on 2024) 60 tablet 1    ondansetron 4 MG Oral Tablet Dispersible Take 1 tablet (4 mg total) by mouth every 8 (eight) hours as needed for Nausea. (Patient not taking: Reported on 2024) 20 tablet 0     Allergies:  Allergies   Allergen Reactions    Adhesive Tape HIVES       Past Medical History:   Diagnosis Date    Basal cell carcinoma 2018    Skin, left cutaneous lip    Cervical radiculopathy at C7 2017    Colon polyps 2010    Coronary atherosclerosis     Essential hypertension 2019    Fibromyalgia     High blood pressure     High cholesterol     Hypercholesterolemia     Psoriatic arthritis (HCC)       Past Surgical History:   Procedure Laterality Date    ANESTH,OPEN HEART SURGERY      CABG      COLONOSCOPY N/A 2017    Procedure: COLONOSCOPY;  Surgeon: Selina Timmons MD;  Location: [facility] ENDOSCOPY    COLONOSCOPY,BIOPSY  2010    Performed by GIOVANNI BRITO at [facility], [facility]    FOOT SURGERY Right 1990    Bunion    OTHER  2018    Mohs surgery left upper lip B[facility]a    VAGINAL HYSTERECTOMY      With cystocele repair      Family History   Problem Relation Age of Onset    Colon Cancer Mother     Heart Disease Mother         CABG age 72    Hypertension Mother     Cancer Mother 61        Colon Cancer    Other (Bladder Cancer) Mother     Other (Cervical Cancer) Mother     Other (Peripheral Vascular Disease) Mother     Diabetes Maternal Grandmother     Hypertension Maternal Grandmother     Cancer Maternal Grandmother         hx of skin cancer     Heart Disease Maternal Grandfather          MI age 32    Heart Disease Maternal Aunt         Per NG: CAD    Breast Cancer Neg       Social History:   Social History     Socioeconomic History    Marital status:    Tobacco Use    Smoking status: Former     Packs/day: 0.50     Years: 37.00     Additional pack years: 0.00     Total pack years:  18.50     Types: Cigarettes     Quit date: 2012     Years since quittin.5     Passive exposure: Never    Smokeless tobacco: Never   Vaping Use    Vaping Use: Never used   Substance and Sexual Activity    Alcohol use: Yes     Alcohol/week: 0.0 standard drinks of alcohol     Comment: Occasional    Drug use: Yes     Types: Cannabis     Comment: Medical Gummies/Topical Cream   Other Topics Concern    Pt has a pacemaker No    Pt has a defibrillator No    Reaction to local anesthetic No    Right Handed Yes        Objective:   Physical Exam  Vitals and nursing note reviewed.   Constitutional:       Appearance: Normal appearance.   HENT:      Head: Normocephalic and atraumatic.   Cardiovascular:      Rate and Rhythm: Normal rate and regular rhythm.      Pulses: Normal pulses.      Heart sounds: Normal heart sounds.   Abdominal:      Palpations: Abdomen is soft.      Tenderness: There is abdominal tenderness.   Musculoskeletal:         General: Normal range of motion.      Cervical back: Normal range of motion and neck supple.   Skin:     General: Skin is warm.   Neurological:      Mental Status: She is alert. Mental status is at baseline.   Psychiatric:         Mood and Affect: Mood normal.         Assessment & Plan:   1. Abdominal pain, bilateral lower quadrant    Stat CT abdomen and pelvis will check urine as well    Orders Placed This Encounter   Procedures    Urine Culture, Routine       Meds This Visit:  Requested Prescriptions      No prescriptions requested or ordered in this encounter       Imaging & Referrals:  None

## 2024-03-14 NOTE — TELEPHONE ENCOUNTER
Action Requested: Summary for Provider     []  Critical Lab, Recommendations Needed  [] Need Additional Advice  [x]   FYI    []   Need Orders  [] Need Medications Sent to Pharmacy  []  Other     SUMMARY: Per protocol, patient should be seen in the office today. Appointment scheduled.    Future Appointments   Date Time Provider Department [place]   3/14/2024  3:45 PM Mabel Henson MD ECHNDIM  [place]   3/22/2024  8:30 AM Michelle Rivera DO UROG [facility]   4/24/2024  8:30 AM Javier Mackay MD E[facility]   5/23/2024  8:40 AM Maria Del Carmen Dye MD ECLMBRHEUM [facility] [place]     Reason for call: Abdominal Pain (/)  Onset: 3/11    Patient reports of left lower abdominal pain since Monday. States pain has gotten worse that it extends to the whole area of the lower abdomen now that she has to wear a brace when going to work to hold the abdomen tight. Reports of a history of cystocele. Also noticed stools are skinny lately, noticed blood this morning as well; she has hemorrhoid. Denies any other complaints at this time. Requesting appointment to be seen. She will leave early from work. Care advice given per protocol. Patient verbalized understanding and agreed with plan of care.     Reason for Disposition   Patient wants to be seen    Protocols used: Abdominal Pain - Female-A-[place]

## 2024-03-18 ENCOUNTER — TELEPHONE (OUTPATIENT)
Dept: UROLOGY | Facility: HOSPITAL | Age: 64
End: 2024-03-18

## 2024-03-18 NOTE — TELEPHONE ENCOUNTER
Patient called on Friday 3/15/2024 and spoke with PSR Mickie about upcoming appointment next Friday for Cysto.  Pt questioning if she should continue with this appointment or follow up with urology .     Pt called office back.  Discussed with patient this upcoming visit on 3/22/24 that scheduled  for Cysto with Dr Rivera.  Informed patient that I have reviewed he chart and discussed upcoming visit with Dr Rivera.  Discussed with patient her previous visit with Dr Rivera on June 18,2021 recommended that she follow up with Urology for lesion that was noted during office Cysto.  Pt states that she did follow up with a Urologist through [facility].  Pt reports that she has had 2 cysto since and had a polyp removed.      Discussed with patient her recent CT of the  abd and pelvis that was ordered per Dr Henson . Dr Henson placed referral for Urologist Dr Grady to f/u on the hydronephrosis.  Pt reports that she misunderstood and thought she was to follow up with Dr Rivera. Informed patient that Dr Rivera also recommends that she follow up with Urology.  Discussed with patient that Dr Rivera will be happy to follow her for the OAB ,prolapse and incontinence .  Pt offered to change appointment from cysto to a follow up visit for OAB and prolapse.      Pt verbalized understanding and requesting to cancel this Fridays appointment for Cysto. Pt states she will make an appointment with Urology and will call to schedule appointment with Dr Rivera at a later date.      Encouraged patient to call with any questions or concerns.

## 2024-04-02 ENCOUNTER — OFFICE VISIT (OUTPATIENT)
Dept: SURGERY | Facility: CLINIC | Age: 64
End: 2024-04-02

## 2024-04-02 VITALS
SYSTOLIC BLOOD PRESSURE: 135 MMHG | HEIGHT: 63 IN | WEIGHT: 141 LBS | DIASTOLIC BLOOD PRESSURE: 76 MMHG | BODY MASS INDEX: 24.98 KG/M2 | HEART RATE: 66 BPM

## 2024-04-02 DIAGNOSIS — N13.5 UPJ OBSTRUCTION, ACQUIRED: ICD-10-CM

## 2024-04-02 DIAGNOSIS — R10.2 PELVIC PAIN: Primary | ICD-10-CM

## 2024-04-02 PROCEDURE — 3008F BODY MASS INDEX DOCD: CPT | Performed by: UROLOGY

## 2024-04-02 PROCEDURE — 3078F DIAST BP <80 MM HG: CPT | Performed by: UROLOGY

## 2024-04-02 PROCEDURE — 99204 OFFICE O/P NEW MOD 45 MIN: CPT | Performed by: UROLOGY

## 2024-04-02 PROCEDURE — 3075F SYST BP GE 130 - 139MM HG: CPT | Performed by: UROLOGY

## 2024-04-02 NOTE — PROGRESS NOTES
SUBJECTIVE:  No Saucedo is a 63 year old female who presents for a consultation at the request of, and a copy of this note will be sent to, Josep Booker, for evaluation of  pelvic pain, bilateral UPJ obstruction seen on CT abd and pelvis with IV contrast done 3/1/24.  Has a previous urologic history of cystocele for which she sees Dr. Michelle Kemp.  Has a vague history of \"bladder polyp\" for which she was seeing a urologist through [facility].  She had a few surveillance cystoscopies although not for the last 2 years..  No records are available in our system.  No gross hematuria or dysuria.  Mostly complains of pelvic discomfort.  No flank pain.  No abdominal pain.  She states that the problem is unchanged. Symptoms include see above. She denies any other complaints.    Allergies:   Allergies   Allergen Reactions    Adhesive Tape HIVES       History:  Past Medical History:   Diagnosis Date    Basal cell carcinoma 02/01/2018    Skin, left cutaneous lip    Cervical radiculopathy at C7 09/29/2017    Colon polyps 02/2010    Coronary atherosclerosis     Essential hypertension 2019    Fibromyalgia     High blood pressure     High cholesterol     Hypercholesterolemia     Psoriatic arthritis (HCC)       Past Surgical History:   Procedure Laterality Date    ANESTH,OPEN HEART SURGERY  2021    CABG      COLONOSCOPY N/A 07/07/2017    Procedure: COLONOSCOPY;  Surgeon: Selina Timmons MD;  Location: [facility] ENDOSCOPY    COLONOSCOPY,BIOPSY  02/12/2010    Performed by GIOAVNNI BRITO at [facility], [facility]    FOOT SURGERY Right 1990    Bunion    OTHER  03/2018    Mohs surgery left upper lip BCCa    VAGINAL HYSTERECTOMY  1987    With cystocele repair      Family History   Problem Relation Age of Onset    Colon Cancer Mother     Heart Disease Mother         CABG age 72    Hypertension Mother     Cancer Mother 61        Colon Cancer    Other (Bladder Cancer) Mother     Other (Cervical Cancer)  Mother     Other (Peripheral Vascular Disease) Mother     Diabetes Maternal Grandmother     Hypertension Maternal Grandmother     Cancer Maternal Grandmother         hx of skin cancer     Heart Disease Maternal Grandfather          MI age 32    Heart Disease Maternal Aunt         Per NG: CAD    Breast Cancer Neg       Social History:   Social History     Socioeconomic History    Marital status:    Tobacco Use    Smoking status: Former     Packs/day: 0.50     Years: 37.00     Additional pack years: 0.00     Total pack years: 18.50     Types: Cigarettes     Quit date: 2012     Years since quittin.6     Passive exposure: Never    Smokeless tobacco: Never   Vaping Use    Vaping Use: Never used   Substance and Sexual Activity    Alcohol use: Yes     Alcohol/week: 0.0 standard drinks of alcohol     Comment: Occasional    Drug use: Yes     Types: Cannabis     Comment: Medical Gummies/Topical Cream   Other Topics Concern    Pt has a pacemaker No    Pt has a defibrillator No    Reaction to local anesthetic No    Right Handed Yes            REVIEW OF SYSTEMS:  RESPIRATORY:  Negative for chest tightness, wheezing, cough, shortness of breath,  sputum production,chest pain or pleurisy.  CARDIOVASCULAR:  Negative for pain or chest discomfort, dizziness or fainting, palpitations, leg swelling, nocturia, or claudication.  GASTROINTESTINAL:  Negative for nausea, vomiting, diarrhea, constipation, heartburn or indigestion, abdominal pains, bloody or tarry stools.  GENERAL: Denies:  weight gain, weight loss, fever, night sweats, bone pain, malaise, and fatigue. Positive for:  none.  All other review of systems reviewed and otherwise negative    OBJECTIVE:  /76 (BP Location: Left arm, Patient Position: Sitting, Cuff Size: adult)   Pulse 66   Ht 5' 3\" (1.6 m)   Wt 141 lb (64 kg)   BMI 24.98 kg/m²   She appears well, in no apparent distress.  Alert and oriented times three, pleasant and  cooperative.  CARDIOVASCULAR:normal apical impulse  RESPIRATORY: no chest wall deformities or tenderness  ABDOMEN: abdomen is soft without significant tenderness, masses, organomegaly or guarding  Some tenderness to suprapubic palpation.  ASSESSMENT/PLAN  Encounter Diagnoses   Name Primary?    Pelvic pain Yes    UPJ obstruction, acquired    Recommend:  - Further evaluation for office cystoscopy in 2 to 3 weeks given history of bladder polyps.  Bladder scan to check postvoid residual volume at that visit.  - Urinalysis and urine for cytology to be done today.  - Repeat CT urogram to further characterize bilateral proximal ureters and rule out indolent masses given a strong family history of urologic malignancies.    Orders Placed This Encounter   Procedures    Urinalysis, Routine    Cytology, fluids       Meds This Visit:  Requested Prescriptions      No prescriptions requested or ordered in this encounter       Imaging & Referrals:  CT UROGRAM(W+WO) W/3D(CPT=74178/81484)

## 2024-04-03 LAB
BILIRUB UR QL: NEGATIVE
CLARITY UR: CLEAR
COLOR UR: COLORLESS
GLUCOSE UR-MCNC: NEGATIVE MG/DL
HGB UR QL STRIP.AUTO: NEGATIVE
KETONES UR-MCNC: NEGATIVE MG/DL
NITRITE UR QL STRIP.AUTO: NEGATIVE
NON GYNE INTERPRETATION: NEGATIVE
PH UR: 6 [PH] (ref 5–8)
PROT UR-MCNC: NEGATIVE MG/DL
SP GR UR STRIP: <=1.005 (ref 1–1.03)
UROBILINOGEN UR STRIP-ACNC: 0.2

## 2024-04-06 ENCOUNTER — HOSPITAL ENCOUNTER (OUTPATIENT)
Dept: CT IMAGING | Facility: HOSPITAL | Age: 64
Discharge: HOME | End: 2024-04-06
Attending: UROLOGY
Payer: COMMERCIAL

## 2024-04-06 DIAGNOSIS — N13.5 UPJ OBSTRUCTION, ACQUIRED: ICD-10-CM

## 2024-04-06 PROCEDURE — 74178 CT ABD&PLV WO CNTR FLWD CNTR: CPT | Performed by: UROLOGY

## 2024-04-06 PROCEDURE — 76377 3D RENDER W/INTRP POSTPROCES: CPT | Performed by: UROLOGY

## 2024-04-08 RX ORDER — OMEPRAZOLE 20 MG/1
20 CAPSULE, DELAYED RELEASE ORAL
Qty: 180 CAPSULE | Refills: 3 | Status: SHIPPED | OUTPATIENT
Start: 2024-04-08

## 2024-04-09 ENCOUNTER — OFFICE VISIT (OUTPATIENT)
Dept: INTERNAL MEDICINE CLINIC | Facility: CLINIC | Age: 64
End: 2024-04-09

## 2024-04-09 ENCOUNTER — NURSE TRIAGE (OUTPATIENT)
Dept: INTERNAL MEDICINE CLINIC | Facility: CLINIC | Age: 64
End: 2024-04-09

## 2024-04-09 VITALS
RESPIRATION RATE: 16 BRPM | SYSTOLIC BLOOD PRESSURE: 150 MMHG | HEIGHT: 63 IN | DIASTOLIC BLOOD PRESSURE: 78 MMHG | BODY MASS INDEX: 24.63 KG/M2 | WEIGHT: 139 LBS | HEART RATE: 69 BPM

## 2024-04-09 DIAGNOSIS — R20.0 NUMBNESS OF TONGUE: ICD-10-CM

## 2024-04-09 DIAGNOSIS — J02.9 SORE THROAT: Primary | ICD-10-CM

## 2024-04-09 LAB
CONTROL LINE PRESENT WITH A CLEAR BACKGROUND (YES/NO): YES YES/NO
KIT LOT #: NORMAL NUMERIC
STREP GRP A CUL-SCR: NEGATIVE

## 2024-04-09 PROCEDURE — 87637 SARSCOV2&INF A&B&RSV AMP PRB: CPT | Performed by: NURSE PRACTITIONER

## 2024-04-09 RX ORDER — PREDNISONE 20 MG/1
20 TABLET ORAL DAILY
Qty: 5 TABLET | Refills: 0 | Status: SHIPPED | OUTPATIENT
Start: 2024-04-09 | End: 2024-04-14

## 2024-04-09 NOTE — TELEPHONE ENCOUNTER
Action Requested: Summary for Provider     []  Critical Lab, Recommendations Needed  [] Need Additional Advice  [x]   FYI    []   Need Orders  [] Need Medications Sent to Pharmacy  []  Other     SUMMARY: Per protocol, patient should be seen in the office within 3 days. Appointment scheduled.    Future Appointments   Date Time Provider Department [place]   4/9/2024  4:40 PM Mnoae Horton APRN ECS[facility]   4/24/2024  8:30 AM [facility]   4/30/2024  8:00 AM Davey Grady MD CCURO [facility]   5/23/2024  8:40 AM [facility] [place]     Reason for call: Numbness in the tongue and inside the mouth.  Onset: 4/8    Patient reports of numbness in the tongue and inside of the mouth; feels it in the roof of the mouth, sides of the tongue and back of throat. She started feeling it in her tongue yesterday and felt it worse this morning. She is unsure if  symptoms are related to radiation effect; has had two CT scans within a month. Also complains of sore throat. Noticed intermittent dizziness and lightheadedness as well, has been taking meclizine for vertigo. Denies any other complaints.    She wants to be seen for followup. Prefers a late appointment. Aware that she should go to ED if symptoms worsen for prompt evaluation and treatment. Patient verbalized understanding and agreed with plan of care.     Reason for Disposition   Patient wants to be seen    Protocols used: Mouth Symptoms-A-[place]

## 2024-04-09 NOTE — TELEPHONE ENCOUNTER
Refill passed per [facility] protocol.      Requested Prescriptions   Pending Prescriptions Disp Refills    OMEPRAZOLE 20 MG Oral Capsule Delayed Release [Pharmacy Med Name: Omeprazole Dr 20 Mg Cap Nort] 180 capsule 0     Sig: Take 1 capsule (20 mg total) by mouth 2 (two) times daily before meals.       Gastrointestional Medication Protocol Passed - 4/7/2024  1:30 AM        Passed - In person appointment or virtual visit in the past 12 mos or appointment in next 3 mos     Recent Outpatient Visits              6 days ago Pelvic pain    [facility] Davey Grady MD    Office Visit    3 weeks ago [facility]dominal pain, bilateral lower quadrant    [facility]    [facility]    1 month ago Difficulty concentrating    [facility] Monae Horton APRN    Office Visit    2 months ago Psoriatic arthritis (HCC)    [facility] Maria Del Carmen Dye MD    Office Visit    6 months ago Lung nodule    [facility] Josep Valdez MD    Telemedicine          Future Appointments         Provider Department Appt Notes    In 2 weeks Javier Mackay MD [facility] CLN    In 3 weeks Davey Grady MD [facility] cystoscopy    In 1 month Maria Del Carmen Dye MD [facility] [address], [place] follow up                     Future Appointments         Provider Department Appt Notes    In 2 weeks [facility]    In 3 weeks Davey Grady MD [facility] cystoscopy    In 1 month Maria Del Carmen Dye MD [facility] [address], [place] follow up            Recent  Outpatient Visits              6 days ago Pelvic pain    [facility] Davey Grady MD    Office Visit    3 weeks ago Abdominal pain, bilateral lower quadrant    [facility] [place] [place]    Office Visit    1 month ago Difficulty concentrating    [facility] Monae Horton APRN    Office Visit    2 months ago Psoriatic arthritis (HCC)    [facility] [place] Maria Del Carmen Dye MD    Office Visit    6 months ago Lung nodule    [facility], [place] Josep Valdez MD    Telemedicine

## 2024-04-09 NOTE — PROGRESS NOTES
No Saucedo is a 63 year old female.  Chief Complaint   Patient presents with    Sore Throat     Mouth numb     HPI:   She presents with symptoms that started on Saturday after having a CT urogram. She had a CT urogram on Saturday and has felt lightheaded since the testing. She is unsure if it is a reaction to contrast. She had vertigo like symptoms on Saturday. She did take 2 doses of meclizine with some relief. Today she has numbness on her tongue and back of her mouth. She denies any SOB or trouble swallowing. She is also having a sore throat. She did have chills at work today.     Current Outpatient Medications   Medication Sig Dispense Refill    prasugrel 10 MG Oral Tab Take 1 tablet (10 mg total) by mouth daily. 90 tablet 3    bumetanide 1 MG Oral Tab Take 1 tablet (1 mg total) by mouth daily. 90 tablet 3    atorvastatin 80 MG Oral Tab Take 1 tablet (80 mg total) by mouth nightly. 90 tablet 3    ondansetron 4 MG Oral Tablet Dispersible Take 1 tablet (4 mg total) by mouth every 8 (eight) hours as needed for Nausea. 20 tablet 0    aspirin 81 MG Oral Tab EC Take 1 tablet (81 mg total) by mouth daily.      ergocalciferol 1.25 MG (31406 UT) Oral Cap Take 1 capsule (50,000 Units total) by mouth once a week. 12 capsule 0    omeprazole 20 MG Oral Capsule Delayed Release Take 1 capsule (20 mg total) by mouth 2 (two) times daily before meals. 180 capsule 3    NON FORMULARY 5 mg  in the morning and 5 mg before bedtime. Takes cannabis gummy 5 mg after work and 10 mg at bedtime.      metoprolol tartrate 25 MG Oral Tab Take 1 tablet (25 mg total) by mouth 2 (two) times daily. (Patient not taking: Reported on 4/9/2024) 90 tablet 3      Past Medical History:    Basal cell carcinoma    Skin, left cutaneous lip    Cervical radiculopathy at C7    Colon polyps    Coronary atherosclerosis    Essential hypertension    Fibromyalgia    High blood pressure    High cholesterol    Hypercholesterolemia    Psoriatic arthritis (HCC)       Past Surgical History:   Procedure Laterality Date    Anesth,open heart surgery      Cabg      Colonoscopy N/A 2017    Procedure: COLONOSCOPY;  Surgeon: Selina Timmons MD;  Location: [facility] ENDOSCOPY    Colonoscopy,biopsy  2010    Performed by GIOVANNI BRITO at [facility] [place] [facility], [facility]    Foot surgery Right     Bunion    Other  2018    Mohs surgery left upper lip BCCa    Vaginal hysterectomy      With cystocele repair      Social History:  Social History     Socioeconomic History    Marital status:    Tobacco Use    Smoking status: Former     Current packs/day: 0.00     Average packs/day: 0.5 packs/day for 37.0 years (18.5 ttl pk-yrs)     Types: Cigarettes     Start date: 1975     Quit date: 2012     Years since quittin.7     Passive exposure: Never    Smokeless tobacco: Never   Vaping Use    Vaping status: Never Used   Substance and Sexual Activity    Alcohol use: Yes     Alcohol/week: 0.0 standard drinks of alcohol     Comment: Occasional    Drug use: Yes     Types: Cannabis     Comment: Medical Gummies/Topical Cream   Other Topics Concern    Pt has a pacemaker No    Pt has a defibrillator No    Reaction to local anesthetic No    Right Handed Yes     Social Determinants of Health      Received from [facility]      Family History   Problem Relation Age of Onset    Colon Cancer Mother     Heart Disease Mother         CABG age 72    Hypertension Mother     Cancer Mother 61        Colon Cancer    Other (Bladder Cancer) Mother     Other (Cervical Cancer) Mother     Other (Peripheral Vascular Disease) Mother     Diabetes Maternal Grandmother     Hypertension Maternal Grandmother     Cancer Maternal Grandmother         hx of skin cancer     Heart Disease Maternal Grandfather          MI age 32    Heart Disease Maternal Aunt         Per NG: CAD    Breast Cancer Neg       Allergies   Allergen Reactions    Adhesive Tape HIVES        REVIEW OF  SYSTEMS:     Review of Systems   Constitutional:  Negative for fever.   HENT:  Positive for sore throat. Negative for trouble swallowing.    Respiratory:  Negative for cough, shortness of breath and wheezing.    Cardiovascular:  Negative for chest pain.   Gastrointestinal:  Negative for abdominal pain.   Genitourinary: Negative.    Musculoskeletal: Negative.    Skin: Negative.    Neurological:  Positive for light-headedness. Negative for dizziness.   Psychiatric/Behavioral: Negative.        Wt Readings from Last 5 Encounters:   04/09/24 139 lb (63 kg)   04/02/24 141 lb (64 kg)   03/14/24 141 lb (64 kg)   03/18/24 141 lb (64 kg)   02/26/24 140 lb (63.5 kg)     Body mass index is 24.62 kg/m².      EXAM:   /78   Pulse 69   Resp 16   Ht 5' 3\" (1.6 m)   Wt 139 lb (63 kg)   BMI 24.62 kg/m²     Physical Exam  Vitals reviewed.   Constitutional:       Appearance: Normal appearance.   HENT:      Head: Normocephalic.      Right Ear: Tympanic membrane normal.      Left Ear: Tympanic membrane normal.      Nose: No congestion.      Mouth/Throat:      Pharynx: Posterior oropharyngeal erythema present.   Eyes:      Extraocular Movements: Extraocular movements intact.      Pupils: Pupils are equal, round, and reactive to light.   Cardiovascular:      Rate and Rhythm: Normal rate and regular rhythm.      Pulses: Normal pulses.   Pulmonary:      Breath sounds: Normal breath sounds. No wheezing.   Musculoskeletal:         General: No swelling. Normal range of motion.   Skin:     General: Skin is warm and dry.   Neurological:      Mental Status: She is alert and oriented to person, place, and time.   Psychiatric:         Mood and Affect: Mood normal.         Behavior: Behavior normal.            ASSESSMENT AND PLAN:   1. Sore throat  - POC Rapid Strep [15779]  - SARS-CoV-2/Flu A and B/RSV by PCR ([place]); Future  - predniSONE 20 MG Oral Tab; Take 1 tablet (20 mg total) by mouth daily for 5 days.  Dispense: 5 tablet; Refill:  0  - SARS-CoV-2/Flu A and B/RSV by PCR ([place])    2. Numbness of tongue  ? Allergic reaction  - no SOB or dysphagia  - will trial low dose prednisone  - if symptoms worsen go to the ER   - predniSONE 20 MG Oral Tab; Take 1 tablet (20 mg total) by mouth daily for 5 days.  Dispense: 5 tablet; Refill: 0      The patient indicates understanding of these issues and agrees to the plan.  Return for if symptoms do not resolve.

## 2024-04-10 ENCOUNTER — TELEPHONE (OUTPATIENT)
Dept: SURGERY | Facility: CLINIC | Age: 64
End: 2024-04-10

## 2024-04-10 LAB
FLUAV + FLUBV RNA SPEC NAA+PROBE: NOT DETECTED
FLUAV + FLUBV RNA SPEC NAA+PROBE: NOT DETECTED
RSV RNA SPEC NAA+PROBE: NOT DETECTED
SARS-COV-2 RNA RESP QL NAA+PROBE: NOT DETECTED

## 2024-04-10 NOTE — TELEPHONE ENCOUNTER
Patient calling stating she has a cystoscopy schedule for 4/30 she would like to know if she will need antibiotic medication before the procedure because she has an heart stent?Please advise

## 2024-04-10 NOTE — TELEPHONE ENCOUNTER
Spoke with patient, I advised her that we will give her one antibiotic after her procedure. PT confirmed and verbalized understanding.     Future Appointments   Date Time Provider Department Center   4/24/2024  8:30 AM [facility]   4/30/2024  8:00 AM Davey Grady MD [facility]   5/23/2024  8:40 AM Maria Del Carmen Dye MD [facility] [place]

## 2024-04-11 ENCOUNTER — TELEPHONE (OUTPATIENT)
Dept: INTERNAL MEDICINE CLINIC | Facility: CLINIC | Age: 64
End: 2024-04-11

## 2024-04-11 NOTE — TELEPHONE ENCOUNTER
Pt stated she was seen  4/9/24 for Numbness of tongue   she's having a reaction to a medication , not sure what it , have taking Prednisone and Zrytec before prescribed 4/9/24      Yesterday s/s was less but Today after taking meds-  feel more  soreness left side of throat, inside of mouth tingling - took  Prednisone, Zrytec,bumetanide ,Prasugrel,Omeprazole ,Potassium / BP med can't remember name - recently ordered by cardiologist     Not sure what to take and not take

## 2024-04-11 NOTE — TELEPHONE ENCOUNTER
Received message from Monae, pt need to be evaluated in UC or ER  Pt contacted, verbalize understanding

## 2024-04-12 NOTE — TELEPHONE ENCOUNTER
Patient called back in and states symptoms have improved somewhat.     Patient reports she still has sore throat and/or numbness to throat. Swelling to left side of throat is still there but not any worse then when she was seen in office.     Patient states she is not going to go to the hospital unless she has to due to cost. Patient states \"If my throat starts to close up then I will go\" Patient states when she opens her mouth/talks she feels then numbness even more when the air hits her throat.     Patient will continue to treat/monitor at home and will go to [facility]/ER if needed.

## 2024-04-12 NOTE — TELEPHONE ENCOUNTER
Scheduled for:  Colonoscopy 10096  Provider Name:  Dr. Milly Jensen  Date:  7/7/17  Location:  [facility]  Sedation:  IV  Time:  0930 (pt is aware to arrive at 0830)  Prep:  Miralax/Gatorade  Meds/Allergies Reconciled?:  Bertha/PA reviewed   Diagnosis with codes:  Family
(M4) withdraws from pain

## 2024-04-25 RX ORDER — ERGOCALCIFEROL 1.25 MG/1
50000 CAPSULE ORAL WEEKLY
Qty: 12 CAPSULE | Refills: 0 | Status: SHIPPED | OUTPATIENT
Start: 2024-04-25

## 2024-04-25 NOTE — TELEPHONE ENCOUNTER
Requested Prescriptions     Pending Prescriptions Disp Refills    ERGOCALCIFEROL 1.25 MG (58644 UT) Oral Cap [Pharmacy Med Name: Vitamin D 50,000 Unit Cap Bion] 12 capsule 0     Sig: Take 1 capsule (50,000 Units total) by mouth once a week     Future Appointments   Date Time Provider Department Center   4/30/2024  8:00 AM Davey Grady MD [facility]   5/23/2024  8:40 AM Maria Del Carmen Dye MD [facility] [place]     LOV: 1/20/24   Last Refilled:3/4/24 #12 0RF     Summary:  1.  Note for work  2. Rest left shoulder  x 3 days   3.  Cont. Legal medical marijuana.   4.  prednisone 10mg a day for flares   5.. Return to clinic in 3-4  months as needed.   - d/w her to      Maria Del Carmen Dye MD  1/20/2024   11:36 AM

## 2024-04-30 ENCOUNTER — PROCEDURE (OUTPATIENT)
Dept: SURGERY | Facility: CLINIC | Age: 64
End: 2024-04-30
Payer: COMMERCIAL

## 2024-04-30 VITALS — HEART RATE: 78 BPM | DIASTOLIC BLOOD PRESSURE: 78 MMHG | SYSTOLIC BLOOD PRESSURE: 122 MMHG

## 2024-04-30 DIAGNOSIS — N13.5 UPJ OBSTRUCTION, ACQUIRED: Primary | ICD-10-CM

## 2024-04-30 DIAGNOSIS — R10.2 PELVIC PAIN: ICD-10-CM

## 2024-04-30 DIAGNOSIS — Z85.51 HISTORY OF BLADDER CANCER: ICD-10-CM

## 2024-04-30 PROCEDURE — 3074F SYST BP LT 130 MM HG: CPT | Performed by: UROLOGY

## 2024-04-30 PROCEDURE — 3078F DIAST BP <80 MM HG: CPT | Performed by: UROLOGY

## 2024-04-30 PROCEDURE — 52000 CYSTOURETHROSCOPY: CPT | Performed by: UROLOGY

## 2024-04-30 PROCEDURE — 99213 OFFICE O/P EST LOW 20 MIN: CPT | Performed by: UROLOGY

## 2024-04-30 RX ORDER — POTASSIUM CHLORIDE 1500 MG/1
TABLET, EXTENDED RELEASE ORAL
COMMUNITY
Start: 2024-04-25

## 2024-04-30 RX ORDER — CLOPIDOGREL BISULFATE 75 MG/1
TABLET ORAL
COMMUNITY
Start: 2024-04-29

## 2024-04-30 RX ORDER — METOPROLOL SUCCINATE 25 MG/1
TABLET, EXTENDED RELEASE ORAL
COMMUNITY
Start: 2024-04-29

## 2024-04-30 RX ORDER — CIPROFLOXACIN 500 MG/1
500 TABLET, FILM COATED ORAL ONCE
Status: COMPLETED | OUTPATIENT
Start: 2024-04-30 | End: 2024-04-30

## 2024-04-30 RX ADMIN — CIPROFLOXACIN 500 MG: 500 TABLET, FILM COATED ORAL at 08:40:00

## 2024-04-30 NOTE — PROGRESS NOTES
No Saucedo is a 63 year old female.    HPI:     Chief Complaint   Patient presents with    Procedure     Pt presents for office cystoscopy.     63-year-old female in follow-up to a visit April 2, 2024 for pelvic pain, incidental bilateral mild UPJ obstruction seen on a contrast-enhanced CT abdomen and pelvis March 1, 2024.  She has a previous urologic history of cystocele for which she sees Michelle Sawant.  The patient was referred for urine testing including a negative urine culture, negative urinalysis, negative urine for cytology.  I referred the patient for a CT urogram which was done April 6, 2024 again demonstrating mild bilateral UPJ obstruction without evidence of high-grade mechanical obstruction.  Both kidneys contain simple appearing cysts.  There is a 2 mm nonobstructing stone in the upper part of the left kidney.  No change in symptoms.  She feels well otherwise.  No gross hematuria or dysuria.        HISTORY:  Past Medical History:    Basal cell carcinoma    Skin, left cutaneous lip    Cervical radiculopathy at C7    Colon polyps    Coronary atherosclerosis    Essential hypertension    Fibromyalgia    High blood pressure    High cholesterol    Hypercholesterolemia    Psoriatic arthritis (HCC)      Past Surgical History:   Procedure Laterality Date    Anesth,open heart surgery  2021    Cabg      Colonoscopy N/A 07/07/2017    Procedure: COLONOSCOPY;  Surgeon: Selina Timmons MD;  Location: [facility] ENDOSCOPY    Colonoscopy,biopsy  02/12/2010    Performed by GIOVANNI BRITO at [facility] [place]    Foot surgery Right 1990    Bunion    Other  03/2018    Mohs surgery left upper lip BCCa    Vaginal hysterectomy  1987    With cystocele repair      Family History   Problem Relation Age of Onset    Colon Cancer Mother     Heart Disease Mother         CABG age 72    Hypertension Mother     Cancer Mother 61        Colon Cancer    Other (Bladder Cancer) Mother     Other (Cervical Cancer)  Mother     Other (Peripheral Vascular Disease) Mother     Diabetes Maternal Grandmother     Hypertension Maternal Grandmother     Cancer Maternal Grandmother         hx of skin cancer     Heart Disease Maternal Grandfather          MI age 32    Heart Disease Maternal Aunt         Per NG: CAD    Breast Cancer Neg       Social History:   Social History     Socioeconomic History    Marital status:    Tobacco Use    Smoking status: Former     Current packs/day: 0.00     Average packs/day: 0.5 packs/day for 37.0 years (18.5 ttl pk-yrs)     Types: Cigarettes     Start date: 1975     Quit date: 2012     Years since quittin.7     Passive exposure: Never    Smokeless tobacco: Never   Vaping Use    Vaping status: Never Used   Substance and Sexual Activity    Alcohol use: Yes     Alcohol/week: 0.0 standard drinks of alcohol     Comment: Occasional    Drug use: Yes     Types: Cannabis     Comment: Medical Gummies/Topical Cream   Other Topics Concern    Pt has a pacemaker No    Pt has a defibrillator No    Reaction to local anesthetic No    Right Handed Yes     Social Determinants of Health      Received from [facility]        Medications (Active prior to today's visit):  Current Outpatient Medications   Medication Sig Dispense Refill    metoprolol succinate ER 25 MG Oral Tablet 24 Hr       clopidogrel 75 MG Oral Tab       Potassium Chloride ER 20 MEQ Oral Tab CR       ergocalciferol 1.25 MG (40343 UT) Oral Cap Take 1 capsule (50,000 Units total) by mouth once a week. 12 capsule 0    omeprazole 20 MG Oral Capsule Delayed Release Take 1 capsule (20 mg total) by mouth 2 (two) times daily before meals. 180 capsule 3    bumetanide 1 MG Oral Tab Take 1 tablet (1 mg total) by mouth daily. 90 tablet 3    atorvastatin 80 MG Oral Tab Take 1 tablet (80 mg total) by mouth nightly. 90 tablet 3    ondansetron 4 MG Oral Tablet Dispersible Take 1 tablet (4 mg total) by mouth every 8 (eight) hours as  needed for Nausea. 20 tablet 0    aspirin 81 MG Oral Tab EC Take 1 tablet (81 mg total) by mouth daily.      prasugrel 10 MG Oral Tab Take 1 tablet (10 mg total) by mouth daily. (Patient not taking: Reported on 2024) 90 tablet 3    NON FORMULARY 5 mg  in the morning and 5 mg before bedtime. Takes cannabis gummy 5 mg after work and 10 mg at bedtime.      metoprolol tartrate 25 MG Oral Tab Take 1 tablet (25 mg total) by mouth 2 (two) times daily. (Patient not taking: Reported on 2024) 90 tablet 3       Allergies:  Allergies   Allergen Reactions    Adhesive Tape HIVES         ROS:       PHYSICAL EXAM:   No Saucedo  : 1960  Referring Physician: No ref. provider found     Chief Complaint   Patient presents with    Procedure     Pt presents for office cystoscopy.       Scribed by:     CYSTOSCOPY    Anesthesia:  2% lidocaine gel    Urethra: Normal    Bladder: Normal.  No tumor, stone, diverticulum, or glomerulation  U.O's: Normal  Trabeculation: none      POST CYSTOSCOPY MEDICATIONS: sample one tablet Cipro 500mg given to patient    DIAGNOSIS:     PLAN: see below    Bladder scan for postvoid residual volume 30 mL     ASSESSMENT/PLAN:   Assessment   Encounter Diagnoses   Name Primary?    UPJ obstruction, acquired Yes    Pelvic pain     History of bladder cancer        Reviewed findings at length with patient.  Recommended further evaluation with Lasix renogram.  The patient tells me that she recently was laid off from her work and has lost her insurance as of next Monday.  She will work on getting another insurance policy that we will get the Lasix renogram as soon as she can with follow-up in 4 to 6 weeks to review those results.  She will follow-up as scheduled with her urogynecologist.  Follow-up otherwise with me in 6 weeks.         Orders This Visit:  No orders of the defined types were placed in this encounter.      Meds This Visit:  Requested Prescriptions      No prescriptions requested or  ordered in this encounter       Imaging & Referrals:  NM RENAL WITH LASIX  (CPT=78708)     4/30/2024  Davey Grady MD

## 2024-05-03 ENCOUNTER — MED REC SCAN ONLY (OUTPATIENT)
Dept: SURGERY | Facility: CLINIC | Age: 64
End: 2024-05-03

## 2024-06-03 ENCOUNTER — NURSE TRIAGE (OUTPATIENT)
Dept: INTERNAL MEDICINE CLINIC | Facility: CLINIC | Age: 64
End: 2024-06-03

## 2024-06-03 ENCOUNTER — HOSPITAL ENCOUNTER (EMERGENCY)
Facility: HOSPITAL | Age: 64
Discharge: LEFT BEFORE DISCHARGE | End: 2024-06-03

## 2024-06-03 ENCOUNTER — OFFICE VISIT (OUTPATIENT)
Dept: INTERNAL MEDICINE CLINIC | Facility: CLINIC | Age: 64
End: 2024-06-03

## 2024-06-03 VITALS
RESPIRATION RATE: 17 BRPM | SYSTOLIC BLOOD PRESSURE: 148 MMHG | BODY MASS INDEX: 24.63 KG/M2 | WEIGHT: 139 LBS | HEIGHT: 63 IN | OXYGEN SATURATION: 98 % | DIASTOLIC BLOOD PRESSURE: 71 MMHG | TEMPERATURE: 98 F | HEART RATE: 73 BPM

## 2024-06-03 DIAGNOSIS — M25.471 RIGHT ANKLE SWELLING: Primary | ICD-10-CM

## 2024-06-03 DIAGNOSIS — M79.661 RIGHT CALF PAIN: ICD-10-CM

## 2024-06-03 RX ORDER — BUMETANIDE 2 MG/1
2 TABLET ORAL EVERY OTHER DAY
COMMUNITY
Start: 2024-04-29

## 2024-06-03 NOTE — TELEPHONE ENCOUNTER
Action Requested: Summary for Provider     []  Critical Lab, Recommendations Needed  [] Need Additional Advice  []   FYI    []   Need Orders  [] Need Medications Sent to Pharmacy  []  Other     SUMMARY: Per protocol: Office visit  Future Appointments   Date Time Provider Department Center   6/3/2024  6:30 PM David Henson MD [facility] [place]     Reason for call: Ankle Pain  Onset: Data Unavailable    Patient is concerned that she may have a DVT. Her foot was red/hot/inflamed. Now she has swelling by her ankle. It is painful. She denies shortness of breath or chest pain.     Reason for Disposition   Thigh, calf, or ankle swelling and only 1 side    Protocols used: Ankle Swelling-A-OH

## 2024-06-04 ENCOUNTER — HOSPITAL ENCOUNTER (OUTPATIENT)
Dept: ULTRASOUND IMAGING | Facility: HOSPITAL | Age: 64
Discharge: HOME | End: 2024-06-04
Attending: INTERNAL MEDICINE

## 2024-06-04 ENCOUNTER — HOSPITAL ENCOUNTER (OUTPATIENT)
Dept: ULTRASOUND IMAGING | Facility: HOSPITAL | Age: 64
End: 2024-06-04

## 2024-06-04 ENCOUNTER — TELEPHONE (OUTPATIENT)
Dept: INTERNAL MEDICINE CLINIC | Facility: CLINIC | Age: 64
End: 2024-06-04

## 2024-06-04 DIAGNOSIS — M25.471 RIGHT ANKLE SWELLING: ICD-10-CM

## 2024-06-04 DIAGNOSIS — M79.661 RIGHT CALF PAIN: ICD-10-CM

## 2024-06-04 PROCEDURE — 93971 EXTREMITY STUDY: CPT | Performed by: INTERNAL MEDICINE

## 2024-06-04 NOTE — TELEPHONE ENCOUNTER
Patient calling ( name and date of birth of patient verified )  Last office visit 6/3/2024 with Dr David Henson     She went to IC and then was sent to ER , was not seen at ER  wait was too long  States she received a message via [facility] for an US appointment at 7am today   She went to [facility] to have US done and the US department does not have the order     Dr Henson-- the patient needs the order for the US  for right foot/ leg pain       Please advise and thank you.        Staff-- please call staff when order is placed     Best call back number: No at [phone] ernst JADE

## 2024-06-04 NOTE — TELEPHONE ENCOUNTER
Patient was supposed to get it done in ER but order placed so if she can get it done today great please order was placed

## 2024-06-07 NOTE — TELEPHONE ENCOUNTER
Spoke to patient, full name and date of birth verified.  Informed patient of Dr. Valdez's okay to schedule her Monday at 4 PM.   Patient accepted appointment, she will have to self-pay due to recently and unexpectedly losing her insurance.     Future Appointments   Date Time Provider Department Center   6/10/2024  4:00 PM [facility]     Patient is aware to go to [facility].

## 2024-06-07 NOTE — TELEPHONE ENCOUNTER
Verified name and .    Patient was seen during office visit with Dr. Henson on 6/3/24 for right ankle swelling and right calf pain.    US venous doppler negative on 24.    She states that she continues to have foot pain, swelling, and warmth to the touch.    She denies any fevers but states that pain is disrupting her sleep at night.    She states that she has been taking 30 mg of Prednisone for the past 3 days- swelling has improved some but pain continues.     She is asking for an appointment to see Dr. Josep Valdez only.    She states she is available any time today or Monday.    Please advise and thank you.

## 2024-06-09 NOTE — PROGRESS NOTES
Subjective:   Patient ID: No Saucedo is a 63 year old female.    HPI    Patient comes in today with complaint of right ankle swelling and pain some pain to the calf area also no injury no recent travel    History/Other:   Review of Systems   Constitutional: Negative.    HENT: Negative.     Eyes: Negative.    Respiratory: Negative.     Cardiovascular: Negative.    Gastrointestinal: Negative.    Genitourinary: Negative.    Musculoskeletal: Negative.         Right ankle swelling and pain   Skin: Negative.    Neurological: Negative.    Psychiatric/Behavioral: Negative.       Current Outpatient Medications   Medication Sig Dispense Refill    bumetanide 2 MG Oral Tab Take 1 tablet (2 mg total) by mouth every other day.      metoprolol succinate ER 25 MG Oral Tablet 24 Hr Take 1 tablet (25 mg total) by mouth daily.      clopidogrel 75 MG Oral Tab       Potassium Chloride ER 20 MEQ Oral Tab CR       ergocalciferol 1.25 MG (14882 UT) Oral Cap Take 1 capsule (50,000 Units total) by mouth once a week. 12 capsule 0    omeprazole 20 MG Oral Capsule Delayed Release Take 1 capsule (20 mg total) by mouth 2 (two) times daily before meals. 180 capsule 3    NON FORMULARY 5 mg  in the morning and 5 mg before bedtime. Takes cannabis gummy 5 mg after work and 10 mg at bedtime.      atorvastatin 80 MG Oral Tab Take 1 tablet (80 mg total) by mouth nightly. 90 tablet 3    ondansetron 4 MG Oral Tablet Dispersible Take 1 tablet (4 mg total) by mouth every 8 (eight) hours as needed for Nausea. 20 tablet 0    aspirin 81 MG Oral Tab EC Take 1 tablet (81 mg total) by mouth daily.      prasugrel 10 MG Oral Tab Take 1 tablet (10 mg total) by mouth daily. (Patient not taking: Reported on 4/30/2024) 90 tablet 3    metoprolol tartrate 25 MG Oral Tab Take 1 tablet (25 mg total) by mouth 2 (two) times daily. (Patient not taking: Reported on 6/3/2024) 90 tablet 3     Allergies:  Allergies   Allergen Reactions    Adhesive Tape HIVES       Objective:    Physical Exam  Vitals and nursing note reviewed.   Constitutional:       Appearance: She is well-developed.   HENT:      Head: Normocephalic and atraumatic.      Right Ear: External ear normal.      Left Ear: External ear normal.      Nose: Nose normal.   Eyes:      Conjunctiva/sclera: Conjunctivae normal.      Pupils: Pupils are equal, round, and reactive to light.   Cardiovascular:      Rate and Rhythm: Normal rate and regular rhythm.      Heart sounds: Normal heart sounds.   Pulmonary:      Effort: Pulmonary effort is normal.      Breath sounds: Normal breath sounds.   Abdominal:      General: Bowel sounds are normal.      Palpations: Abdomen is soft.   Genitourinary:     Vagina: Normal.   Musculoskeletal:         General: Swelling and tenderness present. Normal range of motion.      Cervical back: Normal range of motion and neck supple.      Comments:  Right ankle lower calf   Skin:     General: Skin is warm and dry.   Neurological:      Mental Status: She is alert and oriented to person, place, and time.      Deep Tendon Reflexes: Reflexes are normal and symmetric.   Psychiatric:         Behavior: Behavior normal.         Thought Content: Thought content normal.         Judgment: Judgment normal.         Assessment & Plan:   1. Right ankle swelling ice  keep elevated will order ultrasound to rule out DVT   2. Right calf pain as above ST medication for pain like Tylenol will follow results       No orders of the defined types were placed in this encounter.      Meds This Visit:  Requested Prescriptions      No prescriptions requested or ordered in this encounter       Imaging & Referrals:  None

## 2024-06-10 ENCOUNTER — OFFICE VISIT (OUTPATIENT)
Dept: INTERNAL MEDICINE CLINIC | Facility: CLINIC | Age: 64
End: 2024-06-10

## 2024-06-10 VITALS
SYSTOLIC BLOOD PRESSURE: 132 MMHG | DIASTOLIC BLOOD PRESSURE: 74 MMHG | BODY MASS INDEX: 24.98 KG/M2 | OXYGEN SATURATION: 97 % | HEIGHT: 63 IN | WEIGHT: 141 LBS | HEART RATE: 72 BPM

## 2024-06-10 DIAGNOSIS — S80.861S INSECT BITE OF RIGHT LOWER LEG, SEQUELA: Primary | ICD-10-CM
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
## 2024-06-10 PROCEDURE — 99213 OFFICE O/P EST LOW 20 MIN: CPT | Performed by: INTERNAL MEDICINE

## 2024-06-10 RX ORDER — MELOXICAM 15 MG/1
15 TABLET ORAL DAILY
Qty: 15 TABLET | Refills: 0 | Status: SHIPPED | OUTPATIENT
Start: 2024-06-10 | End: 2024-06-25

## 2024-06-10 RX ORDER — PREDNISONE 10 MG/1
10 TABLET ORAL DAILY
COMMUNITY
Start: 2024-06-06 | End: 2024-06-10

## 2024-06-13 NOTE — PROGRESS NOTES
No Saucedo is a 63 year old female.  Chief Complaint   Patient presents with    Leg Pain     Has been swelling and pain in right leg, took prednisone     HPI:   63-year-old pleasant lady here for evaluation of swelling and pain near the ankle.  She took prednisone and the swelling is gone away.  Then she saw a bite kb.  No fever or chills.  Redness is fading away.      Current Outpatient Medications   Medication Sig Dispense Refill    Meloxicam 15 MG Oral Tab Take 1 tablet (15 mg total) by mouth daily for 15 days. With food only 15 tablet 0    bumetanide 2 MG Oral Tab Take 1 tablet (2 mg total) by mouth every other day.      metoprolol succinate ER 25 MG Oral Tablet 24 Hr Take 1 tablet (25 mg total) by mouth daily.      clopidogrel 75 MG Oral Tab       Potassium Chloride ER 20 MEQ Oral Tab CR       ergocalciferol 1.25 MG (08993 UT) Oral Cap Take 1 capsule (50,000 Units total) by mouth once a week. 12 capsule 0    omeprazole 20 MG Oral Capsule Delayed Release Take 1 capsule (20 mg total) by mouth 2 (two) times daily before meals. 180 capsule 3    NON FORMULARY 5 mg  in the morning and 5 mg before bedtime. Takes cannabis gummy 5 mg after work and 10 mg at bedtime.      atorvastatin 80 MG Oral Tab Take 1 tablet (80 mg total) by mouth nightly. 90 tablet 3    ondansetron 4 MG Oral Tablet Dispersible Take 1 tablet (4 mg total) by mouth every 8 (eight) hours as needed for Nausea. 20 tablet 0    aspirin 81 MG Oral Tab EC Take 1 tablet (81 mg total) by mouth daily.        Past Medical History:    Basal cell carcinoma    Skin, left cutaneous lip    Cervical radiculopathy at C7    Colon polyps    Coronary atherosclerosis    Essential hypertension    Fibromyalgia    High blood pressure    High cholesterol    Hypercholesterolemia    Psoriatic arthritis (HCC)      Past Surgical History:   Procedure Laterality Date    Anesth,open heart surgery  2021    Cabg      Colonoscopy N/A 07/07/2017    Procedure: COLONOSCOPY;   Surgeon: Selina Timmons MD;  Location: [facility] ENDOSCOPY    Colonoscopy,biopsy  2010    Performed by GIOVANNI BRITO at [facility], [facility]    Foot surgery Right 1990    Bunion    Other  2018    Mohs surgery left upper lip BCCa    Vaginal hysterectomy      With cystocele repair      Social History:  Social History     Socioeconomic History    Marital status:    Tobacco Use    Smoking status: Former     Current packs/day: 0.00     Average packs/day: 0.5 packs/day for 37.0 years (18.5 ttl pk-yrs)     Types: Cigarettes     Start date: 1975     Quit date: 2012     Years since quittin.8     Passive exposure: Never    Smokeless tobacco: Never   Vaping Use    Vaping status: Never Used   Substance and Sexual Activity    Alcohol use: Yes     Alcohol/week: 0.0 standard drinks of alcohol     Comment: Occasional    Drug use: Yes     Types: Cannabis     Comment: Medical Gummies/Topical Cream   Other Topics Concern    Pt has a pacemaker No    Pt has a defibrillator No    Reaction to local anesthetic No    Right Handed Yes     Social Determinants of Health      Received from [facility]      Family History   Problem Relation Age of Onset    Colon Cancer Mother     Heart Disease Mother         CABG age 72    Hypertension Mother     Cancer Mother 61        Colon Cancer    Other (Bladder Cancer) Mother     Other (Cervical Cancer) Mother     Other (Peripheral Vascular Disease) Mother     Diabetes Maternal Grandmother     Hypertension Maternal Grandmother     Cancer Maternal Grandmother         hx of skin cancer     Heart Disease Maternal Grandfather          MI age 32    Heart Disease Maternal Aunt         Per NG: CAD    Breast Cancer Neg       Allergies   Allergen Reactions    Adhesive Tape HIVES        REVIEW OF SYSTEMS:   Review of Systems   Review of Systems   Constitutional: Negative for activity change, appetite change and fever.   HENT: Negative for congestion  and voice change.    Respiratory: Negative for cough and shortness of breath.    Cardiovascular: Negative for chest pain.   Gastrointestinal: Negative for abdominal distention, abdominal pain and vomiting.   Skin: Negative for wound.   Psychiatric/Behavioral: Negative for behavioral problems.   Wt Readings from Last 5 Encounters:   06/10/24 141 lb (64 kg)   06/03/24 139 lb (63 kg)   04/09/24 139 lb (63 kg)   04/02/24 141 lb (64 kg)   03/14/24 141 lb (64 kg)     Body mass index is 24.98 kg/m².      EXAM:   /74   Pulse 72   Ht 5' 3\" (1.6 m)   Wt 141 lb (64 kg)   SpO2 97%   BMI 24.98 kg/m²   Physical Exam   Constitutional:       Appearance: Normal appearance.    Cardiovascular:      Rate and Rhythm: Normal rate and regular rhythm.      Heart sounds: Normal heart sounds. No murmur heard.  Pulmonary:      Effort: Pulmonary effort is normal.      Breath sounds: Normal breath sounds. No rhonchi or rales.   Abdominal:      General: Bowel sounds are normal.      Palpations: Abdomen is soft.      Tenderness: There is no abdominal tenderness.   Musculoskeletal:      Cervical back: Neck supple.      Right lower leg: No edema.      Left lower leg: No edema.   Skin:     General: Skin is warm and dry.   Neurological:      General: No focal deficit present.      Mental Status: He is alert and oriented to person, place, and time. Mental status is at baseline.   Psychiatric:         Mood and Affect: Mood normal.         Behavior: Behavior normal.       ASSESSMENT AND PLAN:   1. Insect bite of right lower leg, sequela  It is getting better.  Redness is fading away.  Swelling is significantly down.  I do not think any antibiotics necessary at this time.  She will call me back if there is any worsening symptoms.  Discussed regarding routine visits.  She does not have insurance at this time.  She will follow-up with me in 6 months.    Plan: As above.      The patient indicates understanding of these issues and agrees to the  plan.  No follow-ups on file.    This note was prepared using Dragon Medical voice recognition dictation software. As a result errors may occur. When identified these errors have been corrected. While every attempt is made to correct errors during dictation discrepancies may still exist.

## 2024-07-25 NOTE — TELEPHONE ENCOUNTER
LOV: 1/20/24  No future appointments.  Labs: Vitamin D 17.1  9/16/23  Summary:  1.  Note for work  2. Rest left shoulder  x 3 days   3.  Cont. Legal medical marijuana.   4.  prednisone 10mg a day for flares   5.. Return to clinic in 3-4  months as needed.   - d/w her to      Maria Del Carmen Dye MD  1/20/2024   11:36 AM

## 2024-07-26 RX ORDER — ERGOCALCIFEROL 1.25 MG/1
50000 CAPSULE, LIQUID FILLED ORAL WEEKLY
Qty: 12 CAPSULE | Refills: 0 | OUTPATIENT
Start: 2024-07-26

## 2024-07-29 RX ORDER — ATORVASTATIN CALCIUM 80 MG/1
80 TABLET, FILM COATED ORAL NIGHTLY
Qty: 90 TABLET | Refills: 3 | Status: SHIPPED | OUTPATIENT
Start: 2024-07-29

## 2024-07-29 NOTE — TELEPHONE ENCOUNTER
Refill Per Protocol     Requested Prescriptions   Pending Prescriptions Disp Refills    ATORVASTATIN 80 MG Oral Tab [Pharmacy Med Name: Atorvastatin Calcium 80 Mg Tab Nort] 90 tablet 0     Sig: Take 1 tablet (80 mg total) by mouth nightly.       Cholesterol Medication Protocol Passed - 7/25/2024  3:00 AM        Passed - ALT < 80     Lab Results   Component Value Date    ALT 20 09/16/2023             Passed - ALT resulted within past year        Passed - Lipid panel within past 12 months     Lab Results   Component Value Date    CHOLEST 167 09/16/2023    TRIG 150 (H) 09/16/2023    HDL 56 09/16/2023    LDL 85 09/16/2023    VLDL 24 09/16/2023    NONHDLC 111 09/16/2023             Passed - In person appointment or virtual visit in the past 12 mos or appointment in next 3 mos     Recent Outpatient Visits              1 month ago [facility]    Office Visit    1 month ago Right ankle swelling    [facility] [place] Gabriel Martin Agron B, MD    Office Visit    3 months ago [facility]    [facility] Visit    3 months ago [facility]    Office Visit    4 months ago Abdominal pain, bilateral lower quadrant    [facility] [place] Gabriel Martin Arlinda, MD    Office Visit                               Recent Outpatient Visits              1 month [facility]    Office Visit    1 month ago Right ankle swelling    [facility] [place] Gabriel Martin Agron B, MD    Office Visit    3 months ago Sore throat    [facility]  JEMMA Licona    Office Visit    3 months ago Pelvic pain    [facility] [place] Davey Grady MD    Office Visit    4 months ago Abdominal pain, bilateral lower quadrant    [facility] [place], Mabel Pierson MD    Office Visit

## 2024-11-04 ENCOUNTER — TELEPHONE (OUTPATIENT)
Dept: RHEUMATOLOGY | Facility: CLINIC | Age: 64
End: 2024-11-04

## 2024-11-04 NOTE — TELEPHONE ENCOUNTER
Patient called requesting a prescription for prednisone. Per patient she is having a bad flare up. Per patient her hands are cracked open and bleeding.

## 2024-11-04 NOTE — TELEPHONE ENCOUNTER
Dr HAINES-  Spoke with patient states that she has history of psoriatic arthritis and she has been having flare for the past 3 weeks on the palms of both of her hands. She states dryness, cracking, bleeding. She has been using triamcinolone cream but would like to be prescribed prednisone. Has follow up appointment with Dr. Dye on 12/23/24.

## 2024-11-05 RX ORDER — PREDNISONE 5 MG/1
TABLET ORAL
Qty: 42 TABLET | Refills: 0 | Status: SHIPPED | OUTPATIENT
Start: 2024-11-05

## 2024-12-01 ENCOUNTER — APPOINTMENT (OUTPATIENT)
Dept: CT IMAGING | Facility: HOSPITAL | Age: 64
End: 2024-12-01
Attending: EMERGENCY MEDICINE
Payer: MEDICARE

## 2024-12-01 ENCOUNTER — APPOINTMENT (OUTPATIENT)
Dept: GENERAL RADIOLOGY | Facility: HOSPITAL | Age: 64
End: 2024-12-01
Attending: EMERGENCY MEDICINE
Payer: MEDICARE

## 2024-12-01 ENCOUNTER — HOSPITAL ENCOUNTER (OUTPATIENT)
Facility: HOSPITAL | Age: 64
Setting detail: OBSERVATION
Discharge: HOME | End: 2024-12-04
Attending: EMERGENCY MEDICINE | Admitting: HOSPITALIST
Payer: MEDICARE

## 2024-12-01 DIAGNOSIS — R07.9 CHEST PAIN, UNSPECIFIED TYPE: Primary | ICD-10-CM

## 2024-12-01 LAB
ANION GAP SERPL CALC-SCNC: 8 MMOL/L (ref 0–18)
BASOPHILS # BLD AUTO: 0.05 X10(3) UL (ref 0–0.2)
BASOPHILS NFR BLD AUTO: 0.6 %
BUN BLD-MCNC: 16 MG/DL (ref 9–23)
BUN/CREAT SERPL: 18.2 (ref 10–20)
CALCIUM BLD-MCNC: 9.6 MG/DL (ref 8.7–10.4)
CHLORIDE SERPL-SCNC: 109 MMOL/L (ref 98–112)
CO2 SERPL-SCNC: 25 MMOL/L (ref 21–32)
CREAT BLD-MCNC: 0.88 MG/DL
DEPRECATED RDW RBC AUTO: 39.7 FL (ref 35.1–46.3)
EGFRCR SERPLBLD CKD-EPI 2021: 73 ML/MIN/1.73M2 (ref 60–?)
EOSINOPHIL # BLD AUTO: 0.39 X10(3) UL (ref 0–0.7)
EOSINOPHIL NFR BLD AUTO: 5.1 %
ERYTHROCYTE [DISTWIDTH] IN BLOOD BY AUTOMATED COUNT: 12.7 % (ref 11–15)
GLUCOSE BLD-MCNC: 109 MG/DL (ref 70–99)
HCT VFR BLD AUTO: 37.4 %
HGB BLD-MCNC: 12.8 G/DL
IMM GRANULOCYTES # BLD AUTO: 0.01 X10(3) UL (ref 0–1)
IMM GRANULOCYTES NFR BLD: 0.1 %
INR BLD: 0.89 (ref 0.8–1.2)
LYMPHOCYTES # BLD AUTO: 1.98 X10(3) UL (ref 1–4)
LYMPHOCYTES NFR BLD AUTO: 25.7 %
MCH RBC QN AUTO: 29.3 PG (ref 26–34)
MCHC RBC AUTO-ENTMCNC: 34.2 G/DL (ref 31–37)
MCV RBC AUTO: 85.6 FL
MONOCYTES # BLD AUTO: 0.59 X10(3) UL (ref 0.1–1)
MONOCYTES NFR BLD AUTO: 7.7 %
NEUTROPHILS # BLD AUTO: 4.68 X10 (3) UL (ref 1.5–7.7)
NEUTROPHILS # BLD AUTO: 4.68 X10(3) UL (ref 1.5–7.7)
NEUTROPHILS NFR BLD AUTO: 60.8 %
NT-PROBNP SERPL-MCNC: 235 PG/ML (ref ?–125)
OSMOLALITY SERPL CALC.SUM OF ELEC: 296 MOSM/KG (ref 275–295)
PLATELET # BLD AUTO: 350 10(3)UL (ref 150–450)
POTASSIUM SERPL-SCNC: 3.4 MMOL/L (ref 3.5–5.1)
PROTHROMBIN TIME: 12.6 SECONDS (ref 11.6–14.8)
RBC # BLD AUTO: 4.37 X10(6)UL
SODIUM SERPL-SCNC: 142 MMOL/L (ref 136–145)
TROPONIN I SERPL HS-MCNC: <3 NG/L
TROPONIN I SERPL HS-MCNC: <3 NG/L
WBC # BLD AUTO: 7.7 X10(3) UL (ref 4–11)

## 2024-12-01 PROCEDURE — 99497 ADVNCD CARE PLAN 30 MIN: CPT | Performed by: INTERNAL MEDICINE

## 2024-12-01 PROCEDURE — 71275 CT ANGIOGRAPHY CHEST: CPT | Performed by: EMERGENCY MEDICINE

## 2024-12-01 PROCEDURE — 71045 X-RAY EXAM CHEST 1 VIEW: CPT | Performed by: EMERGENCY MEDICINE

## 2024-12-01 PROCEDURE — 99223 1ST HOSP IP/OBS HIGH 75: CPT | Performed by: INTERNAL MEDICINE

## 2024-12-01 RX ORDER — ASPIRIN 81 MG/1
324 TABLET, CHEWABLE ORAL ONCE
Status: COMPLETED | OUTPATIENT
Start: 2024-12-01 | End: 2024-12-01

## 2024-12-01 RX ORDER — BENZONATATE 200 MG/1
200 CAPSULE ORAL 3 TIMES DAILY PRN
Status: DISCONTINUED | OUTPATIENT
Start: 2024-12-01 | End: 2024-12-04

## 2024-12-01 RX ORDER — BUMETANIDE 1 MG/1
2 TABLET ORAL EVERY OTHER DAY
Status: DISCONTINUED | OUTPATIENT
Start: 2024-12-03 | End: 2024-12-04

## 2024-12-01 RX ORDER — ACETAMINOPHEN 500 MG
500 TABLET ORAL EVERY 4 HOURS PRN
Status: DISCONTINUED | OUTPATIENT
Start: 2024-12-01 | End: 2024-12-04

## 2024-12-01 RX ORDER — IPRATROPIUM BROMIDE AND ALBUTEROL SULFATE 2.5; .5 MG/3ML; MG/3ML
3 SOLUTION RESPIRATORY (INHALATION) ONCE
Status: COMPLETED | OUTPATIENT
Start: 2024-12-01 | End: 2024-12-01

## 2024-12-01 RX ORDER — PROCHLORPERAZINE EDISYLATE 5 MG/ML
5 INJECTION INTRAMUSCULAR; INTRAVENOUS EVERY 8 HOURS PRN
Status: DISCONTINUED | OUTPATIENT
Start: 2024-12-01 | End: 2024-12-01

## 2024-12-01 RX ORDER — ASPIRIN 81 MG/1
81 TABLET ORAL DAILY
Status: DISCONTINUED | OUTPATIENT
Start: 2024-12-02 | End: 2024-12-02

## 2024-12-01 RX ORDER — IPRATROPIUM BROMIDE AND ALBUTEROL SULFATE 2.5; .5 MG/3ML; MG/3ML
3 SOLUTION RESPIRATORY (INHALATION) EVERY 6 HOURS PRN
Status: DISCONTINUED | OUTPATIENT
Start: 2024-12-01 | End: 2024-12-04

## 2024-12-01 RX ORDER — MORPHINE SULFATE 2 MG/ML
2 INJECTION, SOLUTION INTRAMUSCULAR; INTRAVENOUS EVERY 2 HOUR PRN
Status: DISCONTINUED | OUTPATIENT
Start: 2024-12-01 | End: 2024-12-04

## 2024-12-01 RX ORDER — NITROGLYCERIN 0.4 MG/1
0.4 TABLET SUBLINGUAL EVERY 5 MIN PRN
Status: DISCONTINUED | OUTPATIENT
Start: 2024-12-01 | End: 2024-12-04

## 2024-12-01 RX ORDER — ACETAMINOPHEN 325 MG/1
650 TABLET ORAL EVERY 6 HOURS PRN
Status: DISCONTINUED | OUTPATIENT
Start: 2024-12-01 | End: 2024-12-04

## 2024-12-01 RX ORDER — CLOPIDOGREL BISULFATE 75 MG/1
75 TABLET ORAL DAILY
Status: DISCONTINUED | OUTPATIENT
Start: 2024-12-02 | End: 2024-12-02

## 2024-12-01 RX ORDER — MORPHINE SULFATE 2 MG/ML
1 INJECTION, SOLUTION INTRAMUSCULAR; INTRAVENOUS EVERY 2 HOUR PRN
Status: DISCONTINUED | OUTPATIENT
Start: 2024-12-01 | End: 2024-12-04

## 2024-12-01 RX ORDER — METOPROLOL SUCCINATE 25 MG/1
25 TABLET, EXTENDED RELEASE ORAL DAILY
Status: DISCONTINUED | OUTPATIENT
Start: 2024-12-02 | End: 2024-12-04

## 2024-12-01 RX ORDER — SODIUM PHOSPHATE, DIBASIC AND SODIUM PHOSPHATE, MONOBASIC 7; 19 G/230ML; G/230ML
1 ENEMA RECTAL ONCE AS NEEDED
Status: DISCONTINUED | OUTPATIENT
Start: 2024-12-01 | End: 2024-12-04

## 2024-12-01 RX ORDER — CLOPIDOGREL BISULFATE 75 MG/1
TABLET ORAL
Status: CANCELLED | OUTPATIENT
Start: 2024-12-01

## 2024-12-01 RX ORDER — POLYETHYLENE GLYCOL 3350 17 G/17G
17 POWDER, FOR SOLUTION ORAL DAILY PRN
Status: DISCONTINUED | OUTPATIENT
Start: 2024-12-01 | End: 2024-12-04

## 2024-12-01 RX ORDER — MORPHINE SULFATE 2 MG/ML
2 INJECTION, SOLUTION INTRAMUSCULAR; INTRAVENOUS ONCE
Status: COMPLETED | OUTPATIENT
Start: 2024-12-02 | End: 2024-12-01

## 2024-12-01 RX ORDER — POTASSIUM CHLORIDE 1500 MG/1
40 TABLET, EXTENDED RELEASE ORAL ONCE
Status: COMPLETED | OUTPATIENT
Start: 2024-12-01 | End: 2024-12-01

## 2024-12-01 RX ORDER — ATORVASTATIN CALCIUM 80 MG/1
80 TABLET, FILM COATED ORAL NIGHTLY
Status: DISCONTINUED | OUTPATIENT
Start: 2024-12-01 | End: 2024-12-04

## 2024-12-01 RX ORDER — SENNOSIDES 8.6 MG
17.2 TABLET ORAL NIGHTLY PRN
Status: DISCONTINUED | OUTPATIENT
Start: 2024-12-01 | End: 2024-12-04

## 2024-12-01 RX ORDER — BISACODYL 10 MG
10 SUPPOSITORY, RECTAL RECTAL
Status: DISCONTINUED | OUTPATIENT
Start: 2024-12-01 | End: 2024-12-04

## 2024-12-01 RX ORDER — ATORVASTATIN CALCIUM 80 MG/1
80 TABLET, FILM COATED ORAL NIGHTLY
Status: CANCELLED | OUTPATIENT
Start: 2024-12-01

## 2024-12-01 RX ORDER — ONDANSETRON 2 MG/ML
4 INJECTION INTRAMUSCULAR; INTRAVENOUS EVERY 6 HOURS PRN
Status: DISCONTINUED | OUTPATIENT
Start: 2024-12-01 | End: 2024-12-03

## 2024-12-01 RX ORDER — HEPARIN SODIUM 5000 [USP'U]/ML
5000 INJECTION, SOLUTION INTRAVENOUS; SUBCUTANEOUS EVERY 8 HOURS SCHEDULED
Status: DISCONTINUED | OUTPATIENT
Start: 2024-12-01 | End: 2024-12-04

## 2024-12-01 NOTE — HISTORICAL OFFICE NOTE
Facility Logo [facility]  [address], [address] [place] [address]  [phone]      No Saucedo  Progress Note  Demographics:  Name: No Saucedo YOB: 1960  Age: 63, Female Medical Record No: [id number]  Visited Date/Time: 02/21/2024 04:30 PM    Chief Complaints  Follow up  History of Present Illness  63-year-old female being followed for CAD status post bypass surgery and most recently stent of the native LAD due to atretic LIMA, residual  small OM due to occluded jump portion of SVG to OM to the PDA.    Much improved since stent    Patient is doing well, denies chest pain, shortness of breath, lower extremity edema, orthopnea, PND, palpitations or syncope.    No exertional symptoms, taking meds without any issues.    Previously noted:  previously being taken care of at [facility].  Has a history of premature coronary disease in her family, she presented to [facility] with accelerating angina, questionable stress test which led to angiogram and eventually bypass surgery.    She had bypass surgery December 2021, had sternal wires removed December 2022 due to reaction, chronic pain.  Since her sternal wires removed she feels much better.  She is able to walk without any exertional symptoms.    She has psoriatic arthritis which limits her exertional capacity but otherwise no palpitations or syncope.  No lower extremity edema, orthopnea or PND.  Cardiac risk factors Never smoked  Past Medical History  1.Preop testing  2.Hypertension (HTN), primary  3.Hyperlipidemia with target low density lipoprotein (LDL) cholesterol less than 70 mg/dL  4.MEDINA (dyspnea on exertion)  5.CAD native (coronary artery disease)  6.Palpitations  7.Acute chest pain  Past Surgical History  1.S/P CABG x 2  Family History  1. Mother - Heart disease  Social History  Smoking status Never smoked  Tobacco usage - No (Non-smoker for personal reasons (finding))  Review of  systems  Cardiovascular No history of Chest pain, MEDINA, Palpitations, Syncope, PND, Orthopnea, Edema and Claudication  Neurological No history of Migraines, Numbness and Limb weakness  Hem/Lymphatic No history of Easy bruising, Blood clots, Hx of blood transfusion, Anemia and Bleeding problems  Physical Examination  Constitutional 100% o2  Vitals Left Arm Sitting  / 66 mmHg, Pulse rate 63 bpm, Height in 5' 3\", BMI: 24.4, Weight in 138 lbs (or) 62.6 kgs and BSA : 1.68 cc/m²  General Appearance No Acute Distress and Well groomed  Cardiovascular   EKG/Other abnormalities  General: NAD  HEENT: Normocephalic, anicteric sclera, neck supple.  Neck: No JVD, carotids 2+, no bruits.  Cardiac: Regular rate and rhythm. S1, S2 normal. No murmur, pericardial rub, S3.  Lungs: Clear without wheezes, rales, rhonchi or dullness.  Normal excursions and effort.  Abdomen: Soft, non-tender. BS-present.  Extremities: Without clubbing, cyanosis or edema.  Peripheral pulses are 2+.  Neurologic: Non-focal  Skin: Warm and dry.  Allergies  1.adhesive tape - Ingredient(Reaction:hives, Severity:Moderate)  Medications  1.aspirin 81 mg chewable tablet, Take 1 tablet orally once a day.  2.atorvastatin 80 mg tablet, Take 1 tablet orally once a day.  3.bumetanide 1 mg tablet, Take 1 tablet orally once a day.  4.metoprolol tartrate 50 mg tablet, Take 1 tablet orally 2 times a day.  5.omeprazole 20 mg capsule,delayed release, Take 1 capsule orally 2 times a day.  6.prasugreL 10 mg tablet, Take 1 tablet orally once a day.  Impression  1.Chest discomfort  2.Hypertension (HTN), primary  3.Hyperlipidemia with target low density lipoprotein (LDL) cholesterol less than 70 mg/dL  4.MEDINA (dyspnea on exertion)  5.S/P CABG x 2  6.CAD native (coronary artery disease)  7.Palpitations  8.Acute chest pain  Assessment & Plan  CAD: Status post bypass surgery in the past, last angiogram February 2024 with atretic LIMA to LAD status post PCI native LAD, residual  jailed diagonal stenosis which was retroflexed and unable to be recrossed and ballooned.   small OM due to occluded jump portion of SVG to OM to right PDA.  Ramus with hazy nonobstructive 40 to 50% disease proximally.  Has ongoing shortness of breath which is intermittent in response to diuretics, see below.  Unlikely to be anginal equivalent.  Having significant bruising with Effient.  -Cardiac rehab ASAP   -stop prasugrel  -Start Plavix 75 mg daily    HFpEF: LVEDP at the time of angiogram 25 mmHg.  Her shortness of breath has responded to Bumex.  She continues to have intermittent shortness of breath, abdominal bloating which respond to Bumex extra dose.  -Increase Bumex to 2 mg every other day alternating with 1 mg daily  -BMP next week    Hypertension: May be slightly overmedicated with short acting metoprolol  -Stop metoprolol tartrate  -Start metoprolol succinate 25 mg XL daily    Hyperlipidemia:  On high intensity statin for secondary prevention, no changes.    Hyperlipidemia LDL is borderline high at 85 will consider adding Zetia at next office visit.    Follow-up:  Please give information regarding out portal access.  Clinic: 3 months  Testing/intervention: Meds, labs, rehab  Medications Ordered  1.Plavix 75 mg tablet, Take 1 tablet orally once a day.  2.bumetanide 2 mg tablet, Take 1 tablet orally once a day every other day  3.metoprolol succinate ER 25 mg tablet,extended release 24 hr, Take 1 tablet orally once a day.  Labs and Diagnostics ordered  1.BMP (Basic Metabolic Panel) (1 Week)  Future appointments  1.Referral Visit - Josep Valdez ([email or web address]) : (Today)  2.Follow up visit - Matt Mendez MD (3 Months)  Miscellaneous  1.Reviewed trans thoracic echocardiogram, nuclear pet, ambulatory telemetry monitor with the patient.  Patient instructions  Follow-up:  Please give information regarding out portal access.  Clinic: 3 months  Testing/intervention: Meds, labs, rehab  Lab  Details  BASIC METABOLIC PANEL (8)  12/21/2023 12:46:22 PM  GLUCOSE 87 70-99 mg/dL  F  SODIUM 138 136-145 mmol/L  F  POTASSIUM 3.9 3.5-5.1 mmol/L  F  CHLORIDE 104  mmol/L  F  CO2 30.0 21.0-32.0 mmol/L  F  ANION GAP 4 0-18 mmol/L  F  BUN 16 9-23 mg/dL  F  CREATININE 0.73 0.55-1.02 mg/dL  F  BUN/ CREAT RATIO 21.9 10.0-20.0 H F  CALCIUM 9.3 8.7-10.4 mg/dL  F  OSMOLALITY CALCULATED 287 275-295 mOsm/kg  F  E GFR CR 92 >=60 mL/min/1.73m2  F  FASTING PATIENT BMP ANSWER No   F  CBC W/ DIFFERENTIAL  12/04/2023 06:11:03 PM  WBC 11.6 4.0-11.0 x10(3) uL H F  RED BLOOD COUNT 4.33 3.80-5.30 x10(6)uL  F  HGB 12.6 12.0-16.0 g/dL  F  HCT 38.3 35.0-48.0 %  F  MEAN CELL VOLUME 88.5 80.0-100.0 fL  F  MEAN CORPUSCULAR HEMOGLOBIN 29.1 26.0-34.0 pg  F  MEAN CORPUSCULAR HGB CONC 32.9 31.0-37.0 g/dL  F  RED CELL DISTRIBUTION WIDTH-SD 43.1 35.1-46.3 fL  F  RED CELL DISTRIBUTION WIDTH CV 13.2 11.0-15.0 %  F  PLATELETS 363.0 150.0-450.0 10(3)uL  F  NEUTROPHIL ABS PRELIM 8.08 1.50-7.70 x10 (3) uL H F  NEUTROPHIL ABSOLUTE 8.08 1.50-7.70 x10(3) uL H F  LYMPHOCYTE ABSOLUTE 2.53 1.00-4.00 x10(3) uL  F  MONOCYTE ABSOLUTE 0.68 0.10-1.00 x10(3) uL  F  EOSINOPHIL ABSOLUTE 0.15 0.00-0.70 x10(3) uL  F  BASOPHIL ABSOLUTE 0.06 0.00-0.20 x10(3) uL  F  IMMATURE GRANULOCYTE COUNT 0.05 0.00-1.00 x10(3) uL  F  NEUTROPHIL % 70.0 %  F  LYMPHOCYTE % 21.9 %  F  MONOCYTE % 5.9 %  F  EOSINOPHIL % 1.3 %  F  BASOPHIL % 0.5 %  F  IMMATURE GRANULOCYTE RATIO % 0.4 %  F  Diagnostics Details  Trans Thoracic Echocardiogram 11/28/2023  1.The study quality is average.    2.The left ventricle is normal in size, wall thickness and wall motion; there are no regional wall motion abnormalities. Global left ventricular systolic function is normal. . The left ventricular ejection fraction is 64%. Left ventricular diastolic function is normal.    3.The left atrium is mildly enlarged based on the left atrium volume index of 35.0ml/m²    4.The right ventricle is normal in size.  Right ventricular systolic function is normal.    5.Mild mitral regurgitation.    Nuclear PET 11/13/2023  1.Stress EKG is normal.    2.No arrhythmias    3.No chest pain    1.This is a normal perfusion study, no perfusion defects noted.    2.This scan is suggestive of low risk for future cardiovascular events.    3.The left ventricular cavity is noted to be normal on the stress studies. The stress left ventricular ejection fraction was calculated to be 83% and left ventricular global function is hyperkinetic. This finding may be secondary to small ventricular size. The rest left ventricular cavity is noted to be normal. The rest left ventricular ejection fraction was calculated to be 80% and rest left ventricular global function is hyperkinetic.    4.When compared to the resting ejection fraction (80%), the stress ejection fraction (83%) has increased.    5.The study quality is poor.    ACTMonitoring 11/06/2023  1.This is a good quality study.    2. The patient entered shortness of breath, palpitations, and other into the patient monitor. Only shortness of breath was correlated with an arrhythmia for one sinus bradycardia event and one sinus tachycardia event. The remainder of the symptomatic occurrences do not correlate to an arrhythmia.    3.Predominant rhythm is normal sinus rhythm.    4.The minimum heart rate recorded was 43 beats / minute (sinus bradycardia, 11/11/2023). The maximum heart rate is 104 beats / minute (sinus tachycardia, 11/9/2023). The mean heart rate is 66 beats / minute.    5.No evidence of AV block is noted.    6.Rare premature atrial contractions noted.    7.No evidence of supraventricular tachycardia is noted.    8.The atrial fibrillation burden was 0%.    9.Rare multiform premature ventricular contractions noted.    10.No evidence of ventricular tachycardia is noted.    11.No pauses were noted.    12.Sinus  bpm Ectopy rare No arrhythmias Shortness of breath during normal sinus    CPOE  Orders carried out by: Matt Mendez MD and Ella GONZALEZ  Care Providers: Matt Mendez MD, Ella GONZALEZ and Margarette GONZALEZ  Electronically Authenticated by  Matt Mendez MD  02/21/2024 05:38:11 PM  Disclaimer: Components of this note were documented using voice recognition system and are subject to errors not corrected at proofreading. Contact the author of this note for any clarifications.

## 2024-12-01 NOTE — ED PROVIDER NOTES
Patient Seen in: [facility]         EMERGENCY DEPARTMENT NOTE    Dictated. Voice Transcription software has been utilized for this dictation (the reader should be aware that typographical errors are possible with voice transcription software and to please contact the dictating physician if there are questions.)         History     Chief Complaint   Patient presents with    Chest Pain    Breathing Problem       There may be discrepancies from triage note.     HPI    History provided by patient and patient's son who works as a paramedic.  63-year-old female, history of CAD with 1 stent, history of double bypass surgery complaining of left-sided chest pain which radiates to her left perispinal upper back associated with pain and shortness of breath for 2 weeks.  She reports worsening symptoms with exertion.  Reports generalized fatigue.  States that this feels similar to when she required a stent.  No trauma.  Denies cough/cold symptoms    No fevers, chills, nausea, vomiting, diarrhea, constipation, cough, cold symptoms, urinary complaints.  No headache, neck pain, neck stiffness, incontinence.  No changes in mentation, no changes in vision, no total/new extremity weakness, no total/new extremity paresthesia, no difficulty speaking.  No alleviating or exacerbating factors.  Denies orthopnea, pnd, , lower extremity edema/asymmetry.   Denies pleuritic pain.  Denies midline spinal pain    Denies fevers, weight loss, urinary/bowel incontinence, weakness, paresthesias, intravenous drug use, recent back surgeries/manipulation.     History reviewed.   Past Medical History:    Basal cell carcinoma    Skin, left cutaneous lip    Cervical radiculopathy at C7    Colon polyps    Coronary atherosclerosis    Essential hypertension    Fibromyalgia    High blood pressure    High cholesterol    Hypercholesterolemia    Psoriatic arthritis (HCC)       History reviewed.   Past Surgical History:   Procedure Laterality Date     Anesth,open heart surgery      Cabg      Colonoscopy N/A 2017    Procedure: COLONOSCOPY;  Surgeon: Selina Timmons MD;  Location: [facility] ENDOSCOPY    Colonoscopy,biopsy  2010    Performed by GIOVANNI BRITO at [facility], [facility]    Foot surgery Right 1990    Bunion    Other  2018    Mohs surgery left upper lip BCCa    Vaginal hysterectomy      With cystocele repair         Medications :  Prescriptions Prior to Admission[1]     Family History   Problem Relation Age of Onset    Colon Cancer Mother     Heart Disease Mother         CABG age 72    Hypertension Mother     Cancer Mother 61        Colon Cancer    Other (Bladder Cancer) Mother     Other (Cervical Cancer) Mother     Other (Peripheral Vascular Disease) Mother     Diabetes Maternal Grandmother     Hypertension Maternal Grandmother     Cancer Maternal Grandmother         hx of skin cancer     Heart Disease Maternal Grandfather          MI age 32    Heart Disease Maternal Aunt         Per NG: CAD    Breast Cancer Neg        Smoking Status:   Social History     Socioeconomic History    Marital status:    Tobacco Use    Smoking status: Former     Current packs/day: 0.00     Average packs/day: 0.5 packs/day for 37.0 years (18.5 ttl pk-yrs)     Types: Cigarettes     Start date: 1975     Quit date: 2012     Years since quittin.3     Passive exposure: Never    Smokeless tobacco: Never   Vaping Use    Vaping status: Never Used   Substance and Sexual Activity    Alcohol use: Yes     Alcohol/week: 0.0 standard drinks of alcohol     Comment: Occasional    Drug use: Yes     Types: Cannabis     Comment: Medical Gummies/Topical Cream   Other Topics Concern    Pt has a pacemaker No    Pt has a defibrillator No    Reaction to local anesthetic No    Right Handed Yes       Review of Systems   Constitutional: Negative.  Negative for fever.   HENT: Negative.     Eyes: Negative.  Negative for blurred vision.    Respiratory: Negative.  Negative for cough, hemoptysis and shortness of breath.    Cardiovascular:  Positive for chest pain. Negative for leg swelling.   Gastrointestinal: Negative.  Negative for abdominal pain.   Genitourinary: Negative.    Musculoskeletal:  Positive for back pain.   Skin: Negative.    Neurological: Negative.    Endo/Heme/Allergies: Negative.    Psychiatric/Behavioral: Negative.     All other systems reviewed and are negative.    Pertinent positives as listed.  All other organ systems are reviewed and are negative.    Constitutional and vital signs reviewed.      Social History and Family History elements reviewed from today, pertinent positives to the presenting problem noted.    Physical Exam     ED Triage Vitals   BP 12/01/24 0858 148/87   Pulse 12/01/24 0858 67   Resp 12/01/24 0858 24   Temp 12/01/24 0858 97.8 °F (36.6 °C)   Temp src 12/01/24 0858 Temporal   SpO2 12/01/24 0858 98 %   O2 Device 12/01/24 0857 None (Room air)       All measures to prevent infection transmission during my interaction with the patient were taken. The patient was already wearing a droplet mask on my arrival to the room. Personal protective equipment including droplet mask, eye protection, and gloves were worn throughout the duration of the exam.  Handwashing was performed prior to and after the exam.  Stethoscope and any equipment used during my examination was cleaned with super sani-cloth germicidal wipes following the exam.     Physical Exam  Vitals and nursing note reviewed.   Constitutional:       General: She is not in acute distress.     Appearance: She is not ill-appearing or toxic-appearing.   HENT:      Nose: Congestion present.   Cardiovascular:      Rate and Rhythm: Normal rate and regular rhythm.      Comments: Dorsalis pedis pulses 2+ bilaterally  Radial pulses 2+ bilaterally    Pulmonary:      Effort: Pulmonary effort is normal. No respiratory distress.      Breath sounds: No stridor. Wheezing  present. No rhonchi or rales.      Comments: Slightly tachypneic to 24 however speaks in full sentences.  Patient appears to be short of breath with speaking.  Saturating 98% on room air.  Mild expiratory wheeze noted at base  Chest:      Chest wall: No tenderness.   Abdominal:      General: There is no distension.      Palpations: Abdomen is soft.      Tenderness: There is no abdominal tenderness. There is no guarding or rebound.      Comments: Negative Wiggins sign, negative McBurney's point tenderness     Musculoskeletal:      Cervical back: Normal range of motion and neck supple.      Right lower leg: No edema.      Left lower leg: No edema.      Comments: No spinal tenderness diffusely.  No step-offs.  Normal range of motion of back.     Skin:     Capillary Refill: Capillary refill takes less than 2 seconds.   Neurological:      Mental Status: She is alert.      Comments: Gross motor and sensory function intact symmetrically and bilaterally to upper extremities and lower extremities.   Psychiatric:         Mood and Affect: Mood normal.         Behavior: Behavior normal.           Review of prior notes in Care everywhere/Epic performed by myself:  Pt had a RLE dvtstudy 6/24 neg for dvt   Pt with ct chest 4/21- descedning aoritc aceursym of 3.8 cm       ED Course     If labs obtained, they are personally reviewed by myself:     Labs Reviewed   BASIC METABOLIC PANEL (8) - Abnormal; Notable for the following components:       Result Value    Glucose 109 (*)     Potassium 3.4 (*)     Calculated Osmolality 296 (*)     All other components within normal limits   PRO BETA NATRIURETIC PEPTIDE - Abnormal; Notable for the following components:    Pro-Beta Natriuretic Peptide 235 (*)     All other components within normal limits   TROPONIN I HIGH SENSITIVITY - Normal   PROTHROMBIN TIME (PT) - Normal   CBC WITH DIFFERENTIAL WITH PLATELET   TROPONIN I HIGH SENSITIVITY       If radiologic studies ordered during today's ER  visit, my independent interpretation are seen directly below.  This is awaiting the radiologist's final interpretation.  ,cxr  CTA chest, independent interpretation of radiologic study completed by myself and awaiting formal radiologist interpretation: No pneumothorax      Imaging Results read by radiology in ED: CTA CHEST (CPT=71275)    Result Date: 12/1/2024  CONCLUSION:  1. No evidence of aortic dissection.  Stable aneurysmal dilation of the descending thoracic aorta, which measures up to 3.8 cm maximal diameter.  2. No evidence of acute pulmonary embolism. 3. Coronary artery calcification and coronary artery bypass. 4. No evidence of pneumonia. 5. Slight differential attenuation of the lung parenchyma suggests air trapping. 6. Approximate 5 mm left upper lobe lung nodule is unchanged in size, but demonstrates progressive central calcification since prior September, 2023 chest CT.  This is compatible with a benign calcified granuloma.  7. Lesser incidental findings as above.    Dictated by (CST): Naif Soler MD on 12/01/2024 at 10:37 AM     Finalized by (CST): Naif Soler MD on 12/01/2024 at 10:45 AM          XR CHEST AP PORTABLE  (CPT=71045)    Result Date: 12/1/2024  CONCLUSION:  1. Negative for radiographically evident acute intrathoracic process. 2. Stable tortuosity of the thoracic aorta.   Dictated by (CST): Naif Soler MD on 12/01/2024 at 9:37 AM     Finalized by (CST): Naif Soler MD on 12/01/2024 at 9:38 AM               ED Medications Administered:   Medications   ipratropium-albuterol (Duoneb) 0.5-2.5 (3) MG/3ML inhalation solution 3 mL (3 mL Nebulization Given 12/1/24 1030)   iopamidol 76% (ISOVUE-370) injection for power injector (80 mL Intravenous Given 12/1/24 1031)   ipratropium-albuterol (Duoneb) 0.5-2.5 (3) MG/3ML inhalation solution 3 mL (3 mL Nebulization Given 12/1/24 1110)           Vitals:    12/01/24 0857 12/01/24 0858 12/01/24 1115   BP:  148/87 138/72   Pulse:  67 66    Resp:  24 18   Temp:  97.8 °F (36.6 °C)    TempSrc:  Temporal    SpO2:  98% 100%   Weight: 63.5 kg     Height: 160 cm (5' 3\")       *I personally reviewed and interpreted all ED vitals.    Pulse Ox interpretation by myself: 98%, Room air, Normal     Monitor Interpretation by myself:   normal sinus rhythm    If Ekg obtained during today's visit, it is independently interpreted by myself directly below:  EKG    Rate, intervals and axes as noted on EKG Report.  Rate: 68  Rhythm: Sinus Rhythm  Reading: no st elevation, no st depression, normal t waves   1st degree AV block             Medical Record Review: I personally reviewed available prior medical records for any recent pertinent discharge summaries, testing, and procedures and reviewed those reports.      [facility]     Medical decision making/ED Course:   63-year-old female who presents to the emergency department for left-sided chest pain radiating to perispinal upper back associated with fatigue.  Denies lower extremity swelling.  Mild expiratory wheeze noted in patient with mild tachypnea upon initial evaluation.  Sounds congested during my interview however patient denies congestion.  She suspects this is secondary to her coronary artery disease.  States that this feels similar to when she had CAD.  Smiles, no distress, saturating 98%.  Thankfully labs appear stable at this time.  BMP normal.  Troponin negative.  EKG with sinus rhythm.  If this were true ACS, would anticipate a troponin elevation given duration of symptoms and the use of the high-sensitivity troponin.  Moderate heart score  Status post DuoNeb I appreciated a difference of her exam.  Tachypnea almost resolved, improved wheezing.  Patient states that she has had wheezing in the setting of fluid overload in the past.  States that she is also improved with simply resting in the past.  She is not certain if DuoNeb helped her.  Denies tobacco use.  CBC normal.  BMP essentially stable.  Potassium 3.4  however not significantly lower than normal.  Initial troponin negative.  INR normal.  BNP slightly elevated at 235 however x-ray with no pleural effusions.  Given history of descending thoracic aortic aneurysm, CTA obtained and thankfully negative for acute pathology.    Case discussed with both cardiology clinician and hospitalist who will kindly admit.  Patient remains in no distress, hemodynamically stable.  No further recommendations at this time.      Differential Diagnosis:  as listed above in medical decision making.   *Please note that in the presenting to the emergency department, illness/injury that poses a threat to life or function is considered during this patient's initial evaluation.    The complexity of this visit is therefore inherently more complex given the need to consider life threatening pathology prior to any other etiology for this patient's visit.    The differential diagnosis and medical decision above exemplify this rationale.       Medical Decision Making  Problems Addressed:  Chest pain, unspecified type: acute illness or injury    Amount and/or Complexity of Data Reviewed  Independent Historian:      Details: son  External Data Reviewed: notes.  Labs: ordered. Decision-making details documented in ED Course.  Radiology: ordered and independent interpretation performed. Decision-making details documented in ED Course.  ECG/medicine tests: ordered and independent interpretation performed. Decision-making details documented in ED Course.  Discussion of management or test interpretation with external provider(s): Cardiology clinician  Hospitalist    Risk  Decision regarding hospitalization.    Critical Care  Total time providing critical care: 31 minutes          ED Course as of 12/01/24 1123  ------------------------------------------------------------  Time: 12/01 1109  Comment: Respiratory rate of 21, patient appears to look more comfortable however she states that when she is right  breast her symptoms typically tend to self resolve and her shortness of breath gets better.   repeat lung exam with mild expiratory wheeze noted on left.  Will give another DuoNeb       Vitals:    12/01/24 0857 12/01/24 0858 12/01/24 1115   BP:  148/87 138/72   Pulse:  67 66   Resp:  24 18   Temp:  97.8 °F (36.6 °C)    TempSrc:  Temporal    SpO2:  98% 100%   Weight: 63.5 kg     Height: 160 cm (5' 3\")               Complicating Factors: Significant medical problems that contribute to the complexity of this emergency room evaluation is listed above.    Condition upon leaving the department: Stable    Disposition and Plan     Clinical Impression:  1. Chest pain, unspecified type        Disposition:  Admit    Medications Prescribed:  Current Discharge Medication List                Hospital Problems       Present on Admission  Date Reviewed: 6/10/2024            ICD-10-CM Noted POA    * (Principal) Chest pain, unspecified type R07.9 12/1/2024 Unknown                     [1] (Not in a hospital admission)

## 2024-12-01 NOTE — ED INITIAL ASSESSMENT (HPI)
No arrives with complaints of sob and fatigue x 2 weeks with hx of chf and 1 week of worsening chest pains radiating to left arm pit area radiating around to her back.   Comes to ED today due to pain being worse.   
English

## 2024-12-01 NOTE — H&P
[facility]  part of [facility]    History and Physical     No Saucedo Patient Status:  Observation    1960 MRN [id number]   Location [facility] 3W/SW Attending Kayla Merlos MD   Hosp Day # 0 PCP Josep Valdez MD     History of Present Illness: No Saucedo is a 63 year old female with a past medical history of CAD s/p PCI, HTN, HLD presented to the ER with complaints of left sided chest pain, radiating to her back. She stated she has had sob for the past 2 weeks which worsened with exertion. She denied any other complaints at the time of interview.    Past Medical History:  Past Medical History:    Basal cell carcinoma    Skin, left cutaneous lip    Cervical radiculopathy at C7    Colon polyps    Coronary atherosclerosis    Essential hypertension    Fibromyalgia    High blood pressure    High cholesterol    Hypercholesterolemia    Psoriatic arthritis (HCC)        Past Surgical History:   Past Surgical History:   Procedure Laterality Date    Anesth,open heart surgery      Cabg      Colonoscopy N/A 2017    Procedure: COLONOSCOPY;  Surgeon: Selina Timmons MD;  Location: [facility] ENDOSCOPY    Colonoscopy,biopsy  2010    Performed by GIOVANNI BRITO at [facility], [facility]    Foot surgery Right     Bunion    Other  2018    Mohs surgery left upper lip BCCa    Vaginal hysterectomy      With cystocele repair       Social History:  reports that she quit smoking about 12 years ago. Her smoking use included cigarettes. She started smoking about 49 years ago. She has a 18.5 pack-year smoking history. She has never been exposed to tobacco smoke. She has never used smokeless tobacco. She reports current alcohol use. She reports current drug use. Drug: Cannabis.    Family History:   Family History   Problem Relation Age of Onset    Colon Cancer Mother     Heart Disease Mother         CABG age 72    Hypertension Mother     Cancer Mother 61         Colon Cancer    Other (Bladder Cancer) Mother     Other (Cervical Cancer) Mother     Other (Peripheral Vascular Disease) Mother     Diabetes Maternal Grandmother     Hypertension Maternal Grandmother     Cancer Maternal Grandmother         hx of skin cancer     Heart Disease Maternal Grandfather          MI age 32    Heart Disease Maternal Aunt         Per NG: CAD    Breast Cancer Neg        Allergies: Allergies[1]    Medications:  Medications Ordered Prior to Encounter[2]    Review of Systems:   A comprehensive 14 point review of systems was completed.    Pertinent positives and negatives noted in the HPI.    Physical Exam:    /78 (BP Location: Right arm)   Pulse 73   Temp 98.5 °F (36.9 °C) (Oral)   Resp 18   Ht 5' 3\" (1.6 m)   Wt 143 lb (64.9 kg)   SpO2 96%   BMI 25.33 kg/m²   General: No acute distress. Alert and oriented x 3.  Respiratory: Clear to auscultation bilaterally. No wheezes. No rhonchi.  Cardiovascular: S1, S2. Regular rate and rhythm. No murmurs, no rubs or gallops. Equal pulses.   Abdomen: Soft, nontender, nondistended.  Positive bowel sounds. No rebound, guarding or organomegaly.  Neurologic: No focal neurological deficits. CNII-XII grossly intact.  Extremities: No edema or cyanosis.      Diagnostic Data:      Labs:  Recent Labs   Lab 24  0927   WBC 7.7   HGB 12.8   MCV 85.6   .0   INR 0.89       Recent Labs   Lab 24  0927   *   BUN 16   CREATSERUM 0.88   CA 9.6      K 3.4*      CO2 25.0       Estimated Creatinine Clearance: 53.4 mL/min (based on SCr of 0.88 mg/dL).    Recent Labs   Lab 24  0927   PTP 12.6   INR 0.89       No results for input(s): \"TROP\", \"CK\" in the last 168 hours.    Imaging: Imaging data reviewed in Epic.      ASSESSMENT / PLAN:     Chest pain  SOB  CXR reviewed  CT chest negative for dissection, but positive for aneurysmal dilation of descending thoracic aorta. Negative for PE.  Trop and EKG reviewed  Cardiology on  consult  Appreciate recs  PRN pain contrl  Nebs  Tele monitoring  CAD s/p PCI  Continue home meds  Appreciate cardiology recs  HLD  Continue statin  Advance care planning  Full code  ~32 minutes       Quality:  DVT Prophylaxis: Heparin s/c      Plan of care discussed with ED physician    Kayla Merlos MD  12/1/2024                         The 21st Century Cures Act makes medical notes like these available to patients in the interest of transparency. Please be advised this is a medical document. Medical documents are intended to carry relevant information, facts as evident, and the clinical opinion of the practitioner. The medical note is intended as peer to peer communication and may appear blunt or direct. It is written in medical language and may contain abbreviations or verbiage that are unfamiliar.          [1]   Allergies  Allergen Reactions    Adhesive Tape HIVES   [2]   No current facility-administered medications on file prior to encounter.     Current Outpatient Medications on File Prior to Encounter   Medication Sig Dispense Refill    predniSONE 5 MG Oral Tab Take 6 tabsx 2 day, take 5 tabsx 2 day, take 4 tabsx 2 day,take 3 tabsx 2 day, take 2 tabsx 2 day, take 1 tabsx 2 day, then off 42 tablet 0    atorvastatin 80 MG Oral Tab Take 1 tablet (80 mg total) by mouth nightly. 90 tablet 3    bumetanide 2 MG Oral Tab Take 1 tablet (2 mg total) by mouth every other day.      metoprolol succinate ER 25 MG Oral Tablet 24 Hr Take 1 tablet (25 mg total) by mouth daily.      clopidogrel 75 MG Oral Tab       omeprazole 20 MG Oral Capsule Delayed Release Take 1 capsule (20 mg total) by mouth 2 (two) times daily before meals. 180 capsule 3    NON FORMULARY 5 mg  in the morning and 5 mg before bedtime. Takes cannabis gummy 5 mg after work and 10 mg at bedtime.      aspirin 81 MG Oral Tab EC Take 1 tablet (81 mg total) by mouth daily.      Potassium Chloride ER 20 MEQ Oral Tab CR       ergocalciferol 1.25 MG (55129 UT) Oral  Cap Take 1 capsule (50,000 Units total) by mouth once a week. 12 capsule 0    ondansetron 4 MG Oral Tablet Dispersible Take 1 tablet (4 mg total) by mouth every 8 (eight) hours as needed for Nausea. 20 tablet 0

## 2024-12-01 NOTE — PLAN OF CARE
Patient arrived to unit with 10/10 chest pain radiating. MD made paged and PRN nitroglycerin ordered. Improve chest pain. Cardiology aware. Med rec and admission done. Dr. Gaurang bhagat.   Problem: Patient Centered Care  Goal: Patient preferences are identified and integrated in the patient's plan of care  Description: Interventions:  - What would you like us to know as we care for you? Home with family  - Provide timely, complete, and accurate information to patient/family  - Incorporate patient and family knowledge, values, beliefs, and cultural backgrounds into the planning and delivery of care  - Encourage patient/family to participate in care and decision-making at the level they choose  - Honor patient and family perspectives and choices  Outcome: Progressing     Problem: Patient/Family Goals  Goal: Patient/Family Long Term Goal  Description: Patient's Long Term Goal: improve pain    Interventions:  - PRN pain meds  -monitor labs and vitals  -follow plan of care    - See additional Care Plan goals for specific interventions  Outcome: Progressing  Goal: Patient/Family Short Term Goal  Description: Patient's Short Term Goal: home     Interventions:   - follow plan of care  -monitor cardiac symptoms.   - See additional Care Plan goals for specific interventions  Outcome: Progressing     Problem: CARDIOVASCULAR - ADULT  Goal: Maintains optimal cardiac output and hemodynamic stability  Description: INTERVENTIONS:  - Monitor vital signs, rhythm, and trends  - Monitor for bleeding, hypotension and signs of decreased cardiac output  - Evaluate effectiveness of vasoactive medications to optimize hemodynamic stability  - Monitor arterial and/or venous puncture sites for bleeding and/or hematoma  - Assess quality of pulses, skin color and temperature  - Assess for signs of decreased coronary artery perfusion - ex. Angina  - Evaluate fluid balance, assess for edema, trend weights  Outcome: Progressing     Problem: PAIN -  ADULT  Goal: Verbalizes/displays adequate comfort level or patient's stated pain goal  Description: INTERVENTIONS:  - Encourage pt to monitor pain and request assistance  - Assess pain using appropriate pain scale  - Administer analgesics based on type and severity of pain and evaluate response  - Implement non-pharmacological measures as appropriate and evaluate response  - Consider cultural and social influences on pain and pain management  - Manage/alleviate anxiety  - Utilize distraction and/or relaxation techniques  - Monitor for opioid side effects  - Notify MD/LIP if interventions unsuccessful or patient reports new pain  - Anticipate increased pain with activity and pre-medicate as appropriate  Outcome: Progressing

## 2024-12-01 NOTE — CONSULTS
[facility] - CARDIOLOGY CONSULT NOTE    No Saucedo Patient Status:  Observation    1960 MRN [id number]   Location [facility] 3W/SW Attending Kayla Merlos MD   Hosp Day # 0 PCP Josep Valdez MD     Date of Admission:  2024  Date of Consult:  2024  I was asked by Kayla Merlos MD to provide recommendations for evaluation and management of cardiac issues.  Impression:   Chest pain    Coronary disease history of PCI  History of bypass surgery most recent PCI stent to the native LAD due to atretic LIMA and residual  of small OM due to occluded jump portion of SVG to OM to PDA  Chest pain improved since stent    Dyslipidemia  On statin LDL 85    Shortness of breath  Had shortness of breath complaints in the office had switch prasugrel to Plavix and increase Bumex to 2 mg every other day alternating with 1 mg daily    Hypertension  Switch metoprolol to tartrate to succinate    Recommendations:    Serial enzymes to rule out NSTEMI  Telemetry  Consider nuclear stress test if troponins negative versus cardiac cath given patient's history and previous normal testing with significant disease  Will keep n.p.o. after midnight no caffeine for either testing    Reason for Consultation:     Chest pain    History of Present Illness:   Patient is a 63 year old female who was admitted to the hospital for chest pain.    Patient with history of coronary disease and PCI history, hypertension dyslipidemia presented with complaints of left-sided chest pain.  The pain radiated to the back and associate with shortness of breath and worsening with exertion better with rest.  History of bypass surgery in 2021 at [facility].  Had sternal wires removed due to reaction 2022 with chronic pain.  She also has dyspnea with exertion.  Nitroglycerin given in the ER chest pain improved.  She reports previously having okay testing and then finding significant disease almost every year she  did need something done.  Also has chronic pain issues so does not know if some of her symptoms are related to that.  We did discuss initial troponin is negative and no acute changes on the EKG although some of her chest pain was typical and relieved by nitroglycerin.    Cardiac tests:  Troponin less than 3  EKG sinus rhythm no acute changes  proBNP 235  Potassium 3.4 and creatinine 0.88    Status post bypass surgery in the past, last angiogram February 2024 with atretic LIMA to LAD status post PCI native LAD, residual jailed diagonal stenosis which was retroflexed and unable to be recrossed and ballooned.  small OM due to occluded jump portion of SVG to OM to right PDA. Ramus with hazy nonobstructive 40 to 50% disease proximally.  LVEDP 25    Echocardiogram November 2023 EF 64%  PET stress November 2023 normal perfusion EF 80%  Monitor with sinus rhythm 2023  Results:     Lab Results   Component Value Date    WBC 7.7 12/01/2024    HGB 12.8 12/01/2024    HCT 37.4 12/01/2024    .0 12/01/2024    CREATSERUM 0.88 12/01/2024    BUN 16 12/01/2024     12/01/2024    K 3.4 (L) 12/01/2024     12/01/2024    CO2 25.0 12/01/2024     (H) 12/01/2024    CA 9.6 12/01/2024    ALB 3.4 09/16/2023    ALKPHO 75 09/16/2023    BILT 0.6 09/16/2023    TP 6.8 09/16/2023    AST 19 09/16/2023    ALT 20 09/16/2023    INR 0.89 12/01/2024    TSH 0.909 09/16/2023    DDIMER <0.27 04/13/2021    ESRML 4 09/16/2023    CRP <0.29 09/16/2023    TROP <0.045 04/13/2021       CTA CHEST (CPT=71275)    Result Date: 12/1/2024  CONCLUSION:  1. No evidence of aortic dissection.  Stable aneurysmal dilation of the descending thoracic aorta, which measures up to 3.8 cm maximal diameter.  2. No evidence of acute pulmonary embolism. 3. Coronary artery calcification and coronary artery bypass. 4. No evidence of pneumonia. 5. Slight differential attenuation of the lung parenchyma suggests air trapping. 6. Approximate 5 mm left upper lobe  lung nodule is unchanged in size, but demonstrates progressive central calcification since prior September, 2023 chest CT.  This is compatible with a benign calcified granuloma.  7. Lesser incidental findings as above.    Dictated by (CST): Naif Soler MD on 12/01/2024 at 10:37 AM     Finalized by (CST): Naif Soler MD on 12/01/2024 at 10:45 AM          XR CHEST AP PORTABLE  (CPT=71045)    Result Date: 12/1/2024  CONCLUSION:  1. Negative for radiographically evident acute intrathoracic process. 2. Stable tortuosity of the thoracic aorta.   Dictated by (CST): Naif Soler MD on 12/01/2024 at 9:37 AM     Finalized by (CST): Naif Soler MD on 12/01/2024 at 9:38 AM         EKG 12 Lead    Result Date: 12/1/2024  Sinus rhythm with 1st degree A-V block Otherwise normal ECG When compared with ECG of 16-MAR-2023 08:08, No significant change was found     Past Medical History  Past Medical History:    Basal cell carcinoma    Skin, left cutaneous lip    Cervical radiculopathy at C7    Colon polyps    Coronary atherosclerosis    Essential hypertension    Fibromyalgia    High blood pressure    High cholesterol    Hypercholesterolemia    Psoriatic arthritis (HCC)       Past Surgical History  Past Surgical History:   Procedure Laterality Date    Anesth,open heart surgery  2021    Cabg      Colonoscopy N/A 07/07/2017    Procedure: COLONOSCOPY;  Surgeon: Selina Timmons MD;  Location: [facility] ENDOSCOPY    Colonoscopy,biopsy  02/12/2010    Performed by GIOVANNI BRITO at [facility], [facility]    Foot surgery Right 1990    Bunion    Other  03/2018    Mohs surgery left upper lip BCCa    Vaginal hysterectomy  1987    With cystocele repair       Family History  Family History   Problem Relation Age of Onset    Colon Cancer Mother     Heart Disease Mother         CABG age 72    Hypertension Mother     Cancer Mother 61        Colon Cancer    Other (Bladder Cancer) Mother     Other (Cervical Cancer) Mother     Other  (Peripheral Vascular Disease) Mother     Diabetes Maternal Grandmother     Hypertension Maternal Grandmother     Cancer Maternal Grandmother         hx of skin cancer     Heart Disease Maternal Grandfather          MI age 32    Heart Disease Maternal Aunt         Per NG: CAD    Breast Cancer Neg        Social History  Pediatric History   Patient Parents    Not on file     Other Topics Concern    Grew up on a farm Not Asked    History of tanning Not Asked    Outdoor occupation Not Asked    Pt has a pacemaker No    Pt has a defibrillator No    Breast feeding Not Asked    Reaction to local anesthetic No    Left Handed Not Asked    Right Handed Yes    Currently spends a great deal of time in the sun Not Asked    Past Sunlamp Treatments for Acne Not Asked    Hx of Spending Great Deal of Time in Sun Not Asked    Bad sunburns in the past Not Asked    Tanning Salons in the Past Not Asked    Hx of Radiation Treatments Not Asked    Regular use of sun block Not Asked   Social History Narrative    Not on file           Current Medications:  Current Facility-Administered Medications   Medication Dose Route Frequency    melatonin tab 3 mg  3 mg Oral Nightly PRN    heparin (Porcine) 5000 UNIT/ML injection 5,000 Units  5,000 Units Subcutaneous Q8H ZE    acetaminophen (Tylenol Extra Strength) tab 500 mg  500 mg Oral Q4H PRN    benzonatate (Tessalon) cap 200 mg  200 mg Oral TID PRN    ondansetron (Zofran) 4 MG/2ML injection 4 mg  4 mg Intravenous Q6H PRN    polyethylene glycol (PEG 3350) (Miralax) 17 g oral packet 17 g  17 g Oral Daily PRN    sennosides (Senokot) tab 17.2 mg  17.2 mg Oral Nightly PRN    bisacodyl (Dulcolax) 10 MG rectal suppository 10 mg  10 mg Rectal Daily PRN    fleet enema (Fleet) rectal enema 133 mL  1 enema Rectal Once PRN    influenza virus trivalent PF (Fluzone Trivalent) 0.5 mL IM injection (ages 6 months to 64 years) 0.5 mL  0.5 mL Intramuscular Prior to discharge    ipratropium-albuterol (Duoneb)  0.5-2.5 (3) MG/3ML inhalation solution 3 mL  3 mL Nebulization Q6H PRN    nitroglycerin (Nitrostat) SL tab 0.4 mg  0.4 mg Sublingual Q5 Min PRN    morphINE PF 2 MG/ML injection 1 mg  1 mg Intravenous Q2H PRN    Or    morphINE PF 2 MG/ML injection 2 mg  2 mg Intravenous Q2H PRN    acetaminophen (Tylenol) tab 650 mg  650 mg Oral Q6H PRN     Medications Prior to Admission   Medication Sig    predniSONE 5 MG Oral Tab Take 6 tabsx 2 day, take 5 tabsx 2 day, take 4 tabsx 2 day,take 3 tabsx 2 day, take 2 tabsx 2 day, take 1 tabsx 2 day, then off    atorvastatin 80 MG Oral Tab Take 1 tablet (80 mg total) by mouth nightly.    bumetanide 2 MG Oral Tab Take 1 tablet (2 mg total) by mouth every other day.    metoprolol succinate ER 25 MG Oral Tablet 24 Hr Take 1 tablet (25 mg total) by mouth daily.    clopidogrel 75 MG Oral Tab     omeprazole 20 MG Oral Capsule Delayed Release Take 1 capsule (20 mg total) by mouth 2 (two) times daily before meals.    NON FORMULARY 5 mg  in the morning and 5 mg before bedtime. Takes cannabis gummy 5 mg after work and 10 mg at bedtime.    aspirin 81 MG Oral Tab EC Take 1 tablet (81 mg total) by mouth daily.    Potassium Chloride ER 20 MEQ Oral Tab CR     ergocalciferol 1.25 MG (12500 UT) Oral Cap Take 1 capsule (50,000 Units total) by mouth once a week.    ondansetron 4 MG Oral Tablet Dispersible Take 1 tablet (4 mg total) by mouth every 8 (eight) hours as needed for Nausea.       Allergies  Allergies[1]    Review of Systems:   10 pt ROS performed, separate from HPI  Review of Systems:  GENERAL: no fevers, chills, sweats  HEENT: no visual or hearing changes  SKIN: denies any unusual skin lesions or rashes  RESPIRATORY: denies shortness of breath with exertion  CARDIOVASCULAR: no active chest pain, no claudication  GI: denies abdominal pain and denies heartburn  : no dysuria or hematuria  NEURO: denies headaches, focal weaknesses or paresthesias  All other systems reviewed and negative.  fatigue  is present  All other systems were reviewed are negative  Physical Exam:   Blood pressure 130/75, pulse 75, temperature 98.5 °F (36.9 °C), temperature source Oral, resp. rate 18, height 5' 3\" (1.6 m), weight 143 lb (64.9 kg), SpO2 96%, not currently breastfeeding.    Scheduled Meds:    heparin  5,000 Units Subcutaneous Q8H ZE         Physical Exam:    General: Alert and oriented. No apparent distress. No respiratory or constitutional distress.  HEENT: Normocephalic, anicteric sclera, neck supple  Neck: No JVD, carotids 2+, supple  Cardiac: Regular rate. No pathologic murmur.  Lungs: Clear with normal effort.  Normal excursions and effort.  Abdomen: Soft, non-tender. BS-present.  Extremities: Without clubbing, cyanosis.  Peripheral pulsespresent.  Neurologic: Alert and oriented, normal affect. Motor ok.  Skin: Warm and dry.     Imaging: I independently visualized all relevant chest imaging in PACS, agree with radiology interpretation except where noted.  Thank you for allowing me to participate in the care of your patient.    Marcos Louie MD  [facility]  Cardiac Electrophysiology  12/1/2024  5 level consult         [1]   Allergies  Allergen Reactions    Adhesive Tape HIVES

## 2024-12-01 NOTE — ED QUICK NOTES
Orders for admission, patient is aware of plan and ready to go upstairs. Any questions, please call ED TAE Morgan at extension [phone].     Patient Covid vaccination status: Fully vaccinated     COVID Test Ordered in ED: None    COVID Suspicion at Admission: N/A    Running Infusions:  None    Mental Status/LOC at time of transport: A&Ox4    Other pertinent information:   CIWA score: N/A   NIH score:  N/A      Orders for admission, patient is aware of plan and ready to go upstairs. Any questions, please call ED TAE Morgan at extension [phone].     Patient Covid vaccination status: Fully vaccinated     COVID Test Ordered in ED: None    COVID Suspicion at Admission: N/A    Running Infusions:  None    Mental Status/LOC at time of transport: A&Ox4    Other pertinent information:   CIWA score: N/A   NIH score:  N/A

## 2024-12-02 ENCOUNTER — APPOINTMENT (OUTPATIENT)
Dept: INTERVENTIONAL RADIOLOGY/VASCULAR | Facility: HOSPITAL | Age: 64
End: 2024-12-02
Attending: INTERNAL MEDICINE
Payer: MEDICARE

## 2024-12-02 LAB
ANION GAP SERPL CALC-SCNC: 7 MMOL/L (ref 0–18)
ANION GAP SERPL CALC-SCNC: 9 MMOL/L (ref 0–18)
ATRIAL RATE: 58 BPM
ATRIAL RATE: 68 BPM
ATRIAL RATE: 69 BPM
ATRIAL RATE: 69 BPM
BASOPHILS # BLD AUTO: 0.07 X10(3) UL (ref 0–0.2)
BASOPHILS NFR BLD AUTO: 1 %
BUN BLD-MCNC: 14 MG/DL (ref 9–23)
BUN BLD-MCNC: 16 MG/DL (ref 9–23)
BUN/CREAT SERPL: 19.7 (ref 10–20)
BUN/CREAT SERPL: 22.2 (ref 10–20)
CALCIUM BLD-MCNC: 9.8 MG/DL (ref 8.7–10.4)
CALCIUM BLD-MCNC: 9.9 MG/DL (ref 8.7–10.4)
CHLORIDE SERPL-SCNC: 107 MMOL/L (ref 98–112)
CHLORIDE SERPL-SCNC: 109 MMOL/L (ref 98–112)
CHOLEST SERPL-MCNC: 186 MG/DL (ref ?–200)
CO2 SERPL-SCNC: 25 MMOL/L (ref 21–32)
CO2 SERPL-SCNC: 27 MMOL/L (ref 21–32)
CREAT BLD-MCNC: 0.71 MG/DL
CREAT BLD-MCNC: 0.72 MG/DL
DEPRECATED RDW RBC AUTO: 42.7 FL (ref 35.1–46.3)
DEPRECATED RDW RBC AUTO: 45.1 FL (ref 35.1–46.3)
EGFRCR SERPLBLD CKD-EPI 2021: 93 ML/MIN/1.73M2 (ref 60–?)
EGFRCR SERPLBLD CKD-EPI 2021: 95 ML/MIN/1.73M2 (ref 60–?)
EOSINOPHIL # BLD AUTO: 0.41 X10(3) UL (ref 0–0.7)
EOSINOPHIL NFR BLD AUTO: 6.1 %
ERYTHROCYTE [DISTWIDTH] IN BLOOD BY AUTOMATED COUNT: 13.1 % (ref 11–15)
ERYTHROCYTE [DISTWIDTH] IN BLOOD BY AUTOMATED COUNT: 13.2 % (ref 11–15)
GLUCOSE BLD-MCNC: 108 MG/DL (ref 70–99)
GLUCOSE BLD-MCNC: 91 MG/DL (ref 70–99)
HCT VFR BLD AUTO: 40.2 %
HCT VFR BLD AUTO: 40.9 %
HDLC SERPL-MCNC: 54 MG/DL (ref 40–59)
HGB BLD-MCNC: 13.1 G/DL
HGB BLD-MCNC: 13.4 G/DL
IMM GRANULOCYTES # BLD AUTO: 0.01 X10(3) UL (ref 0–1)
IMM GRANULOCYTES NFR BLD: 0.1 %
ISTAT ACTIVATED CLOTTING TIME: 244 SECONDS (ref 125–137)
ISTAT ACTIVATED CLOTTING TIME: 269 SECONDS (ref 125–137)
LDLC SERPL CALC-MCNC: 112 MG/DL (ref ?–100)
LYMPHOCYTES # BLD AUTO: 1.83 X10(3) UL (ref 1–4)
LYMPHOCYTES NFR BLD AUTO: 27.2 %
MAGNESIUM SERPL-MCNC: 2.1 MG/DL (ref 1.6–2.6)
MCH RBC QN AUTO: 29.6 PG (ref 26–34)
MCH RBC QN AUTO: 30 PG (ref 26–34)
MCHC RBC AUTO-ENTMCNC: 32 G/DL (ref 31–37)
MCHC RBC AUTO-ENTMCNC: 33.3 G/DL (ref 31–37)
MCV RBC AUTO: 88.9 FL
MCV RBC AUTO: 93.8 FL
MONOCYTES # BLD AUTO: 0.52 X10(3) UL (ref 0.1–1)
MONOCYTES NFR BLD AUTO: 7.7 %
NEUTROPHILS # BLD AUTO: 3.89 X10 (3) UL (ref 1.5–7.7)
NEUTROPHILS # BLD AUTO: 3.89 X10(3) UL (ref 1.5–7.7)
NEUTROPHILS NFR BLD AUTO: 57.9 %
NONHDLC SERPL-MCNC: 132 MG/DL (ref ?–130)
OSMOLALITY SERPL CALC.SUM OF ELEC: 294 MOSM/KG (ref 275–295)
OSMOLALITY SERPL CALC.SUM OF ELEC: 296 MOSM/KG (ref 275–295)
P AXIS: 23 DEGREES
P AXIS: 27 DEGREES
P AXIS: 49 DEGREES
P AXIS: 51 DEGREES
P-R INTERVAL: 210 MS
P-R INTERVAL: 216 MS
P-R INTERVAL: 218 MS
P-R INTERVAL: 222 MS
PLATELET # BLD AUTO: 294 10(3)UL (ref 150–450)
PLATELET # BLD AUTO: 343 10(3)UL (ref 150–450)
PLATELETS.RETICULATED NFR BLD AUTO: 4 % (ref 0–7)
POTASSIUM SERPL-SCNC: 4.2 MMOL/L (ref 3.5–5.1)
POTASSIUM SERPL-SCNC: 4.2 MMOL/L (ref 3.5–5.1)
POTASSIUM SERPL-SCNC: 4.3 MMOL/L (ref 3.5–5.1)
Q-T INTERVAL: 388 MS
Q-T INTERVAL: 396 MS
Q-T INTERVAL: 400 MS
Q-T INTERVAL: 406 MS
QRS DURATION: 74 MS
QRS DURATION: 74 MS
QRS DURATION: 76 MS
QRS DURATION: 84 MS
QTC CALCULATION (BEZET): 398 MS
QTC CALCULATION (BEZET): 412 MS
QTC CALCULATION (BEZET): 424 MS
QTC CALCULATION (BEZET): 428 MS
R AXIS: -16 DEGREES
R AXIS: -20 DEGREES
R AXIS: -27 DEGREES
R AXIS: -7 DEGREES
RBC # BLD AUTO: 4.36 X10(6)UL
RBC # BLD AUTO: 4.52 X10(6)UL
SODIUM SERPL-SCNC: 141 MMOL/L (ref 136–145)
SODIUM SERPL-SCNC: 143 MMOL/L (ref 136–145)
T AXIS: 48 DEGREES
T AXIS: 50 DEGREES
T AXIS: 54 DEGREES
T AXIS: 6 DEGREES
TRIGL SERPL-MCNC: 112 MG/DL (ref 30–149)
TROPONIN I SERPL HS-MCNC: <3 NG/L
VENTRICULAR RATE: 58 BPM
VENTRICULAR RATE: 68 BPM
VENTRICULAR RATE: 69 BPM
VENTRICULAR RATE: 69 BPM
VLDLC SERPL CALC-MCNC: 19 MG/DL (ref 0–30)
WBC # BLD AUTO: 10.2 X10(3) UL (ref 4–11)
WBC # BLD AUTO: 6.7 X10(3) UL (ref 4–11)

## 2024-12-02 PROCEDURE — 99233 SBSQ HOSP IP/OBS HIGH 50: CPT | Performed by: HOSPITALIST

## 2024-12-02 PROCEDURE — 02703ZZ DILATION OF CORONARY ARTERY, ONE ARTERY, PERCUTANEOUS APPROACH: ICD-10-PCS | Performed by: INTERNAL MEDICINE

## 2024-12-02 PROCEDURE — 027034Z DILATION OF CORONARY ARTERY, ONE ARTERY WITH DRUG-ELUTING INTRALUMINAL DEVICE, PERCUTANEOUS APPROACH: ICD-10-PCS | Performed by: INTERNAL MEDICINE

## 2024-12-02 PROCEDURE — B2111ZZ FLUOROSCOPY OF MULTIPLE CORONARY ARTERIES USING LOW OSMOLAR CONTRAST: ICD-10-PCS | Performed by: INTERNAL MEDICINE

## 2024-12-02 PROCEDURE — 4A023N7 MEASUREMENT OF CARDIAC SAMPLING AND PRESSURE, LEFT HEART, PERCUTANEOUS APPROACH: ICD-10-PCS | Performed by: INTERNAL MEDICINE

## 2024-12-02 PROCEDURE — B2121ZZ FLUOROSCOPY OF SINGLE CORONARY ARTERY BYPASS GRAFT USING LOW OSMOLAR CONTRAST: ICD-10-PCS | Performed by: INTERNAL MEDICINE

## 2024-12-02 RX ORDER — IOPAMIDOL 612 MG/ML
85 INJECTION, SOLUTION INTRAVASCULAR
Status: COMPLETED | OUTPATIENT
Start: 2024-12-02 | End: 2024-12-02

## 2024-12-02 RX ORDER — PROCHLORPERAZINE EDISYLATE 5 MG/ML
10 INJECTION INTRAMUSCULAR; INTRAVENOUS EVERY 6 HOURS PRN
Status: DISCONTINUED | OUTPATIENT
Start: 2024-12-02 | End: 2024-12-04

## 2024-12-02 RX ORDER — CLOPIDOGREL BISULFATE 75 MG/1
75 TABLET ORAL DAILY
Status: DISCONTINUED | OUTPATIENT
Start: 2024-12-03 | End: 2024-12-04

## 2024-12-02 RX ORDER — HYDROCODONE BITARTRATE AND ACETAMINOPHEN 5; 325 MG/1; MG/1
1 TABLET ORAL EVERY 6 HOURS PRN
Status: DISCONTINUED | OUTPATIENT
Start: 2024-12-02 | End: 2024-12-04

## 2024-12-02 RX ORDER — ASPIRIN 81 MG/1
81 TABLET ORAL DAILY
Status: DISCONTINUED | OUTPATIENT
Start: 2024-12-03 | End: 2024-12-04

## 2024-12-02 RX ORDER — CLOPIDOGREL BISULFATE 75 MG/1
TABLET ORAL
Status: COMPLETED
Start: 2024-12-02 | End: 2024-12-02

## 2024-12-02 RX ORDER — ASPIRIN 81 MG/1
324 TABLET, CHEWABLE ORAL ONCE
Status: COMPLETED | OUTPATIENT
Start: 2024-12-02 | End: 2024-12-02

## 2024-12-02 RX ORDER — HYDRALAZINE HYDROCHLORIDE 20 MG/ML
INJECTION INTRAMUSCULAR; INTRAVENOUS
Status: COMPLETED
Start: 2024-12-02 | End: 2024-12-02

## 2024-12-02 RX ORDER — MIDAZOLAM HYDROCHLORIDE 1 MG/ML
INJECTION INTRAMUSCULAR; INTRAVENOUS
Status: COMPLETED
Start: 2024-12-02 | End: 2024-12-02

## 2024-12-02 RX ORDER — SODIUM CHLORIDE 9 MG/ML
INJECTION, SOLUTION INTRAVENOUS
Status: ACTIVE | OUTPATIENT
Start: 2024-12-03 | End: 2024-12-03

## 2024-12-02 RX ORDER — CHLORHEXIDINE GLUCONATE 40 MG/ML
SOLUTION TOPICAL
Status: ACTIVE | OUTPATIENT
Start: 2024-12-03 | End: 2024-12-03

## 2024-12-02 RX ORDER — HEPARIN SODIUM 1000 [USP'U]/ML
INJECTION, SOLUTION INTRAVENOUS; SUBCUTANEOUS
Status: COMPLETED
Start: 2024-12-02 | End: 2024-12-02

## 2024-12-02 RX ORDER — PANTOPRAZOLE SODIUM 20 MG/1
20 TABLET, DELAYED RELEASE ORAL
Status: DISCONTINUED | OUTPATIENT
Start: 2024-12-03 | End: 2024-12-02

## 2024-12-02 RX ORDER — SODIUM CHLORIDE 9 MG/ML
INJECTION, SOLUTION INTRAVENOUS CONTINUOUS
Status: ACTIVE | OUTPATIENT
Start: 2024-12-02 | End: 2024-12-02

## 2024-12-02 RX ORDER — HYDRALAZINE HYDROCHLORIDE 20 MG/ML
5 INJECTION INTRAMUSCULAR; INTRAVENOUS ONCE
Status: COMPLETED | OUTPATIENT
Start: 2024-12-02 | End: 2024-12-02

## 2024-12-02 RX ORDER — LIDOCAINE HYDROCHLORIDE 20 MG/ML
INJECTION, SOLUTION EPIDURAL; INFILTRATION; INTRACAUDAL; PERINEURAL
Status: COMPLETED
Start: 2024-12-02 | End: 2024-12-02

## 2024-12-02 RX ORDER — PANTOPRAZOLE SODIUM 20 MG/1
20 TABLET, DELAYED RELEASE ORAL
Status: DISCONTINUED | OUTPATIENT
Start: 2024-12-02 | End: 2024-12-04

## 2024-12-02 NOTE — PLAN OF CARE
Patient is from home w/ boyfriend. A&Ox4. Room air. Pain managed w/ PRN pain medications. Self care in room. Post procedure, patient became nauseous and coughed causing bleeding of groin site. Cardiology aware - advised to apply pressure and continue monitoring blood pressure. Night RN aware of bleeding and in contact w/ cardiology for continued monitoring.  See documentation. Bed in lowest position and locked. Call light in hand. Personal belongings w/in reach.     Problem: Patient Centered Care  Goal: Patient preferences are identified and integrated in the patient's plan of care  Description: Interventions:  - What would you like us to know as we care for you? From home   - Provide timely, complete, and accurate information to patient/family  - Incorporate patient and family knowledge, values, beliefs, and cultural backgrounds into the planning and delivery of care  - Encourage patient/family to participate in care and decision-making at the level they choose  - Honor patient and family perspectives and choices  Outcome: Progressing     Problem: Patient/Family Goals  Goal: Patient/Family Long Term Goal  Description: Patient's Long Term Goal: discharge home    Interventions:  - stress test  - scheduled meds  - pain control  - nebs as needed  - See additional Care Plan goals for specific interventions  Outcome: Progressing  Goal: Patient/Family Short Term Goal  Description: Patient's Short Term Goal: manage pain    Interventions:   - Monitor pain  - Prn pain meds  - See additional Care Plan goals for specific interventions  Outcome: Progressing     Problem: CARDIOVASCULAR - ADULT  Goal: Maintains optimal cardiac output and hemodynamic stability  Description: INTERVENTIONS:  - Monitor vital signs, rhythm, and trends  - Monitor for bleeding, hypotension and signs of decreased cardiac output  - Evaluate effectiveness of vasoactive medications to optimize hemodynamic stability  - Monitor arterial and/or venous puncture  sites for bleeding and/or hematoma  - Assess quality of pulses, skin color and temperature  - Assess for signs of decreased coronary artery perfusion - ex. Angina  - Evaluate fluid balance, assess for edema, trend weights  Outcome: Progressing     Problem: PAIN - ADULT  Goal: Verbalizes/displays adequate comfort level or patient's stated pain goal  Description: INTERVENTIONS:  - Encourage pt to monitor pain and request assistance  - Assess pain using appropriate pain scale  - Administer analgesics based on type and severity of pain and evaluate response  - Implement non-pharmacological measures as appropriate and evaluate response  - Consider cultural and social influences on pain and pain management  - Manage/alleviate anxiety  - Utilize distraction and/or relaxation techniques  - Monitor for opioid side effects  - Notify MD/LIP if interventions unsuccessful or patient reports new pain  - Anticipate increased pain with activity and pre-medicate as appropriate  Outcome: Progressing

## 2024-12-02 NOTE — PROGRESS NOTES
[facility]  part of [facility]  Hospitalist Progress Note     No Saucedo Patient Status:  Observation    1960  63 year old CSN [id number]   Location 334/334-A Attending Gary Schwartz MD   Hosp Day # 0 PCP Josep Valdez MD     Assessment & Plan:   ----------------------------------  Chest pain.  Atypical.  However in the setting of significant coronary history.  Has had negative noninvasive stress test in the past, subsequent positive angiograms  -Cardiac catheterization  -Cardiology consult appreciated  -Telemetry  -Aspirin, Lipitor, Bumex, Plavix, metoprolol  -Pain control    Other problems  Coronary artery disease  Psoriatic arthritis  Dyslipidemia  Hypertension  Colon polyps    DVT Mechanical Prophylaxis:   SCDs,    DVT Pharmacologic Prophylaxis   Medication    heparin (Porcine) 5000 UNIT/ML injection 5,000 Units         DVT Pharmacologic prophylaxis: Aspirin 81 mg    code status: Full  dispo: home upon medical clearance  If applicable, malnutrition status at bottom of note    I personally reviewed the available laboratories, imaging including. I discussed/will discuss the case with consultants. I ordered laboratories and/or radiographic studies. I adjusted medications as detailed above.  Medical decision making high, risk is high.    Subjective:   ----------------------------------  Continues to have some chest pain from time to time.  Received nitro overnight with questionable relief.  Agreeable to angiogram      Objective:   Chief Complaint:   Chief Complaint   Patient presents with    Chest Pain    Breathing Problem     ----------------------------------  Temp:  [97.3 °F (36.3 °C)-98.5 °F (36.9 °C)] 97.3 °F (36.3 °C)  Pulse:  [53-77] 64  Resp:  [18] 18  BP: (111-156)/(63-85) 156/81  SpO2:  [94 %-99 %] 94 %  Gen: A+Ox3.  No distress.   HEENT: NCAT, neck supple, no carotid bruit.  CV: RRR, S1S2, and intact distal pulses. No gallop, rub, murmur.  Pulm: Effort and breath  sounds normal. No distress, wheezes, rales, rhonchi.  Abd: Soft, NTND, BS normal, no mass, no HSM, no rebound/guarding.   Neuro: Normal reflexes, CN. Sensory/motor exams grossly normal deficit.   MS: No joint effusions.  No peripheral edema.  Skin: Skin is warm and dry. No rashes, erythema, diaphoresis.   Psych: Normal mood and affect. Calm, cooperative    Labs:  Lab Results   Component Value Date    HGB 13.1 12/02/2024    WBC 6.7 12/02/2024    .0 12/02/2024     12/02/2024    K 4.2 12/02/2024    K 4.2 12/02/2024    CREATSERUM 0.71 12/02/2024    INR 0.89 12/01/2024    BILIRUBTOTAL 0.4 07/18/2009    AST 19 09/16/2023    ALT 20 09/16/2023    TROP <0.045 04/13/2021            [START ON 12/3/2024] chlorhexidine   Topical On Call    [START ON 12/3/2024] sodium chloride   Intravenous On Call    heparin  5,000 Units Subcutaneous Q8H ZE    aspirin  81 mg Oral Daily    atorvastatin  80 mg Oral Nightly    [START ON 12/3/2024] bumetanide  2 mg Oral QOD    clopidogrel  75 mg Oral Daily    metoprolol succinate ER  25 mg Oral Daily       HYDROcodone-acetaminophen    melatonin    acetaminophen    benzonatate    ondansetron    polyethylene glycol (PEG 3350)    sennosides    bisacodyl    fleet enema    influenza virus vaccine PF    ipratropium-albuterol    nitroglycerin    morphINE **OR** morphINE    acetaminophen

## 2024-12-02 NOTE — PROGRESS NOTES
[facility] - CARDIOLOGY PROGRESS NOTE  No Saucedo Patient Status:  Observation    1960 MRN [id number]   Location [facility] 3W/SW Attending Gary Schwartz MD   Hosp Day # 0 PCP Josep Valdez MD     Assessment:    1.  Chest pain and exertional shortness of breath, similar to previous angina.  Recurrent symptoms over the last several weeks.  No objective evidence for ischemia on troponin and EKG.  However she has had severe disease with these findings in the past.  Patient has agreeable to cardiac catheterization.      Plan:    Cardiac cath, possible PCI today.  The risks, benefits, and alternative approaches were discussed with the patient who expresses understanding and agrees to proceed.      Subjective:  No chest pain or shortness of breath.    Objective:  /79 (BP Location: Right arm)   Pulse 66   Temp 97.3 °F (36.3 °C) (Temporal)   Resp 18   Ht 160 cm (5' 3\")   Wt 130 lb 14.4 oz (59.4 kg)   SpO2 96%   BMI 23.19 kg/m²     Temp (24hrs), Av.9 °F (36.6 °C), Min:97.3 °F (36.3 °C), Max:98.5 °F (36.9 °C)      Intake/Output:    Intake/Output Summary (Last 24 hours) at 2024 0852  Last data filed at 2024 0700  Gross per 24 hour   Intake 560 ml   Output --   Net 560 ml       Wt Readings from Last 2 Encounters:   24 130 lb 14.4 oz (59.4 kg)   06/10/24 141 lb (64 kg)       Physical Exam:    General: Alert and oriented x 3,  No apparent distress.  HEENT: No focal deficits.  Neck: No JVD, carotids 2+ no bruits.  Cardiac: Regular rate and rhythm, S1, S2 normal, no murmur  Lungs: Clear without wheezes, rales, rhonchi.  Normal excursions and effort.  Abdomen: Soft, non-tender.   Extremities: Without clubbing, cyanosis or edema.  Peripheral pulses are 2+.  Skin: Warm and dry.     Labs:  Lab Results   Component Value Date    WBC 6.7 2024    HGB 13.1 2024    HCT 40.9  12/02/2024    .0 12/02/2024     Lab Results   Component Value Date    INR 0.89 12/01/2024     Lab Results   Component Value Date     12/02/2024    K 4.2 12/02/2024    K 4.2 12/02/2024     12/02/2024    CO2 25.0 12/02/2024    BUN 14 12/02/2024    CREATSERUM 0.71 12/02/2024    GLU 91 12/02/2024    MG 2.1 12/02/2024    CA 9.8 12/02/2024        Lab Results   Component Value Date    TROP <0.045 04/13/2021        Medications:     heparin  5,000 Units Subcutaneous Q8H ZE    aspirin  81 mg Oral Daily    atorvastatin  80 mg Oral Nightly    [START ON 12/3/2024] bumetanide  2 mg Oral QOD    clopidogrel  75 mg Oral Daily    metoprolol succinate ER  25 mg Oral Daily         Aaron Underwood MD  12/2/2024  8:52 AM

## 2024-12-02 NOTE — PROGRESS NOTES
Notified by RN pt c/o chest and upper back pain. Pain mildly improved after IV morphine given as per PRN order. EKG obtained and reviewed-no acute ischemic changes. Additional 2 mg of morphine IVP and nitroglycerin paste ordered. Troponin level also ordered.       Addendum 0440:   Notified by RN pt c/o pain to chest and upper back again. Repeat troponin from earlier was negative. PRN morphine was given with improvement in pain. Norco ordered.

## 2024-12-02 NOTE — PROGRESS NOTES
[facility]  part of [facility] Cardiac Cath Procedure Note  No Saucedo Patient Status:  Observation    1960 MRN [id number]   Location [facility] 3W/SW Attending Gary Schwartz MD   Hosp Day # 0 PCP Josep Valdez MD       Proceduralist: Jono Urbina DO  Date of Procedure: 2024     Preoperative Diagnosis:  1) Unstable angina  2) Coronary disease with prior CABG and LAD PCI    Postoperative Diagnosis:  1) 90% ostial first diagonal branch stenosis otherwise stable CAD    Procedures Performed:  1) Selective Left and Right Coronary angiogram  2) Left Heart Catheterization  3) successful PCI of proximal/ostial first diagonal branch with 2.5 x 15 mm resolute [place] NAGI, final kissing angioplasty with 2.5 (diagonal branch) and 4 mm (LAD) balloons    Indication/History: No Saucedo is a 63 year old female with history of CAD with prior CABG and PCI presenting for evaluation of chest pain.  Patient presents with her typical anginal symptoms of chest tightness and shortness of breath.  She had a CABG in  with LIMA to LAD and vein graft to OM-PDA.  She has known atretic LIMA to LAD and underwent LAD PCI 2023.    Summary of Case: After written informed consent was obtained from the patient, patient was brought to the cardiac catheterization laboratory.  Patient was prepped and draped in the usual sterile fashion. Lidocaine 1% was used to infiltrate the right groin for local anesthesia and a 6 Palauan introducer sheath was inserted into the right femoral artery using modified Seldinger technique.      Selective coronary angiography performed with 6 Palauan JR4 catheter for RCA and 6 Palauan JL 4 catheter for LCA.  Angiography performed in standard projections.  Pigtail catheter used to cross aortic valve and perform left heart catheterization.    Findings:  1) Hemodynamics: LVEDP 17 mmHg, no gradient upon pullback  3) Coronaries:  Left main coronary artery: Large  size vessel with  no angiographically significant disease.  Left anterior descending artery: Large size, Type IV vessel with stent in mid segment with mild in-stent restenosis.  First diagonal branch originating from stented region and LAD has ostial 90% stenosis.  Circumflex artery: Medium size non-dominant vessel with 100%   mid segment.  High first OM is patent.  Right coronary artery: Large size dominant vessel with severe diffuse disease ranging between 70 to 99% in proximal mid and distal segments.  PDA has bypassed.    Graft anatomy:  LIMA-LAD: Not imaged, known to be atretic  VG-OM-PDA: Widely patent    Specimen sent to: No specimen collected  Estimated blood loss: 10cc  Closure: Perclose      Lesion #1 first diagonal branch  Lesion Characteristics- non torturous, noncalcified.  Type non-C lesion.  Pre-intervention stenosis 90%, Post intervention stenosis 0%.  Pre GIANNA 3, Post GIANNA 3.     Guide Catheter: 6 Uzbek EBU 3.5  Wire: Scion black (diagonal), Scion blue (LAD)  Pre-dilation Balloon: 2.5 mm compliant balloon  Stent: 2.5 x 15 resolute [place] NAGI (diagonal)  Post-dilation Balloon: Diagonal rewired, 2 mm compliant  Kissing angioplasty: 2.5 NC (D1) and 4 mm NC (LAD)    IV was maintained by RN and moderate conscious sedation of versed 2 mg and fentanyl 25 mcg was given.  Patient was assessed and monitoring of oxygen, heart rate and blood pressure by nurse and myself during the exam from 1633 to 1523.      Assessment and Plan:  63-year-old female with above history presenting with her typical anginal symptoms.  Today study shows relatively stable coronary anatomy compared to 1 year prior except for progressive disease in first diagonal branch resulting in 90% ostial stenosis.  She underwent PCI to diagonal branch as detailed above with very good results.  Heparin is used for anticoagulation.  Recommend continuing aspirin, statin, Plavix.  If patient has continued ischemic symptoms and consider stress test  to assess viability and ischemic burden of circumflex.  Consider circumflex  at a later date if still having anginal symptoms.    Jono Urbina DO  [facility]   Interventional Cardiac and Vascular Services      Jono Urbina DO  12/02/24

## 2024-12-02 NOTE — PROGRESS NOTES
Progress Note  No Saucedo Patient Status:  Observation    1960 MRN [id number]   Location [facility] 3W/SW Attending Gary Schwartz MD   Hosp Day # 0 PCP Josep Valdez MD     Subjective:  Pt stated to have complaints of chest pain that is to the chest back and left arm, worse with activities. Better at rest. Stated to be feeling similar to when she had CABG.     Objective:  /79 (BP Location: Right arm)   Pulse 66   Temp 97.3 °F (36.3 °C) (Temporal)   Resp 18   Ht 5' 3\" (1.6 m)   Wt 130 lb 14.4 oz (59.4 kg)   SpO2 96%   BMI 23.19 kg/m²     Telemetry: NSR      Intake/Output:    Intake/Output Summary (Last 24 hours) at 2024 0831  Last data filed at 2024 0700  Gross per 24 hour   Intake 560 ml   Output --   Net 560 ml       Last 3 Weights   24 0431 130 lb 14.4 oz (59.4 kg)   24 1133 143 lb (64.9 kg)   24 0857 140 lb (63.5 kg)   06/10/24 1606 141 lb (64 kg)   24 1832 139 lb (63 kg)       Labs:  Recent Labs   Lab 24  0927 24  0630   * 91   BUN 16 14   CREATSERUM 0.88 0.71   EGFRCR 73 95   CA 9.6 9.8    143   K 3.4* 4.2  4.2    109   CO2 25.0 25.0     Recent Labs   Lab 24  0927 24  0630   RBC 4.37 4.36   HGB 12.8 13.1   HCT 37.4 40.9   MCV 85.6 93.8   MCH 29.3 30.0   MCHC 34.2 32.0   RDW 12.7 13.1   NEPRELIM 4.68 3.89   WBC 7.7 6.7   .0 294.0         Recent Labs   Lab 24  0927 24  1123 24  0001   TROPHS <3 <3 <3     Lab Results   Component Value Date    INR 0.89 2024       Diagnostics:       Review of Systems   Respiratory: Negative.     Cardiovascular: Negative.        Physical Exam:    Physical Exam  Vitals reviewed.   Constitutional:       General: She is not in acute distress.  Neck:      Vascular: No JVD.   Cardiovascular:      Rate and Rhythm: Normal rate and regular rhythm.      Pulses: Normal pulses.      Heart sounds: Normal heart sounds. No murmur heard.     No friction  rub. No gallop.   Pulmonary:      Effort: Pulmonary effort is normal. No respiratory distress.      Breath sounds: Normal breath sounds. No stridor. No wheezing, rhonchi or rales.   Chest:      Chest wall: No tenderness.   Musculoskeletal:      Cervical back: Normal range of motion.      Right lower leg: No edema.      Left lower leg: No edema.   Neurological:      Mental Status: She is alert and oriented to person, place, and time.   Psychiatric:         Mood and Affect: Mood normal.         Medications:   heparin  5,000 Units Subcutaneous Q8H ZE    aspirin  81 mg Oral Daily    atorvastatin  80 mg Oral Nightly    [START ON 12/3/2024] bumetanide  2 mg Oral QOD    clopidogrel  75 mg Oral Daily    metoprolol succinate ER  25 mg Oral Daily         Assessment/Plan:    Chest pain  H/o CAD  - pt complaining of pain to the chest, upper back and left shoulder, stated to be similar to when she had CABG  - trop x3 negative   -  h/o CABG 2021  - recent [facility] 12/15/23 : LIMA atretic, FFR positive LAD secondary to proximal lesion as well as mid intramyocardial segment. SVG-OM () and PDA unremarkable   - on plavix, aspirin, metoprolol    Dyslipidemia   - on statin    HFpEF   Shortness of breath   - had SOB for the past 2 weeks   - pro   - echo from 11/2023 with LVEF 64%  - xray- negative for any acute intrathoracic process  - CT: negative for PE/aortic dissection  - started on bumex BID  - in accurate documentation of output, wt down 10 lab from admission?    Hypertension   - blood pressure well controlled on metoprolol    Plan;  - keep NPO   - repeat EKG  - will check lipid panel   - check standing weight  - strict intake and output monitoring  - may need possible angiogram, will discuss with rounding cardiologist     Plan discussed with pt and RN     JEMMA Simon  [facility] [place]  12/2/2024  8:31 AM      Addendum: 1437  Notified by RN that pt with complaints of chest pain 6/10. Assessed pt at  bedside. 12 lead EKG ordered, no ST changes indicating acute ischemia. Will give morpine 2mg. Pt to go for [facility] this afternoon.

## 2024-12-02 NOTE — PLAN OF CARE
Patient continues to have chest, armpit, and back pain. EKG done overnight and AL He notified. Additional PRN pain meds ordered and given. Patient able to get some relief with medications and heat pack. Plan for stress test.    Problem: Patient Centered Care  Goal: Patient preferences are identified and integrated in the patient's plan of care  Description: Interventions:  - What would you like us to know as we care for you? From home   - Provide timely, complete, and accurate information to patient/family  - Incorporate patient and family knowledge, values, beliefs, and cultural backgrounds into the planning and delivery of care  - Encourage patient/family to participate in care and decision-making at the level they choose  - Honor patient and family perspectives and choices  12/2/2024 0740 by Tg Zavala RN  Outcome: Progressing  12/2/2024 0739 by Tg Zavala RN  Outcome: Progressing     Problem: Patient/Family Goals  Goal: Patient/Family Long Term Goal  Description: Patient's Long Term Goal: discharge home    Interventions:  - stress test  - scheduled meds  - pain control  - nebs as needed  - See additional Care Plan goals for specific interventions  12/2/2024 0740 by Tg Zavala RN  Outcome: Progressing  12/2/2024 0739 by Tg Zavala RN  Outcome: Progressing  Goal: Patient/Family Short Term Goal  Description: Patient's Short Term Goal: manage pain    Interventions:   - Monitor pain  - Prn pain meds  - See additional Care Plan goals for specific interventions  12/2/2024 0740 by Tg Zavala RN  Outcome: Progressing  12/2/2024 0739 by Tg Zavala RN  Outcome: Progressing     Problem: CARDIOVASCULAR - ADULT  Goal: Maintains optimal cardiac output and hemodynamic stability  Description: INTERVENTIONS:  - Monitor vital signs, rhythm, and trends  - Monitor for bleeding, hypotension and signs of decreased cardiac output  - Evaluate effectiveness of vasoactive  medications to optimize hemodynamic stability  - Monitor arterial and/or venous puncture sites for bleeding and/or hematoma  - Assess quality of pulses, skin color and temperature  - Assess for signs of decreased coronary artery perfusion - ex. Angina  - Evaluate fluid balance, assess for edema, trend weights  12/2/2024 0740 by Tg Zavala RN  Outcome: Progressing  12/2/2024 0739 by Tg Zavala RN  Outcome: Progressing     Problem: PAIN - ADULT  Goal: Verbalizes/displays adequate comfort level or patient's stated pain goal  Description: INTERVENTIONS:  - Encourage pt to monitor pain and request assistance  - Assess pain using appropriate pain scale  - Administer analgesics based on type and severity of pain and evaluate response  - Implement non-pharmacological measures as appropriate and evaluate response  - Consider cultural and social influences on pain and pain management  - Manage/alleviate anxiety  - Utilize distraction and/or relaxation techniques  - Monitor for opioid side effects  - Notify MD/LIP if interventions unsuccessful or patient reports new pain  - Anticipate increased pain with activity and pre-medicate as appropriate  12/2/2024 0740 by Tg Zavala RN  Outcome: Progressing  12/2/2024 0739 by Tg Zavala RN  Outcome: Progressing

## 2024-12-03 ENCOUNTER — APPOINTMENT (OUTPATIENT)
Dept: GENERAL RADIOLOGY | Facility: HOSPITAL | Age: 64
End: 2024-12-03
Attending: HOSPITALIST
Payer: MEDICARE

## 2024-12-03 PROBLEM — I20.0 UNSTABLE ANGINA (HCC): Status: ACTIVE | Noted: 2024-12-03

## 2024-12-03 LAB
ANION GAP SERPL CALC-SCNC: 12 MMOL/L (ref 0–18)
ATRIAL RATE: 82 BPM
BUN BLD-MCNC: 15 MG/DL (ref 9–23)
BUN/CREAT SERPL: 24.2 (ref 10–20)
CALCIUM BLD-MCNC: 10.2 MG/DL (ref 8.7–10.4)
CHLORIDE SERPL-SCNC: 109 MMOL/L (ref 98–112)
CO2 SERPL-SCNC: 21 MMOL/L (ref 21–32)
CREAT BLD-MCNC: 0.62 MG/DL
DEPRECATED RDW RBC AUTO: 45.5 FL (ref 35.1–46.3)
EGFRCR SERPLBLD CKD-EPI 2021: 99 ML/MIN/1.73M2 (ref 60–?)
ERYTHROCYTE [DISTWIDTH] IN BLOOD BY AUTOMATED COUNT: 13.3 % (ref 11–15)
GLUCOSE BLD-MCNC: 100 MG/DL (ref 70–99)
HCT VFR BLD AUTO: 42.6 %
HGB BLD-MCNC: 13.5 G/DL
MCH RBC QN AUTO: 29 PG (ref 26–34)
MCHC RBC AUTO-ENTMCNC: 31.7 G/DL (ref 31–37)
MCV RBC AUTO: 91.6 FL
OSMOLALITY SERPL CALC.SUM OF ELEC: 295 MOSM/KG (ref 275–295)
P AXIS: 45 DEGREES
P-R INTERVAL: 204 MS
PLATELET # BLD AUTO: 364 10(3)UL (ref 150–450)
POTASSIUM SERPL-SCNC: 3.9 MMOL/L (ref 3.5–5.1)
Q-T INTERVAL: 382 MS
QRS DURATION: 84 MS
QTC CALCULATION (BEZET): 446 MS
R AXIS: -21 DEGREES
RBC # BLD AUTO: 4.65 X10(6)UL
SODIUM SERPL-SCNC: 142 MMOL/L (ref 136–145)
T AXIS: 29 DEGREES
VENTRICULAR RATE: 82 BPM
WBC # BLD AUTO: 10.2 X10(3) UL (ref 4–11)

## 2024-12-03 PROCEDURE — 99233 SBSQ HOSP IP/OBS HIGH 50: CPT | Performed by: HOSPITALIST

## 2024-12-03 PROCEDURE — 74018 RADEX ABDOMEN 1 VIEW: CPT | Performed by: HOSPITALIST

## 2024-12-03 RX ORDER — SODIUM CHLORIDE 9 MG/ML
INJECTION, SOLUTION INTRAVENOUS CONTINUOUS
Status: ACTIVE | OUTPATIENT
Start: 2024-12-03 | End: 2024-12-04

## 2024-12-03 RX ORDER — ONDANSETRON 2 MG/ML
4 INJECTION INTRAMUSCULAR; INTRAVENOUS EVERY 4 HOURS
Status: DISCONTINUED | OUTPATIENT
Start: 2024-12-03 | End: 2024-12-04

## 2024-12-03 RX ORDER — ONDANSETRON 2 MG/ML
4 INJECTION INTRAMUSCULAR; INTRAVENOUS
Status: DISCONTINUED | OUTPATIENT
Start: 2024-12-03 | End: 2024-12-03

## 2024-12-03 RX ORDER — EZETIMIBE 10 MG/1
10 TABLET ORAL NIGHTLY
Status: DISCONTINUED | OUTPATIENT
Start: 2024-12-03 | End: 2024-12-04

## 2024-12-03 NOTE — PROGRESS NOTES
Patient with bleeding to right groin site after cath. Maico pressure unsuccessful. Hetal MELENDEZ notified, Femostop applied for 1 hour. Bleeding stopped. New dressing applied. Site currently soft and without hematoma.

## 2024-12-03 NOTE — PLAN OF CARE
Patient is from home w/ bf. A&Ox4. Room air. Patient complains of constant nausea post cath yesterday. Compazine given prn for management of nausea. Patient is a self in room. Bed in lowest position and locked. Call light in hand. Personal belongings w/in reach.     Problem: Patient Centered Care  Goal: Patient preferences are identified and integrated in the patient's plan of care  Description: Interventions:  - What would you like us to know as we care for you? From home   - Provide timely, complete, and accurate information to patient/family  - Incorporate patient and family knowledge, values, beliefs, and cultural backgrounds into the planning and delivery of care  - Encourage patient/family to participate in care and decision-making at the level they choose  - Honor patient and family perspectives and choices  Outcome: Progressing     Problem: Patient/Family Goals  Goal: Patient/Family Long Term Goal  Description: Patient's Long Term Goal: discharge home    Interventions:  - stress test  - scheduled meds  - pain control  - nebs as needed  - See additional Care Plan goals for specific interventions  Outcome: Progressing  Goal: Patient/Family Short Term Goal  Description: Patient's Short Term Goal: manage pain    Interventions:   - Monitor pain  - Prn pain meds  - See additional Care Plan goals for specific interventions  Outcome: Progressing     Problem: CARDIOVASCULAR - ADULT  Goal: Maintains optimal cardiac output and hemodynamic stability  Description: INTERVENTIONS:  - Monitor vital signs, rhythm, and trends  - Monitor for bleeding, hypotension and signs of decreased cardiac output  - Evaluate effectiveness of vasoactive medications to optimize hemodynamic stability  - Monitor arterial and/or venous puncture sites for bleeding and/or hematoma  - Assess quality of pulses, skin color and temperature  - Assess for signs of decreased coronary artery perfusion - ex. Angina  - Evaluate fluid balance, assess for  edema, trend weights  Outcome: Progressing     Problem: PAIN - ADULT  Goal: Verbalizes/displays adequate comfort level or patient's stated pain goal  Description: INTERVENTIONS:  - Encourage pt to monitor pain and request assistance  - Assess pain using appropriate pain scale  - Administer analgesics based on type and severity of pain and evaluate response  - Implement non-pharmacological measures as appropriate and evaluate response  - Consider cultural and social influences on pain and pain management  - Manage/alleviate anxiety  - Utilize distraction and/or relaxation techniques  - Monitor for opioid side effects  - Notify MD/LIP if interventions unsuccessful or patient reports new pain  - Anticipate increased pain with activity and pre-medicate as appropriate  Outcome: Progressing

## 2024-12-03 NOTE — PROGRESS NOTES
Progress Note  No Saucedo Patient Status:  Observation    1960 MRN [id number]   Location [facility] 3W/SW Attending Gary Schwartz MD   Hosp Day # 0 PCP Josep Valdez MD     Subjective:  In bed on exam. C/o nausea/vomiting since last night. No more cp/pressure.     Objective:  /78 (BP Location: Right arm)   Pulse 95   Temp 98 °F (36.7 °C) (Temporal)   Resp 20   Ht 5' 3\" (1.6 m)   Wt 134 lb 3.2 oz (60.9 kg)   SpO2 95%   BMI 23.77 kg/m²     Telemetry: nsr      Intake/Output:    Intake/Output Summary (Last 24 hours) at 12/3/2024 0950  Last data filed at 12/3/2024 0924  Gross per 24 hour   Intake 350 ml   Output 350 ml   Net 0 ml       Last 3 Weights   24 0635 134 lb 3.2 oz (60.9 kg)   24 0431 130 lb 14.4 oz (59.4 kg)   24 1133 143 lb (64.9 kg)   24 0857 140 lb (63.5 kg)   06/10/24 1606 141 lb (64 kg)   24 1832 139 lb (63 kg)       Labs:  Recent Labs   Lab 24  0630 24  1904 24  0620   GLU 91 108* 100*   BUN 14 16 15   CREATSERUM 0.71 0.72 0.62   EGFRCR 95 93 99   CA 9.8 9.9 10.2    141 142   K 4.2  4.2 4.3 3.9    107 109   CO2 25.0 27.0 21.0     Recent Labs   Lab 24  0927 24  0630 24  1904 24  0620   RBC 4.37 4.36 4.52 4.65   HGB 12.8 13.1 13.4 13.5   HCT 37.4 40.9 40.2 42.6   MCV 85.6 93.8 88.9 91.6   MCH 29.3 30.0 29.6 29.0   MCHC 34.2 32.0 33.3 31.7   RDW 12.7 13.1 13.2 13.3   NEPRELIM 4.68 3.89  --   --    WBC 7.7 6.7 10.2 10.2   .0 294.0 343.0 364.0         Recent Labs   Lab 24  0927 24  1123 24  0001   TROPHS <3 <3 <3       Review of Systems   Cardiovascular: Negative.    Respiratory: Negative.     Gastrointestinal:  Positive for nausea and vomiting.       Physical Exam:    Gen: alert, oriented x 3, NAD  Heent: pupils equal, reactive. Mucous membranes moist.   Neck: no jvd  Cardiac: regular rate and rhythm, normal S1,S2  Lungs: CTA  Abd: soft, NT/ND +bs  Ext: no edema.  Right groin cdi, no hematoma noted.  Skin: Warm, dry  Neuro: No focal deficits        Medications:     chlorhexidine   Topical On Call    sodium chloride   Intravenous On Call    clopidogrel  75 mg Oral Daily    aspirin  81 mg Oral Daily    pantoprazole  20 mg Oral BID AC    heparin  5,000 Units Subcutaneous Q8H ZE    atorvastatin  80 mg Oral Nightly    bumetanide  2 mg Oral QOD    metoprolol succinate ER  25 mg Oral Daily       Findings:  1) Hemodynamics: LVEDP 17 mmHg, no gradient upon pullback  3) Coronaries:  Left main coronary artery: Large size vessel with  no angiographically significant disease.  Left anterior descending artery: Large size, Type IV vessel with stent in mid segment with mild in-stent restenosis.  First diagonal branch originating from stented region and LAD has ostial 90% stenosis.  Circumflex artery: Medium size non-dominant vessel with 100%   mid segment.  High first OM is patent.  Right coronary artery: Large size dominant vessel with severe diffuse disease ranging between 70 to 99% in proximal mid and distal segments.  PDA has bypassed.     Graft anatomy:  LIMA-LAD: Not imaged, known to be atretic  VG-OM-PDA: Widely patent      Assessment:  Pod 1 s/p PCI/NAGI diag 12/2/24 presented with exertional dyspnea  Asa, statin, plavix, bb  LHC 12/2/24-remainder of arteries as above with  of Cx  If continued ischemic symptoms, consider OP stress to assess viability and ischemic burden of Cx and consider   LVEF 64% (11/2023)  Trop negative x3  Hx CAD s/p CABG 2021  Prior hx PCI to native LAD due to atretic LIMA  HTN-modest control  HL-high intensity statin, , goal <70  May need additional agent as OP given LDL not at goal.     Plan:  Cont asa, plavix, statin, bb.   LDL not at goal. Add zetia. Consider additional agent as OP.   EKG today.   Anticipate possible dc this afternoon pending clinical course. Currently with n/v and poor po intake.   Has f/u with Dr. Mendez scheduled 12/8.  Should have echo as well as OP.     Plan of care discussed with patient, RN.    Sharmila Lind, JEMMA  12/3/2024  9:50 AM      Cardiologist Addendum:  No Saucedo was seen and examined independently and I agree with the above documentation provided by CRIS Mckenna.  Patient without recurrent chest pain since diagonal branch PCI yesterday.  Had some nausea, vomiting last night and this morning, slight groin bleed which appears stable at this time.  Continue DAPT, statin, beta-blocker-consider Zetia versus Repatha as outpatient.  Patient stable from cardiac standpoint, if GI symptoms improve  then consider discharge today or tomorrow with follow-up with Dr. Mendez within a week.    Thank you for allowing me to take part in the care of No Saucedo. Please call with any questions of concerns.    L3    Jono Urbina DO  [facility]   Interventional Cardiac and Vascular Services      December 03, 2024  3:07 PM

## 2024-12-03 NOTE — DIETARY NOTE
NUTRITION EDUCATION NOTE     Received consult for cardiac nutrition education. Pt tired and feeling nauseous upon visit. Politely declined verbal discussion at this time, states she would like to discuss diet at a later time, perhaps during cardiac rehab program. Advised pt that there is a dietitian who is part of the cardiac rehab team. Provided with Eating Heart-Healthy and NCM handout to reinforce. Would benefit from outpt f/u. Expect fair compliance. RD contact information provided to pt.        Grace Brandon RD, LDN  Clinical Dietitian  P: [phone]

## 2024-12-03 NOTE — PROGRESS NOTES
[facility]  part of [facility]  Hospitalist Progress Note     No Saucedo Patient Status:  Observation    1960  63 year old CSN [id number]   Location 334/334-A Attending Gary Schwartz MD   Hosp Day # 0 PCP Josep Valdez MD     Assessment & Plan:   ----------------------------------  Chest pain.  Atypical.  However in the setting of significant coronary history.  Has had negative noninvasive stress test in the past, subsequent positive angiograms.   angiogram with intervention on first diagonal.  -Cardiology consult appreciated  -Telemetry  -Aspirin, Lipitor, Bumex, Plavix, metoprolol  -Pain control    Other problems  Coronary artery disease  Psoriatic arthritis  Dyslipidemia  Hypertension  Colon polyps    DVT Mechanical Prophylaxis:   SCDs,    DVT Pharmacologic Prophylaxis   Medication    heparin (Porcine) 5000 UNIT/ML injection 5,000 Units         DVT Pharmacologic prophylaxis: Aspirin 81 mg    code status: Full  dispo: home upon medical clearance, possibly later today if nausea improves  If applicable, malnutrition status at bottom of note    I personally reviewed the available laboratories, imaging including. I discussed/will discuss the case with consultants. I ordered laboratories and/or radiographic studies. I adjusted medications as detailed above.  Medical decision making high, risk is high.    Subjective:   ----------------------------------  No new cardiac complaints.  Chest pain resolved.  She did have significant nausea overnight and this morning.      Objective:   Chief Complaint:   Chief Complaint   Patient presents with    Chest Pain    Breathing Problem     ----------------------------------  Temp:  [97.2 °F (36.2 °C)-99.4 °F (37.4 °C)] 98 °F (36.7 °C)  Pulse:  [] 95  Resp:  [16-20] 20  BP: (114-161)/(63-96) 154/78  SpO2:  [94 %-99 %] 95 %  Gen: A+Ox3.  No distress.   HEENT: NCAT, neck supple, no carotid bruit.  CV: RRR, S1S2, and intact distal pulses. No  gallop, rub, murmur.  Pulm: Effort and breath sounds normal. No distress, wheezes, rales, rhonchi.  Abd: Soft, NTND, BS normal, no mass, no HSM, no rebound/guarding.   Neuro: Normal reflexes, CN. Sensory/motor exams grossly normal deficit.   MS: No joint effusions.  No peripheral edema.  Skin: Skin is warm and dry. No rashes, erythema, diaphoresis.   Psych: Normal mood and affect. Calm, cooperative    Labs:  Lab Results   Component Value Date    HGB 13.5 12/03/2024    WBC 10.2 12/03/2024    .0 12/03/2024     12/03/2024    K 3.9 12/03/2024    CREATSERUM 0.62 12/03/2024    INR 0.89 12/01/2024    BILIRUBTOTAL 0.4 07/18/2009    AST 19 09/16/2023    ALT 20 09/16/2023    TROP <0.045 04/13/2021            ezetimibe  10 mg Oral Nightly    chlorhexidine   Topical On Call    sodium chloride   Intravenous On Call    clopidogrel  75 mg Oral Daily    aspirin  81 mg Oral Daily    pantoprazole  20 mg Oral BID AC    heparin  5,000 Units Subcutaneous Q8H ZE    atorvastatin  80 mg Oral Nightly    bumetanide  2 mg Oral QOD    metoprolol succinate ER  25 mg Oral Daily       HYDROcodone-acetaminophen    prochlorperazine    melatonin    acetaminophen    benzonatate    ondansetron    polyethylene glycol (PEG 3350)    sennosides    bisacodyl    fleet enema    influenza virus vaccine PF    ipratropium-albuterol    nitroglycerin    morphINE **OR** morphINE    acetaminophen

## 2024-12-04 ENCOUNTER — TELEPHONE (OUTPATIENT)
Dept: INTERNAL MEDICINE UNIT | Facility: HOSPITAL | Age: 64
End: 2024-12-04

## 2024-12-04 VITALS
OXYGEN SATURATION: 95 % | RESPIRATION RATE: 19 BRPM | TEMPERATURE: 99 F | HEART RATE: 78 BPM | BODY MASS INDEX: 23.83 KG/M2 | DIASTOLIC BLOOD PRESSURE: 86 MMHG | WEIGHT: 134.5 LBS | SYSTOLIC BLOOD PRESSURE: 146 MMHG | HEIGHT: 63 IN

## 2024-12-04 LAB
ALBUMIN SERPL-MCNC: 4.6 G/DL (ref 3.2–4.8)
ALBUMIN/GLOB SERPL: 1.9 {RATIO} (ref 1–2)
ALP LIVER SERPL-CCNC: 78 U/L
ALT SERPL-CCNC: 19 U/L
ANION GAP SERPL CALC-SCNC: 8 MMOL/L (ref 0–18)
AST SERPL-CCNC: 32 U/L (ref ?–34)
BASOPHILS # BLD AUTO: 0.03 X10(3) UL (ref 0–0.2)
BASOPHILS NFR BLD AUTO: 0.3 %
BILIRUB SERPL-MCNC: 0.9 MG/DL (ref 0.2–1.1)
BUN BLD-MCNC: 21 MG/DL (ref 9–23)
BUN/CREAT SERPL: 30.9 (ref 10–20)
CALCIUM BLD-MCNC: 9.8 MG/DL (ref 8.7–10.4)
CHLORIDE SERPL-SCNC: 108 MMOL/L (ref 98–112)
CO2 SERPL-SCNC: 27 MMOL/L (ref 21–32)
CREAT BLD-MCNC: 0.68 MG/DL
DEPRECATED RDW RBC AUTO: 42.9 FL (ref 35.1–46.3)
EGFRCR SERPLBLD CKD-EPI 2021: 97 ML/MIN/1.73M2 (ref 60–?)
EOSINOPHIL # BLD AUTO: 0.06 X10(3) UL (ref 0–0.7)
EOSINOPHIL NFR BLD AUTO: 0.6 %
ERYTHROCYTE [DISTWIDTH] IN BLOOD BY AUTOMATED COUNT: 13.2 % (ref 11–15)
GLOBULIN PLAS-MCNC: 2.4 G/DL (ref 2–3.5)
GLUCOSE BLD-MCNC: 97 MG/DL (ref 70–99)
HCT VFR BLD AUTO: 40.9 %
HGB BLD-MCNC: 13.4 G/DL
IMM GRANULOCYTES # BLD AUTO: 0.03 X10(3) UL (ref 0–1)
IMM GRANULOCYTES NFR BLD: 0.3 %
LYMPHOCYTES # BLD AUTO: 1.95 X10(3) UL (ref 1–4)
LYMPHOCYTES NFR BLD AUTO: 18.4 %
MCH RBC QN AUTO: 29 PG (ref 26–34)
MCHC RBC AUTO-ENTMCNC: 32.8 G/DL (ref 31–37)
MCV RBC AUTO: 88.5 FL
MONOCYTES # BLD AUTO: 1.01 X10(3) UL (ref 0.1–1)
MONOCYTES NFR BLD AUTO: 9.6 %
NEUTROPHILS # BLD AUTO: 7.49 X10 (3) UL (ref 1.5–7.7)
NEUTROPHILS # BLD AUTO: 7.49 X10(3) UL (ref 1.5–7.7)
NEUTROPHILS NFR BLD AUTO: 70.8 %
OSMOLALITY SERPL CALC.SUM OF ELEC: 299 MOSM/KG (ref 275–295)
PLATELET # BLD AUTO: 371 10(3)UL (ref 150–450)
POTASSIUM SERPL-SCNC: 3.6 MMOL/L (ref 3.5–5.1)
PROT SERPL-MCNC: 7 G/DL (ref 5.7–8.2)
RBC # BLD AUTO: 4.62 X10(6)UL
SODIUM SERPL-SCNC: 143 MMOL/L (ref 136–145)
WBC # BLD AUTO: 10.6 X10(3) UL (ref 4–11)

## 2024-12-04 PROCEDURE — 99239 HOSP IP/OBS DSCHRG MGMT >30: CPT | Performed by: INTERNAL MEDICINE

## 2024-12-04 NOTE — CARDIAC REHAB
Cardiac Rehab Phase I    Activity:  Distance   Assistance needed   Patient tolerated activity .    Education:  Handouts provided and reviewed: [facility] Booklet.       Diet: Healthy Cardiac diet reviewed.    Disease Process: Disease process reviewed.    Reviewed the following: SITE CARE: Reviewed      RISK FACTORS: Reviewed      SMOKING CESSATION: Reviewed      HOME EXERCISE ACTIVITY: Reviewed      OUTPATIENT CARDIAC REHAB: Referred to Cardiac Rehabilitation

## 2024-12-04 NOTE — PLAN OF CARE
Pt A/Ox4. RA. No complaints of pain or nausea. Refused scheduled zofran. Lidocaine patch added to L shoulder for OA. Cleared by cards. No needs at home. DC order in place. AVS and education provided. Flu shot provided. Home with son.     Problem: Patient Centered Care  Goal: Patient preferences are identified and integrated in the patient's plan of care  Description: Interventions:  - What would you like us to know as we care for you? From home   - Provide timely, complete, and accurate information to patient/family  - Incorporate patient and family knowledge, values, beliefs, and cultural backgrounds into the planning and delivery of care  - Encourage patient/family to participate in care and decision-making at the level they choose  - Honor patient and family perspectives and choices  Outcome: Progressing     Problem: Patient/Family Goals  Goal: Patient/Family Long Term Goal  Description: Patient's Long Term Goal: discharge home    Interventions:  - stress test  - scheduled meds  - pain control  - nebs as needed  - See additional Care Plan goals for specific interventions  Outcome: Progressing  Goal: Patient/Family Short Term Goal  Description: Patient's Short Term Goal: manage pain    Interventions:   - Monitor pain  - Prn pain meds  - See additional Care Plan goals for specific interventions  Outcome: Progressing     Problem: CARDIOVASCULAR - ADULT  Goal: Maintains optimal cardiac output and hemodynamic stability  Description: INTERVENTIONS:  - Monitor vital signs, rhythm, and trends  - Monitor for bleeding, hypotension and signs of decreased cardiac output  - Evaluate effectiveness of vasoactive medications to optimize hemodynamic stability  - Monitor arterial and/or venous puncture sites for bleeding and/or hematoma  - Assess quality of pulses, skin color and temperature  - Assess for signs of decreased coronary artery perfusion - ex. Angina  - Evaluate fluid balance, assess for edema, trend weights  Outcome:  Progressing     Problem: PAIN - ADULT  Goal: Verbalizes/displays adequate comfort level or patient's stated pain goal  Description: INTERVENTIONS:  - Encourage pt to monitor pain and request assistance  - Assess pain using appropriate pain scale  - Administer analgesics based on type and severity of pain and evaluate response  - Implement non-pharmacological measures as appropriate and evaluate response  - Consider cultural and social influences on pain and pain management  - Manage/alleviate anxiety  - Utilize distraction and/or relaxation techniques  - Monitor for opioid side effects  - Notify MD/LIP if interventions unsuccessful or patient reports new pain  - Anticipate increased pain with activity and pre-medicate as appropriate  Outcome: Progressing

## 2024-12-04 NOTE — TELEPHONE ENCOUNTER
Received fax from [place] re: Prior Authorization needed for prescribed Zylotrol 4-1% patch. Writer called pharmacy and spoke with pharmacist who states 4% Lidocaine patch is available over the counter, prescription cancelled, writer called and informed patient. No further action needed.

## 2024-12-04 NOTE — DISCHARGE SUMMARY
Discharge Summary     No Saucedo Patient Status:  Observation    1960 MRN [id number]   Location [facility] 3W/SW Attending Valentina Carlton MD   Hosp Day # 0 PCP Josep Valdez MD     Date of Admission: 2024  Date of Discharge: 2024  Discharge Disposition: home    Discharge Diagnosis: NSTEMI  s/p PCI/NAGI diag 24       History of Present Illness:             Brief Synopsis:      s/p PCI/NAGI diag 24 presented with exertional dyspnea  Asa, statin, plavix, bb  [facility] 24-remainder of arteries as above with  of Cx  If continued ischemic symptoms, consider OP stress to assess viability and ischemic burden of Cx and consider   LVEF 64% (2023)  Trop negative x3  Cleared by cardiology  for dc home ; Cont asa, plavix, statin, bb.   Has f/u with Dr. Mendez scheduled . Should have echo as well as OP.   Hx CAD s/p CABG   Prior hx PCI to native LAD due to atretic LIMA  Other problems  Coronary artery disease  Psoriatic arthritis  Dyslipidemia  Hypertension  Colon polyps    Lace+ Score: 35  59-90 High Risk  29-58 Medium Risk  0-28   Low Risk       TCM Follow-Up Recommendation:  LACE < 29: Low Risk of readmission after discharge from the hospital. No TCM follow-up needed.    Procedures during hospitalization:   s/p PCI/NAGI diag 24       Consultants:  IC    Discharge Medication List:     Discharge Medications        START taking these medications        Instructions Prescription details   lidocaine-menthol 4-1 % Ptch  Start taking on: 2024      Place 1 patch onto the skin daily.   Quantity: 30 patch  Refills: 0            CONTINUE taking these medications        Instructions Prescription details   aspirin 81 MG Tbec      Take 1 tablet (81 mg total) by mouth daily.   Refills: 0     atorvastatin 80 MG Tabs  Commonly known as: Lipitor      Take 1 tablet (80 mg total) by mouth nightly.   Quantity: 90 tablet  Refills: 3     bumetanide 2 MG Tabs  Commonly known  as: Bumex      Take 1 tablet (2 mg total) by mouth every other day.   Refills: 0     clopidogrel 75 MG Tabs  Commonly known as: Plavix       Refills: 0     ergocalciferol 1.25 MG (34159 UT) Caps  Commonly known as: Vitamin D2      Take 1 capsule (50,000 Units total) by mouth once a week.   Quantity: 12 capsule  Refills: 0     metoprolol succinate ER 25 MG Tb24  Commonly known as: Toprol XL      Take 1 tablet (25 mg total) by mouth daily.   Refills: 0     NON FORMULARY      5 mg  in the morning and 5 mg before bedtime. Takes cannabis gummy 5 mg after work and 10 mg at bedtime.   Refills: 0     omeprazole 20 MG Cpdr  Commonly known as: PriLOSEC      Take 1 capsule (20 mg total) by mouth 2 (two) times daily before meals.   Quantity: 180 capsule  Refills: 3     ondansetron 4 MG Tbdp  Commonly known as: Zofran-ODT      Take 1 tablet (4 mg total) by mouth every 8 (eight) hours as needed for Nausea.   Quantity: 20 tablet  Refills: 0     Potassium Chloride ER 20 MEQ Tbcr       Refills: 0            STOP taking these medications      predniSONE 5 MG Tabs  Commonly known as: Deltasone                  Where to Get Your Medications        These medications were sent to [id number] - [place], [place] - [address] [place] [id number], [phone]  [address] [place] [address]      Phone: [phone]   lidocaine-menthol 4-1 % Ptch         Follow-up appointment:   Josep Valdez MD  [address] [facility] [address]  [phone]    Follow up in 1 week(s)      Matt Mendez MD  [address] [place] [address]  [facility] [address]  [phone]    Follow up in 4 day(s)      Appointments for Next 30 Days 12/4/2024 - 1/3/2025        Date Arrival Time Visit Type Length Department Provider     12/5/2024  3:00 PM  MEDICARE ANNUAL WELL VISIT [5011] 30 min [facility], [facility] Josep Valdez MD    Patient Instructions:         Location Instructions:     Your appointment is located at [facility]  [address] in [place], [place].&nbsp; Please park in the [place] lot, enter through the [facility] entrance, and go to [address].  Masks are optional for all patients and visitors, unless otherwise indicated.               12/23/2024  1:30 PM  FOLLOW UP VISIT [[id number]] 20 min [facility] Maria Del Carmen Dye MD    Patient Instructions:         Location Instructions:     Your appointment is located at [address] [facility] in [place], [place].&nbsp; Please park in the [place] lot and enter through the [facility] entrance.&nbsp; Then proceed to [address].  Masks are optional for all patients and visitors, unless otherwise indicated.                      Supplementary Documentation:   [place] reviewed[place] na    Vital signs:  Temp:  [97.9 °F (36.6 °C)-98.9 °F (37.2 °C)] 98.9 °F (37.2 °C)  Pulse:  [73-94] 78  Resp:  [18-20] 19  BP: (146-158)/(72-86) 146/86  SpO2:  [95 %-96 %] 95 %    Physical Exam:    General:  NAD  Cardiovascular:  S1, S2    -----------------------------------------------------------------------------------------------  PATIENT DISCHARGE INSTRUCTIONS: See electronic chart    Tip: Documentation requirements: For split shared discharge, BOTH providers need to document specific floor, unit, and time spent on the discharge.  The note needs to be signed by the provider with > 50% of time and bill under their NPI.   Time spent:  45 min         Valentina Carlton MD

## 2024-12-04 NOTE — PLAN OF CARE
Bed locked in low position, belongings in reach, call light in reach. Frequent rounding done, all patient needs met. Non-slip socks on/at bedside. Par patient the scheduled zofran has taken away the nausea tonight. She is hoping to get discharged today if the nausea remains gone. No complaint of pain this shift.    Problem: CARDIOVASCULAR - ADULT  Goal: Maintains optimal cardiac output and hemodynamic stability  Description: INTERVENTIONS:  - Monitor vital signs, rhythm, and trends  - Monitor for bleeding, hypotension and signs of decreased cardiac output  - Evaluate effectiveness of vasoactive medications to optimize hemodynamic stability  - Monitor arterial and/or venous puncture sites for bleeding and/or hematoma  - Assess quality of pulses, skin color and temperature  - Assess for signs of decreased coronary artery perfusion - ex. Angina  - Evaluate fluid balance, assess for edema, trend weights  Outcome: Progressing     Problem: PAIN - ADULT  Goal: Verbalizes/displays adequate comfort level or patient's stated pain goal  Description: INTERVENTIONS:  - Encourage pt to monitor pain and request assistance  - Assess pain using appropriate pain scale  - Administer analgesics based on type and severity of pain and evaluate response  - Implement non-pharmacological measures as appropriate and evaluate response  - Consider cultural and social influences on pain and pain management  - Manage/alleviate anxiety  - Utilize distraction and/or relaxation techniques  - Monitor for opioid side effects  - Notify MD/LIP if interventions unsuccessful or patient reports new pain  - Anticipate increased pain with activity and pre-medicate as appropriate  Outcome: Progressing

## 2024-12-04 NOTE — PROGRESS NOTES
Progress Note  No Saucedo Patient Status:  Observation    1960 MRN [id number]   Location [facility] 3W/SW Attending Gary Schwartz MD   Hosp Day # 0 PCP Josep Valdez MD     Subjective:  In bed, feeling much better. Eager to go home.     Objective:  /86 (BP Location: Left arm)   Pulse 78   Temp 98.9 °F (37.2 °C) (Oral)   Resp 19   Ht 5' 3\" (1.6 m)   Wt 134 lb 8 oz (61 kg)   SpO2 95%   BMI 23.83 kg/m²     Telemetry: nsr      Intake/Output:    Intake/Output Summary (Last 24 hours) at 2024 1008  Last data filed at 2024 0838  Gross per 24 hour   Intake 1230 ml   Output --   Net 1230 ml       Last 3 Weights   24 0508 134 lb 8 oz (61 kg)   24 0635 134 lb 3.2 oz (60.9 kg)   24 0431 130 lb 14.4 oz (59.4 kg)   24 1133 143 lb (64.9 kg)   24 0857 140 lb (63.5 kg)   06/10/24 1606 141 lb (64 kg)   24 1832 139 lb (63 kg)       Labs:  Recent Labs   Lab 24  0620 24  0655   * 100* 97   BUN 16 15 21   CREATSERUM 0.72 0.62 0.68   EGFRCR 93 99 97   CA 9.9 10.2 9.8    142 143   K 4.3 3.9 3.6    109 108   CO2 27.0 21.0 27.0     Recent Labs   Lab 24  0927 24  0630 24  19024  0620 24  0655   RBC 4.37 4.36 4.52 4.65 4.62   HGB 12.8 13.1 13.4 13.5 13.4   HCT 37.4 40.9 40.2 42.6 40.9   MCV 85.6 93.8 88.9 91.6 88.5   MCH 29.3 30.0 29.6 29.0 29.0   MCHC 34.2 32.0 33.3 31.7 32.8   RDW 12.7 13.1 13.2 13.3 13.2   NEPRELIM 4.68 3.89  --   --  7.49   WBC 7.7 6.7 10.2 10.2 10.6   .0 294.0 343.0 364.0 371.0         Recent Labs   Lab 24  0927 24  1123 24  0001   TROPHS <3 <3 <3       Review of Systems   Cardiovascular: Negative.    Respiratory: Negative.     Gastrointestinal:  Positive for nausea and vomiting.       Physical Exam:    Gen: alert, oriented x 3, NAD  Heent: pupils equal, reactive. Mucous membranes moist.   Neck: no jvd  Cardiac: regular rate and rhythm,  normal S1,S2  Lungs: CTA  Abd: soft, NT/ND +bs  Ext: no edema. Right groin cdi, no hematoma noted. Mild ecchymosis noted.  Skin: Warm, dry  Neuro: No focal deficits        Medications:     lidocaine-menthol  1 patch Transdermal Daily    ezetimibe  10 mg Oral Nightly    ondansetron  4 mg Intravenous q4h    clopidogrel  75 mg Oral Daily    aspirin  81 mg Oral Daily    pantoprazole  20 mg Oral BID AC    heparin  5,000 Units Subcutaneous Q8H ZE    atorvastatin  80 mg Oral Nightly    [Held by provider] bumetanide  2 mg Oral QOD    metoprolol succinate ER  25 mg Oral Daily       Findings:  1) Hemodynamics: LVEDP 17 mmHg, no gradient upon pullback  3) Coronaries:  Left main coronary artery: Large size vessel with  no angiographically significant disease.  Left anterior descending artery: Large size, Type IV vessel with stent in mid segment with mild in-stent restenosis.  First diagonal branch originating from stented region and LAD has ostial 90% stenosis.  Circumflex artery: Medium size non-dominant vessel with 100%   mid segment.  High first OM is patent.  Right coronary artery: Large size dominant vessel with severe diffuse disease ranging between 70 to 99% in proximal mid and distal segments.  PDA has bypassed.     Graft anatomy:  LIMA-LAD: Not imaged, known to be atretic  VG-OM-PDA: Widely patent      Assessment:  Pod 2 s/p PCI/NAGI diag 12/2/24 presented with exertional dyspnea  Asa, statin, plavix, bb  [facility] 12/2/24-remainder of arteries as above with  of Cx  If continued ischemic symptoms, consider OP stress to assess viability and ischemic burden of Cx and consider   LVEF 64% (11/2023)  Trop negative x3  Hx CAD s/p CABG 2021  Prior hx PCI to native LAD due to atretic LIMA  HTN-modest control  HL-high intensity statin, , goal <70  May need additional agent as OP given LDL not at goal.     Plan:  Cont asa, plavix, statin, bb.   LDL not at goal. Zetia added. Consider additional agent as OP.   No  objection to dc from cardiology standpoint.   Has f/u with Dr. Mendez scheduled 12/8. Should have echo as well as OP.     Plan of care discussed with patient, RN.    Sharmila Lind, APRN  12/4/2024  10:08 AM

## 2024-12-05 ENCOUNTER — TELEPHONE (OUTPATIENT)
Dept: INTERNAL MEDICINE CLINIC | Facility: CLINIC | Age: 64
End: 2024-12-05

## 2024-12-05 ENCOUNTER — PATIENT OUTREACH (OUTPATIENT)
Dept: CASE MANAGEMENT | Age: 64
End: 2024-12-05

## 2024-12-05 ENCOUNTER — OFFICE VISIT (OUTPATIENT)
Facility: CLINIC | Age: 64
End: 2024-12-05

## 2024-12-05 VITALS
WEIGHT: 137 LBS | OXYGEN SATURATION: 99 % | HEART RATE: 80 BPM | HEIGHT: 63 IN | BODY MASS INDEX: 24.27 KG/M2 | SYSTOLIC BLOOD PRESSURE: 124 MMHG | DIASTOLIC BLOOD PRESSURE: 74 MMHG

## 2024-12-05 DIAGNOSIS — Z02.9 ENCOUNTERS FOR UNSPECIFIED ADMINISTRATIVE PURPOSE: Primary | ICD-10-CM

## 2024-12-05 DIAGNOSIS — M25.562 LEFT KNEE PAIN, UNSPECIFIED CHRONICITY: ICD-10-CM

## 2024-12-05 DIAGNOSIS — I25.118 CORONARY ARTERY DISEASE INVOLVING NATIVE CORONARY ARTERY OF NATIVE HEART WITH OTHER FORM OF ANGINA PECTORIS (HCC): Primary | ICD-10-CM

## 2024-12-05 DIAGNOSIS — Z12.11 SCREEN FOR COLON CANCER: ICD-10-CM

## 2024-12-05 DIAGNOSIS — Z12.31 BREAST CANCER SCREENING BY MAMMOGRAM: ICD-10-CM

## 2024-12-05 PROBLEM — R07.9 CHEST PAIN, UNSPECIFIED TYPE: Status: RESOLVED | Noted: 2024-12-01 | Resolved: 2024-12-05

## 2024-12-05 PROCEDURE — 99496 TRANSJ CARE MGMT HIGH F2F 7D: CPT | Performed by: INTERNAL MEDICINE

## 2024-12-05 NOTE — PROGRESS NOTES
Subjective:   No Saucedo is a 64 year old female who presents for hospital follow up.   She was discharged from [facility] to Home or Self Care  Admission Date: 12/1/24   Discharge Date: 12/4/24  Hospital Discharge Diagnosis: Non-STEMI    Interactive contact within 2 business days post discharge first initiated on Date: 12/5/2024    During the visit, the following was completed:  Obtained and reviewed discharge summary, continuity of care documents, and Hospitalist notes  Reviewed Labs (CBC, CMP)    HPI: Copied from hospital records     No Saucedo is a 63 year old female with a past medical history of CAD s/p PCI, HTN, HLD presented to the ER with complaints of left sided chest pain, radiating to her back. She stated she has had sob for the past 2 weeks which worsened with exertion. She denied any other complaints at the time of interview.       Brief Synopsis:       s/p PCI/NAGI diag 12/2/24 presented with exertional dyspnea  Asa, statin, plavix, bb  [facility] 12/2/24-remainder of arteries as above with  of Cx  If continued ischemic symptoms, consider OP stress to assess viability and ischemic burden of Cx and consider   LVEF 64% (11/2023)  Trop negative x3  Cleared by cardiology  for dc home ; Cont asa, plavix, statin, bb.   Has f/u with Dr. Mendez scheduled 12/8. Should have echo as well as OP.   Hx CAD s/p CABG 2021  Prior hx PCI to native LAD due to atretic LIMA  Other problems  Coronary artery disease  Psoriatic arthritis  Dyslipidemia  Hypertension  Colon polyps       Post discharge, she is doing well.  No recurrence of chest pain.  Breathing is at baseline.  She is taking her antiplatelets.  She reports that she has knee pain.  Denies any recent fall or trauma.    History/Other:   Current Medications:  Medication Reconciliation:  I am aware of an inpatient discharge within the last 30 days.  The discharge medication list has been reconciled with the patient's current medication list and reviewed by  me. See medication list for additions of new medication, and changes to current doses of medications and discontinued medications.  Outpatient Medications Marked as Taking for the 12/5/24 encounter (Office Visit) with Josep Valdez MD   Medication Sig    lidocaine-menthol 4-1 % External Patch Place 1 patch onto the skin daily.    atorvastatin 80 MG Oral Tab Take 1 tablet (80 mg total) by mouth nightly.    bumetanide 2 MG Oral Tab Take 1 tablet (2 mg total) by mouth every other day.    metoprolol succinate ER 25 MG Oral Tablet 24 Hr Take 1 tablet (25 mg total) by mouth daily.    clopidogrel 75 MG Oral Tab     ergocalciferol 1.25 MG (12549 UT) Oral Cap Take 1 capsule (50,000 Units total) by mouth once a week.    omeprazole 20 MG Oral Capsule Delayed Release Take 1 capsule (20 mg total) by mouth 2 (two) times daily before meals.    NON FORMULARY 5 mg  in the morning and 5 mg before bedtime. Takes cannabis gummy 5 mg after work and 10 mg at bedtime.    ondansetron 4 MG Oral Tablet Dispersible Take 1 tablet (4 mg total) by mouth every 8 (eight) hours as needed for Nausea.    aspirin 81 MG Oral Tab EC Take 1 tablet (81 mg total) by mouth daily.       Review of Systems   Constitutional:  Negative for activity change, appetite change and fever.   HENT:  Negative for congestion and voice change.    Respiratory:  Negative for cough and shortness of breath.    Cardiovascular:  Negative for chest pain.   Gastrointestinal:  Negative for abdominal distention, abdominal pain and vomiting.   Genitourinary:  Negative for hematuria.   Skin:  Negative for wound.   Psychiatric/Behavioral:  Negative for behavioral problems.    Knee pain present      Objective:   Physical Exam  Constitutional:       Appearance: Normal appearance.   HENT:      Head: Normocephalic.   Eyes:      Conjunctiva/sclera: Conjunctivae normal.   Cardiovascular:      Rate and Rhythm: Normal rate and regular rhythm.      Heart sounds: Normal heart sounds. No  murmur heard.  Pulmonary:      Effort: Pulmonary effort is normal.      Breath sounds: Normal breath sounds. No rhonchi or rales.   Abdominal:      General: Bowel sounds are normal.      Palpations: Abdomen is soft.      Tenderness: There is no abdominal tenderness.   Musculoskeletal:      Cervical back: Neck supple.      Right lower leg: No edema.      Left lower leg: No edema.   Skin:     General: Skin is warm and dry.   Neurological:      General: No focal deficit present.      Mental Status: She is alert and oriented to person, place, and time. Mental status is at baseline.   Psychiatric:         Mood and Affect: Mood normal.         Behavior: Behavior normal.           /74   Pulse 80   Ht 5' 3\" (1.6 m)   Wt 137 lb (62.1 kg)   SpO2 99%   BMI 24.27 kg/m²  Estimated body mass index is 24.27 kg/m² as calculated from the following:    Height as of this encounter: 5' 3\" (1.6 m).    Weight as of this encounter: 137 lb (62.1 kg).    Assessment & Plan:   1. Coronary artery disease involving native coronary artery of native heart with other form of angina pectoris (HCC) (Primary)  Overview:  CABG and s/p PCI - follows up with cards  2. Breast cancer screening by mammogram  -     [facility] POORNIMA 2D+3D SCREENING BILAT (CPT=77067/59304); Future; Expected date: 12/05/2024  3. Left knee pain, unspecified chronicity  -     XR KNEE (1 OR 2 VIEWS), LEFT (CPT=73560); Future; Expected date: 12/05/2024  4. Screen for colon cancer  -     Occult Blood, Fecal, FIT Immunoassay; Future; Expected date: 12/05/2024  -     GASTRO - INTERNAL    Plan  Follow-up with the cardiology.  Plan for cardiopulmonary rehab.  Discussed regarding importance of antiplatelets and statins.  Knee pain most likely from DJD.  X-ray ordered.      No follow-ups on file.

## 2024-12-05 NOTE — TELEPHONE ENCOUNTER
The patient is currently scheduled with Dr. Josep Valdez for a medicare annual well visit today, 12/5/2024, at  3:00, but a Transitional Care Management/hospital follow up appointment is recommended by 12/18/2024, as the patient is a moderate risk for readmission.  Please advise.    BOOK BY DATE (last date for Transitional Care Management): 12/18/2024    Please change the visit type of the scheduled appointment to a Transitional Care Management hospital follow up appointment if approved by Dr. Valdez. Thank you!

## 2024-12-05 NOTE — PROGRESS NOTES
The patient is scheduled with Dr. Josep Valdez on 12/5/2024. A telephone encounter was sent to the office for an appointment review. Nurse care manager will continue to follow.

## 2024-12-11 ENCOUNTER — ORDER TRANSCRIPTION (OUTPATIENT)
Dept: CARDIAC REHAB | Facility: HOSPITAL | Age: 64
End: 2024-12-11

## 2024-12-11 DIAGNOSIS — Z95.820 S/P ANGIOPLASTY WITH STENT: Primary | ICD-10-CM

## 2024-12-17 ENCOUNTER — CARDPULM VISIT (OUTPATIENT)
Dept: CARDIAC REHAB | Facility: HOSPITAL | Age: 64
End: 2024-12-17
Attending: INTERNAL MEDICINE
Payer: MEDICARE

## 2024-12-17 DIAGNOSIS — Z95.820 S/P ANGIOPLASTY WITH STENT: Primary | ICD-10-CM

## 2024-12-23 ENCOUNTER — OFFICE VISIT (OUTPATIENT)
Dept: RHEUMATOLOGY | Facility: CLINIC | Age: 64
End: 2024-12-23
Payer: MEDICARE

## 2024-12-23 ENCOUNTER — HOSPITAL ENCOUNTER (OUTPATIENT)
Dept: GENERAL RADIOLOGY | Facility: HOSPITAL | Age: 64
Discharge: HOME | End: 2024-12-23
Attending: INTERNAL MEDICINE
Payer: MEDICARE

## 2024-12-23 VITALS
DIASTOLIC BLOOD PRESSURE: 72 MMHG | HEIGHT: 63 IN | BODY MASS INDEX: 24.7 KG/M2 | RESPIRATION RATE: 16 BRPM | WEIGHT: 139.38 LBS | HEART RATE: 77 BPM | SYSTOLIC BLOOD PRESSURE: 130 MMHG

## 2024-12-23 DIAGNOSIS — M25.512 CHRONIC LEFT SHOULDER PAIN: ICD-10-CM

## 2024-12-23 DIAGNOSIS — G89.29 CHRONIC LEFT SHOULDER PAIN: ICD-10-CM

## 2024-12-23 DIAGNOSIS — L40.50 PSORIATIC ARTHRITIS (HCC): Primary | ICD-10-CM

## 2024-12-23 DIAGNOSIS — M25.562 LEFT KNEE PAIN, UNSPECIFIED CHRONICITY: ICD-10-CM

## 2024-12-23 DIAGNOSIS — I71.20 THORACIC AORTIC ANEURYSM WITHOUT RUPTURE, UNSPECIFIED PART (HCC): ICD-10-CM

## 2024-12-23 DIAGNOSIS — Z51.81 THERAPEUTIC DRUG MONITORING: ICD-10-CM

## 2024-12-23 PROCEDURE — 73560 X-RAY EXAM OF KNEE 1 OR 2: CPT | Performed by: INTERNAL MEDICINE

## 2024-12-23 RX ORDER — TRIAMCINOLONE ACETONIDE 40 MG/ML
40 INJECTION, SUSPENSION INTRA-ARTICULAR; INTRAMUSCULAR ONCE
Status: COMPLETED | OUTPATIENT
Start: 2024-12-23 | End: 2024-12-23

## 2024-12-23 RX ORDER — ISOSORBIDE MONONITRATE 30 MG/1
30 TABLET, EXTENDED RELEASE ORAL DAILY
COMMUNITY

## 2024-12-23 RX ORDER — METHOTREXATE 2.5 MG/1
12.5 TABLET ORAL WEEKLY
Qty: 25 TABLET | Refills: 3 | Status: SHIPPED | OUTPATIENT
Start: 2024-12-23 | End: 2025-01-22

## 2024-12-23 RX ORDER — FOLIC ACID 1 MG/1
1 TABLET ORAL DAILY
Qty: 90 TABLET | Refills: 3 | Status: SHIPPED | OUTPATIENT
Start: 2024-12-23 | End: 2025-12-23

## 2024-12-23 RX ORDER — ONDANSETRON 4 MG/1
4 TABLET, FILM COATED ORAL EVERY 8 HOURS PRN
Qty: 30 TABLET | Refills: 3 | Status: SHIPPED | OUTPATIENT
Start: 2024-12-23

## 2024-12-23 NOTE — TELEPHONE ENCOUNTER
An appointment was completed with Dr. Josep Valdez on 12/5/2024. Nurse care manager closing encounter.

## 2024-12-23 NOTE — PROGRESS NOTES
No Saucedo is a 63 year old female.    HPI:     Chief Complaint   Patient presents with    Psoriatic Arthritis     Having a flare up    Medication Follow-Up    Psoriasis     Flare up     Hand Pain     B/L    Shoulder Pain     Left,         She is a plesasnt 59year old old with psoriatic arthritis and fibromyalgia.    She was on  enbrel 50mg weekly and   methotrexate weekly.   She is now off since 2019  The injection Enbrel hurts really bad. She efeels it helps her thigh. It's a swollen area over her right thigh.   She has 8/10 pain in her right elbows.   She stopped the enbrel temporarily for hte basal cell cancer but then her right elbows started botehring her again last night.   So the last injeciton lasted 3 months.     2020  VIDEO VISIT  She was furloughedx 3 weeks.   She's stressed.   Her son's girlfriend  at 32 from hepatic failure.   Her mom also passed from cancer recurrence 3 weeks after Lorri pass away.   Ambrocio mom also passed away from cancer. 2 months after.   She started medical marijuana in  . She has used prednisone only twice in the last 10 months.   She uses 1/2 gummy at night.   She still has pain in her hands.   Her pain hasn't flared with stress. She's done better than in the last 10 years. She has been off all her medications.   She's painting around the house and her hands are getting sore but its' not like something she can tolerate.   Her neck is better.   She was off enbrel - it was hurting so bad when she tried it even with the new formulation.   She is alos off methtorexate. It's amazing to her.   She takes prednisone - if seh has a bad flare.     2020  She is lousy for the last two weeks her right hip an dright leg is hurting.   It's radiating down to her right shin.   She denies back pain.   Lifting her leg - she has right hip pain.   She took the pred nisone burst on 2020.   She uses the topical pain med.   Today she felt ok - she took  three ibuporfen and that's helping.   Driving to work.   She's back now for 3 months - 20 hours a week     4/11/2023    She is re-establihsing.   Had medrol andrea sent in 2/2023 - for back pain.   She had a CABG in 12/2021. She was in the hospital 14 days - severe MEDINA. She had sternotomy to get wires removed. In 12/2022. She had a lot of pain with movemetn of her chest and had wirse removed. She feles much better. Had several cardiac aneurysm-   It's still not 100% but it was a lot better.   She has left 4th finger ganglion cyst - and planning on removed.or could be a dupytren's contracture. - dr. Ricci.   She is overall doing well - only using medical marijuana.   Her right hip is doing ok.   Her feet are swelling more.   She has about 5/10 pain.     Her son is on methotrexate , and leflunomide - reactive arthritis - not worked for 1 1/2 years . For covid long haul    5/20/2023  Her left shoulder is swelling again - it's toleratble for the last 1 1 2/ months but now she has trouble lifting again.   She had a pneumonia shot in her right arm.   Last time her left hip and left arm was hurting.   Now her left hip is better.   Her left arm is still hurting.     She has consant pain in her left arm - 9/10 pain. It' swaking her up at night.     9/16/2023  Her left arm is still really hurting. Her back is bad again.   Was in the [facility] - but got lichen planus for 3 months - needed to get light therapy and everything to get rid of it. This was after her sternotomy   So she is afraid to restart biologics.     1/20/2024  Left shoulder is still really hurting.   She had another blockage in her heart - so she had another stent in 12/2023 -   She is having more right lower back pain - it's burning - more fibro pain   Her right ankle and right foot is painful   Then right 3rd pip is very tender again.  She is getting winded on and off post stent.   She had a bypass 3 years ago .       12/23/2024 -   She had to be hospitalized  for another cardiac sten on 12/4  Getting more psoarisis over her hands and in her inner elbows.   She thinkgs it's a flare - since it started for the last 1 month - before the hospital.   It increased to her elbows post hospitlization.   Her scapular on her left side and left shoulder is very tender posterioly -   She can' only use a lidocaine patch for 1 hour - topically   She has chf - and stress - and on new cardiac meds.   Steroid cream didn't help.   She doesn't think the topicals are helping.   She waited til 12/1 for her medicare -       HISTORY:  Past Medical History:    Basal cell carcinoma    Skin, left cutaneous lip    Cervical radiculopathy at C7    Colon polyps    Coronary atherosclerosis    Essential hypertension    Fibromyalgia    High blood pressure    High cholesterol    Hypercholesterolemia    Psoriatic arthritis (HCC)      Social Hx Reviewed   Family Hx Reviewed     Medications (Active prior to today's visit):  Current Outpatient Medications   Medication Sig Dispense Refill    lidocaine-menthol 4-1 % External Patch Place 1 patch onto the skin daily. 30 patch 0    atorvastatin 80 MG Oral Tab Take 1 tablet (80 mg total) by mouth nightly. 90 tablet 3    bumetanide 2 MG Oral Tab Take 1 tablet (2 mg total) by mouth every other day.      metoprolol succinate ER 25 MG Oral Tablet 24 Hr Take 1 tablet (25 mg total) by mouth daily.      clopidogrel 75 MG Oral Tab       ergocalciferol 1.25 MG (30592 UT) Oral Cap Take 1 capsule (50,000 Units total) by mouth once a week. 12 capsule 0    omeprazole 20 MG Oral Capsule Delayed Release Take 1 capsule (20 mg total) by mouth 2 (two) times daily before meals. 180 capsule 3    NON FORMULARY 5 mg  in the morning and 5 mg before bedtime. Takes cannabis gummy 5 mg after work and 10 mg at bedtime.      ondansetron 4 MG Oral Tablet Dispersible Take 1 tablet (4 mg total) by mouth every 8 (eight) hours as needed for Nausea. 20 tablet 0    aspirin 81 MG Oral Tab EC Take 1  tablet (81 mg total) by mouth daily.      isosorbide mononitrate ER 30 MG Oral Tablet 24 Hr Take 1 tablet (30 mg total) by mouth daily.         Allergies:  Allergies   Allergen Reactions    Adhesive Tape HIVES         ROS:   All other ROS are negative.     PHYSICAL EXAM:    pleasant, no acute distress, no CAD,  Malar rash - none now   CVS: RRR  RS: CTAB  ABD: soft nont lizz   Hands not swollen. Moving them easily   Can close both hands.   Moving neck is huring   Right 3rd dip and pip is very tender - mild swelling   No rash on her palms. , no psoriasis.   Left shoulder ac joint - sonya tender -decreased  abduction and in the ac joint .   Right hip not  tender , decreased ext rotaiton   Right trochanteric bursitis not tender.   Right pretibial tendernessnot tender.   Right ankle tender   Right  with squeeze test -         Component      Latest Ref [facility] 12/4/2024   WBC      4.0 - 11.0 x10(3) uL 10.6    RBC      3.80 - 5.30 x10(6)uL 4.62    Hemoglobin      12.0 - 16.0 g/dL 13.4    Hematocrit      35.0 - 48.0 % 40.9    MCV      80.0 - 100.0 fL 88.5    MCH      26.0 - 34.0 pg 29.0    MCHC      31.0 - 37.0 g/dL 32.8    RDW-SD      35.1 - 46.3 fL 42.9    RDW      11.0 - 15.0 % 13.2    Platelet Count      150.0 - 450.0 10(3)uL 371.0    Prelim Neutrophil Abs      1.50 - 7.70 x10 (3) uL 7.49    Neutrophils Absolute      1.50 - 7.70 x10(3) uL 7.49    Lymphocytes Absolute      1.00 - 4.00 x10(3) uL 1.95    Monocytes Absolute      0.10 - 1.00 x10(3) uL 1.01 (H)    Eosinophils Absolute      0.00 - 0.70 x10(3) uL 0.06    Basophils Absolute      0.00 - 0.20 x10(3) uL 0.03    Immature Granulocyte Absolute      0.00 - 1.00 x10(3) uL 0.03    Neutrophils %      % 70.8    Lymphocytes %      % 18.4    Monocytes %      % 9.6    Eosinophils %      % 0.6    Basophils %      % 0.3    Immature Granulocyte %      % 0.3    Glucose      70 - 99 mg/dL 97    Sodium      136 - 145 mmol/L 143    Potassium      3.5 - 5.1 mmol/L 3.6     Chloride      98 - 112 mmol/L 108    Carbon Dioxide, Total      21.0 - 32.0 mmol/L 27.0    ANION GAP      0 - 18 mmol/L 8    BUN      9 - 23 mg/dL 21    CREATININE      0.55 - 1.02 mg/dL 0.68    BUN/CREATININE RATIO      10.0 - 20.0  30.9 (H)    CALCIUM      8.7 - 10.4 mg/dL 9.8    CALCULATED OSMOLALITY      275 - 295 mOsm/kg 299 (H)    EGFR      >=60 mL/min/1.73m2 97    ALT (SGPT)      10 - 49 U/L 19    AST (SGOT)      <34 U/L 32    ALKALINE PHOSPHATASE      50 - 130 U/L 78    Total Bilirubin      0.2 - 1.1 mg/dL 0.9    PROTEIN, TOTAL      5.7 - 8.2 g/dL 7.0    Albumin      3.2 - 4.8 g/dL 4.6    Globulin      2.0 - 3.5 g/dL 2.4    A/G Ratio      1.0 - 2.0  1.9       Legend:  (H) High    ASSESSMENT/PLAN:     1. Psoriatic arthritis (HCC)  1. psoriatic arthritis -  Stable except for left shoulder   Psoraisis is flaring - desiree on hands - stress and hospitliazation flared her -   She's open to starting methotrexate 12.5 mg a week and folic acid 1mg aday - again - will restart   Left shoulder/biceps tenderness -   S/p left shoulder injection 5/2023 , 1/2024 -   Here again today for left shoulder injeciton.   With paitent's consent, I injected pt's left shoulder ac joint with 1ml lidocaine 1 % and 1 ml kenalog 40. It was done under sterile technique using iodine and alcohol swabs and ethyl chloride was used as an anaesthetic spray. Pt.  tolerated it well.  Note for work to stay off of it.   Can use prednisone for her right 3rd finger pain - topical and pressure on it as well   - zofran prn   - return to clini in 3months   Labs in 3months.     She declines other meds at this time   Off her meds   - on medical marijuana,- uses sativa and cbd/cbd and thc for sleep -  and prednsone burst prn -    s/p right trochanteric burisits in 12/2020 -   Check labs in 1 month   - rtc in 6-12 months.     Right lateral epicodnylitis steroid injection - 11/2018 and 2/2019   - off naproxen  - Topical steroid for rash - dermatitis over hands  improving with mtx,    -  been off   methotrexaet 17.mg a week sub cu - b/c of elevated liver funtion tests   - off  Enbrel,  leflunomdie 20mg a day  And mtx 0.5ml subcu injction     2. CAD /sp cabg in 12/2021 and stent  12/2023 - in Intermittent - cp -  better -   Stent in 12/2024 -   Had CABG in 12/2021 - ahd had sternotomy for wire removal in 12/2022 .   Follow up with pulmonary as wlel -   She was on lanoxin b/c she has hx of SVT when her kids were little. She's fine with that for years now.   F/u dr. Mendez -   - f/u with gi - cont. probitiotics helping -   quantiferon gold 5/2017 - negative   3. Cervical radiculopathy - much better   4. . chest xray 2012 5/2017 - quantiferon gold negaitve.   5. Gastritis/ibs - on probiotics - .   6. Fibromyalgia - better   - off  low dose LDN - off   low dose gabpentin 300mg at night and cyclobenzparine 5mg at night prn   7. new basal cell ca  = dx in 2/2018     Summary:  1.  Start methotrexate 5tablets a week (12.5mg) and folic acid 1mg a day   2. Rest left shoulder  x 3 days   3.  Cont. Legal medical marijuana.   4.  Return to clinic in 3- months as needed.   5. Labs in 3 months -   - d/w her to     Maria Del Carmen Dye MD  12/23/2024   1:45 PM     is applicable because the patient's medical record notes reflects the ongoing nature of the continuous longitudinal relationship of care, and the medical record indicates that there is ongoing treatment of a serious/complex medical condition.

## 2025-01-02 ENCOUNTER — CARDPULM VISIT (OUTPATIENT)
Dept: CARDIAC REHAB | Facility: HOSPITAL | Age: 65
End: 2025-01-02
Attending: INTERNAL MEDICINE
Payer: MEDICARE

## 2025-01-02 PROCEDURE — 93798 PHYS/QHP OP CAR RHAB W/ECG: CPT

## 2025-01-06 ENCOUNTER — CARDPULM VISIT (OUTPATIENT)
Dept: CARDIAC REHAB | Facility: HOSPITAL | Age: 65
End: 2025-01-06
Attending: INTERNAL MEDICINE
Payer: MEDICARE

## 2025-01-06 PROCEDURE — 93798 PHYS/QHP OP CAR RHAB W/ECG: CPT

## 2025-01-08 ENCOUNTER — CARDPULM VISIT (OUTPATIENT)
Dept: CARDIAC REHAB | Facility: HOSPITAL | Age: 65
End: 2025-01-08
Attending: INTERNAL MEDICINE
Payer: MEDICARE

## 2025-01-08 PROCEDURE — 93798 PHYS/QHP OP CAR RHAB W/ECG: CPT

## 2025-01-09 ENCOUNTER — CARDPULM VISIT (OUTPATIENT)
Dept: CARDIAC REHAB | Facility: HOSPITAL | Age: 65
End: 2025-01-09
Attending: INTERNAL MEDICINE
Payer: MEDICARE

## 2025-01-09 PROCEDURE — 93798 PHYS/QHP OP CAR RHAB W/ECG: CPT

## 2025-01-13 ENCOUNTER — CARDPULM VISIT (OUTPATIENT)
Dept: CARDIAC REHAB | Facility: HOSPITAL | Age: 65
End: 2025-01-13
Attending: INTERNAL MEDICINE
Payer: MEDICARE

## 2025-01-13 PROCEDURE — 93798 PHYS/QHP OP CAR RHAB W/ECG: CPT

## 2025-01-20 ENCOUNTER — CARDPULM VISIT (OUTPATIENT)
Dept: CARDIAC REHAB | Facility: HOSPITAL | Age: 65
End: 2025-01-20
Attending: INTERNAL MEDICINE
Payer: MEDICARE

## 2025-01-20 PROCEDURE — 93798 PHYS/QHP OP CAR RHAB W/ECG: CPT

## 2025-01-22 ENCOUNTER — APPOINTMENT (OUTPATIENT)
Dept: CARDIAC REHAB | Facility: HOSPITAL | Age: 65
End: 2025-01-22
Attending: INTERNAL MEDICINE
Payer: MEDICARE

## 2025-01-23 ENCOUNTER — CARDPULM VISIT (OUTPATIENT)
Dept: CARDIAC REHAB | Facility: HOSPITAL | Age: 65
End: 2025-01-23
Attending: INTERNAL MEDICINE
Payer: MEDICARE

## 2025-01-23 PROCEDURE — 93798 PHYS/QHP OP CAR RHAB W/ECG: CPT

## 2025-01-27 ENCOUNTER — NURSE TRIAGE (OUTPATIENT)
Dept: INTERNAL MEDICINE CLINIC | Facility: CLINIC | Age: 65
End: 2025-01-27

## 2025-01-28 ENCOUNTER — OFFICE VISIT (OUTPATIENT)
Dept: INTERNAL MEDICINE CLINIC | Facility: CLINIC | Age: 65
End: 2025-01-28
Payer: MEDICARE

## 2025-01-28 ENCOUNTER — LAB ENCOUNTER (OUTPATIENT)
Dept: LAB | Age: 65
End: 2025-01-28
Attending: INTERNAL MEDICINE
Payer: MEDICARE

## 2025-01-28 VITALS
BODY MASS INDEX: 23.81 KG/M2 | DIASTOLIC BLOOD PRESSURE: 60 MMHG | HEIGHT: 63 IN | WEIGHT: 134.38 LBS | HEART RATE: 68 BPM | SYSTOLIC BLOOD PRESSURE: 120 MMHG

## 2025-01-28 DIAGNOSIS — N13.5 UPJ OBSTRUCTION, ACQUIRED: Primary | ICD-10-CM

## 2025-01-28 DIAGNOSIS — R10.31 ABDOMINAL PAIN, BILATERAL LOWER QUADRANT: ICD-10-CM

## 2025-01-28 DIAGNOSIS — R10.32 ABDOMINAL PAIN, BILATERAL LOWER QUADRANT: ICD-10-CM

## 2025-01-28 DIAGNOSIS — I25.118 CORONARY ARTERY DISEASE INVOLVING NATIVE CORONARY ARTERY OF NATIVE HEART WITH OTHER FORM OF ANGINA PECTORIS: ICD-10-CM

## 2025-01-28 DIAGNOSIS — L40.50 PSORIATIC ARTHRITIS (HCC): ICD-10-CM

## 2025-01-28 PROBLEM — I20.0 UNSTABLE ANGINA (HCC): Status: RESOLVED | Noted: 2024-12-03 | Resolved: 2025-01-28

## 2025-01-28 PROCEDURE — 81015 MICROSCOPIC EXAM OF URINE: CPT

## 2025-01-28 PROCEDURE — 99214 OFFICE O/P EST MOD 30 MIN: CPT | Performed by: INTERNAL MEDICINE

## 2025-01-28 PROCEDURE — G2211 COMPLEX E/M VISIT ADD ON: HCPCS | Performed by: INTERNAL MEDICINE

## 2025-01-28 NOTE — PROGRESS NOTES
No Saucedo is a 64 year old female.  Chief Complaint   Patient presents with    Low Back Pain    Abdominal Pain     HPI:     History of Present Illness  The patient, with a history of congestive heart failure, coronary disease status post CABG presents with lower abdominal pain and spasms, particularly at night. The discomfort is localized to the right lower quadrant  She also reports that the area occasionally becomes hard and distended, which she attributes to fluid accumulation from her heart failure. She is concerned about a possible urinary tract infection (UTI) or issues related to a known cystocele, for which she is scheduled to have surgery.    The patient also mentions a history of kidney obstruction and is due for follow-up with a urologist, Dr. Warner. She has recently gained Medicare coverage and plans to schedule an appointment for further kidney function tests.    In addition to these concerns, the patient has arthritis, which has been causing severe hand pain. She has been prescribed methotrexate by her rheumatologist, Dr. HAINES but has been hesitant to take it due to side effects.       Current Outpatient Medications   Medication Sig Dispense Refill    isosorbide mononitrate ER 30 MG Oral Tablet 24 Hr Take 1 tablet (30 mg total) by mouth daily.      ondansetron (ZOFRAN) 4 mg tablet Take 1 tablet (4 mg total) by mouth every 8 (eight) hours as needed for Nausea. 30 tablet 3    lidocaine-menthol 4-1 % External Patch Place 1 patch onto the skin daily. 30 patch 0    atorvastatin 80 MG Oral Tab Take 1 tablet (80 mg total) by mouth nightly. 90 tablet 3    bumetanide 2 MG Oral Tab Take 1 tablet (2 mg total) by mouth every other day.      metoprolol succinate ER 25 MG Oral Tablet 24 Hr Take 1 tablet (25 mg total) by mouth daily.      clopidogrel 75 MG Oral Tab       omeprazole 20 MG Oral Capsule Delayed Release Take 1 capsule (20 mg total) by mouth 2 (two) times daily before meals. 180 capsule 3    NON  FORMULARY 5 mg  in the morning and 5 mg before bedtime. Takes cannabis gummy 5 mg after work and 10 mg at bedtime.      ondansetron 4 MG Oral Tablet Dispersible Take 1 tablet (4 mg total) by mouth every 8 (eight) hours as needed for Nausea. 20 tablet 0    aspirin 81 MG Oral Tab EC Take 1 tablet (81 mg total) by mouth daily.      folic acid 1 MG Oral Tab Take 1 tablet (1 mg total) by mouth daily. (Patient not taking: Reported on 2025) 90 tablet 3    ergocalciferol 1.25 MG (36497 UT) Oral Cap Take 1 capsule (50,000 Units total) by mouth once a week. (Patient not taking: Reported on 2025) 12 capsule 0      Past Medical History:    Basal cell carcinoma    Skin, left cutaneous lip    Cervical radiculopathy at C7    Colon polyps    Coronary atherosclerosis    Essential hypertension    Fibromyalgia    High blood pressure    High cholesterol    Hypercholesterolemia    Psoriatic arthritis (HCC)      Past Surgical History:   Procedure Laterality Date    Anesth,open heart surgery      Cabg      Colonoscopy N/A 2017    Procedure: COLONOSCOPY;  Surgeon: Selina Timmons MD;  Location: [facility] ENDOSCOPY    Colonoscopy,biopsy  2010    Performed by GIOVANNI BRITO at [facility], [facility]    Foot surgery Right     Bunion    Other  2018    Mohs surgery left upper lip BCCa    Vaginal hysterectomy      With cystocele repair      Social History:  Social History     Socioeconomic History    Marital status:    Tobacco Use    Smoking status: Former     Current packs/day: 0.00     Average packs/day: 0.5 packs/day for 37.0 years (18.5 ttl pk-yrs)     Types: Cigarettes     Start date: 1975     Quit date: 2012     Years since quittin.4     Passive exposure: Never    Smokeless tobacco: Never   Vaping Use    Vaping status: Never Used   Substance and Sexual Activity    Alcohol use: Yes     Alcohol/week: 0.0 standard drinks of alcohol     Comment: Occasional    Drug use: Yes      Types: Cannabis     Comment: Medical Gummies/Topical Cream   Other Topics Concern    Pt has a pacemaker No    Pt has a defibrillator No    Reaction to local anesthetic No    Right Handed Yes     Social Drivers of Health     Food Insecurity: No Food Insecurity (2024)    Food Insecurity     Food Insecurity: Never true   Transportation Needs: No Transportation Needs (2024)    Transportation Needs     Lack of Transportation: No   Housing Stability: Low Risk  (2024)    Housing Stability     Housing Instability: No      Family History   Problem Relation Age of Onset    Colon Cancer Mother     Heart Disease Mother         CABG age 72    Hypertension Mother     Cancer Mother 61        Colon Cancer    Other (Bladder Cancer) Mother     Other (Cervical Cancer) Mother     Other (Peripheral Vascular Disease) Mother     Diabetes Maternal Grandmother     Hypertension Maternal Grandmother     Cancer Maternal Grandmother         hx of skin cancer     Heart Disease Maternal Grandfather          MI age 32    Heart Disease Maternal Aunt         Per NG: CAD    Breast Cancer Neg       Allergies[1]     REVIEW OF SYSTEMS:   Review of Systems   Review of Systems   Constitutional: Negative for activity change, appetite change and fever.   HENT: Negative for congestion and voice change.    Respiratory: Negative for cough and shortness of breath.    Cardiovascular: Negative for chest pain.   Gastrointestinal: Negative for abdominal distention, abdominal pain and vomiting.   Genitourinary: Negative for hematuria.   Skin: Negative for wound.   Psychiatric/Behavioral: Negative for behavioral problems.   Wt Readings from Last 5 Encounters:   25 134 lb 6.4 oz (61 kg)   24 139 lb 6.4 oz (63.2 kg)   24 137 lb (62.1 kg)   24 134 lb 8 oz (61 kg)   06/10/24 141 lb (64 kg)     Body mass index is 23.81 kg/m².      EXAM:   /60 (BP Location: Right arm, Patient Position: Sitting, Cuff Size: adult)   Pulse 68    Ht 5' 3\" (1.6 m)   Wt 134 lb 6.4 oz (61 kg)   BMI 23.81 kg/m²   Physical Exam   Constitutional:       Appearance: Normal appearance.   HENT:      Head: Normocephalic.   Eyes:      Conjunctiva/sclera: Conjunctivae normal.   Cardiovascular:      Rate and Rhythm: Normal rate and regular rhythm.      Heart sounds: Normal heart sounds. No murmur heard.  Pulmonary:      Effort: Pulmonary effort is normal.      Breath sounds: Normal breath sounds. No rhonchi or rales.   Abdominal:      General: Bowel sounds are normal.      Palpations: Abdomen is soft.      Tenderness: There is no abdominal tenderness.   Musculoskeletal:      Cervical back: Neck supple.      Right lower leg: No edema.      Left lower leg: No edema.   Skin:     General: Skin is warm and dry.   Neurological:      General: No focal deficit present.      Mental Status: He is alert and oriented to person, place, and time. Mental status is at baseline.   Psychiatric:         Mood and Affect: Mood normal.         Behavior: Behavior normal.       ASSESSMENT AND PLAN:   1. UPJ obstruction, acquired  I have reviewed the consultation note from urology.  They have ordered another testing.  Encourage patient to complete the imaging before urology appointment.  - Urology Referral - [place] ([facility])    2. Abdominal pain, bilateral lower quadrant  -Check urinalysis to make sure there is no infection.  Will treat accordingly.  - UA Microscopic only, urine; Future    3. Coronary artery disease involving native coronary artery of native heart with other form of angina pectoris  Status post CABG doing well.  I do not think her symptoms are cardiac related now.    4. Psoriatic arthritis (HCC)  Encouraged patient to continue to follow-up with rheumatology.    Assessment & Plan  Lower Abdominal Pain  Patient reports spasms and pain in the lower abdomen, particularly on the right side. Suspected urinary tract infection (UTI) or possible relation to known  cystocele. No dysuria reported.  -Order urine test today to rule out UTI.  -If UTI is confirmed, prescribe appropriate antibiotics.  -Refer to urology for further evaluation and management of cystocele.  -Schedule previously ordered testing now that patient has insurance coverage.    Hyperlipidemia  LDL slightly above target (112) for patient with heart disease.  -Continue Atorvastatin.    Rheumatoid Arthritis  Patient reports worsening hand symptoms. Currently managed by Dr. HAINES  Patient reluctant to resume Methotrexate.  -Advise patient to contact Dr. HAINES to discuss symptoms and treatment plan.    Congestive Heart Failure-well compensated.    -Continue current management and monitor symptoms.    General Health Maintenance  -Continue Plavix and statins for heart disease management.  -Advise patient to take Folic Acid if Methotrexate is resumed.  To see Dr. HAINES  Check urinalysis, complete imaging and then follow-up with urology     Plan: As above.  I will see her back in 3 months.  Meanwhile if there is any concerns she will contact me.  I will follow-up on the results.      The patient indicates understanding of these issues and agrees to the plan.  No follow-ups on file.    This note was prepared using Dragon Medical voice recognition dictation software. As a result errors may occur. When identified these errors have been corrected. While every attempt is made to correct errors during dictation discrepancies may still exist.         [1]   Allergies  Allergen Reactions    Adhesive Tape HIVES

## 2025-01-28 NOTE — PROGRESS NOTES
The following individual(s), No Saucedo, verbally consents to be recorded using ambient AI listening technology and understand that they can each withdraw their consent to this listening technology at any point by asking the clinician to turn off or pause the recording:

## 2025-01-29 ENCOUNTER — TELEPHONE (OUTPATIENT)
Dept: SURGERY | Facility: CLINIC | Age: 65
End: 2025-01-29

## 2025-01-29 NOTE — TELEPHONE ENCOUNTER
Patient calling stating Dr Grady order a nuclear test for her and is schedule for 2/12 and was told to ask Dr Grady if is ok with him if she can stop taking her water pill 2 days prior to the test.Please advise

## 2025-02-05 ENCOUNTER — NURSE TRIAGE (OUTPATIENT)
Dept: INTERNAL MEDICINE CLINIC | Facility: CLINIC | Age: 65
End: 2025-02-05

## 2025-02-05 NOTE — TELEPHONE ENCOUNTER
Please reply to pool: EM RN TRIAGE  Action Requested: Summary for Provider     []  Critical Lab, Recommendations Needed  [x] Need Additional Advice  []   FYI    []   Need Orders  [] Need Medications Sent to Pharmacy  []  Other     SUMMARY: Patient contacts clinic reporting head congestion for the last 3 days and today as started to cough.  Sore throat. Clear nasal drainage, clear chest sputum.  Denies fever.  Baseline shortness of breath due to CHF.  Denies worsening, cough is audible over the phone.  Home covid test negative.  Suppotive measures discussed, report back for any progression of symptoms, fever or if breathing difficulty develops got to immediate care or emergency room.  She verbalized understanding.    States her pharmacist recommended coricidin over the counter.  Would like to check with Dr. Valdez if ok to take this.  Not included in protocol, please advise.     Reason for call: Cold  Onset: Data Unavailable                       Reason for Disposition   Colds with no complications    Protocols used: Common Cold-A-OH

## 2025-02-06 ENCOUNTER — APPOINTMENT (OUTPATIENT)
Dept: CARDIAC REHAB | Facility: HOSPITAL | Age: 65
End: 2025-02-06
Attending: INTERNAL MEDICINE
Payer: MEDICARE

## 2025-02-06 ENCOUNTER — OFFICE VISIT (OUTPATIENT)
Dept: INTERNAL MEDICINE CLINIC | Facility: CLINIC | Age: 65
End: 2025-02-06

## 2025-02-06 VITALS
BODY MASS INDEX: 23.57 KG/M2 | WEIGHT: 133 LBS | SYSTOLIC BLOOD PRESSURE: 144 MMHG | HEIGHT: 63 IN | DIASTOLIC BLOOD PRESSURE: 79 MMHG | HEART RATE: 76 BPM | OXYGEN SATURATION: 97 % | TEMPERATURE: 98 F

## 2025-02-06 DIAGNOSIS — J44.1 COPD EXACERBATION (HCC): Primary | ICD-10-CM

## 2025-02-06 PROCEDURE — 99214 OFFICE O/P EST MOD 30 MIN: CPT | Performed by: STUDENT IN AN ORGANIZED HEALTH CARE EDUCATION/TRAINING PROGRAM

## 2025-02-06 RX ORDER — PREDNISONE 20 MG/1
40 TABLET ORAL DAILY
Qty: 10 TABLET | Refills: 0 | Status: SHIPPED | OUTPATIENT
Start: 2025-02-06 | End: 2025-02-11

## 2025-02-06 RX ORDER — AZITHROMYCIN 500 MG/1
500 TABLET, FILM COATED ORAL DAILY
Qty: 3 TABLET | Refills: 0 | Status: SHIPPED | OUTPATIENT
Start: 2025-02-06

## 2025-02-06 NOTE — PROGRESS NOTES
Subjective     Chief Complaint   Patient presents with    Cough     For about a week, started with runny nose, last night cough got worse.took home covid was negative but was     Sore Throat    Fatigue    Wheezing       No Saucedo is a 64 year old female who is presenting today with URI symptoms.     Started with a sore throat, then had clear rinorrhea, then started having a cough. Last night, the cough was significantly worse. She barely had any sleep over night due to the cough. She took a home COVID test that was negative.       Past Medical History:    Basal cell carcinoma    Skin, left cutaneous lip    Cervical radiculopathy at C7    Colon polyps    Coronary atherosclerosis    Essential hypertension    Fibromyalgia    High blood pressure    High cholesterol    Hypercholesterolemia    Psoriatic arthritis (HCC)       Past Surgical History:   Procedure Laterality Date    Anesth,open heart surgery      Cabg      Colonoscopy N/A 2017    Procedure: COLONOSCOPY;  Surgeon: Selina Timmons MD;  Location: [facility] ENDOSCOPY    Colonoscopy,biopsy  2010    Performed by GIOVANNI BRITO at [facility], [facility]    Foot surgery Right     Bunion    Other  2018    Mohs surgery left upper lip BCCa    Vaginal hysterectomy      With cystocele repair       Allergies[1]    Family History   Problem Relation Age of Onset    Colon Cancer Mother     Heart Disease Mother         CABG age 72    Hypertension Mother     Cancer Mother 61        Colon Cancer    Other (Bladder Cancer) Mother     Other (Cervical Cancer) Mother     Other (Peripheral Vascular Disease) Mother     Diabetes Maternal Grandmother     Hypertension Maternal Grandmother     Cancer Maternal Grandmother         hx of skin cancer     Heart Disease Maternal Grandfather          MI age 32    Heart Disease Maternal Aunt         Per NG: CAD    Breast Cancer Neg        Social History     Socioeconomic History    Marital status:     Tobacco Use    Smoking status: Former     Current packs/day: 0.00     Average packs/day: 0.5 packs/day for 37.0 years (18.5 ttl pk-yrs)     Types: Cigarettes     Start date: 1975     Quit date: 2012     Years since quittin.4     Passive exposure: Never    Smokeless tobacco: Never   Vaping Use    Vaping status: Never Used   Substance and Sexual Activity    Alcohol use: Yes     Alcohol/week: 0.0 standard drinks of alcohol     Comment: Occasional    Drug use: Yes     Types: Cannabis     Comment: Medical Gummies/Topical Cream   Other Topics Concern    Pt has a pacemaker No    Pt has a defibrillator No    Reaction to local anesthetic No    Right Handed Yes         I have personally reviewed and updated the following EMR sections as appropriate: Current medications, Allergies, Problem list, Past Medical History, Past Surgical History, Social History and Family History    Review of Systems   Constitutional:  Positive for appetite change and fatigue.   HENT:  Positive for rhinorrhea.    Respiratory:  Positive for cough.        Objective     Medications Ordered Prior to Encounter[2]  /79 (BP Location: Right arm, Patient Position: Sitting, Cuff Size: adult)   Pulse 76   Temp 98.3 °F (36.8 °C) (Oral)   Ht 5' 3\" (1.6 m)   Wt 133 lb (60.3 kg)   SpO2 97%   BMI 23.56 kg/m²   Physical Exam  Vitals reviewed.   Constitutional:       Appearance: Normal appearance.   HENT:      Head: Normocephalic and atraumatic.   Pulmonary:      Breath sounds: Wheezing present.   Skin:     General: Skin is warm and dry.   Neurological:      General: No focal deficit present.      Mental Status: She is alert and oriented to person, place, and time.   Psychiatric:         Mood and Affect: Mood normal.         Behavior: Behavior normal.         Recent Results (from the past 14 weeks)   EKG 12 Lead    Collection Time: 24  8:57 AM   Result Value Ref Range    Ventricular rate 68 BPM    Atrial rate 68 BPM    P-R  Interval 222 ms    QRS Duration 74 ms    Q-T Interval 388 ms    QTC Calculation (Bezet) 412 ms    P Axis 49 degrees    R Axis -16 degrees    T Axis 54 degrees   Basic Metabolic Panel (8)    Collection Time: 12/01/24  9:27 AM   Result Value Ref Range    Glucose 109 (H) 70 - 99 mg/dL    Sodium 142 136 - 145 mmol/L    Potassium 3.4 (L) 3.5 - 5.1 mmol/L    Chloride 109 98 - 112 mmol/L    CO2 25.0 21.0 - 32.0 mmol/L    Anion Gap 8 0 - 18 mmol/L    BUN 16 9 - 23 mg/dL    Creatinine 0.88 0.55 - 1.02 mg/dL    BUN/CREA Ratio 18.2 10.0 - 20.0    Calcium, Total 9.6 8.7 - 10.4 mg/dL    Calculated Osmolality 296 (H) 275 - 295 mOsm/kg    eGFR-Cr 73 >=60 mL/min/1.73m2   CBC With Differential With Platelet    Collection Time: 12/01/24  9:27 AM   Result Value Ref Range    WBC 7.7 4.0 - 11.0 x10(3) uL    RBC 4.37 3.80 - 5.30 x10(6)uL    HGB 12.8 12.0 - 16.0 g/dL    HCT 37.4 35.0 - 48.0 %    MCV 85.6 80.0 - 100.0 fL    MCH 29.3 26.0 - 34.0 pg    MCHC 34.2 31.0 - 37.0 g/dL    RDW-SD 39.7 35.1 - 46.3 fL    RDW 12.7 11.0 - 15.0 %    .0 150.0 - 450.0 10(3)uL    Neutrophil Absolute Prelim 4.68 1.50 - 7.70 x10 (3) uL    Neutrophil Absolute 4.68 1.50 - 7.70 x10(3) uL    Lymphocyte Absolute 1.98 1.00 - 4.00 x10(3) uL    Monocyte Absolute 0.59 0.10 - 1.00 x10(3) uL    Eosinophil Absolute 0.39 0.00 - 0.70 x10(3) uL    Basophil Absolute 0.05 0.00 - 0.20 x10(3) uL    Immature Granulocyte Absolute 0.01 0.00 - 1.00 x10(3) uL    Neutrophil % 60.8 %    Lymphocyte % 25.7 %    Monocyte % 7.7 %    Eosinophil % 5.1 %    Basophil % 0.6 %    Immature Granulocyte % 0.1 %   Pro Beta Natriuretic Peptide    Collection Time: 12/01/24  9:27 AM   Result Value Ref Range    Pro-Beta Natriuretic Peptide 235 (H) <125 pg/mL   Troponin I (High Sensitivity)    Collection Time: 12/01/24  9:27 AM   Result Value Ref Range    Troponin I (High Sensitivity) <3 <=34 ng/L   Prothrombin Time (PT)    Collection Time: 12/01/24  9:27 AM   Result Value Ref Range    PT 12.6  11.6 - 14.8 seconds    INR 0.89 0.80 - 1.20   Troponin I (High Sensitivity)    Collection Time: 12/01/24 11:23 AM   Result Value Ref Range    Troponin I (High Sensitivity) <3 <=34 ng/L   EKG 12 Lead    Collection Time: 12/01/24 11:36 PM   Result Value Ref Range    Ventricular rate 69 BPM    Atrial rate 69 BPM    P-R Interval 216 ms    QRS Duration 76 ms    Q-T Interval 396 ms    QTC Calculation (Bezet) 424 ms    P Axis 51 degrees    R Axis -7 degrees    T Axis 50 degrees   Troponin I (High Sensitivity)    Collection Time: 12/02/24 12:01 AM   Result Value Ref Range    Troponin I (High Sensitivity) <3 <=34 ng/L   Basic Metabolic Panel (8)    Collection Time: 12/02/24  6:30 AM   Result Value Ref Range    Glucose 91 70 - 99 mg/dL    Sodium 143 136 - 145 mmol/L    Potassium 4.2 3.5 - 5.1 mmol/L    Chloride 109 98 - 112 mmol/L    CO2 25.0 21.0 - 32.0 mmol/L    Anion Gap 9 0 - 18 mmol/L    BUN 14 9 - 23 mg/dL    Creatinine 0.71 0.55 - 1.02 mg/dL    BUN/CREA Ratio 19.7 10.0 - 20.0    Calcium, Total 9.8 8.7 - 10.4 mg/dL    Calculated Osmolality 296 (H) 275 - 295 mOsm/kg    eGFR-Cr 95 >=60 mL/min/1.73m2   Magnesium    Collection Time: 12/02/24  6:30 AM   Result Value Ref Range    Magnesium 2.1 1.6 - 2.6 mg/dL   CBC With Differential With Platelet    Collection Time: 12/02/24  6:30 AM   Result Value Ref Range    WBC 6.7 4.0 - 11.0 x10(3) uL    RBC 4.36 3.80 - 5.30 x10(6)uL    HGB 13.1 12.0 - 16.0 g/dL    HCT 40.9 35.0 - 48.0 %    MCV 93.8 80.0 - 100.0 fL    MCH 30.0 26.0 - 34.0 pg    MCHC 32.0 31.0 - 37.0 g/dL    RDW-SD 45.1 35.1 - 46.3 fL    RDW 13.1 11.0 - 15.0 %    .0 150.0 - 450.0 10(3)uL    Immature Platelet Fraction 4.0 0.0 - 7.0 %    Neutrophil Absolute Prelim 3.89 1.50 - 7.70 x10 (3) uL    Neutrophil Absolute 3.89 1.50 - 7.70 x10(3) uL    Lymphocyte Absolute 1.83 1.00 - 4.00 x10(3) uL    Monocyte Absolute 0.52 0.10 - 1.00 x10(3) uL    Eosinophil Absolute 0.41 0.00 - 0.70 x10(3) uL    Basophil Absolute 0.07 0.00  - 0.20 x10(3) uL    Immature Granulocyte Absolute 0.01 0.00 - 1.00 x10(3) uL    Neutrophil % 57.9 %    Lymphocyte % 27.2 %    Monocyte % 7.7 %    Eosinophil % 6.1 %    Basophil % 1.0 %    Immature Granulocyte % 0.1 %   Potassium    Collection Time: 12/02/24  6:30 AM   Result Value Ref Range    Potassium 4.2 3.5 - 5.1 mmol/L   Lipid Panel    Collection Time: 12/02/24  6:30 AM   Result Value Ref Range    Cholesterol, Total 186 <200 mg/dL    HDL Cholesterol 54 40 - 59 mg/dL    Triglycerides 112 30 - 149 mg/dL    LDL Cholesterol 112 (H) <100 mg/dL    VLDL 19 0 - 30 mg/dL    Non HDL Chol 132 (H) <130 mg/dL   EKG 12 Lead    Collection Time: 12/02/24  8:17 AM   Result Value Ref Range    Ventricular rate 69 BPM    Atrial rate 69 BPM    P-R Interval 218 ms    QRS Duration 84 ms    Q-T Interval 400 ms    QTC Calculation (Bezet) 428 ms    P Axis 27 degrees    R Axis -27 degrees    T Axis 6 degrees   EKG 12 Lead    Collection Time: 12/02/24  2:21 PM   Result Value Ref Range    Ventricular rate 58 BPM    Atrial rate 58 BPM    P-R Interval 210 ms    QRS Duration 74 ms    Q-T Interval 406 ms    QTC Calculation (Bezet) 398 ms    P Axis 23 degrees    R Axis -20 degrees    T Axis 48 degrees   Istat activated clotting time    Collection Time: 12/02/24  5:06 PM   Result Value Ref Range    ISTAT Activated Clotting Time 244 (H) 125 - 137 Seconds   Istat activated clotting time    Collection Time: 12/02/24  5:25 PM   Result Value Ref Range    ISTAT Activated Clotting Time 269 (H) 125 - 137 Seconds   CBC, Platelet; No Differential    Collection Time: 12/02/24  7:04 PM   Result Value Ref Range    WBC 10.2 4.0 - 11.0 x10(3) uL    RBC 4.52 3.80 - 5.30 x10(6)uL    HGB 13.4 12.0 - 16.0 g/dL    HCT 40.2 35.0 - 48.0 %    MCV 88.9 80.0 - 100.0 fL    MCH 29.6 26.0 - 34.0 pg    MCHC 33.3 31.0 - 37.0 g/dL    RDW 13.2 11.0 - 15.0 %    RDW-SD 42.7 35.1 - 46.3 fL    .0 150.0 - 450.0 10(3)uL   Basic Metabolic Panel (8)    Collection Time: 12/02/24   7:04 PM   Result Value Ref Range    Glucose 108 (H) 70 - 99 mg/dL    Sodium 141 136 - 145 mmol/L    Potassium 4.3 3.5 - 5.1 mmol/L    Chloride 107 98 - 112 mmol/L    CO2 27.0 21.0 - 32.0 mmol/L    Anion Gap 7 0 - 18 mmol/L    BUN 16 9 - 23 mg/dL    Creatinine 0.72 0.55 - 1.02 mg/dL    BUN/CREA Ratio 22.2 (H) 10.0 - 20.0    Calcium, Total 9.9 8.7 - 10.4 mg/dL    Calculated Osmolality 294 275 - 295 mOsm/kg    eGFR-Cr 93 >=60 mL/min/1.73m2   Basic Metabolic Panel (8)    Collection Time: 12/03/24  6:20 AM   Result Value Ref Range    Glucose 100 (H) 70 - 99 mg/dL    Sodium 142 136 - 145 mmol/L    Potassium 3.9 3.5 - 5.1 mmol/L    Chloride 109 98 - 112 mmol/L    CO2 21.0 21.0 - 32.0 mmol/L    Anion Gap 12 0 - 18 mmol/L    BUN 15 9 - 23 mg/dL    Creatinine 0.62 0.55 - 1.02 mg/dL    BUN/CREA Ratio 24.2 (H) 10.0 - 20.0    Calcium, Total 10.2 8.7 - 10.4 mg/dL    Calculated Osmolality 295 275 - 295 mOsm/kg    eGFR-Cr 99 >=60 mL/min/1.73m2   CBC, Platelet; No Differential    Collection Time: 12/03/24  6:20 AM   Result Value Ref Range    WBC 10.2 4.0 - 11.0 x10(3) uL    RBC 4.65 3.80 - 5.30 x10(6)uL    HGB 13.5 12.0 - 16.0 g/dL    HCT 42.6 35.0 - 48.0 %    MCV 91.6 80.0 - 100.0 fL    MCH 29.0 26.0 - 34.0 pg    MCHC 31.7 31.0 - 37.0 g/dL    RDW 13.3 11.0 - 15.0 %    RDW-SD 45.5 35.1 - 46.3 fL    .0 150.0 - 450.0 10(3)uL   EKG 12 Lead    Collection Time: 12/03/24  1:48 PM   Result Value Ref Range    Ventricular rate 82 BPM    Atrial rate 82 BPM    P-R Interval 204 ms    QRS Duration 84 ms    Q-T Interval 382 ms    QTC Calculation (Bezet) 446 ms    P Axis 45 degrees    R Axis -21 degrees    T Axis 29 degrees   CBC With Differential With Platelet    Collection Time: 12/04/24  6:55 AM   Result Value Ref Range    WBC 10.6 4.0 - 11.0 x10(3) uL    RBC 4.62 3.80 - 5.30 x10(6)uL    HGB 13.4 12.0 - 16.0 g/dL    HCT 40.9 35.0 - 48.0 %    MCV 88.5 80.0 - 100.0 fL    MCH 29.0 26.0 - 34.0 pg    MCHC 32.8 31.0 - 37.0 g/dL    RDW-SD 42.9  35.1 - 46.3 fL    RDW 13.2 11.0 - 15.0 %    .0 150.0 - 450.0 10(3)uL    Neutrophil Absolute Prelim 7.49 1.50 - 7.70 x10 (3) uL    Neutrophil Absolute 7.49 1.50 - 7.70 x10(3) uL    Lymphocyte Absolute 1.95 1.00 - 4.00 x10(3) uL    Monocyte Absolute 1.01 (H) 0.10 - 1.00 x10(3) uL    Eosinophil Absolute 0.06 0.00 - 0.70 x10(3) uL    Basophil Absolute 0.03 0.00 - 0.20 x10(3) uL    Immature Granulocyte Absolute 0.03 0.00 - 1.00 x10(3) uL    Neutrophil % 70.8 %    Lymphocyte % 18.4 %    Monocyte % 9.6 %    Eosinophil % 0.6 %    Basophil % 0.3 %    Immature Granulocyte % 0.3 %   Comp Metabolic Panel (14)    Collection Time: 12/04/24  6:55 AM   Result Value Ref Range    Glucose 97 70 - 99 mg/dL    Sodium 143 136 - 145 mmol/L    Potassium 3.6 3.5 - 5.1 mmol/L    Chloride 108 98 - 112 mmol/L    CO2 27.0 21.0 - 32.0 mmol/L    Anion Gap 8 0 - 18 mmol/L    BUN 21 9 - 23 mg/dL    Creatinine 0.68 0.55 - 1.02 mg/dL    BUN/CREA Ratio 30.9 (H) 10.0 - 20.0    Calcium, Total 9.8 8.7 - 10.4 mg/dL    Calculated Osmolality 299 (H) 275 - 295 mOsm/kg    eGFR-Cr 97 >=60 mL/min/1.73m2    ALT 19 10 - 49 U/L    AST 32 <34 U/L    Alkaline Phosphatase 78 50 - 130 U/L    Bilirubin, Total 0.9 0.2 - 1.1 mg/dL    Total Protein 7.0 5.7 - 8.2 g/dL    Albumin 4.6 3.2 - 4.8 g/dL    Globulin  2.4 2.0 - 3.5 g/dL    A/G Ratio 1.9 1.0 - 2.0   UA Microscopic only, urine    Collection Time: 01/28/25  2:26 PM   Result Value Ref Range    WBC Urine 6-10 (A) 0 - 5 /HPF    RBC Urine 0-2 0 - 2 /HPF    Bacteria Urine None Seen None Seen /HPF    Squamous Epi. Cells Few (A) None Seen /HPF    Renal Tubular Epithelial Cells None Seen None Seen /HPF    Transitional Cells None Seen None Seen /HPF    Yeast Urine None Seen None Seen /HPF       Assessment and Plan      COPD exacerbation (HCC) (Primary)  Other orders  -     predniSONE; Take 2 tablets (40 mg total) by mouth daily for 5 days.  Dispense: 10 tablet; Refill: 0  -     Azithromycin; Take 1 tablet (500 mg  total) by mouth daily.  Dispense: 3 tablet; Refill: 0  -     Albuterol Sulfate; Inhale 1 puff into the lungs every 4 to 6 hours as needed (cough or wheezing).  Dispense: 1 each; Refill: 0    Wheezing on exam.   Will treat as a COPD exacerbation.   Patient might need PFTs after recovering from URI.   No follow-ups on file.    Oscar Bashir MD  Internal Medicine  2/6/2025         [1]   Allergies  Allergen Reactions    Adhesive Tape HIVES   [2]   Current Outpatient Medications on File Prior to Visit   Medication Sig Dispense Refill    isosorbide mononitrate ER 30 MG Oral Tablet 24 Hr Take 1 tablet (30 mg total) by mouth daily.      ondansetron (ZOFRAN) 4 mg tablet Take 1 tablet (4 mg total) by mouth every 8 (eight) hours as needed for Nausea. 30 tablet 3    lidocaine-menthol 4-1 % External Patch Place 1 patch onto the skin daily. 30 patch 0    atorvastatin 80 MG Oral Tab Take 1 tablet (80 mg total) by mouth nightly. 90 tablet 3    bumetanide 2 MG Oral Tab Take 1 tablet (2 mg total) by mouth every other day.      metoprolol succinate ER 25 MG Oral Tablet 24 Hr Take 1 tablet (25 mg total) by mouth daily.      clopidogrel 75 MG Oral Tab       omeprazole 20 MG Oral Capsule Delayed Release Take 1 capsule (20 mg total) by mouth 2 (two) times daily before meals. 180 capsule 3    NON FORMULARY 5 mg  in the morning and 5 mg before bedtime. Takes cannabis gummy 5 mg after work and 10 mg at bedtime.      ondansetron 4 MG Oral Tablet Dispersible Take 1 tablet (4 mg total) by mouth every 8 (eight) hours as needed for Nausea. 20 tablet 0    aspirin 81 MG Oral Tab EC Take 1 tablet (81 mg total) by mouth daily.      folic acid 1 MG Oral Tab Take 1 tablet (1 mg total) by mouth daily. (Patient not taking: Reported on 2/6/2025) 90 tablet 3    ergocalciferol 1.25 MG (45883 UT) Oral Cap Take 1 capsule (50,000 Units total) by mouth once a week. (Patient not taking: Reported on 2/6/2025) 12 capsule 0     No current facility-administered  medications on file prior to visit.

## 2025-02-06 NOTE — TELEPHONE ENCOUNTER
PT calling back , stated coughing is worse, nose runny, sneezing , day # 6   Advised need appt, unable to make appt , Pt has schedled appt with [facility] at 2:45(1hr)  Pt will call to cancel, spoke to PSR,Debbie will block appt     Pt cancel appt

## 2025-02-06 NOTE — TELEPHONE ENCOUNTER
LM for pt on identified VM that she should contact the prescribing physician about the water pill if more questions she should call back.

## 2025-02-06 NOTE — TELEPHONE ENCOUNTER
I s/w pt and told her that she would need to get the instructions for holding a water pill from the physician that prescribed it. She stated that it was her cardiologist and she is there at his office now and will ask them and then call [facility]  [facility] BACK.

## 2025-02-10 ENCOUNTER — TELEPHONE (OUTPATIENT)
Dept: SURGERY | Facility: CLINIC | Age: 65
End: 2025-02-10

## 2025-02-10 NOTE — TELEPHONE ENCOUNTER
Per patient she has surgery and is supposed to stop all medications today, but today is the last day she is supposed to take prednisone for an upper respiratory infection and asking if she should take today's dose or skip it. Please advise

## 2025-02-11 NOTE — TELEPHONE ENCOUNTER
Pt is scheduled for Nuclear test not surgery.  Pt frustrated  because she stated that it's hard to communicate with anyone in the office.  I did apologize to the pt but she was able to get answers from the Radiology dept   Pt appreciated follow up call.

## 2025-02-12 ENCOUNTER — HOSPITAL ENCOUNTER (OUTPATIENT)
Dept: NUCLEAR MEDICINE | Facility: HOSPITAL | Age: 65
Discharge: HOME | End: 2025-02-12
Attending: UROLOGY
Payer: MEDICARE

## 2025-02-12 ENCOUNTER — APPOINTMENT (OUTPATIENT)
Dept: CARDIAC REHAB | Facility: HOSPITAL | Age: 65
End: 2025-02-12
Attending: INTERNAL MEDICINE
Payer: MEDICARE

## 2025-02-12 DIAGNOSIS — N13.5 UPJ OBSTRUCTION, ACQUIRED: ICD-10-CM

## 2025-02-12 PROCEDURE — 78708 K FLOW/FUNCT IMAGE W/DRUG: CPT | Performed by: UROLOGY

## 2025-02-12 RX ORDER — FUROSEMIDE 10 MG/ML
40 INJECTION INTRAMUSCULAR; INTRAVENOUS ONCE
Status: COMPLETED | OUTPATIENT
Start: 2025-02-12 | End: 2025-02-12

## 2025-02-12 RX ORDER — FUROSEMIDE 10 MG/ML
INJECTION INTRAMUSCULAR; INTRAVENOUS
Status: COMPLETED
Start: 2025-02-12 | End: 2025-02-12

## 2025-02-12 RX ADMIN — FUROSEMIDE 40 MG: 10 INJECTION INTRAMUSCULAR; INTRAVENOUS at 13:11:00

## 2025-02-13 ENCOUNTER — APPOINTMENT (OUTPATIENT)
Dept: CARDIAC REHAB | Facility: HOSPITAL | Age: 65
End: 2025-02-13
Attending: INTERNAL MEDICINE
Payer: MEDICARE

## 2025-02-20 ENCOUNTER — MED REC SCAN ONLY (OUTPATIENT)
Dept: INTERNAL MEDICINE CLINIC | Facility: CLINIC | Age: 65
End: 2025-02-20

## 2025-03-03 ENCOUNTER — CARDPULM VISIT (OUTPATIENT)
Dept: CARDIAC REHAB | Facility: HOSPITAL | Age: 65
End: 2025-03-03
Attending: INTERNAL MEDICINE
Payer: MEDICARE

## 2025-03-03 PROCEDURE — 93798 PHYS/QHP OP CAR RHAB W/ECG: CPT

## 2025-03-06 ENCOUNTER — HOSPITAL ENCOUNTER (OUTPATIENT)
Dept: MAMMOGRAPHY | Facility: HOSPITAL | Age: 65
Discharge: HOME | End: 2025-03-06
Attending: INTERNAL MEDICINE
Payer: MEDICARE

## 2025-03-06 DIAGNOSIS — Z12.31 BREAST CANCER SCREENING BY MAMMOGRAM: ICD-10-CM

## 2025-03-06 PROCEDURE — 77063 BREAST TOMOSYNTHESIS BI: CPT | Performed by: INTERNAL MEDICINE

## 2025-03-06 PROCEDURE — 77067 SCR MAMMO BI INCL CAD: CPT | Performed by: INTERNAL MEDICINE

## 2025-03-17 ENCOUNTER — CARDPULM VISIT (OUTPATIENT)
Dept: CARDIAC REHAB | Facility: HOSPITAL | Age: 65
End: 2025-03-17
Attending: INTERNAL MEDICINE
Payer: MEDICARE

## 2025-03-19 ENCOUNTER — APPOINTMENT (OUTPATIENT)
Dept: CARDIAC REHAB | Facility: HOSPITAL | Age: 65
End: 2025-03-19
Attending: INTERNAL MEDICINE
Payer: MEDICARE

## 2025-03-20 ENCOUNTER — APPOINTMENT (OUTPATIENT)
Dept: CARDIAC REHAB | Facility: HOSPITAL | Age: 65
End: 2025-03-20
Attending: INTERNAL MEDICINE
Payer: MEDICARE

## 2025-03-24 ENCOUNTER — APPOINTMENT (OUTPATIENT)
Dept: CT IMAGING | Facility: HOSPITAL | Age: 65
End: 2025-03-24
Attending: EMERGENCY MEDICINE
Payer: MEDICARE

## 2025-03-24 ENCOUNTER — APPOINTMENT (OUTPATIENT)
Dept: CARDIAC REHAB | Facility: HOSPITAL | Age: 65
End: 2025-03-24
Attending: INTERNAL MEDICINE
Payer: MEDICARE

## 2025-03-24 ENCOUNTER — APPOINTMENT (OUTPATIENT)
Dept: GENERAL RADIOLOGY | Facility: HOSPITAL | Age: 65
End: 2025-03-24
Attending: EMERGENCY MEDICINE
Payer: MEDICARE

## 2025-03-24 ENCOUNTER — APPOINTMENT (OUTPATIENT)
Dept: GENERAL RADIOLOGY | Facility: HOSPITAL | Age: 65
End: 2025-03-24
Payer: MEDICARE

## 2025-03-24 ENCOUNTER — HOSPITAL ENCOUNTER (OUTPATIENT)
Facility: HOSPITAL | Age: 65
Setting detail: OBSERVATION
Discharge: HOME | End: 2025-03-25
Attending: EMERGENCY MEDICINE | Admitting: HOSPITALIST
Payer: MEDICARE

## 2025-03-24 DIAGNOSIS — R07.9 CHEST PAIN OF UNCERTAIN ETIOLOGY: ICD-10-CM

## 2025-03-24 DIAGNOSIS — R06.09 DYSPNEA ON EXERTION: Primary | ICD-10-CM

## 2025-03-24 PROBLEM — D64.9 ANEMIA: Status: ACTIVE | Noted: 2025-03-24

## 2025-03-24 PROBLEM — I20.0 UNSTABLE ANGINA (HCC): Status: ACTIVE | Noted: 2025-03-24

## 2025-03-24 LAB
ANION GAP SERPL CALC-SCNC: 9 MMOL/L (ref 0–18)
ATRIAL RATE: 71 BPM
BASOPHILS # BLD AUTO: 0.06 X10(3) UL (ref 0–0.2)
BASOPHILS NFR BLD AUTO: 0.6 %
BUN BLD-MCNC: 14 MG/DL (ref 9–23)
BUN/CREAT SERPL: 19.4 (ref 10–20)
CALCIUM BLD-MCNC: 9.3 MG/DL (ref 8.7–10.4)
CHLORIDE SERPL-SCNC: 108 MMOL/L (ref 98–112)
CO2 SERPL-SCNC: 24 MMOL/L (ref 21–32)
CREAT BLD-MCNC: 0.72 MG/DL
DEPRECATED RDW RBC AUTO: 39.5 FL (ref 35.1–46.3)
EGFRCR SERPLBLD CKD-EPI 2021: 93 ML/MIN/1.73M2 (ref 60–?)
EOSINOPHIL # BLD AUTO: 0.37 X10(3) UL (ref 0–0.7)
EOSINOPHIL NFR BLD AUTO: 3.8 %
ERYTHROCYTE [DISTWIDTH] IN BLOOD BY AUTOMATED COUNT: 12.6 % (ref 11–15)
FLUAV + FLUBV RNA SPEC NAA+PROBE: NEGATIVE
FLUAV + FLUBV RNA SPEC NAA+PROBE: NEGATIVE
GLUCOSE BLD-MCNC: 86 MG/DL (ref 70–99)
HCT VFR BLD AUTO: 35.2 %
HGB BLD-MCNC: 11.7 G/DL
IMM GRANULOCYTES # BLD AUTO: 0.02 X10(3) UL (ref 0–1)
IMM GRANULOCYTES NFR BLD: 0.2 %
LYMPHOCYTES # BLD AUTO: 2.5 X10(3) UL (ref 1–4)
LYMPHOCYTES NFR BLD AUTO: 25.6 %
MCH RBC QN AUTO: 28.7 PG (ref 26–34)
MCHC RBC AUTO-ENTMCNC: 33.2 G/DL (ref 31–37)
MCV RBC AUTO: 86.5 FL
MONOCYTES # BLD AUTO: 0.78 X10(3) UL (ref 0.1–1)
MONOCYTES NFR BLD AUTO: 8 %
NEUTROPHILS # BLD AUTO: 6.03 X10 (3) UL (ref 1.5–7.7)
NEUTROPHILS # BLD AUTO: 6.03 X10(3) UL (ref 1.5–7.7)
NEUTROPHILS NFR BLD AUTO: 61.8 %
NT-PROBNP SERPL-MCNC: 132 PG/ML (ref ?–125)
OSMOLALITY SERPL CALC.SUM OF ELEC: 292 MOSM/KG (ref 275–295)
P AXIS: 12 DEGREES
P-R INTERVAL: 216 MS
PLATELET # BLD AUTO: 371 10(3)UL (ref 150–450)
POTASSIUM SERPL-SCNC: 4.1 MMOL/L (ref 3.5–5.1)
Q-T INTERVAL: 380 MS
QRS DURATION: 72 MS
QTC CALCULATION (BEZET): 412 MS
R AXIS: -18 DEGREES
RBC # BLD AUTO: 4.07 X10(6)UL
RSV RNA SPEC NAA+PROBE: NEGATIVE
SARS-COV-2 RNA RESP QL NAA+PROBE: NOT DETECTED
SODIUM SERPL-SCNC: 141 MMOL/L (ref 136–145)
T AXIS: 20 DEGREES
TROPONIN I SERPL HS-MCNC: 3 NG/L
TROPONIN I SERPL HS-MCNC: <3 NG/L
TROPONIN I SERPL HS-MCNC: <3 NG/L
VENTRICULAR RATE: 71 BPM
WBC # BLD AUTO: 9.8 X10(3) UL (ref 4–11)

## 2025-03-24 PROCEDURE — 71260 CT THORAX DX C+: CPT | Performed by: EMERGENCY MEDICINE

## 2025-03-24 PROCEDURE — 71045 X-RAY EXAM CHEST 1 VIEW: CPT | Performed by: EMERGENCY MEDICINE

## 2025-03-24 PROCEDURE — 99222 1ST HOSP IP/OBS MODERATE 55: CPT | Performed by: HOSPITALIST

## 2025-03-24 RX ORDER — METHOTREXATE 2.5 MG/1
5 TABLET ORAL WEEKLY
COMMUNITY

## 2025-03-24 RX ORDER — IPRATROPIUM BROMIDE AND ALBUTEROL SULFATE 2.5; .5 MG/3ML; MG/3ML
3 SOLUTION RESPIRATORY (INHALATION) ONCE
Status: COMPLETED | OUTPATIENT
Start: 2025-03-24 | End: 2025-03-24

## 2025-03-24 RX ORDER — PANTOPRAZOLE SODIUM 40 MG/1
40 TABLET, DELAYED RELEASE ORAL
Status: DISCONTINUED | OUTPATIENT
Start: 2025-03-25 | End: 2025-03-25

## 2025-03-24 RX ORDER — ONDANSETRON 2 MG/ML
4 INJECTION INTRAMUSCULAR; INTRAVENOUS EVERY 6 HOURS PRN
Status: DISCONTINUED | OUTPATIENT
Start: 2025-03-24 | End: 2025-03-25

## 2025-03-24 RX ORDER — METOCLOPRAMIDE HYDROCHLORIDE 5 MG/ML
10 INJECTION INTRAMUSCULAR; INTRAVENOUS EVERY 8 HOURS PRN
Status: DISCONTINUED | OUTPATIENT
Start: 2025-03-24 | End: 2025-03-25

## 2025-03-24 RX ORDER — ACETAMINOPHEN 500 MG
500 TABLET ORAL EVERY 4 HOURS PRN
Status: DISCONTINUED | OUTPATIENT
Start: 2025-03-24 | End: 2025-03-25

## 2025-03-24 RX ORDER — ENOXAPARIN SODIUM 100 MG/ML
40 INJECTION SUBCUTANEOUS DAILY
Status: DISCONTINUED | OUTPATIENT
Start: 2025-03-24 | End: 2025-03-25

## 2025-03-24 RX ORDER — TEMAZEPAM 15 MG/1
15 CAPSULE ORAL NIGHTLY PRN
Status: DISCONTINUED | OUTPATIENT
Start: 2025-03-24 | End: 2025-03-25

## 2025-03-24 RX ORDER — ATORVASTATIN CALCIUM 80 MG/1
80 TABLET, FILM COATED ORAL NIGHTLY
Status: DISCONTINUED | OUTPATIENT
Start: 2025-03-24 | End: 2025-03-25

## 2025-03-24 RX ORDER — METOPROLOL SUCCINATE 25 MG/1
25 TABLET, EXTENDED RELEASE ORAL DAILY
Status: DISCONTINUED | OUTPATIENT
Start: 2025-03-25 | End: 2025-03-25

## 2025-03-24 RX ORDER — BUMETANIDE 1 MG/1
2 TABLET ORAL EVERY OTHER DAY
Status: DISCONTINUED | OUTPATIENT
Start: 2025-03-25 | End: 2025-03-25

## 2025-03-24 RX ORDER — FOLIC ACID 1 MG/1
1 TABLET ORAL DAILY
Status: DISCONTINUED | OUTPATIENT
Start: 2025-03-24 | End: 2025-03-25

## 2025-03-24 RX ORDER — ISOSORBIDE MONONITRATE 30 MG/1
30 TABLET, EXTENDED RELEASE ORAL DAILY
Status: DISCONTINUED | OUTPATIENT
Start: 2025-03-25 | End: 2025-03-25

## 2025-03-24 RX ORDER — ASPIRIN 81 MG/1
81 TABLET ORAL DAILY
Status: DISCONTINUED | OUTPATIENT
Start: 2025-03-25 | End: 2025-03-25

## 2025-03-24 RX ORDER — CLOPIDOGREL BISULFATE 75 MG/1
75 TABLET ORAL DAILY
Status: DISCONTINUED | OUTPATIENT
Start: 2025-03-25 | End: 2025-03-25

## 2025-03-24 RX ORDER — ALBUTEROL SULFATE 90 UG/1
2 INHALANT RESPIRATORY (INHALATION) EVERY 4 HOURS PRN
Status: DISCONTINUED | OUTPATIENT
Start: 2025-03-24 | End: 2025-03-25

## 2025-03-24 NOTE — CONSULTS
[facility]  Consultation    No Saucedo Patient Status:  Emergency    1960 MRN [id number]   Location [facility] EMERGENCY DEPARTMENT Attending Mj Fox MD   Hosp Day # 0 PCP Josep Valdez MD       Reason for consultation;  CP     HPI:  This is a 64 year old female patient with PMH of CAD s/p CABG () & PCI, HTN, DLP who presented with CP, SOB and weakness. Symptoms are worse with exertion, and have been progressive over the past 1-2 weeks. They are exactly the same to back in 2024 when she was admitted for unstable angina. At the time she underwent a LHC which showed stable CAD. She also had diagonal artery PCI at LAD bifurcation. Her symptoms seemed to respond more when she was started on Imdur however.    MDM / Diagnostics reviewed & interpreted:  ECG shows NSR, first degree AVB, NSST  Trp negative  CXR w/o acute process    Assessment:    CP  CAD s/p CAB & PCI  HTN  DLP    Plan:  -CP with typical angina features. She underwent LHC Dec 2024 which showed stable CAD (tiny LCX , RCA , patent LAD stent & SVG-RCA, atretic LIMA-LAD). She had diagonal artery PCI, and it is unclear if that resolved her symptoms. She responded better to Imdur however.  -At present, no evidence of ACS (negative ECG, Trp). Would repeat second trp  -TTE  -Favor augmentation of her antianginal regimen, as her symptoms could be secondary to demand-ischemia in LCX territory (though seems very small in caliber and would be out of proportion) or microcirculatory dysfunction. Increase Imdur to 60mg PO every day  -continue home DAPT (without interruption), statin, BB  -She wishes to discuss further with Dr. Mendez    Discussed with patient.     Please call with questions. Thank you for your consult.    Level of care: C5      Boy De Leon MD  Interventional Cardiology  [facility]  [place]    --------------------------------------------------------------------------------------------------------------------------------  ROS 10 systems reviewed, pertinent findings above.    History:  Past Medical History:    Basal cell carcinoma    Skin, left cutaneous lip    Cervical radiculopathy at C7    Colon polyps    Coronary atherosclerosis    Essential hypertension    Fibromyalgia    High blood pressure    High cholesterol    Hypercholesterolemia    Psoriatic arthritis (HCC)     Past Surgical History:   Procedure Laterality Date    Anesth,open heart surgery      Cabg      Colonoscopy N/A 2017    Procedure: COLONOSCOPY;  Surgeon: Selina Timmons MD;  Location: [facility] ENDOSCOPY    Colonoscopy,biopsy  2010    Performed by GIOVANNI BRITO at [facility]    Foot surgery Right 1990    Bunion    Hysterectomy      Other  2018    Mohs surgery left upper lip BCCa    Vaginal hysterectomy      With cystocele repair     Family History   Problem Relation Age of Onset    Colon Cancer Mother     Heart Disease Mother         CABG age 72    Hypertension Mother     Cancer Mother 61        Colon Cancer    Other (Bladder Cancer) Mother     Other (Cervical Cancer) Mother     Other (Peripheral Vascular Disease) Mother     Diabetes Maternal Grandmother     Hypertension Maternal Grandmother     Cancer Maternal Grandmother         hx of skin cancer     Heart Disease Maternal Grandfather          MI age 32    Heart Disease Maternal Aunt         Per NG: CAD    Breast Cancer Neg     Ovarian Cancer Neg     Prostate Cancer Neg     Pancreatic Cancer Neg       reports that she quit smoking about 12 years ago. Her smoking use included cigarettes. She started smoking about 49 years ago. She has a 18.5 pack-year smoking history. She has never been exposed to tobacco smoke. She has never used smokeless tobacco. She reports current alcohol use. She reports current drug use. Drug:  Cannabis.    Objective:   Temp: 97 °F (36.1 °C)  Pulse: 71  Resp: 25  BP: 137/80    Intake/Output:   No intake or output data in the 24 hours ending 03/24/25 1239    Physical Exam:     General: NAD. Conversant.   HEENT: Normocephalic, atraumatic.  Neck: Supple.  Cardiac: RRR. Normal S1, S2 . No murmur.  Lungs: GAEB, no wheezing.  GI: Soft, non-distended  Extremities: Rt radial pulses 2+. No edema.  Skin: Warm and dry.      Boy De Leon MD  3/24/2025  12:39 PM

## 2025-03-24 NOTE — HISTORICAL OFFICE NOTE
[facility]ility [facility]  [address], [address] [place] [address]  [phone]      No Saucedo  Progress Note  Demographics:  Name: No Saucedo YOB: 1960  Age: 63, Female Medical Record No: [id number]  Visited Date/Time: 12/09/2024 12:50 PM    Chief Complaints  hospital follow up angio done 12/03 groin approach 1 stent placed   History of Present Illness  63-year-old female being followed for CAD status post bypass surgery and most recently stent of the native LAD due to atretic LIMA, residual  small OM due to occluded jump portion of SVG to OM to the PDA.    Much improved since stent    Patient is doing well, denies chest pain, shortness of breath, lower extremity edema, orthopnea, PND, palpitations or syncope.    No exertional symptoms, taking meds without any issues.    Previously noted:  previously being taken care of at [facility].  Has a history of premature coronary disease in her family, she presented to [facility] with accelerating angina, questionable stress test which led to angiogram and eventually bypass surgery.    She had bypass surgery December 2021, had sternal wires removed December 2022 due to reaction, chronic pain.  Since her sternal wires removed she feels much better.  She is able to walk without any exertional symptoms.    She has psoriatic arthritis which limits her exertional capacity but otherwise no palpitations or syncope.  No lower extremity edema, orthopnea or PND.  Cardiac risk factors Never smoked  Past Medical History  1.Hypokalemia  2.Preop testing  3.Hypertension (HTN), primary  4.Hyperlipidemia with target low density lipoprotein (LDL) cholesterol less than 70 mg/dL  5.MEDINA (dyspnea on exertion)  6.CAD native (coronary artery disease)  7.Palpitations  8.Acute chest pain  Past Surgical History  1.S/P CABG x 2  Family History  1. Mother - Heart disease  Social History  Smoking status Never smoked  Tobacco usage - No  (Non-smoker for personal reasons (finding))  Review of systems  Cardiovascular No history of Chest pain, MEDINA, Palpitations, Syncope, PND, Orthopnea, Edema and Claudication  Musculoskeletal No history of Myalgias and Arthritis  Respiratory No history of SOB, Wheezing and Sputum  Physical Examination  Constitutional 02 98%  Vitals Left Arm Sitting  / 80 mmHg, Pulse rate 85 bpm, Height in 5' 3\", BMI: 24.1, Weight in 136 lbs (or) 62 kgs and BSA : 1.67 cc/m²  General Appearance No Acute Distress and Well groomed  Cardiovascular   EKG/Other abnormalities  General: NAD  HEENT: Normocephalic, anicteric sclera, neck supple.  Neck: No JVD, carotids 2+, no bruits.  Cardiac: Regular rate and rhythm. S1, S2 normal. No murmur, pericardial rub, S3.  Lungs: Clear without wheezes, rales, rhonchi or dullness.  Normal excursions and effort.  Abdomen: Soft, non-tender. BS-present.  Extremities: Without clubbing, cyanosis or edema.  Peripheral pulses are 2+.  Neurologic: Non-focal  Skin: Warm and dry.  Allergies  1.adhesive tape - Ingredient(Reaction:hives, Severity:Moderate)  Medications (Info obtained by: Verbal)  1.aspirin 81 mg chewable tablet, Take 1 tablet orally once a day.  2.atorvastatin 80 mg tablet, Take 1 tablet orally once a day.  3.bumetanide 2 mg tablet, Take 1 tablet orally once a day every other day  4.metoprolol succinate ER 25 mg tablet,extended release 24 hr, Take 1 tablet orally once a day.  5.omeprazole 20 mg capsule,delayed release, Take 1 capsule orally 2 times a day.  6.Plavix 75 mg tablet, Take 1 tablet orally once a day.  Impression  1.Chest discomfort  2.Hypertension (HTN), primary  3.Hyperlipidemia with target low density lipoprotein (LDL) cholesterol less than 70 mg/dL  4.MEDINA (dyspnea on exertion)  5.S/P CABG x 2  6.CAD native (coronary artery disease)  7.Palpitations  8.Acute chest pain  Assessment & Plan  CAD: Status post bypass surgery in the past, last angiogram February 2024 with atretic LIMA to  LAD status post PCI native LAD, residual jailed diagonal stenosis which was retroflexed and unable to be recrossed and ballooned.   small OM due to occluded jump portion of SVG to OM to right PDA.  Ramus with hazy nonobstructive 40 to 50% disease proximally.  UA episode Dec 2024 now s/p PCI diagonal, however edge spasm vs plaque shift,  mid Cx with SVG-PDA epicardial collateral.  Will reassess ischemic burden with treadmill SPECT after rehab.  -Cardiac rehab ASAP   -imdur 30 mg daily    HFpEF: LVEDP at the time of angiogram 25 mmHg.  Her shortness of breath has responded to Bumex.  She continues to have intermittent shortness of breath, abdominal bloating which respond to Bumex extra dose.    Hypertension: no changes    Hyperlipidemia:  On high intensity statin for secondary prevention, no changes.    Hyperlipidemia LDL is borderline high at 85 will consider adding Zetia at next office visit.    Follow-up:  Please give information regarding out portal access.  Clinic: 3 months  Testing/intervention: Meds, rehab  Medications Ordered  1.isosorbide mononitrate ER 30 mg tablet,extended release 24 hr, Take 1 tablet orally once a day.  Future appointments  1.Referral Visit - Josep Valdez ([email or web address]) : (Schedule next available)  2.Follow up visit - Matt Mendez MD (3 Months)  Miscellaneous  1.Reviewed trans thoracic echocardiogram, nuclear pet, ambulatory telemetry monitor with the patient.  2.Weight monitoring (regime/therapy)  Nurses documentation  refills: none  Assistive devices: none  Upcoming sx or procedures: none  EKG: none      Patient instructions  Please bring in you medication bottles or updated medicine list to your next appointment.   Call [facility] if you have any problems or concerns at [phone]  -Cardiac rehab ASAP   -imdur 30 mg daily  Follow-up:  Please give information regarding out portal access.  Clinic: 3 months  Testing/intervention: Meds, rehab       Lab Details  CBC,  PLATELET; NO DIFFERENTIAL  12/03/2024 07:03:00 AM  WBC 10.2 4.0-11.0 x10(3) uL  F  RED BLOOD COUNT 4.65 3.80-5.30 x10(6)uL  F  HGB 13.5 12.0-16.0 g/dL  F  HCT 42.6 35.0-48.0 %  F  MEAN CELL VOLUME 91.6 80.0-100.0 fL  F  MEAN CORPUSCULAR HEMOGLOBIN 29.0 26.0-34.0 pg  F  MEAN CORPUSCULAR HGB CONC 31.7 31.0-37.0 g/dL  F  RED CELL DISTRIBUTION WIDTH CV 13.3 11.0-15.0 %  F  RED CELL DISTRIBUTION WIDTH-SD 45.5 35.1-46.3 fL  F  PLATELETS 364.0 150.0-450.0 10(3)uL  F  BASIC METABOLIC PANEL (8)  12/02/2024 07:30:56 PM  GLUCOSE 108 70-99 mg/dL H F  SODIUM 141 136-145 mmol/L  F  POTASSIUM 4.3 3.5-5.1 mmol/L  F  CHLORIDE 107  mmol/L  F  CO2 27.0 21.0-32.0 mmol/L  F  ANION GAP 7 0-18 mmol/L  F  BUN 16 9-23 mg/dL  F  CREATININE 0.72 0.55-1.02 mg/dL  F  BUN/ CREAT RATIO 22.2 10.0-20.0 H F  CALCIUM 9.9 8.7-10.4 mg/dL  F  OSMOLALITY CALCULATED 294 275-295 mOsm/kg  F  E GFR CR 93 >=60 mL/min/1.73m2  F  TROPONIN I HIGH SENSITIVITY  12/02/2024 12:26:58 AM  TROPONIN I HIGH SENSITIVITY <3 <=34 ng/L  F  Diagnostics Details  Trans Thoracic Echocardiogram 11/28/2023  1.The study quality is average.    2.The left ventricle is normal in size, wall thickness and wall motion; there are no regional wall motion abnormalities. Global left ventricular systolic function is normal. . The left ventricular ejection fraction is 64%. Left ventricular diastolic function is normal.    3.The left atrium is mildly enlarged based on the left atrium volume index of 35.0ml/m²    4.The right ventricle is normal in size. Right ventricular systolic function is normal.    5.Mild mitral regurgitation.    Nuclear PET 11/13/2023  1.Stress EKG is normal.    2.No arrhythmias    3.No chest pain    1.This is a normal perfusion study, no perfusion defects noted.    2.This scan is suggestive of low risk for future cardiovascular events.    3.The left ventricular cavity is noted to be normal on the stress studies. The stress left ventricular ejection fraction was  calculated to be 83% and left ventricular global function is hyperkinetic. This finding may be secondary to small ventricular size. The rest left ventricular cavity is noted to be normal. The rest left ventricular ejection fraction was calculated to be 80% and rest left ventricular global function is hyperkinetic.    4.When compared to the resting ejection fraction (80%), the stress ejection fraction (83%) has increased.    5.The study quality is poor.    ACTMonitoring 11/06/2023  1.This is a good quality study.    2. The patient entered shortness of breath, palpitations, and other into the patient monitor. Only shortness of breath was correlated with an arrhythmia for one sinus bradycardia event and one sinus tachycardia event. The remainder of the symptomatic occurrences do not correlate to an arrhythmia.    3.Predominant rhythm is normal sinus rhythm.    4.The minimum heart rate recorded was 43 beats / minute (sinus bradycardia, 11/11/2023). The maximum heart rate is 104 beats / minute (sinus tachycardia, 11/9/2023). The mean heart rate is 66 beats / minute.    5.No evidence of AV block is noted.    6.Rare premature atrial contractions noted.    7.No evidence of supraventricular tachycardia is noted.    8.The atrial fibrillation burden was 0%.    9.Rare multiform premature ventricular contractions noted.    10.No evidence of ventricular tachycardia is noted.    11.No pauses were noted.    12.Sinus  bpm Ectopy rare No arrhythmias Shortness of breath during normal sinus    CPOE Orders carried out by: Matt Mendez MD and Bushra JAIMES  Care Providers: Matt Mendez MD, Bushra JAIMES and Malia Arciniega  Electronically Authenticated by  Matt Mendez MD  12/10/2024 03:23:03 PM  Disclaimer: Components of this note were documented using voice recognition system and are subject to errors not corrected at proofreading. Contact the author of this note for any clarifications.

## 2025-03-24 NOTE — ED QUICK NOTES
Orders for admission, patient is aware of plan and ready to go upstairs. Any questions, please call ED RN marilia at extension [phone].     Patient Covid vaccination status: Fully vaccinated     COVID Test Ordered in ED: None    COVID Suspicion at Admission: N/A    Running Infusions:  None    Mental Status/LOC at time of transport: x4    Other pertinent information:   CIWA score: N/A   NIH score:  N/A

## 2025-03-24 NOTE — ED PROVIDER NOTES
Patient Seen in: [facility] Emergency Department      History     Chief Complaint   Patient presents with    Difficulty Breathing     Stated Complaint: Chest Pain, SOB, fatigue    Subjective:   HPI      Patient presents the emergency department complaint of chest pain, shortness of breath and fatigue for the last 3 days.  She states that she has had increasing shortness of breath which is typical for her when she gets \"a blockage\".  She has had multiple coronary interventions and stents.  She has had some chest discomfort over the last 48 hours.  There is no fever.  There is no other aggravating or alleviating factors.    Objective:     Past Medical History:    Basal cell carcinoma    Skin, left cutaneous lip    Cervical radiculopathy at C7    Colon polyps    Coronary atherosclerosis    Essential hypertension    Fibromyalgia    High blood pressure    High cholesterol    Hypercholesterolemia    Psoriatic arthritis (HCC)              Past Surgical History:   Procedure Laterality Date    Anesth,open heart surgery      Cabg      Colonoscopy N/A 2017    Procedure: COLONOSCOPY;  Surgeon: Selina Timmons MD;  Location: [facility] ENDOSCOPY    Colonoscopy,biopsy  2010    Performed by GIOVANNI BRITO at [facility], [facility]    Foot surgery Right     Bunion    Hysterectomy      Other  2018    Mohs surgery left upper lip [facility]a    Vaginal hysterectomy      With cystocele repair                Social History     Socioeconomic History    Marital status:    Tobacco Use    Smoking status: Former     Current packs/day: 0.00     Average packs/day: 0.5 packs/day for 37.0 years (18.5 ttl pk-yrs)     Types: Cigarettes     Start date: 1975     Quit date: 2012     Years since quittin.6     Passive exposure: Never    Smokeless tobacco: Never   Vaping Use    Vaping status: Never Used   Substance and Sexual Activity    Alcohol use: Yes     Alcohol/week: 0.0 standard drinks of  alcohol     Comment: Occasional    Drug use: Yes     Types: Cannabis     Comment: Medical Gummies/Topical Cream   Other Topics Concern    Pt has a pacemaker No    Pt has a defibrillator No    Reaction to local anesthetic No    Right Handed Yes     Social Drivers of Health     Food Insecurity: No Food Insecurity (3/24/2025)    NCSS - Food Insecurity     Worried About Running Out of Food in the Last Year: No     Ran Out of Food in the Last Year: No   Transportation Needs: No Transportation Needs (3/24/2025)    NCSS - Transportation     Lack of Transportation: No   Housing Stability: Not At Risk (3/24/2025)    NCSS - Housing/Utilities     Has Housing: Yes     Worried About Losing Housing: No     Unable to Get Utilities: No                  Physical Exam     ED Triage Vitals   BP 03/24/25 1049 137/80   Pulse 03/24/25 1049 71   Resp 03/24/25 1049 25   Temp 03/24/25 1049 97 °F (36.1 °C)   Temp src 03/24/25 1416 Oral   SpO2 03/24/25 1049 97 %   O2 Device 03/24/25 1108 None (Room air)       Current Vitals:   Vital Signs  BP: 127/84  Pulse: 65  Resp: 18  Temp: 98.3 °F (36.8 °C)  Temp src: Oral  MAP (mmHg): 96    Oxygen Therapy  SpO2: 96 %  O2 Device: None (Room air)        Physical Exam  Vitals and nursing note reviewed.   Constitutional:       General: She is not in acute distress.     Appearance: She is well-developed.   HENT:      Head: Normocephalic.      Nose: Nose normal.      Mouth/Throat:      Mouth: Mucous membranes are moist.   Eyes:      Conjunctiva/sclera: Conjunctivae normal.   Cardiovascular:      Rate and Rhythm: Normal rate and regular rhythm.      Heart sounds: No murmur heard.  Pulmonary:      Effort: Pulmonary effort is normal. No respiratory distress.      Breath sounds: Examination of the left-upper field reveals wheezing. Examination of the right-middle field reveals rhonchi and rales. Examination of the left-middle field reveals wheezing. Examination of the right-lower field reveals rhonchi and rales.  Wheezing, rhonchi and rales present.   Abdominal:      General: There is no distension.      Palpations: Abdomen is soft.      Tenderness: There is no abdominal tenderness.   Musculoskeletal:         General: No tenderness. Normal range of motion.      Cervical back: Normal range of motion and neck supple.   Skin:     General: Skin is warm and dry.      Findings: No rash.   Neurological:      Mental Status: She is alert and oriented to person, place, and time.             ED Course     Labs Reviewed   CBC WITH DIFFERENTIAL WITH PLATELET - Abnormal; Notable for the following components:       Result Value    HGB 11.7 (*)     All other components within normal limits   PRO BETA NATRIURETIC PEPTIDE - Abnormal; Notable for the following components:    Pro-Beta Natriuretic Peptide 132 (*)     All other components within normal limits   BASIC METABOLIC PANEL (8) - Normal   TROPONIN I HIGH SENSITIVITY - Normal   TROPONIN I HIGH SENSITIVITY - Normal   TROPONIN I HIGH SENSITIVITY - Normal   BASIC METABOLIC PANEL (8) - Normal   SARS-COV-2/FLU A AND B/RSV BY PCR (GENEXPERT) - Normal    Narrative:     This test is intended for the qualitative detection and differentiation of SARS-CoV-2, influenza A, influenza B, and respiratory syncytial virus (RSV) viral RNA in nasopharyngeal or nares swabs from individuals suspected of respiratory viral infection consistent with COVID-19 by their healthcare provider. Signs and symptoms of respiratory viral infection due to SARS-CoV-2, influenza, and RSV can be similar.    Test performed using the Xpert Xpress SARS-CoV-2/FLU/RSV (real time RT-PCR)  assay on the GeneXpert instrument, [employer], [place] [address].   This test is being used under the Food and Drug Administration's Emergency Use Authorization.    The authorized Fact Sheet for Healthcare Providers for this assay is available upon request from the laboratory.   CBC WITH DIFFERENTIAL WITH PLATELET   RAINBOW DRAW LAVENDER   RAINBOW  DRAW LIGHT GREEN   RAINBOW DRAW BLUE     EKG    Rate, intervals and axes as noted on EKG Report.  Rate: 71 bpm  Rhythm: Sinus Rhythm  Reading: Nonspecific changes, no acute injury pattern, abnormal.                       [facility]          Admission disposition: 3/24/2025  1:37 PM           Medical Decision Making  Differential diagnosis considered for anginal equivalent, PE, pleurisy, viral illness.    Problems Addressed:  Chest pain of uncertain etiology: acute illness or injury  Dyspnea on exertion: acute illness or injury    Amount and/or Complexity of Data Reviewed  External Data Reviewed: notes.     Details: Notes from Dr. Urbina regarding angiogram last year discusses possibility of reintervention should symptoms progress or persist.  Labs: ordered. Decision-making details documented in ED Course.     Details: CBC, chemistry, troponin all normal.  Radiology: ordered and independent interpretation performed. Decision-making details documented in ED Course.     Details: Chest x-ray without acute findings  ECG/medicine tests: ordered and independent interpretation performed. Decision-making details documented in ED Course.  Discussion of management or test interpretation with external provider(s): Discussed with cardiology.  Will admit for possible coronary intervention and observation.    Risk  Decision regarding hospitalization.        Disposition and Plan     Clinical Impression:  1. Dyspnea on exertion    2. Chest pain of uncertain etiology         Disposition:  Admit  3/24/2025  1:37 pm    Follow-up:  No follow-up provider specified.  We recommend that you schedule follow up care with a primary care provider within the next three months to obtain basic health screening including reassessment of your blood pressure.      Medications Prescribed:  Current Discharge Medication List              Supplementary Documentation:         Hospital Problems       Present on Admission  Date Reviewed: 2/6/2025             ICD-10-CM Noted POA    * (Principal) Dyspnea on exertion R06.09 3/24/2025 Unknown    Anemia D64.9 3/24/2025 Yes    Chest pain of uncertain etiology R07.9 3/24/2025 Unknown    Unstable angina (HCC) I20.0 3/24/2025 Unknown

## 2025-03-24 NOTE — H&P
[facility]    PATIENT'S NAME: VICENTA SHAH   ATTENDING PHYSICIAN: Mj Rolle MD   PATIENT ACCOUNT#:   [id number]    LOCATION:  [address]  MEDICAL RECORD #:   [id number]       YOB: 1960  ADMISSION DATE:       03/24/2025    HISTORY AND PHYSICAL EXAMINATION    CHIEF COMPLAINT:  Chest pain, possible unstable angina.    HISTORY OF PRESENT ILLNESS:  The patient is a 64-year-old  female who has history of coronary artery disease, last intervention first diagonal PCI in December 2024.  The patient started developing chest pain associated with dyspnea on exertion for the last 2 to 3 days, today came into the emergency department for evaluation.  CBC and chemistry unremarkable.  Troponin was negative.  EKG showed normal sinus rhythm with nonspecific ST-T changes.  CT scan of the chest rule out PE still pending.  Chest x-ray showed no acute findings.    PAST MEDICAL HISTORY:  Coronary artery disease status post coronary artery bypass graft surgery with atretic LIMA to LAD, had LAD intervention 2023 and first diagonal bifurcational intervention in December 2024.  History of hypertension, hyperlipidemia, psoriatic arthritis, fibromyalgia, degenerative joint disease of cervical spine, and generalized osteoarthritis.    PAST SURGICAL HISTORY:  Coronary artery bypass graft surgery, right bunionectomy, hysterectomy, left upper lip basal cell carcinoma resection.    MEDICATIONS:  Please see medication reconciliation list.     ALLERGIES:  Adhesive tape.    FAMILY HISTORY:  Mother had colon cancer and coronary artery disease.    SOCIAL HISTORY:  Ex-tobacco user.  Social alcohol.  No drug use.     REVIEW OF SYSTEMS:  Left precordial chest discomfort radiating to the left arm associated with dyspnea on exertion.  Symptoms come with activity, go away with rest.  She denies any current chest pain resting in bed.  Other 12-point review of systems negative.      PHYSICAL EXAMINATION:     GENERAL:  Alert, oriented to time, place, and person, no acute distress.   VITAL SIGNS:  Temperature 97.0.  Pulse 71.  Respiratory rate 25.  Blood pressure 137/80.  Pulse ox 97% on room air.  HEENT:  Atraumatic.  Oropharynx clear.  Moist mucous membranes.  Ears, nose normal.  Eyes:  Anicteric sclerae.  NECK:  Supple.  No lymphadenopathy.  Trachea midline.  Full range of motion.  LUNGS:  Diminished breathing sounds both lung bases.  No wheezing.  HEART:  Regular rate and rhythm.  S1 and S2 auscultated.  No murmur.  ABDOMEN:  Soft, nondistended.  No tenderness.  Positive bowel sounds.  EXTREMITIES:  No peripheral edema, clubbing, or cyanosis.  NEUROLOGIC:  Motor and sensory intact.    ASSESSMENT:    1.   Symptoms could be consistent with unstable angina.  2.   Coronary artery disease.  3.   Hypertension.    PLAN:  The patient will be admitted to general medical floor.  CT scan of the chest rule out PE.  A 2D echocardiogram with Doppler.  Trend troponin level.  Cardiology consult.  Bed rest.  Further recommendations to follow.     Dictated By Luciana Mejia MD  d: 03/24/2025 13:15:30  t: 03/24/2025 13:34:35  Job [id number]  /

## 2025-03-25 ENCOUNTER — TELEPHONE (OUTPATIENT)
Dept: RHEUMATOLOGY | Facility: CLINIC | Age: 65
End: 2025-03-25

## 2025-03-25 ENCOUNTER — APPOINTMENT (OUTPATIENT)
Dept: CV DIAGNOSTICS | Facility: HOSPITAL | Age: 65
End: 2025-03-25
Attending: INTERNAL MEDICINE
Payer: MEDICARE

## 2025-03-25 VITALS
SYSTOLIC BLOOD PRESSURE: 143 MMHG | OXYGEN SATURATION: 98 % | BODY MASS INDEX: 23.28 KG/M2 | WEIGHT: 131.38 LBS | DIASTOLIC BLOOD PRESSURE: 74 MMHG | RESPIRATION RATE: 17 BRPM | TEMPERATURE: 98 F | HEIGHT: 63 IN | HEART RATE: 78 BPM

## 2025-03-25 LAB
ANION GAP SERPL CALC-SCNC: 9 MMOL/L (ref 0–18)
BASOPHILS # BLD AUTO: 0.06 X10(3) UL (ref 0–0.2)
BASOPHILS NFR BLD AUTO: 0.8 %
BUN BLD-MCNC: 14 MG/DL (ref 9–23)
BUN/CREAT SERPL: 18.4 (ref 10–20)
CALCIUM BLD-MCNC: 9.4 MG/DL (ref 8.7–10.4)
CHLORIDE SERPL-SCNC: 107 MMOL/L (ref 98–112)
CO2 SERPL-SCNC: 26 MMOL/L (ref 21–32)
CREAT BLD-MCNC: 0.76 MG/DL
DEPRECATED RDW RBC AUTO: 39.1 FL (ref 35.1–46.3)
EGFRCR SERPLBLD CKD-EPI 2021: 87 ML/MIN/1.73M2 (ref 60–?)
EOSINOPHIL # BLD AUTO: 0.41 X10(3) UL (ref 0–0.7)
EOSINOPHIL NFR BLD AUTO: 5.8 %
ERYTHROCYTE [DISTWIDTH] IN BLOOD BY AUTOMATED COUNT: 12.7 % (ref 11–15)
GLUCOSE BLD-MCNC: 84 MG/DL (ref 70–99)
HCT VFR BLD AUTO: 38.3 %
HGB BLD-MCNC: 12.9 G/DL
IMM GRANULOCYTES # BLD AUTO: 0.02 X10(3) UL (ref 0–1)
IMM GRANULOCYTES NFR BLD: 0.3 %
LYMPHOCYTES # BLD AUTO: 1.68 X10(3) UL (ref 1–4)
LYMPHOCYTES NFR BLD AUTO: 23.7 %
MCH RBC QN AUTO: 28.9 PG (ref 26–34)
MCHC RBC AUTO-ENTMCNC: 33.7 G/DL (ref 31–37)
MCV RBC AUTO: 85.9 FL
MONOCYTES # BLD AUTO: 0.63 X10(3) UL (ref 0.1–1)
MONOCYTES NFR BLD AUTO: 8.9 %
NEUTROPHILS # BLD AUTO: 4.29 X10 (3) UL (ref 1.5–7.7)
NEUTROPHILS # BLD AUTO: 4.29 X10(3) UL (ref 1.5–7.7)
NEUTROPHILS NFR BLD AUTO: 60.5 %
OSMOLALITY SERPL CALC.SUM OF ELEC: 294 MOSM/KG (ref 275–295)
PLATELET # BLD AUTO: 348 10(3)UL (ref 150–450)
POTASSIUM SERPL-SCNC: 4.4 MMOL/L (ref 3.5–5.1)
RBC # BLD AUTO: 4.46 X10(6)UL
SODIUM SERPL-SCNC: 142 MMOL/L (ref 136–145)
WBC # BLD AUTO: 7.1 X10(3) UL (ref 4–11)

## 2025-03-25 PROCEDURE — 99239 HOSP IP/OBS DSCHRG MGMT >30: CPT | Performed by: HOSPITALIST

## 2025-03-25 PROCEDURE — 93306 TTE W/DOPPLER COMPLETE: CPT | Performed by: INTERNAL MEDICINE

## 2025-03-25 RX ORDER — ISOSORBIDE MONONITRATE 60 MG/1
60 TABLET, EXTENDED RELEASE ORAL DAILY
Status: DISCONTINUED | OUTPATIENT
Start: 2025-03-26 | End: 2025-03-25

## 2025-03-25 RX ORDER — ISOSORBIDE MONONITRATE 60 MG/1
60 TABLET, EXTENDED RELEASE ORAL DAILY
Qty: 30 TABLET | Refills: 0 | Status: SHIPPED | OUTPATIENT
Start: 2025-03-26

## 2025-03-25 RX ORDER — ISOSORBIDE MONONITRATE 30 MG/1
30 TABLET, EXTENDED RELEASE ORAL ONCE
Status: COMPLETED | OUTPATIENT
Start: 2025-03-25 | End: 2025-03-25

## 2025-03-25 NOTE — PROGRESS NOTES
Progress Note  No Saucedo Patient Status:  Observation    1960 MRN [id number]   Location [facility] 3W/SW Attending Karime Alcantara, DO   Hosp Day # 0 PCP Josep Valdez MD     Subjective:  Pt stated she has been feeling Sob  and chest pressure when ambulating. Since she has not moved around today, no chest pain or SOB    Objective:  /87 (BP Location: Right arm)   Pulse 64   Temp 98.3 °F (36.8 °C) (Oral)   Resp 17   Ht 5' 3\" (1.6 m)   Wt 131 lb 6.4 oz (59.6 kg)   SpO2 96%   BMI 23.28 kg/m²     Telemetry: NSR       Intake/Output:    Intake/Output Summary (Last 24 hours) at 3/25/2025 1254  Last data filed at 3/25/2025 1100  Gross per 24 hour   Intake 220 ml   Output 2650 ml   Net -2430 ml       Last 3 Weights   25 1416 131 lb 6.4 oz (59.6 kg)   25 1049 135 lb (61.2 kg)   25 1505 133 lb (60.3 kg)   25 1327 134 lb 6.4 oz (61 kg)       Labs:  Recent Labs   Lab 25  11125  0647   GLU 86 84   BUN 14 14   CREATSERUM 0.72 0.76   EGFRCR 93 87   CA 9.3 9.4    142   K 4.1 4.4    107   CO2 24.0 26.0     Recent Labs   Lab 25  1117 25  0647   RBC 4.07 4.46   HGB 11.7* 12.9   HCT 35.2 38.3   MCV 86.5 85.9   MCH 28.7 28.9   MCHC 33.2 33.7   RDW 12.6 12.7   NEPRELIM 6.03 4.29   WBC 9.8 7.1   .0 348.0         Recent Labs   Lab 25  11125  2212   TROPHS <3 <3 3     Lab Results   Component Value Date    INR 0.89 2024       Diagnostics:     Review of Systems   Respiratory: Negative.     Cardiovascular: Negative.          Physical Exam:    Physical Exam  Vitals reviewed.   Constitutional:       General: She is not in acute distress.     Appearance: She is not ill-appearing.   Neck:      Vascular: No JVD.   Cardiovascular:      Rate and Rhythm: Normal rate and regular rhythm.      Pulses: Normal pulses.      Heart sounds: Normal heart sounds. No murmur heard.     No gallop.   Pulmonary:      Effort:  Pulmonary effort is normal. No respiratory distress.      Breath sounds: Normal breath sounds. No stridor. No wheezing, rhonchi or rales.   Musculoskeletal:      Cervical back: Normal range of motion.      Right lower leg: No edema.      Left lower leg: No edema.   Neurological:      Mental Status: She is alert and oriented to person, place, and time.         Medications:   enoxaparin  40 mg Subcutaneous Daily    aspirin  81 mg Oral Daily    atorvastatin  80 mg Oral Nightly    bumetanide  2 mg Oral QOD    clopidogrel  75 mg Oral Daily    folic acid  1 mg Oral Daily    isosorbide mononitrate ER  30 mg Oral Daily    metoprolol succinate ER  25 mg Oral Daily    pantoprazole  40 mg Oral QAM AC         Assessment/Plan:    Chest pain  - pt presented with chest pain  - trop x 2 negative    H/o CAD    - s/p CABG   - [facility] 12/15/23 : LIMA atretic, FFR positive LAD secondary to proximal lesion as well as mid intramyocardial segment. SVG-OM () and PDA unremarkable   - s/p PCI 12/2/24 of priximal/ostial first diagonal branch with 2.5 x15mm resolutaion Tahir NAGI, final kissing angioplasity with 2.5(diagonal branch) and 4mm (LAD) balloons  - on aspirin, atorvastatin, plavix, metoprolol and imdur     HFpEF  - echo pending from this admission  - on oral bumex  - net neg of 2.4L , wt down 4lb    HTN  - modest control    HLD  - on statin     Plan;    - continue aspirin, plavix, atorvastatin, metoprolol   - will start on increased dose of imdur. Ambulate in hallway post medication  -further recommendations pending echo  Plan discussed with pt and RN     JEMMA Simon  [facility]  3/25/2025  12:54 PM    PM rounds addendum:    Chronic stable angina: Angiogram reviewed,  OM, unclear ischemic territory.  Given negative troponin doubt that she has new disease in brother stented segments or bypass segments (LAD, RCA territories).  Will titrate antianginals, PET stress to assess ischemic burden in the office  and further discussion for  circumflex intervention.  Discharge home pending echo  Imdur 60    Patient seen and examined independently.  Agree with exam.  Labs reviewed.  Changes to assessment and plan as above.    Matt Mendez MD  Interventional Cardiology  [facility]

## 2025-03-25 NOTE — TELEPHONE ENCOUNTER
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

## 2025-03-25 NOTE — PLAN OF CARE
Patient came from ED for chest pain/exertional dyspnea. No reports of chest pain since admission. Still reports some dyspnea. Patient ambulating ad marcos. Plan for echo per cardiology.     Problem: CARDIOVASCULAR - ADULT  Goal: Maintains optimal cardiac output and hemodynamic stability  Description: INTERVENTIONS:- Monitor vital signs, rhythm, and trends- Monitor for bleeding, hypotension and signs of decreased cardiac output- Evaluate effectiveness of vasoactive medications to optimize hemodynamic stability- Monitor arterial and/or venous puncture sites for bleeding and/or hematoma- Assess quality of pulses, skin color and temperature- Assess for signs of decreased coronary artery perfusion - ex. Angina- Evaluate fluid balance, assess for edema, trend weights  Outcome: Progressing  Goal: Absence of cardiac arrhythmias or at baseline  Description: INTERVENTIONS:- Continuous cardiac monitoring, monitor vital signs, obtain 12 lead EKG if indicated- Evaluate effectiveness of antiarrhythmic and heart rate control medications as ordered- Initiate emergency measures for life threatening arrhythmias- Monitor electrolytes and administer replacement therapy as ordered  Outcome: Progressing     Problem: RESPIRATORY - ADULT  Goal: Achieves optimal ventilation and oxygenation  Description: INTERVENTIONS:- Assess for changes in respiratory status- Assess for changes in mentation and behavior- Position to facilitate oxygenation and minimize respiratory effort- Oxygen supplementation based on oxygen saturation or ABGs- Provide Smoking Cessation handout, if applicable- Encourage broncho-pulmonary hygiene including cough, deep breathe, Incentive Spirometry- Assess the need for suctioning and perform as needed- Assess and instruct to report SOB or any respiratory difficulty- Respiratory Therapy support as indicated- Manage/alleviate anxiety- Monitor for signs/symptoms of CO2 retention  Outcome: Progressing     Problem: METABOLIC/FLUID  AND ELECTROLYTES - ADULT  Goal: Electrolytes maintained within normal limits  Description: INTERVENTIONS:- Monitor labs and rhythm and assess patient for signs and symptoms of electrolyte imbalances- Administer electrolyte replacement as ordered- Monitor response to electrolyte replacements, including rhythm and repeat lab results as appropriate- Fluid restriction as ordered- Instruct patient on fluid and nutrition restrictions as appropriate  Outcome: Progressing     Problem: HEMATOLOGIC - ADULT  Goal: Free from bleeding injury  Description: (Example usage: patient with low platelets)INTERVENTIONS:- Avoid intramuscular injections, enemas and rectal medication administration- Ensure safe mobilization of patient- Hold pressure on venipuncture sites to achieve adequate hemostasis- Assess for signs and symptoms of internal bleeding- Monitor lab trends- Patient is to report abnormal signs of bleeding to staff- Avoid use of toothpicks and dental floss- Use electric shaver for shaving- Use soft bristle tooth brush- Limit straining and forceful nose blowing  Outcome: Progressing

## 2025-03-25 NOTE — PLAN OF CARE
Problem: Patient Centered Care  Goal: Patient preferences are identified and integrated in the patient's plan of care  Description: Interventions:- What would you like us to know as we care for you? Has a son who is a paramedic- Provide timely, complete, and accurate information to patient/family- Incorporate patient and family knowledge, values, beliefs, and cultural backgrounds into the planning and delivery of care- Encourage patient/family to participate in care and decision-making at the level they choose- Honor patient and family perspectives and choices  Outcome: Progressing     Problem: CARDIOVASCULAR - ADULT  Goal: Maintains optimal cardiac output and hemodynamic stability  Description: INTERVENTIONS:- Monitor vital signs, rhythm, and trends- Monitor for bleeding, hypotension and signs of decreased cardiac output- Evaluate effectiveness of vasoactive medications to optimize hemodynamic stability- Monitor arterial and/or venous puncture sites for bleeding and/or hematoma- Assess quality of pulses, skin color and temperature- Assess for signs of decreased coronary artery perfusion - ex. Angina- Evaluate fluid balance, assess for edema, trend weights  Outcome: Progressing  Goal: Absence of cardiac arrhythmias or at baseline  Description: INTERVENTIONS:- Continuous cardiac monitoring, monitor vital signs, obtain 12 lead EKG if indicated- Evaluate effectiveness of antiarrhythmic and heart rate control medications as ordered- Initiate emergency measures for life threatening arrhythmias- Monitor electrolytes and administer replacement therapy as ordered  Outcome: Progressing     Problem: RESPIRATORY - ADULT  Goal: Achieves optimal ventilation and oxygenation  Description: INTERVENTIONS:- Assess for changes in respiratory status- Assess for changes in mentation and behavior- Position to facilitate oxygenation and minimize respiratory effort- Oxygen supplementation based on oxygen saturation or ABGs- Provide  Smoking Cessation handout, if applicable- Encourage broncho-pulmonary hygiene including cough, deep breathe, Incentive Spirometry- Assess the need for suctioning and perform as needed- Assess and instruct to report SOB or any respiratory difficulty- Respiratory Therapy support as indicated- Manage/alleviate anxiety- Monitor for signs/symptoms of CO2 retention  Outcome: Progressing     Problem: METABOLIC/FLUID AND ELECTROLYTES - ADULT  Goal: Electrolytes maintained within normal limits  Description: INTERVENTIONS:- Monitor labs and rhythm and assess patient for signs and symptoms of electrolyte imbalances- Administer electrolyte replacement as ordered- Monitor response to electrolyte replacements, including rhythm and repeat lab results as appropriate- Fluid restriction as ordered- Instruct patient on fluid and nutrition restrictions as appropriate  Outcome: Progressing     Problem: HEMATOLOGIC - ADULT  Goal: Free from bleeding injury  Description: (Example usage: patient with low platelets)INTERVENTIONS:- Avoid intramuscular injections, enemas and rectal medication administration- Ensure safe mobilization of patient- Hold pressure on venipuncture sites to achieve adequate hemostasis- Assess for signs and symptoms of internal bleeding- Monitor lab trends- Patient is to report abnormal signs of bleeding to staff- Avoid use of toothpicks and dental floss- Use electric shaver for shaving- Use soft bristle tooth brush- Limit straining and forceful nose blowing  Outcome: Progressing

## 2025-03-25 NOTE — PLAN OF CARE
Problem: Patient Centered Care  Goal: Patient preferences are identified and integrated in the patient's plan of care  Description: Interventions:- What would you like us to know as we care for you? - Provide timely, complete, and accurate information to patient/family- Incorporate patient and family knowledge, values, beliefs, and cultural backgrounds into the planning and delivery of care- Encourage patient/family to participate in care and decision-making at the level they choose- Honor patient and family perspectives and choices  Outcome: Progressing     Problem: Patient/Family Goals  Goal: Patient/Family Long Term Goal  Description: Patient's Long Term Goal: Interventions:- - See additional Care Plan goals for specific interventions  Outcome: Progressing  Goal: Patient/Family Short Term Goal  Description: Patient's Short Term Goal: Interventions: - - See additional Care Plan goals for specific interventions  Outcome: Progressing     Problem: CARDIOVASCULAR - ADULT  Goal: Maintains optimal cardiac output and hemodynamic stability  Description: INTERVENTIONS:- Monitor vital signs, rhythm, and trends- Monitor for bleeding, hypotension and signs of decreased cardiac output- Evaluate effectiveness of vasoactive medications to optimize hemodynamic stability- Monitor arterial and/or venous puncture sites for bleeding and/or hematoma- Assess quality of pulses, skin color and temperature- Assess for signs of decreased coronary artery perfusion - ex. Angina- Evaluate fluid balance, assess for edema, trend weights  Outcome: Progressing  Goal: Absence of cardiac arrhythmias or at baseline  Description: INTERVENTIONS:- Continuous cardiac monitoring, monitor vital signs, obtain 12 lead EKG if indicated- Evaluate effectiveness of antiarrhythmic and heart rate control medications as ordered- Initiate emergency measures for life threatening arrhythmias- Monitor electrolytes and administer replacement therapy as  ordered  Outcome: Progressing     Problem: RESPIRATORY - ADULT  Goal: Achieves optimal ventilation and oxygenation  Description: INTERVENTIONS:- Assess for changes in respiratory status- Assess for changes in mentation and behavior- Position to facilitate oxygenation and minimize respiratory effort- Oxygen supplementation based on oxygen saturation or ABGs- Provide Smoking Cessation handout, if applicable- Encourage broncho-pulmonary hygiene including cough, deep breathe, Incentive Spirometry- Assess the need for suctioning and perform as needed- Assess and instruct to report SOB or any respiratory difficulty- Respiratory Therapy support as indicated- Manage/alleviate anxiety- Monitor for signs/symptoms of CO2 retention  Outcome: Progressing     Problem: METABOLIC/FLUID AND ELECTROLYTES - ADULT  Goal: Electrolytes maintained within normal limits  Description: INTERVENTIONS:- Monitor labs and rhythm and assess patient for signs and symptoms of electrolyte imbalances- Administer electrolyte replacement as ordered- Monitor response to electrolyte replacements, including rhythm and repeat lab results as appropriate- Fluid restriction as ordered- Instruct patient on fluid and nutrition restrictions as appropriate  Outcome: Progressing     Problem: HEMATOLOGIC - ADULT  Goal: Free from bleeding injury  Description: (Example usage: patient with low platelets)INTERVENTIONS:- Avoid intramuscular injections, enemas and rectal medication administration- Ensure safe mobilization of patient- Hold pressure on venipuncture sites to achieve adequate hemostasis- Assess for signs and symptoms of internal bleeding- Monitor lab trends- Patient is to report abnormal signs of bleeding to staff- Avoid use of toothpicks and dental floss- Use electric shaver for shaving- Use soft bristle tooth brush- Limit straining and forceful nose blowing  Outcome: Progressing

## 2025-03-25 NOTE — PLAN OF CARE
Patient cleared for discharge.denies pain or discomfort. Ambulated around unit without difficulty. VSS.   Problem: Patient Centered Care  Goal: Patient preferences are identified and integrated in the patient's plan of care  Description: Interventions:- What would you like us to know as we care for you? - Provide timely, complete, and accurate information to patient/family- Incorporate patient and family knowledge, values, beliefs, and cultural backgrounds into the planning and delivery of care- Encourage patient/family to participate in care and decision-making at the level they choose- Honor patient and family perspectives and choices  3/25/2025 1628 by Laverne Frances RN  Outcome: Adequate for Discharge  3/25/2025 1430 by Laverne Frances RN  Outcome: Progressing     Problem: Patient/Family Goals  Goal: Patient/Family Long Term Goal  Description: Patient's Long Term Goal: Interventions:- - See additional Care Plan goals for specific interventions  3/25/2025 1628 by Laverne Frances RN  Outcome: Adequate for Discharge  3/25/2025 1430 by Laverne Frances RN  Outcome: Progressing  Goal: Patient/Family Short Term Goal  Description: Patient's Short Term Goal: Interventions: - - See additional Care Plan goals for specific interventions  3/25/2025 1628 by Laverne Frances RN  Outcome: Adequate for Discharge  3/25/2025 1430 by Laverne Frances RN  Outcome: Progressing     Problem: CARDIOVASCULAR - ADULT  Goal: Maintains optimal cardiac output and hemodynamic stability  Description: INTERVENTIONS:- Monitor vital signs, rhythm, and trends- Monitor for bleeding, hypotension and signs of decreased cardiac output- Evaluate effectiveness of vasoactive medications to optimize hemodynamic stability- Monitor arterial and/or venous puncture sites for bleeding and/or hematoma- Assess quality of pulses, skin color and temperature- Assess for signs of decreased coronary artery perfusion - ex. Angina- Evaluate fluid balance,  assess for edema, trend weights  3/25/2025 1628 by Laverne Frances RN  Outcome: Adequate for Discharge  3/25/2025 1430 by Laverne Frances RN  Outcome: Progressing  Goal: Absence of cardiac arrhythmias or at baseline  Description: INTERVENTIONS:- Continuous cardiac monitoring, monitor vital signs, obtain 12 lead EKG if indicated- Evaluate effectiveness of antiarrhythmic and heart rate control medications as ordered- Initiate emergency measures for life threatening arrhythmias- Monitor electrolytes and administer replacement therapy as ordered  3/25/2025 1628 by Laverne Frances RN  Outcome: Adequate for Discharge  3/25/2025 1430 by Laverne Frances RN  Outcome: Progressing     Problem: RESPIRATORY - ADULT  Goal: Achieves optimal ventilation and oxygenation  Description: INTERVENTIONS:- Assess for changes in respiratory status- Assess for changes in mentation and behavior- Position to facilitate oxygenation and minimize respiratory effort- Oxygen supplementation based on oxygen saturation or ABGs- Provide Smoking Cessation handout, if applicable- Encourage broncho-pulmonary hygiene including cough, deep breathe, Incentive Spirometry- Assess the need for suctioning and perform as needed- Assess and instruct to report SOB or any respiratory difficulty- Respiratory Therapy support as indicated- Manage/alleviate anxiety- Monitor for signs/symptoms of CO2 retention  3/25/2025 1628 by Laverne Frances RN  Outcome: Adequate for Discharge  3/25/2025 1430 by Laverne Frances RN  Outcome: Progressing     Problem: METABOLIC/FLUID AND ELECTROLYTES - ADULT  Goal: Electrolytes maintained within normal limits  Description: INTERVENTIONS:- Monitor labs and rhythm and assess patient for signs and symptoms of electrolyte imbalances- Administer electrolyte replacement as ordered- Monitor response to electrolyte replacements, including rhythm and repeat lab results as appropriate- Fluid restriction as ordered- Instruct patient on  fluid and nutrition restrictions as appropriate  3/25/2025 1628 by Laverne Frances RN  Outcome: Adequate for Discharge  3/25/2025 1430 by Laverne Frances RN  Outcome: Progressing     Problem: HEMATOLOGIC - ADULT  Goal: Free from bleeding injury  Description: (Example usage: patient with low platelets)INTERVENTIONS:- Avoid intramuscular injections, enemas and rectal medication administration- Ensure safe mobilization of patient- Hold pressure on venipuncture sites to achieve adequate hemostasis- Assess for signs and symptoms of internal bleeding- Monitor lab trends- Patient is to report abnormal signs of bleeding to staff- Avoid use of toothpicks and dental floss- Use electric shaver for shaving- Use soft bristle tooth brush- Limit straining and forceful nose blowing  3/25/2025 1628 by Laverne Frances RN  Outcome: Adequate for Discharge  3/25/2025 1430 by Laverne Frances RN  Outcome: Progressing

## 2025-03-25 NOTE — DISCHARGE PLANNING
Patient was provided with discharge instructions, education, and follow up information. Prescriptions were already sent electronically to patient's pharmacy. Patient verbalizes understanding of follow up information, specifically follow ups, medication changes and when to take the next dose, angina, signs and symptoms of when to call MD or EMS. Patient has no questions after reviewing all instructions and will be going home.     Chanelle STRONG, Discharge Leader [id number]

## 2025-03-25 NOTE — DISCHARGE SUMMARY
[facility]  part of [facility]    Discharge Summary    No Saucedo Patient Status:  Observation    1960 MRN [id number]   Location [facility] 3W/SW Attending Karime Alcantara DO   Hosp Day # 0 PCP Josep Valdez MD     Date of Admission: 3/24/2025 Disposition: Home or Self Care     Date of Discharge: 3/25/2025      Admitting Diagnosis: Dyspnea on exertion [R06.09]  Chest pain of uncertain etiology [R07.9]    Hospital Discharge Diagnoses:  chest pain     Hospital Discharge Diagnoses:  chest pain     Lace+ Score: 48  59-90 High Risk  29-58 Medium Risk  0-28   Low Risk.    TCM Follow-Up Recommendation:  LACE 29-58: Moderate Risk of readmission after discharge from the hospital.          Lace+ Score: 48  59-90 High Risk  29-58 Medium Risk  0-28   Low Risk    Risk of readmission: No Saucedo has Moderate Risk of readmission after discharge from the hospital.    Problem List:   Patient Active Problem List   Diagnosis    Psoriatic arthritis (HCC)    Hypercholesterolemia    Colon polyps    Diverticulosis    Cervical radiculopathy at C7    Neoplasm of uncertain behavior of skin    Sun-damaged skin    History of basal cell carcinoma (BCC)    Essential hypertension    Burning reflux    Hemoperitoneum    Gastroesophageal reflux disease without esophagitis    CAD (coronary artery disease)    Overactive bladder    Thoracic aortic aneurysm without rupture, unspecified part    Dyspnea on exertion    Anemia    Unstable angina (HCC)    Chest pain of uncertain etiology       Reason for Admission: cp     Physical Exam:   General appearance: alert, appears stated age and cooperative  Pulmonary:  clear to auscultation bilaterally  Cardiovascular: S1, S2 normal, no murmur, click, rub or gallop, regular rate and rhythm  Abdominal: soft, non-tender; bowel sounds normal; no masses,  no organomegaly  Extremities: extremities normal, atraumatic, no cyanosis or edema  Psychiatric:  calm        History of Present Illness:       HISTORY OF PRESENT ILLNESS:  The patient is a 64-year-old  female who has history of coronary artery disease, last intervention first diagonal PCI in December 2024.  The patient started developing chest pain associated with dyspnea on exertion for the last 2 to 3 days, today came into the emergency department for evaluation.  CBC and chemistry unremarkable.  Troponin was negative.  EKG showed normal sinus rhythm with nonspecific ST-T changes.  CT scan of the chest rule out PE still pending.  Chest x-ray showed no acute findings.     Hospital Course:       ASSESSMENT:    1.       Symptoms could be consistent with unstable angina.  - EKG and trop were neg for acs.   - pt seen by DR Mendez and plan for pet outpt and increase isosorbide   2.       Coronary artery disease.  3.       Hypertension.     PLAN:  The patient will be admitted to general medical floor.  CT scan of the chest rule out PE.  A 2D echocardiogram with Doppler.  Trend troponin level.  Cardiology consult.  Bed rest.  Further recommendations to follow.     Consultations: Dr Mendez     Procedures: none    Complications: none    Discharge Condition: Good    Discharge Medications:      Discharge Medications        CONTINUE taking these medications        Instructions Prescription details   aspirin 81 MG Tbec      Take 1 tablet (81 mg total) by mouth daily.   Refills: 0     atorvastatin 80 MG Tabs  Commonly known as: Lipitor      Take 1 tablet (80 mg total) by mouth nightly.   Quantity: 90 tablet  Refills: 3     bumetanide 2 MG Tabs  Commonly known as: Bumex      Take 1 tablet (2 mg total) by mouth every other day.   Refills: 0     clopidogrel 75 MG Tabs  Commonly known as: Plavix      Take 1 tablet (75 mg total) by mouth daily.   Refills: 0     folic acid 1 MG Tabs  Commonly known as: Folvite      Take 1 tablet (1 mg total) by mouth daily.   Quantity: 90 tablet  Refills: 3     methotrexate 2.5 MG  Tabs  Commonly known as: Rheumatrex      Take 5 tablets (12.5 mg total) by mouth once a week. Pt taking dose on Wednesdays   Refills: 0     metoprolol succinate ER 25 MG Tb24  Commonly known as: Toprol XL      Take 1 tablet (25 mg total) by mouth daily.   Refills: 0     NON FORMULARY      5 mg  in the morning and 5 mg before bedtime. Takes cannabis gummy 5 mg after work and 10 mg at bedtime.   Refills: 0     omeprazole 20 MG Cpdr  Commonly known as: PriLOSEC      Take 1 capsule (20 mg total) by mouth 2 (two) times daily before meals.   Quantity: 180 capsule  Refills: 3     ondansetron 4 mg tablet  Commonly known as: Zofran      Take 1 tablet (4 mg total) by mouth every 8 (eight) hours as needed for Nausea.   Quantity: 30 tablet  Refills: 3     Ventolin  (90 Base) MCG/ACT Aers  Generic drug: albuterol      Inhale 2 puffs into the lungs every 4 (four) hours as needed for Wheezing.   Quantity: 1 each  Refills: 2            ASK your doctor about these medications        Instructions Prescription details   isosorbide mononitrate ER 30 MG Tb24  Commonly known as: Imdur      Take 1 tablet (30 mg total) by mouth daily.   Refills: 0              Follow up Visits: Follow-up with pcp  in 1 week    Follow up Labs: none     Other Discharge Instructions: f/u with cards 1 week     Karime Alcantara DO  3/25/2025  2:59 PM    > 35 min

## 2025-03-26 ENCOUNTER — APPOINTMENT (OUTPATIENT)
Dept: CARDIAC REHAB | Facility: HOSPITAL | Age: 65
End: 2025-03-26
Attending: INTERNAL MEDICINE
Payer: MEDICARE

## 2025-03-26 ENCOUNTER — PATIENT OUTREACH (OUTPATIENT)
Dept: CASE MANAGEMENT | Age: 65
End: 2025-03-26

## 2025-03-26 NOTE — PROGRESS NOTES
Transitional Care Management   Discharge Date: 3/25/25  Contact Date: 3/26/2025    Assessment:  (Insert appropriate Smartphrase below after completing flowsheet)  TCM Initial Assessment    General:  Assessment completed with: Patient  Patient Subjective: The patient states she is feeling a little better.  Chief Complaint: Dyspnea on exertion  Anemia  Unstable angina (HCC)  Chest pain of uncertain etiology  Verify patient name and  with patient/ caregiver: Yes    Hospital Stay/Discharge:  Tell me what you understand of why you were in the hospital or emergency department: States she was having heart probles, SOB and a possible blockage.  Prior to leaving the hospital were your Discharge Instructions reviewed with you?: Yes  Did you receive a copy of your written Discharge Instructions?: Yes  What questions do you have about your Discharge Instructions?: None  Do you feel better or worse since you left the hospital or emergency department?: Same    Follow - Up Appointment:  Do you have a follow-up appointment?: No  Are there any barriers to getting to your follow-up appointment?: No    Home Health/DME:  Prior to leaving the hospital was Home Health (HH) arranged for you?: No     Prior to leaving the hospital or emergency department was Durable Medical Equipment (DME), medical supplies, or infusions arranged for you?: No  Are DME/medical supply/infusions needs identified by staff during this assessment?: No     Medications/Diet:       Were you given a different diet per your Discharge Instructions?: Yes  Diet Type: Cardiac diet  Reason: heart problems  Are there any barriers to following that diet?: No     Questions/Concerns:  Do you have any questions or concerns that have not been discussed?: No        Follow-up Appointments:  Your appointments       Date & Time Appointment Department (Center)    May 06, 2025 11:00 AM CDT Follow Up Visit with Maria Del Carmen Dye MD [facility], [facility],  [place] ([facility])              [facility], [facility], [place]  [facility]  [address], [address]  [facility] [id number] [phone]            Transitional [facility] Clinic  Was TCC Ordered: No  Was TCC Scheduled: No, Explain     - If yes: []  Advised patient to bring all medications and blood glucose meter/supplies if applicable.    Primary Care Provider (If no TCC appointment)  Does patient already have a PCP appointment scheduled? No  Care Manager Scheduled PCP office TCM appointment with patient   -If no appointment scheduled: Explain     Specialist  Does the patient have any other follow-up appointment(s) that need to be scheduled? Yes   -If yes: Care Manager reviewed upcoming specialist appointments with patient: Yes   -Does the patient need assistance scheduling appointment(s): No      Book By Date: 4/8/2025

## 2025-03-27 ENCOUNTER — APPOINTMENT (OUTPATIENT)
Dept: CARDIAC REHAB | Facility: HOSPITAL | Age: 65
End: 2025-03-27
Attending: INTERNAL MEDICINE
Payer: MEDICARE

## 2025-03-27 RX ORDER — CLOBETASOL PROPIONATE 0.5 MG/G
1 CREAM TOPICAL 2 TIMES DAILY
Qty: 60 G | Refills: 3 | Status: SHIPPED | OUTPATIENT
Start: 2025-03-27

## 2025-03-31 ENCOUNTER — APPOINTMENT (OUTPATIENT)
Dept: CARDIAC REHAB | Facility: HOSPITAL | Age: 65
End: 2025-03-31
Attending: INTERNAL MEDICINE
Payer: MEDICARE

## 2025-03-31 ENCOUNTER — TELEPHONE (OUTPATIENT)
Dept: INTERNAL MEDICINE CLINIC | Facility: CLINIC | Age: 65
End: 2025-03-31

## 2025-03-31 NOTE — TELEPHONE ENCOUNTER
COMPREHENSIVE MEDICATION REVIEW         No Saucedo MRN [id number]    1960 PCP Josep Valdez MD     Comments: Medication history completed by Ambulatory Clinic Pharmacist over the phone on 3/31/25. Patient has upcoming TCM visit with PCP on 25.     After thorough medication review, 1 discrepancy has been identified and corrected on patient's medication list. See updated list below:     Outpatient Encounter Medications as of 3/31/2025   Medication Sig    clobetasol 0.05 % External Cream Apply 1 Application topically 2 (two) times daily.    isosorbide mononitrate ER 60 MG Oral Tablet 24 Hr Take 1 tablet (60 mg total) by mouth daily.    methotrexate 2.5 MG Oral Tab Take 5 tablets (12.5 mg total) by mouth once a week. Pt taking dose on     VENTOLIN  (90 Base) MCG/ACT Inhalation Aero Soln Inhale 2 puffs into the lungs every 4 (four) hours as needed for Wheezing.    folic acid 1 MG Oral Tab Take 1 tablet (1 mg total) by mouth daily.    ondansetron (ZOFRAN) 4 mg tablet Take 1 tablet (4 mg total) by mouth every 8 (eight) hours as needed for Nausea.    atorvastatin 80 MG Oral Tab Take 1 tablet (80 mg total) by mouth nightly.    bumetanide 2 MG Oral Tab Take 1 tablet (2 mg total) by mouth every other day.    metoprolol succinate ER 25 MG Oral Tablet 24 Hr Take 1 tablet (25 mg total) by mouth daily.    clopidogrel 75 MG Oral Tab Take 1 tablet (75 mg total) by mouth daily.    omeprazole 20 MG Oral Capsule Delayed Release Take 1 capsule (20 mg total) by mouth 2 (two) times daily before meals.    NON FORMULARY Cannabis gummy 5 mg by mouth twice daily    aspirin 81 MG Oral Tab EC Take 1 tablet (81 mg total) by mouth daily.     Medication Assessment:   Reviewed all medications in detail with patient including dose, indication, timing of administration, monitoring parameters, and potential side effects of medications.     Patient reports taking the increased isosorbide mononitrate ER 60 mg daily  as instructed on hospital discharge. This was the only change to her medications that was made during hospitalization. She does have a follow up appointment with cardiologist today. Did instruct patient to call me back if they make other changes to her medications at appointment today.     Patient confirms she is still taking atorvastatin 80 mg nightly, bumetanide 2 mg every other day, clopidogrel 75 mg daily and metoprolol succinate ER 25 mg daily as prescribed. She will sometimes monitor her blood pressure at home. Did recommend she monitor her blood pressure 2-3 times weekly and bring readings in to all MD appointments for review.     Patient mentions she is back to taking her methotrexate 12.5 mg weekly and folic acid 1 mg daily. She states her psoriatic arthritis is acting up due to break outs from the adhesive tape while hospitalized. She does follow closely with rheumatology.     Did provide education and stressed the importance of taking medication just like prescribed to get the most benefit. Patient denies forgetting or missing medication doses and denies any questions or concerns with medications at this time.     Thank you,    Emelia Cole, PharmD, 3/31/2025, 11:15 AM

## 2025-04-02 ENCOUNTER — OFFICE VISIT (OUTPATIENT)
Dept: INTERNAL MEDICINE CLINIC | Facility: CLINIC | Age: 65
End: 2025-04-02
Payer: MEDICARE

## 2025-04-02 ENCOUNTER — APPOINTMENT (OUTPATIENT)
Dept: CARDIAC REHAB | Facility: HOSPITAL | Age: 65
End: 2025-04-02
Attending: INTERNAL MEDICINE
Payer: MEDICARE

## 2025-04-02 VITALS
WEIGHT: 132 LBS | HEIGHT: 63 IN | HEART RATE: 79 BPM | SYSTOLIC BLOOD PRESSURE: 110 MMHG | OXYGEN SATURATION: 97 % | TEMPERATURE: 98 F | BODY MASS INDEX: 23.39 KG/M2 | DIASTOLIC BLOOD PRESSURE: 64 MMHG

## 2025-04-02 DIAGNOSIS — I71.20 THORACIC AORTIC ANEURYSM WITHOUT RUPTURE, UNSPECIFIED PART: ICD-10-CM

## 2025-04-02 DIAGNOSIS — Z12.11 SCREEN FOR COLON CANCER: ICD-10-CM

## 2025-04-02 DIAGNOSIS — T78.40XS ALLERGIC REACTION, SEQUELA: ICD-10-CM

## 2025-04-02 DIAGNOSIS — I25.118 CORONARY ARTERY DISEASE INVOLVING NATIVE CORONARY ARTERY OF NATIVE HEART WITH OTHER FORM OF ANGINA PECTORIS: Primary | ICD-10-CM

## 2025-04-02 PROBLEM — I20.0 UNSTABLE ANGINA (HCC): Status: RESOLVED | Noted: 2025-03-24 | Resolved: 2025-04-02

## 2025-04-02 PROBLEM — R07.9 CHEST PAIN OF UNCERTAIN ETIOLOGY: Status: RESOLVED | Noted: 2025-03-24 | Resolved: 2025-04-02

## 2025-04-02 PROBLEM — R06.09 DYSPNEA ON EXERTION: Status: RESOLVED | Noted: 2025-03-24 | Resolved: 2025-04-02

## 2025-04-02 PROCEDURE — 99496 TRANSJ CARE MGMT HIGH F2F 7D: CPT | Performed by: INTERNAL MEDICINE

## 2025-04-02 RX ORDER — METHYLPREDNISOLONE 4 MG
TABLET, DOSE PACK ORAL
COMMUNITY
Start: 2025-03-31

## 2025-04-02 NOTE — PROGRESS NOTES
Subjective:   No Saucedo is a 64 year old female who presents for hospital follow up.   She was discharged from [facility] to Home or Self Care  Admission Date: 3/24/25   Discharge Date: 3/25/25  Hospital Discharge Diagnosis: Coronary artery disease    Interactive contact within 2 business days post discharge first initiated on Date: 3/26/2025    During the visit, the following was completed:  Obtained and reviewed discharge summary, continuity of care documents, and Hospitalist notes  Reviewed Labs (CBC, CMP)    HPI: 64-year-old lady with medical history significant for coronary artery disease status post intervention came into the hospital for chest pain and worsening dyspnea.  EKG was unremarkable.  Troponins are negative.  She underwent CT scan which is negative for pulmonary embolism and stable thoracic aneurysms.  Subsequently patient was admitted to the hospital.  Cardiology evaluated patient.  Echocardiogram was unremarkable.  Troponins were negative.  Subsequently cardiology decided to discharge the patient home.  She underwent PET scan with cardiology postdischarge.  Reportedly her test is unremarkable.  And decided to manage with the medically.  Today she is here for follow-up.  She denies any chest pain, shortness of breath, palpitations.  No bleeding reported.    History/Other:   Current Medications:  Medication Reconciliation:  I am aware of an inpatient discharge within the last 30 days.  The discharge medication list has been reconciled with the patient's current medication list and reviewed by me. See medication list for additions of new medication, and changes to current doses of medications and discontinued medications.  Outpatient Medications Marked as Taking for the 4/2/25 encounter (Office Visit) with Josep Valdez MD   Medication Sig    MEDROL 4 MG Oral Tablet Therapy Pack Medrol (Rocael) 4 mg tablets in a dose pack, [RxNorm: 126250]    clobetasol 0.05 % External Cream Apply 1  Application topically 2 (two) times daily.    isosorbide mononitrate ER 60 MG Oral Tablet 24 Hr Take 1 tablet (60 mg total) by mouth daily.    methotrexate 2.5 MG Oral Tab Take 5 tablets (12.5 mg total) by mouth once a week. Pt taking dose on Wednesdays    VENTOLIN  (90 Base) MCG/ACT Inhalation Aero Soln Inhale 2 puffs into the lungs every 4 (four) hours as needed for Wheezing.    folic acid 1 MG Oral Tab Take 1 tablet (1 mg total) by mouth daily.    ondansetron (ZOFRAN) 4 mg tablet Take 1 tablet (4 mg total) by mouth every 8 (eight) hours as needed for Nausea.    atorvastatin 80 MG Oral Tab Take 1 tablet (80 mg total) by mouth nightly.    bumetanide 2 MG Oral Tab Take 1 tablet (2 mg total) by mouth every other day.    metoprolol succinate ER 25 MG Oral Tablet 24 Hr Take 1 tablet (25 mg total) by mouth daily.    clopidogrel 75 MG Oral Tab Take 1 tablet (75 mg total) by mouth daily.    omeprazole 20 MG Oral Capsule Delayed Release Take 1 capsule (20 mg total) by mouth 2 (two) times daily before meals.    NON FORMULARY Cannabis gummy 5 mg by mouth twice daily    aspirin 81 MG Oral Tab EC Take 1 tablet (81 mg total) by mouth daily.       Review of Systems   Constitutional:  Negative for activity change, appetite change and fever.   HENT:  Negative for congestion and voice change.    Respiratory:  Negative for cough and shortness of breath.    Cardiovascular:  Negative for chest pain.   Gastrointestinal:  Negative for abdominal distention, abdominal pain and vomiting.   Genitourinary:  Negative for hematuria.   Skin:  Negative for wound.   Psychiatric/Behavioral:  Negative for behavioral problems.          Objective:   Physical Exam  Constitutional:       Appearance: Normal appearance.   HENT:      Head: Normocephalic.   Eyes:      Conjunctiva/sclera: Conjunctivae normal.   Cardiovascular:      Rate and Rhythm: Normal rate and regular rhythm.      Heart sounds: Normal heart sounds. No murmur heard.  Pulmonary:       Effort: Pulmonary effort is normal.      Breath sounds: Normal breath sounds. No rhonchi or rales.   Abdominal:      General: Bowel sounds are normal.      Palpations: Abdomen is soft.      Tenderness: There is no abdominal tenderness.   Musculoskeletal:      Cervical back: Neck supple.      Right lower leg: No edema.      Left lower leg: No edema.   Skin:     General: Skin is warm and dry.   Neurological:      General: No focal deficit present.      Mental Status: She is alert and oriented to person, place, and time. Mental status is at baseline.   Psychiatric:         Mood and Affect: Mood normal.         Behavior: Behavior normal.           /64   Pulse 79   Temp 97.8 °F (36.6 °C) (Temporal)   Ht 5' 3\" (1.6 m)   Wt 132 lb (59.9 kg)   SpO2 97%   BMI 23.38 kg/m²  Estimated body mass index is 23.38 kg/m² as calculated from the following:    Height as of this encounter: 5' 3\" (1.6 m).    Weight as of this encounter: 132 lb (59.9 kg).    Assessment & Plan:   1. Coronary artery disease involving native coronary artery of native heart with other form of angina pectoris (Primary)  Overview:  CABG and s/p PCI - follows up with cards  2. Thoracic aortic aneurysm without rupture, unspecified part  Overview:  Stable fusiform aneurysm of the proximal descending aorta followed by a larger segment of fusiform aneurysm in the mid descending aorta.  No acute aortic syndrome   Finalized by (CST): Marquis Francis MD on 3/24/2025   3. Allergic reaction, sequela    Plan  Continue current cardiac medications.  Her aneurysm has been stable.  I have encouraged her to discuss this with the cardiology regarding repeating CT chest in around 1 year.  She will also discuss with cardiology regarding holding clopidogrel if she is going for colonoscopy and cataract surgery.  She developed allergic reactions to the pads.  Got better with the Medrol Dosepak.  She will continue to follow-up with rheumatology for psoriatic arthritis.  I  will see her back in 4 months        No follow-ups on file.

## 2025-04-03 ENCOUNTER — APPOINTMENT (OUTPATIENT)
Dept: CARDIAC REHAB | Facility: HOSPITAL | Age: 65
End: 2025-04-03
Attending: INTERNAL MEDICINE
Payer: MEDICARE

## 2025-04-07 ENCOUNTER — HOSPITAL ENCOUNTER (OUTPATIENT)
Dept: ULTRASOUND IMAGING | Facility: HOSPITAL | Age: 65
Discharge: HOME | End: 2025-04-07
Attending: INTERNAL MEDICINE
Payer: MEDICARE

## 2025-04-07 ENCOUNTER — OFFICE VISIT (OUTPATIENT)
Age: 65
End: 2025-04-07
Payer: MEDICARE

## 2025-04-07 ENCOUNTER — MED REC SCAN ONLY (OUTPATIENT)
Dept: INTERNAL MEDICINE CLINIC | Facility: CLINIC | Age: 65
End: 2025-04-07

## 2025-04-07 VITALS
SYSTOLIC BLOOD PRESSURE: 124 MMHG | RESPIRATION RATE: 16 BRPM | BODY MASS INDEX: 24.13 KG/M2 | WEIGHT: 136.19 LBS | DIASTOLIC BLOOD PRESSURE: 70 MMHG | HEART RATE: 85 BPM | HEIGHT: 63 IN

## 2025-04-07 DIAGNOSIS — Z51.81 THERAPEUTIC DRUG MONITORING: ICD-10-CM

## 2025-04-07 DIAGNOSIS — M79.602 LEFT ARM PAIN: ICD-10-CM

## 2025-04-07 DIAGNOSIS — M25.512 CHRONIC LEFT SHOULDER PAIN: ICD-10-CM

## 2025-04-07 DIAGNOSIS — L40.50 PSORIATIC ARTHRITIS (HCC): Primary | ICD-10-CM

## 2025-04-07 DIAGNOSIS — G89.29 CHRONIC LEFT SHOULDER PAIN: ICD-10-CM

## 2025-04-07 PROCEDURE — 99214 OFFICE O/P EST MOD 30 MIN: CPT | Performed by: INTERNAL MEDICINE

## 2025-04-07 PROCEDURE — G2211 COMPLEX E/M VISIT ADD ON: HCPCS | Performed by: INTERNAL MEDICINE

## 2025-04-07 PROCEDURE — 93971 EXTREMITY STUDY: CPT | Performed by: INTERNAL MEDICINE

## 2025-04-07 RX ORDER — METHOTREXATE 2.5 MG/1
12.5 TABLET ORAL WEEKLY
Qty: 65 TABLET | Refills: 1 | Status: SHIPPED | OUTPATIENT
Start: 2025-04-07

## 2025-04-07 NOTE — PATIENT INSTRUCTIONS
1.  Cont. methotrexate 5tablets a week (12.5mg) and folic acid 1mg a day   2.  Left arm ultrasound today - on plavix   3.  Cont. Legal medical marijuana.   4.  Return to clinic in 3- 6 months as needed.   5. Labs in 3 -6 months -

## 2025-04-07 NOTE — PROGRESS NOTES
No Saucedo is a 64 year old female.    HPI:     Chief Complaint   Patient presents with    Psoriatic Arthritis    Medication Follow-Up    Arm Pain     left        She is a plesasnt 59year old old with psoriatic arthritis and fibromyalgia.    She was on  enbrel 50mg weekly and   methotrexate weekly.   She is now off since 2019  The injection Enbrel hurts really bad. She efeels it helps her thigh. It's a swollen area over her right thigh.   She has 8/10 pain in her right elbows.   She stopped the enbrel temporarily for hte basal cell cancer but then her right elbows started botehring her again last night.   So the last injeciton lasted 3 months.     2020  VIDEO VISIT  She was furloughedx 3 weeks.   She's stressed.   Her son's girlfriend  at 32 from hepatic failure.   Her mom also passed from cancer recurrence 3 weeks after Lorri pass away.   Ambrocio mom also passed away from cancer. 2 months after.   She started medical marijuana in  . She has used prednisone only twice in the last 10 months.   She uses 1/2 gummy at night.   She still has pain in her hands.   Her pain hasn't flared with stress. She's done better than in the last 10 years. She has been off all her medications.   She's painting around the house and her hands are getting sore but its' not like something she can tolerate.   Her neck is better.   She was off enbrel - it was hurting so bad when she tried it even with the new formulation.   She is alos off methtorexate. It's amazing to her.   She takes prednisone - if seh has a bad flare.     2020  She is lousy for the last two weeks her right hip an dright leg is hurting.   It's radiating down to her right shin.   She denies back pain.   Lifting her leg - she has right hip pain.   She took the pred nisone burst on 2020.   She uses the topical pain med.   Today she felt ok - she took three ibuporfen and that's helping.   Driving to work.   She's back now for 3  months - 20 hours a week     4/11/2023    She is re-establihsing.   Had medrol andrea sent in 2/2023 - for back pain.   She had a CABG in 12/2021. She was in the hospital 14 days - severe MEDINA. She had sternotomy to get wires removed. In 12/2022. She had a lot of pain with movemetn of her chest and had wirse removed. She feles much better. Had several cardiac aneurysm-   It's still not 100% but it was a lot better.   She has left 4th finger ganglion cyst - and planning on removed.or could be a dupytren's contracture. - dr. Ricci.   She is overall doing well - only using medical marijuana.   Her right hip is doing ok.   Her feet are swelling more.   She has about 5/10 pain.     Her son is on methotrexate , and leflunomide - reactive arthritis - not worked for 1 1/2 years . For covid long betsey    5/20/2023  Her left shoulder is swelling again - it's toleratble for the last 1 1 2/ months but now she has trouble lifting again.   She had a pneumonia shot in her right arm.   Last time her left hip and left arm was hurting.   Now her left hip is better.   Her left arm is still hurting.     She has consant pain in her left arm - 9/10 pain. It' swaking her up at night.     9/16/2023  Her left arm is still really hurting. Her back is bad again.   Was in the [facility] - but got lichen planus for 3 months - needed to get light therapy and everything to get rid of it. This was after her sternotomy   So she is afraid to restart biologics.     1/20/2024  Left shoulder is still really hurting.   She had another blockage in her heart - so she had another stent in 12/2023 -   She is having more right lower back pain - it's burning - more fibro pain   Her right ankle and right foot is painful   Then right 3rd pip is very tender again.  She is getting winded on and off post stent.   She had a bypass 3 years ago .       12/23/2024 -   She had to be hospitalized for another cardiac sten on 12/4  Getting more psoarisis over her hands and  in her inner elbows.   She thinkgs it's a flare - since it started for the last 1 month - before the hospital.   It increased to her elbows post hospitlization.   Her scapular on her left side and left shoulder is very tender posterioly -   She can' only use a lidocaine patch for 1 hour - topically   She has chf - and stress - and on new cardiac meds.   Steroid cream didn't help.   She doesn't think the topicals are helping.   She waited til 12/1 for her medicare -     4/7/2025  Her left elbow is hurting - after she had her left elbow had bp taken  last week.   She is fatigued.   Saw np - on 3/31/2025  -   She is on meds for her CHF - and water pill day -     Called her last week around 3/27 b/c she had a lot of rash post electrodes from heart montior   She is on medrol - and it's helping better than predniosne  She is also taking methtorexate again - and that's helping her psoriasis outbreak on her hands. -wated 6 weeks to take it, so she has been on it for 6 weeks but her hands are so much better.   Cream helps with the hands -     Started havint he left elbow/bicps pain on 4/2 post bp - started 1 hours afterards and cont. To have pain         HISTORY:  Past Medical History:    Basal cell carcinoma    Skin, left cutaneous lip    Cervical radiculopathy at C7    Colon polyps    Coronary atherosclerosis    Essential hypertension    Fibromyalgia    High blood pressure    High cholesterol    Hypercholesterolemia    Psoriatic arthritis (HCC)      Social Hx Reviewed   Family Hx Reviewed     Medications (Active prior to today's visit):  Current Outpatient Medications   Medication Sig Dispense Refill    MEDROL 4 MG Oral Tablet Therapy Pack Medrol (Rocael) 4 mg tablets in a dose pack, [RxNorm: 456503]      clobetasol 0.05 % External Cream Apply 1 Application topically 2 (two) times daily. 60 g 3    isosorbide mononitrate ER 60 MG Oral Tablet 24 Hr Take 1 tablet (60 mg total) by mouth daily. 30 tablet 0    methotrexate 2.5 MG  Oral Tab Take 5 tablets (12.5 mg total) by mouth once a week. Pt taking dose on Wednesdays      VENTOLIN  (90 Base) MCG/ACT Inhalation Aero Soln Inhale 2 puffs into the lungs every 4 (four) hours as needed for Wheezing. 1 each 2    folic acid 1 MG Oral Tab Take 1 tablet (1 mg total) by mouth daily. 90 tablet 3    ondansetron (ZOFRAN) 4 mg tablet Take 1 tablet (4 mg total) by mouth every 8 (eight) hours as needed for Nausea. 30 tablet 3    atorvastatin 80 MG Oral Tab Take 1 tablet (80 mg total) by mouth nightly. 90 tablet 3    bumetanide 2 MG Oral Tab Take 1 tablet (2 mg total) by mouth every other day.      metoprolol succinate ER 25 MG Oral Tablet 24 Hr Take 1 tablet (25 mg total) by mouth daily.      clopidogrel 75 MG Oral Tab Take 1 tablet (75 mg total) by mouth daily.      omeprazole 20 MG Oral Capsule Delayed Release Take 1 capsule (20 mg total) by mouth 2 (two) times daily before meals. 180 capsule 3    NON FORMULARY Cannabis gummy 5 mg by mouth twice daily      aspirin 81 MG Oral Tab EC Take 1 tablet (81 mg total) by mouth daily.         Allergies:  Allergies   Allergen Reactions    Adhesive Tape HIVES         ROS:   All other ROS are negative.     PHYSICAL EXAM:    pleasant, no acute distress, no CAD,  Malar rash - none now   CVS: RRR  RS: CTAB  ABD: soft nont lizz   Hands not swollen. Moving them easily   Can close both hands.   Moving neck is huring   Right 3rd dip and pip is very tender - mild swelling   No rash on her palms. , no psoriasis.   Left shoulder ac joint - sonya tender -decreased  abduction and in the ac joint .   Left elbow has large lump over her left medial epicondyle - point tenderness - over the lump   Right hip not  tender , decreased ext rotaiton   Right trochanteric bursitis not tender.   Right pretibial tendernessnot tender.   Right ankle tender   Right  with squeeze test -         Component      Latest Ref Rng 12/4/2024   WBC      4.0 - 11.0 x10(3) uL 10.6    RBC       3.80 - 5.30 x10(6)uL 4.62    Hemoglobin      12.0 - 16.0 g/dL 13.4    Hematocrit      35.0 - 48.0 % 40.9    MCV      80.0 - 100.0 fL 88.5    MCH      26.0 - 34.0 pg 29.0    MCHC      31.0 - 37.0 g/dL 32.8    RDW-SD      35.1 - 46.3 fL 42.9    RDW      11.0 - 15.0 % 13.2    Platelet Count      150.0 - 450.0 10(3)uL 371.0    Prelim Neutrophil Abs      1.50 - 7.70 x10 (3) uL 7.49    Neutrophils Absolute      1.50 - 7.70 x10(3) uL 7.49    Lymphocytes Absolute      1.00 - 4.00 x10(3) uL 1.95    Monocytes Absolute      0.10 - 1.00 x10(3) uL 1.01 (H)    Eosinophils Absolute      0.00 - 0.70 x10(3) uL 0.06    Basophils Absolute      0.00 - 0.20 x10(3) uL 0.03    Immature Granulocyte Absolute      0.00 - 1.00 x10(3) uL 0.03    Neutrophils %      % 70.8    Lymphocytes %      % 18.4    Monocytes %      % 9.6    Eosinophils %      % 0.6    Basophils %      % 0.3    Immature Granulocyte %      % 0.3    Glucose      70 - 99 mg/dL 97    Sodium      136 - 145 mmol/L 143    Potassium      3.5 - 5.1 mmol/L 3.6    Chloride      98 - 112 mmol/L 108    Carbon Dioxide, Total      21.0 - 32.0 mmol/L 27.0    ANION GAP      0 - 18 mmol/L 8    BUN      9 - 23 mg/dL 21    CREATININE      0.55 - 1.02 mg/dL 0.68    BUN/CREATININE RATIO      10.0 - 20.0  30.9 (H)    CALCIUM      8.7 - 10.4 mg/dL 9.8    CALCULATED OSMOLALITY      275 - 295 mOsm/kg 299 (H)    EGFR      >=60 mL/min/1.73m2 97    ALT (SGPT)      10 - 49 U/L 19    AST (SGOT)      <34 U/L 32    ALKALINE PHOSPHATASE      50 - 130 U/L 78    Total Bilirubin      0.2 - 1.1 mg/dL 0.9    PROTEIN, TOTAL      5.7 - 8.2 g/dL 7.0    Albumin      3.2 - 4.8 g/dL 4.6    Globulin      2.0 - 3.5 g/dL 2.4    A/G Ratio      1.0 - 2.0  1.9       Legend:  (H) High    ASSESSMENT/PLAN:     1. Psoriatic arthritis (HCC)  1. psoriatic arthritis -  Stable except for left shoulder   Psoraisis is flaring - desiree on hands - stress and hospitliazation flared her -   She's open to starting methotrexate 12.5 mg a  week and folic acid 1mg aday - again - will restart   Left shoulder/biceps tenderness -   S/p left shoulder injection 5/2023 , 1/2024 - ,  12/2024 -   Now with left elbow pain and lump - seems unusual for a medial  epicondylitis -     Note for work to stay off of it.   Can use prednisone for her right 3rd finger pain - topical and pressure on it as well   - zofran prn   - return to clini in 3months   Labs in 3months.     She declines other meds at this time   Off her meds   - on medical marijuana,- uses sativa and cbd/cbd and thc for sleep -  and prednsone burst prn -    s/p right trochanteric burisits in 12/2020 -   Check labs in 1 month   - rtc in 6-12 months.     Right lateral epicodnylitis steroid injection - 11/2018 and 2/2019   - off naproxen  - Topical steroid for rash - dermatitis over hands improving with mtx,    -  been off   methotrexaet 17.mg a week sub cu - b/c of elevated liver funtion tests   - off  Enbrel,  leflunomdie 20mg a day  And mtx 0.5ml subcu injction     2. CAD /sp cabg in 12/2021 and stent  12/2023 - in Intermittent - cp -  better -   Stent in 12/2024 -   Had CABG in 12/2021 - ahd had sternotomy for wire removal in 12/2022 .   Follow up with pulmonary as wlel -   She was on lanoxin b/c she has hx of SVT when her kids were little. She's fine with that for years now.   F/u dr. Mendez -   - f/u with gi - cont. probitiotics helping -   quantiferon gold 5/2017 - negative   3. Cervical radiculopathy - much better   4. . chest xray 2012 5/2017 - quantiferon gold negaitve.   5. Gastritis/ibs - on probiotics - .   6. Fibromyalgia - better   - off  low dose LDN - off   low dose gabpentin 300mg at night and cyclobenzparine 5mg at night prn   7. new basal cell ca  = dx in 2/2018     Summary:  1.  Cont. methotrexate 5tablets a week (12.5mg) and folic acid 1mg a day   2.  Left arm ultrasound today - on plavix   3.  Cont. Legal medical marijuana.   4.  Return to clinic in 3- 6 months as needed.   5. Labs in  3 -6 months -     - d/w her to     Maria Del Carmen Dye MD  4/7/2025   3:56 PM         is applicable because the patient's medical record notes reflects the ongoing nature of the continuous longitudinal relationship of care, and the medical record indicates that there is ongoing treatment of a serious/complex medical condition.

## 2025-04-08 ENCOUNTER — HOSPITAL ENCOUNTER (EMERGENCY)
Facility: HOSPITAL | Age: 65
Discharge: HOME | End: 2025-04-08
Attending: EMERGENCY MEDICINE
Payer: MEDICARE

## 2025-04-08 ENCOUNTER — APPOINTMENT (OUTPATIENT)
Dept: GENERAL RADIOLOGY | Facility: HOSPITAL | Age: 65
End: 2025-04-08
Attending: EMERGENCY MEDICINE
Payer: MEDICARE

## 2025-04-08 VITALS
SYSTOLIC BLOOD PRESSURE: 128 MMHG | DIASTOLIC BLOOD PRESSURE: 82 MMHG | BODY MASS INDEX: 23.1 KG/M2 | TEMPERATURE: 98 F | RESPIRATION RATE: 18 BRPM | HEIGHT: 63.5 IN | OXYGEN SATURATION: 97 % | HEART RATE: 67 BPM | WEIGHT: 132 LBS

## 2025-04-08 DIAGNOSIS — R07.9 ACUTE CHEST PAIN: Primary | ICD-10-CM

## 2025-04-08 LAB
ALBUMIN SERPL-MCNC: 4 G/DL (ref 3.2–4.8)
ALBUMIN/GLOB SERPL: 1.8 {RATIO} (ref 1–2)
ALP LIVER SERPL-CCNC: 71 U/L
ALT SERPL-CCNC: 8 U/L
ANION GAP SERPL CALC-SCNC: 7 MMOL/L (ref 0–18)
AST SERPL-CCNC: 15 U/L (ref ?–34)
ATRIAL RATE: 79 BPM
BASOPHILS # BLD AUTO: 0.05 X10(3) UL (ref 0–0.2)
BASOPHILS NFR BLD AUTO: 0.5 %
BILIRUB SERPL-MCNC: 0.3 MG/DL (ref 0.2–1.1)
BUN BLD-MCNC: 18 MG/DL (ref 9–23)
BUN/CREAT SERPL: 26.5 (ref 10–20)
CALCIUM BLD-MCNC: 8.7 MG/DL (ref 8.7–10.4)
CHLORIDE SERPL-SCNC: 106 MMOL/L (ref 98–112)
CO2 SERPL-SCNC: 27 MMOL/L (ref 21–32)
CREAT BLD-MCNC: 0.68 MG/DL
DEPRECATED RDW RBC AUTO: 41.4 FL (ref 35.1–46.3)
EGFRCR SERPLBLD CKD-EPI 2021: 97 ML/MIN/1.73M2 (ref 60–?)
EOSINOPHIL # BLD AUTO: 0.48 X10(3) UL (ref 0–0.7)
EOSINOPHIL NFR BLD AUTO: 4.7 %
ERYTHROCYTE [DISTWIDTH] IN BLOOD BY AUTOMATED COUNT: 13.3 % (ref 11–15)
GLOBULIN PLAS-MCNC: 2.2 G/DL (ref 2–3.5)
GLUCOSE BLD-MCNC: 101 MG/DL (ref 70–99)
HCT VFR BLD AUTO: 35.5 %
HGB BLD-MCNC: 11.9 G/DL
IMM GRANULOCYTES # BLD AUTO: 0.02 X10(3) UL (ref 0–1)
IMM GRANULOCYTES NFR BLD: 0.2 %
LYMPHOCYTES # BLD AUTO: 2.25 X10(3) UL (ref 1–4)
LYMPHOCYTES NFR BLD AUTO: 22.1 %
MCH RBC QN AUTO: 29.3 PG (ref 26–34)
MCHC RBC AUTO-ENTMCNC: 33.5 G/DL (ref 31–37)
MCV RBC AUTO: 87.4 FL
MONOCYTES # BLD AUTO: 0.94 X10(3) UL (ref 0.1–1)
MONOCYTES NFR BLD AUTO: 9.3 %
NEUTROPHILS # BLD AUTO: 6.42 X10 (3) UL (ref 1.5–7.7)
NEUTROPHILS # BLD AUTO: 6.42 X10(3) UL (ref 1.5–7.7)
NEUTROPHILS NFR BLD AUTO: 63.2 %
NT-PROBNP SERPL-MCNC: 154 PG/ML (ref ?–125)
OSMOLALITY SERPL CALC.SUM OF ELEC: 292 MOSM/KG (ref 275–295)
P AXIS: 55 DEGREES
P-R INTERVAL: 214 MS
PLATELET # BLD AUTO: 362 10(3)UL (ref 150–450)
POTASSIUM SERPL-SCNC: 3.2 MMOL/L (ref 3.5–5.1)
PROT SERPL-MCNC: 6.2 G/DL (ref 5.7–8.2)
Q-T INTERVAL: 380 MS
QRS DURATION: 88 MS
QTC CALCULATION (BEZET): 435 MS
R AXIS: 0 DEGREES
RBC # BLD AUTO: 4.06 X10(6)UL
SODIUM SERPL-SCNC: 140 MMOL/L (ref 136–145)
T AXIS: 69 DEGREES
TROPONIN I SERPL HS-MCNC: <3 NG/L
VENTRICULAR RATE: 79 BPM
WBC # BLD AUTO: 10.2 X10(3) UL (ref 4–11)

## 2025-04-08 PROCEDURE — 83880 ASSAY OF NATRIURETIC PEPTIDE: CPT | Performed by: EMERGENCY MEDICINE

## 2025-04-08 PROCEDURE — 99285 EMERGENCY DEPT VISIT HI MDM: CPT

## 2025-04-08 PROCEDURE — 80053 COMPREHEN METABOLIC PANEL: CPT | Performed by: EMERGENCY MEDICINE

## 2025-04-08 PROCEDURE — 71045 X-RAY EXAM CHEST 1 VIEW: CPT | Performed by: EMERGENCY MEDICINE

## 2025-04-08 PROCEDURE — 84484 ASSAY OF TROPONIN QUANT: CPT | Performed by: EMERGENCY MEDICINE

## 2025-04-08 PROCEDURE — 85025 COMPLETE CBC W/AUTO DIFF WBC: CPT | Performed by: EMERGENCY MEDICINE

## 2025-04-08 PROCEDURE — 94640 AIRWAY INHALATION TREATMENT: CPT

## 2025-04-08 PROCEDURE — 93010 ELECTROCARDIOGRAM REPORT: CPT

## 2025-04-08 PROCEDURE — 36415 COLL VENOUS BLD VENIPUNCTURE: CPT

## 2025-04-08 PROCEDURE — 99284 EMERGENCY DEPT VISIT MOD MDM: CPT

## 2025-04-08 PROCEDURE — 93005 ELECTROCARDIOGRAM TRACING: CPT

## 2025-04-08 RX ORDER — IPRATROPIUM BROMIDE AND ALBUTEROL SULFATE 2.5; .5 MG/3ML; MG/3ML
3 SOLUTION RESPIRATORY (INHALATION) ONCE
Status: COMPLETED | OUTPATIENT
Start: 2025-04-08 | End: 2025-04-08

## 2025-04-08 RX ORDER — POTASSIUM CHLORIDE 1.5 G/1.58G
40 POWDER, FOR SOLUTION ORAL ONCE
Status: COMPLETED | OUTPATIENT
Start: 2025-04-08 | End: 2025-04-08

## 2025-04-08 RX ORDER — RANOLAZINE 500 MG/1
500 TABLET, EXTENDED RELEASE ORAL ONCE
Status: COMPLETED | OUTPATIENT
Start: 2025-04-08 | End: 2025-04-08

## 2025-04-08 RX ORDER — RANOLAZINE 500 MG/1
500 TABLET, EXTENDED RELEASE ORAL 2 TIMES DAILY
Qty: 180 TABLET | Refills: 0 | Status: SHIPPED | OUTPATIENT
Start: 2025-04-08 | End: 2025-07-07

## 2025-04-08 RX ORDER — ISOSORBIDE MONONITRATE 60 MG/1
60 TABLET, EXTENDED RELEASE ORAL ONCE
Status: COMPLETED | OUTPATIENT
Start: 2025-04-08 | End: 2025-04-08

## 2025-04-08 NOTE — ED PROVIDER NOTES
Patient Seen in: [facility] Emergency Department    History     Chief Complaint   Patient presents with    Chest Pain Angina    Difficulty Breathing       HPI    64-year-old female presents to the ER for evaluation of chest pressure, shortness of breath, coughing.  Patient states symptoms started yesterday evening after she had a doctor's visit at the hospital.  She states she was here 2 weeks prior for very similar symptoms.  She states that after medication adjustment her symptoms were significantly improved up until yesterday.  She denies any fever, chills, nausea, diaphoresis.  Symptoms do seem worse with exertion.    History from Independent Source:       External Records Reviewed: On chart review, patient presented with similar symptoms at the end of March.  Patient was admitted with relatively normal workup and was seen by cardiology.  She was placed on Imdur 60 mg 3 times daily and had improvement in symptoms and was discharged.  Cardiology had recently stented her in December and did not believe that she had restenosis.    History reviewed.   Past Medical History:    Basal cell carcinoma    Skin, left cutaneous lip    Cervical radiculopathy at C7    Colon polyps    Coronary atherosclerosis    Essential hypertension    Fibromyalgia    High blood pressure    High cholesterol    Hypercholesterolemia    Psoriatic arthritis (HCC)       History reviewed.   Past Surgical History:   Procedure Laterality Date    Anesth,open heart surgery  2021    Cabg      Colonoscopy N/A 07/07/2017    Procedure: COLONOSCOPY;  Surgeon: Selina Timmons MD;  Location: [facility] ENDOSCOPY    Colonoscopy,biopsy  02/12/2010    Performed by GIOVANNI BRITO at [facility]    Foot surgery Right 1990    Bunion    Hysterectomy      Other  03/2018    Mohs surgery left upper lip BCCa    Vaginal hysterectomy  1987    With cystocele repair         Medications :  Prescriptions Prior to Admission[1]     Family History    Problem Relation Age of Onset    Colon Cancer Mother     Heart Disease Mother         CABG age 72    Hypertension Mother     Cancer Mother 61        Colon Cancer    Other (Bladder Cancer) Mother     Other (Cervical Cancer) Mother     Other (Peripheral Vascular Disease) Mother     Diabetes Maternal Grandmother     Hypertension Maternal Grandmother     Cancer Maternal Grandmother         hx of skin cancer     Heart Disease Maternal Grandfather          MI age 32    Heart Disease Maternal Aunt         Per NG: CAD    Breast Cancer Neg     Ovarian Cancer Neg     Prostate Cancer Neg     Pancreatic Cancer Neg        Smoking Status:   Social History     Socioeconomic History    Marital status:    Tobacco Use    Smoking status: Former     Current packs/day: 0.00     Average packs/day: 0.5 packs/day for 37.0 years (18.5 ttl pk-yrs)     Types: Cigarettes     Start date: 1975     Quit date: 2012     Years since quittin.6     Passive exposure: Never    Smokeless tobacco: Never   Vaping Use    Vaping status: Never Used   Substance and Sexual Activity    Alcohol use: Yes     Alcohol/week: 0.0 standard drinks of alcohol     Comment: Occasional    Drug use: Yes     Types: Cannabis     Comment: Medical Gummies/Topical Cream   Other Topics Concern    Pt has a pacemaker No    Pt has a defibrillator No    Reaction to local anesthetic No    Right Handed Yes       Constitutional and vital signs reviewed.      Social History and Family History elements reviewed from today, pertinent positives to the presenting problem noted.    Physical Exam     ED Triage Vitals [25 1319]   /67   Pulse 75   Resp 20   Temp 97.9 °F (36.6 °C)   Temp src    SpO2 97 %   O2 Device None (Room air)       Physical Exam   Constitutional: AAOx3, well nourished, NAD  HEENT: Normocephalic, PERRLA, MMM  CV: s1s2+, RRR, no m/r/g, normal distal pulses  Pulmonary/Chest: CTA b/l with no rales, wheezes.  No chest wall  tenderness  Abdominal: Nontender.  Nondistended. Soft. Bowel sounds are normal.   Neck/Back:   :   Musculoskeletal: Normal range of motion. No deformity.   Neurological: Awake, alert. Normal reflexes. No cranial nerve deficit.    Skin: Skin is warm and dry. No rash noted. No erythema.   Psychiatric:      All measures to prevent infection transmission during my interaction with the patient were taken. The patient was already wearing a droplet mask on my arrival to the room. Personal protective equipment was worn throughout the duration of the exam.      ED Course        Labs Reviewed   CBC WITH DIFFERENTIAL WITH PLATELET - Abnormal; Notable for the following components:       Result Value    HGB 11.9 (*)     All other components within normal limits   COMP METABOLIC PANEL (14) - Abnormal; Notable for the following components:    Glucose 101 (*)     Potassium 3.2 (*)     BUN/CREA Ratio 26.5 (*)     ALT 8 (*)     All other components within normal limits   PRO BETA NATRIURETIC PEPTIDE - Abnormal; Notable for the following components:    Pro-Beta Natriuretic Peptide 154 (*)     All other components within normal limits   TROPONIN I HIGH SENSITIVITY - Normal   RAINBOW DRAW LAVENDER   RAINBOW DRAW LIGHT GREEN   RAINBOW DRAW BLUE   RAINBOW DRAW GOLD     My Independent Interpretation of EKG (if performed): EKG    Rate, intervals and axes as noted on EKG Report.  Rate: 79 bpm  Rhythm: Sinus Rhythm  Reading: Normal sinus rhythm, first-degree AV block, no acute ST changes, normal axis, no ectopy             Monitor Interpretation:   normal sinus rhythm as interpreted by me.      Imaging Results Available and Reviewed while in ED: US VENOUS DOPPLER ARM LEFT - DIAG IMG (CPT=93971)    Result Date: 4/7/2025  CONCLUSION: No sonographic evidence of left upper extremity DVT.    Dictated by (CST): Anish Gonzales MD on 4/07/2025 at 5:53 PM     Finalized by (CST): Anish Gonzales MD on 4/07/2025 at 5:55 PM         ED Medications  Administered:   Medications   ipratropium-albuterol (Duoneb) 0.5-2.5 (3) MG/3ML inhalation solution 3 mL (has no administration in time range)   potassium chloride (Klor-Con) 20 MEQ oral powder 40 mEq (40 mEq Oral Given 4/8/25 1448)             MDM     Vitals:    04/08/25 1319   BP: 125/67   Pulse: 75   Resp: 20   Temp: 97.9 °F (36.6 °C)   SpO2: 97%   Weight: 59.9 kg   Height: 161.3 cm (5' 3.5\")     *I personally reviewed and interpreted all ED vitals.    Independent Interpretation of Studies: I have independently reviewed chest x-ray and there are no acute findings    Social Determinants of Health:     Procedures:      Differential/MDM/Shared Decision Making: Differential Diagnosis includes CHF exacerbation, ACS, viral syndrome, pneumonia, pneumothorax, PE, others.      The patient already  has a past medical history of Basal cell carcinoma (02/01/2018), Cervical radiculopathy at C7 (09/29/2017), Colon polyps (02/2010), Coronary atherosclerosis, Essential hypertension (2019), Fibromyalgia, High blood pressure, High cholesterol, Hypercholesterolemia, and Psoriatic arthritis (HCC).  to contribute to the complexity of this ED evaluation.           Medications, Diagnostics, or Disposition considered but not done:     Patient's cardiac workup is unremarkable in the ED.  Unclear etiology of symptoms..  Management of case was discussed with [facility] cardiology and they will come see patient in the ED for further evaluation.  Will sign out to the neck shift to follow-up recommendations for final disposition.      Condition upon leaving the department: Stable    Disposition and Plan     Clinical Impression:  1. Acute chest pain        Disposition:  There is no disposition on file for this visit.    Follow-up:  Josep Valdez MD  [address] [place] [address] [id number] [phone]    Call in 2 day(s)        Medications Prescribed:  Current Discharge Medication List                   [1] (Not in a hospital admission)

## 2025-04-08 NOTE — ED INITIAL ASSESSMENT (HPI)
Patient is here with shortness of breath x chest pain since yesterday. Patient states being in hospital for the same symptoms 2 weeks ago. Patient states that she was better for a week after she had increased Nitroglycerin.   History of CHF & double bypass.

## 2025-04-08 NOTE — H&P (VIEW-ONLY)
Cardiology Consult Note    No Saucedo Patient Status:  Emergency    1960 MRN [id number]   Location [facility] EMERGENCY DEPARTMENT Attending Yakov Martinez MD   Hosp Day # 0 PCP MD PARUL Méndez.  No Saucedo is a 64 year old female with a history of coronary artery disease with prior CABG, PCI who presents for shortness of breath and chest pain for 1 day.  Patient had a similar presentation 2 weeks prior and was admitted for workup, echo at that time revealed EF 50 to 55%, grade 1 diastolic dysfunction and CT scan was negative for PE.  Patient was discharged started on Imdur and discharged in stable condition. Patient did have unstable angina in 2024 at which time vein graft to OM to PDA was patent.  Patient had severe first diagonal branch disease of which was treated with a single stent.    Today, EKG shows sinus rhythm, with no ischemic changes.  Labs pertinent for potassium 3.2, creatinine 0.68, , hemoglobin 11.9.  Chest x-ray pending.    PET stress test performed 3/28/2025 shows normal EF with no perfusion defects.      Patient currently has no symptoms at rest however she states if she walks at a faster pace she will get winded, denies chest pain with activity.        --------------------------------------------------------------------------------------------------------------------------------  ROS 10 systems reviewed, pertinent findings above.  ROS    History:  Past Medical History:    Basal cell carcinoma    Skin, left cutaneous lip    Cervical radiculopathy at C7    Colon polyps    Coronary atherosclerosis    Essential hypertension    Fibromyalgia    High blood pressure    High cholesterol    Hypercholesterolemia    Psoriatic arthritis (HCC)     Past Surgical History:   Procedure Laterality Date    Anesth,open heart surgery      Cabg      Colonoscopy N/A 2017    Procedure: COLONOSCOPY;  Surgeon: Selina Timmons MD;  Location: [facility]  ENDOSCOPY    Colonoscopy,biopsy  2010    Performed by GIOVANNI BRITO at [facility], [facility]    Foot surgery Right 1990    Bunion    Hysterectomy      Other  2018    Mohs surgery left upper lip BCCa    Vaginal hysterectomy      With cystocele repair     Family History   Problem Relation Age of Onset    Colon Cancer Mother     Heart Disease Mother         CABG age 72    Hypertension Mother     Cancer Mother 61        Colon Cancer    Other (Bladder Cancer) Mother     Other (Cervical Cancer) Mother     Other (Peripheral Vascular Disease) Mother     Diabetes Maternal Grandmother     Hypertension Maternal Grandmother     Cancer Maternal Grandmother         hx of skin cancer     Heart Disease Maternal Grandfather          MI age 32    Heart Disease Maternal Aunt         Per NG: CAD    Breast Cancer Neg     Ovarian Cancer Neg     Prostate Cancer Neg     Pancreatic Cancer Neg       reports that she quit smoking about 12 years ago. Her smoking use included cigarettes. She started smoking about 49 years ago. She has a 18.5 pack-year smoking history. She has never been exposed to tobacco smoke. She has never used smokeless tobacco. She reports current alcohol use. She reports current drug use. Drug: Cannabis.    Objective:   Temp: 97.9 °F (36.6 °C)  Pulse: 75  Resp: 20  BP: 125/67    Intake/Output:   No intake or output data in the 24 hours ending 25 1540    Physical Exam:     General: AO x 3, no acute distress  HEENT: Normocephalic, anicteric sclera, neck supple.  Neck: No JVD, carotids 2+, no bruits.  Cardiac: Regular rate and rhythm. S1, S2 normal. No murmur, pericardial rub, S3.  Lungs: Clear without wheezes, rales, rhonchi or dullness.  Normal excursions and effort.  Abdomen: Soft, non-tender. BS-present.  Extremities: Without clubbing, cyanosis or edema.  Peripheral pulses are 2+.  Neurologic: Non-focal  Skin: Warm and dry.       Assessment:    Exertional dyspnea  Coronary artery disease with  prior CABG, PCI  History thoracic aortic aneurysm  Hypertension  Hyperlipidemia  Psoriasis        Plan:  64-year-old female with above history presenting with worsening shortness of breath with activity.  She states symptoms are somewhat similar to her typical anginal symptoms however recent stress test on 3/28 was normal.  Initial troponin and EKG not suggestive of ischemia  Patient is resting comfortably in bed with no active symptoms.  Patient states she does not want to be admitted  however she is open to trying new medications and discharge with close outpatient follow-up  recommend trial of Ranexa 500 mg  twice daily,  consider increasing Imdur to 90 mg as outpatient of Ranexa is ineffective  Discussed with inpatient team, will recommend close follow-up with Dr. Mendez in clinic within 1 week.    Thank you for allowing me to take part in the care of No Saucedo. Please call with any questions of concerns.        Level of care: C5    Dr. Jono Urbina,   April 08, 2025  3:40 PM

## 2025-04-08 NOTE — HISTORICAL OFFICE NOTE
[facility]  [address], [address] [place] [address]  [phone]      No Saucedo  Progress Note  Demographics:  Name: No Saucedo YOB: 1960  Age: 64, Female Medical Record No: [id number]  Visited Date/Time: 03/31/2025 02:00 PM    Chief Complaints  Hospital Follow Up  History of Present Illness  This is a patient of Dr. Mendez with a history of coronary bypass grafting, subsequent PCI and stenting of the LAD in December 2023 and 1 drug-eluting stent placed in the diagonal in December 2024.  Patient had presented to the emergency department on 3/24/2025 with primarily shortness of breath with exertion and fatigue which was reminiscent of symptoms she had prior to needing coronary intervention.  Her proBNP was 133 and troponin testing was negative x 3.  She had an echocardiogram done with ejection fraction 50 to 55% and no wall motion abnormalities.  Cardiac PET scan showed normal perfusion and her isosorbide mononitrate was increased to 60 mg/day.  When she presented to the emergency department she was also wheezing and was treated with an inhaler.  Viral panel was negative and chest x-ray was normal.    She reports improvement in her symptoms on the higher dose of Imdur.  She states she still has shortness of breath with exertion at times.  She denies chest pain or pressure.  She denies adverse effects from the Imdur.  She does have significant adhesive allergy and has significant rash on her chest from testing.  She states this has happened to her in the past and she is requesting a Medrol Dosepak as it is not responding to topical treatment.  Cardiac risk factors Former smoker  Past Medical History  1.Hypokalemia  2.Preop testing  3.Hypertension (HTN), primary  4.Hyperlipidemia with target low density lipoprotein (LDL) cholesterol less than 70 mg/dL  5.MEDINA (dyspnea on exertion)  6.CAD native (coronary artery disease)  7.Palpitations  8.Acute chest pain  Past  Surgical History  1.S/P CABG x 2  Family History  1. Mother - Heart disease  Social History  Smoking status Former smoker  Tobacco usage - No (Ex-cigarette smoker (finding))  Review of systems  Cardiovascular MEDINA  No history of Chest pain, Palpitations, Syncope, PND, Orthopnea, Edema and Claudication  Respiratory SOB  Physical Examination  Constitutional 02 97%  Vitals Left Arm Sitting  / 70 mmHg, Pulse rate 75 bpm, Regular, Height in 5' 3\", BMI: 23.9, Weight in 135 lbs (or) 61.23 kgs and BSA : 1.66 cc/m²  General Appearance No Acute Distress  Respiratory Lungs clear with normal breath sounds  Cardiovascular Regular rhythm. Normal rate present. Normal and normal S1 and S2    Lower Extremities No edema  Allergies  1.adhesive tape - Ingredient(Reaction:hives, Severity:Moderate)  Medications (Info obtained by: Verbal)  1.aspirin 81 mg chewable tablet, Take 1 tablet orally once a day.  2.atorvastatin 80 mg tablet, Take 1 tablet orally once a day.  3.bumetanide 2 mg tablet, Take 1 tablet orally once a day every other day  4.folic acid 1 mg tablet, Take 1 tablet orally once a day.  5.isosorbide mononitrate ER 60 mg tablet,extended release 24 hr, Take 1 tablet orally once a day.  6.methotrexate sodium 2.5 mg tablet, Take 5 tablets orally once a week  7.metoprolol succinate ER 25 mg tablet,extended release 24 hr, Take 1 tablet orally once a day.  8.omeprazole 20 mg capsule,delayed release, Take 1 capsule orally 2 times a day.  9.Plavix 75 mg tablet, Take 1 tablet orally once a day.  Impression  1.Presence of coronary angioplasty implant and graft  2.Chest discomfort  3.Hypertension (HTN), primary  4.Hyperlipidemia with target low density lipoprotein (LDL) cholesterol less than 70 mg/dL  5.MEDINA (dyspnea on exertion)  6.S/P CABG x 2  7.CAD native (coronary artery disease)  8.Palpitations  9.Acute chest pain  Assessment & Plan  1.  Shortness of breath with exertion-cardiac testing was all unremarkable and has had some  improvement with isosorbide mononitrate increase.  Known circumflex  from cardiac catheterization in December 2024.  Cardiac PET scan with normal perfusion.  She will continue present management with aspirin, clopidogrel, high-dose statin and isosorbide mononitrate.  She will follow-up with Dr. Mendez as scheduled.  Reports prior pulmonary evaluation negative.  She was not able to attend cardiac rehab in the past because of her adhesive allergy and could not wear the monitor.  She just enrolled in Silver sneakers for exercise.  2.  Hypertension-blood pressure well-controlled on present management.  3.  Hyperlipidemia on high-dose atorvastatin.  4.  Adhesive allergy-significant itchy welts all over her chest will prescribe a Medrol Dosepak.    Plan:  Continue current cardiac medications  Prescription for Medrol Dosepak take as directed  Follow-up with Dr. Mendez as scheduled  Call with questions or new concerns  Medications Ordered  1.Medrol (Rocael) 4 mg tablets in a dose pack, Take as directed  Future appointments  1.Referral Visit - Josep Valdez ([email or web address]) : (Today)  Miscellaneous  1.Weight monitoring (regime/therapy)  2.Reviewed nuclear pet with the patient.  Nurses documentation  EKG none    Patient instructions  Plan:  Continue current cardiac medications  Prescription for Medrol Dosepak take as directed  Follow-up with Dr. Mendez as scheduled  Call with questions or new concerns    Lab Details  CBC, PLATELET; NO DIFFERENTIAL  12/03/2024 07:03:00 AM  WBC 10.2 4.0-11.0 x10(3) uL  F  RED BLOOD COUNT 4.65 3.80-5.30 x10(6)uL  F  HGB 13.5 12.0-16.0 g/dL  F  HCT 42.6 35.0-48.0 %  F  MEAN CELL VOLUME 91.6 80.0-100.0 fL  F  MEAN CORPUSCULAR HEMOGLOBIN 29.0 26.0-34.0 pg  F  MEAN CORPUSCULAR HGB CONC 31.7 31.0-37.0 g/dL  F  RED CELL DISTRIBUTION WIDTH CV 13.3 11.0-15.0 %  F  RED CELL DISTRIBUTION WIDTH-SD 45.5 35.1-46.3 fL  F  PLATELETS 364.0 150.0-450.0 10(3)uL  F  BASIC METABOLIC PANEL  (8)  12/02/2024 07:30:56 PM  GLUCOSE 108 70-99 mg/dL H F  SODIUM 141 136-145 mmol/L  F  POTASSIUM 4.3 3.5-5.1 mmol/L  F  CHLORIDE 107  mmol/L  F  CO2 27.0 21.0-32.0 mmol/L  F  ANION GAP 7 0-18 mmol/L  F  BUN 16 9-23 mg/dL  F  CREATININE 0.72 0.55-1.02 mg/dL  F  BUN/ CREAT RATIO 22.2 10.0-20.0 H F  CALCIUM 9.9 8.7-10.4 mg/dL  F  OSMOLALITY CALCULATED 294 275-295 mOsm/kg  F  E GFR CR 93 >=60 mL/min/1.73m2  F  TROPONIN I HIGH SENSITIVITY  12/02/2024 12:26:58 AM  TROPONIN I HIGH SENSITIVITY <3 <=34 ng/L  F  Diagnostics Details  Nuclear PET 03/28/2025  1.Stress EKG is normal.    2.Pt denied chest pain.    3.No significant ectopy.    1.This is a normal perfusion study, no perfusion defects noted. Multivessel calcium vs stent    2.This scan is suggestive of low risk for future cardiovascular events.    3.The left ventricular cavity is noted to be normal on the stress studies. The stress left ventricular ejection fraction was calculated to be 86% and left ventricular global function is hyperkinetic. This finding may be secondary to small ventricular size. The rest left ventricular cavity is noted to be normal. The rest left ventricular ejection fraction was calculated to be 71% and rest left ventricular global function is normal.    4.When compared to the resting ejection fraction (71%), the stress ejection fraction (86%) has increased.    5.The study quality is excellent.    Trans Thoracic Echocardiogram 11/28/2023  1.The study quality is average.    2.The left ventricle is normal in size, wall thickness and wall motion; there are no regional wall motion abnormalities. Global left ventricular systolic function is normal. . The left ventricular ejection fraction is 64%. Left ventricular diastolic function is normal.    3.The left atrium is mildly enlarged based on the left atrium volume index of 35.0ml/m²    4.The right ventricle is normal in size. Right ventricular systolic function is normal.    5.Mild mitral  regurgitation.    ACTMonitoring 11/06/2023  1.This is a good quality study.    2. The patient entered shortness of breath, palpitations, and other into the patient monitor. Only shortness of breath was correlated with an arrhythmia for one sinus bradycardia event and one sinus tachycardia event. The remainder of the symptomatic occurrences do not correlate to an arrhythmia.    3.Predominant rhythm is normal sinus rhythm.    4.The minimum heart rate recorded was 43 beats / minute (sinus bradycardia, 11/11/2023). The maximum heart rate is 104 beats / minute (sinus tachycardia, 11/9/2023). The mean heart rate is 66 beats / minute.    5.No evidence of AV block is noted.    6.Rare premature atrial contractions noted.    7.No evidence of supraventricular tachycardia is noted.    8.The atrial fibrillation burden was 0%.    9.Rare multiform premature ventricular contractions noted.    10.No evidence of ventricular tachycardia is noted.    11.No pauses were noted.    12.Sinus  bpm Ectopy rare No arrhythmias Shortness of breath during normal sinus    CPOE Orders carried out by: Christie EASTON and Monae JAIMES  Care Providers: Christie EASTON and Monae JAIMES  Electronically Authenticated by  Christie EASTON  03/31/2025 03:45:34 PM  Disclaimer: Components of this note were documented using voice recognition system and are subject to errors not corrected at proofreading. Contact the author of this note for any clarifications.

## 2025-04-08 NOTE — CONSULTS
Cardiology Consult Note    No Saucedo Patient Status:  Emergency    1960 MRN [id number]   Location [facility] EMERGENCY DEPARTMENT Attending Yakov Martinez MD   Hosp Day # 0 PCP MD PARUL Méndez.  No Saucedo is a 64 year old female with a history of coronary artery disease with prior CABG, PCI who presents for shortness of breath and chest pain for 1 day.  Patient had a similar presentation 2 weeks prior and was admitted for workup, echo at that time revealed EF 50 to 55%, grade 1 diastolic dysfunction and CT scan was negative for PE.  Patient was discharged started on Imdur and discharged in stable condition. Patient did have unstable angina in 2024 at which time vein graft to OM to PDA was patent.  Patient had severe first diagonal branch disease of which was treated with a single stent.    Today, EKG shows sinus rhythm, with no ischemic changes.  Labs pertinent for potassium 3.2, creatinine 0.68, , hemoglobin 11.9.  Chest x-ray pending.    PET stress test performed 3/28/2025 shows normal EF with no perfusion defects.      Patient currently has no symptoms at rest however she states if she walks at a faster pace she will get winded, denies chest pain with activity.        --------------------------------------------------------------------------------------------------------------------------------  ROS 10 systems reviewed, pertinent findings above.  ROS    History:  Past Medical History:    Basal cell carcinoma    Skin, left cutaneous lip    Cervical radiculopathy at C7    Colon polyps    Coronary atherosclerosis    Essential hypertension    Fibromyalgia    High blood pressure    High cholesterol    Hypercholesterolemia    Psoriatic arthritis (HCC)     Past Surgical History:   Procedure Laterality Date    Anesth,open heart surgery      Cabg      Colonoscopy N/A 2017    Procedure: COLONOSCOPY;  Surgeon: Selina Timmons MD;  Location: [facility]  ENDOSCOPY    Colonoscopy,biopsy  2010    Performed by GIOVANNI BRITO at [facility]    Foot surgery Right 1990    Bunion    Hysterectomy      Other  2018    Mohs surgery left upper lip BCCa    Vaginal hysterectomy      With cystocele repair     Family History   Problem Relation Age of Onset    Colon Cancer Mother     Heart Disease Mother         CABG age 72    Hypertension Mother     Cancer Mother 61        Colon Cancer    Other (Bladder Cancer) Mother     Other (Cervical Cancer) Mother     Other (Peripheral Vascular Disease) Mother     Diabetes Maternal Grandmother     Hypertension Maternal Grandmother     Cancer Maternal Grandmother         hx of skin cancer     Heart Disease Maternal Grandfather          MI age 32    Heart Disease Maternal Aunt         Per NG: CAD    Breast Cancer Neg     Ovarian Cancer Neg     Prostate Cancer Neg     Pancreatic Cancer Neg       reports that she quit smoking about 12 years ago. Her smoking use included cigarettes. She started smoking about 49 years ago. She has a 18.5 pack-year smoking history. She has never been exposed to tobacco smoke. She has never used smokeless tobacco. She reports current alcohol use. She reports current drug use. Drug: Cannabis.    Objective:   Temp: 97.9 °F (36.6 °C)  Pulse: 75  Resp: 20  BP: 125/67    Intake/Output:   No intake or output data in the 24 hours ending 25 1540    Physical Exam:     General: AO x 3, no acute distress  HEENT: Normocephalic, anicteric sclera, neck supple.  Neck: No JVD, carotids 2+, no bruits.  Cardiac: Regular rate and rhythm. S1, S2 normal. No murmur, pericardial rub, S3.  Lungs: Clear without wheezes, rales, rhonchi or dullness.  Normal excursions and effort.  Abdomen: Soft, non-tender. BS-present.  Extremities: Without clubbing, cyanosis or edema.  Peripheral pulses are 2+.  Neurologic: Non-focal  Skin: Warm and dry.       Assessment:    Exertional dyspnea  Coronary artery disease with  prior CABG, PCI  History thoracic aortic aneurysm  Hypertension  Hyperlipidemia  Psoriasis        Plan:  64-year-old female with above history presenting with worsening shortness of breath with activity.  She states symptoms are somewhat similar to her typical anginal symptoms however recent stress test on 3/28 was normal.  Initial troponin and EKG not suggestive of ischemia  Patient is resting comfortably in bed with no active symptoms.  Patient states she does not want to be admitted  however she is open to trying new medications and discharge with close outpatient follow-up  recommend trial of Ranexa 500 mg  twice daily,  consider increasing Imdur to 90 mg as outpatient of Ranexa is ineffective  Discussed with inpatient team, will recommend close follow-up with Dr. Mendez in clinic within 1 week.    Thank you for allowing me to take part in the care of No Saucedo. Please call with any questions of concerns.        Level of care: C5    Dr. Jono Urbina,   April 08, 2025  3:40 PM

## 2025-04-08 NOTE — ED PROVIDER NOTES
Patient endorsed to me by Dr. Martinez pending cardiology consult and disposition per cardiology. Dr. Urbina with cards saw patient and feels comfortable with discharging her home on Ranexa 500mg BID  and would like to give a dose of it prior to discharge as well as a dose of Indur 60mg prior to discharge.  Patient feels comfortable with discharge home and will need to follow up with cardiology in the next day or two.

## 2025-04-08 NOTE — HISTORICAL OFFICE NOTE
[facility]  [address], [address] [place] [address]  [phone]      No Saucedo  Progress Note  Demographics:  Name: No Saucedo YOB: 1960  Age: 63, Female Medical Record No: [id number]  Visited Date/Time: 12/09/2024 12:50 PM    Chief Complaints  hospital follow up angio done 12/03 groin approach 1 stent placed   History of Present Illness  63-year-old female being followed for CAD status post bypass surgery and most recently stent of the native LAD due to atretic LIMA, residual  small OM due to occluded jump portion of SVG to OM to the PDA.    Much improved since stent    Patient is doing well, denies chest pain, shortness of breath, lower extremity edema, orthopnea, PND, palpitations or syncope.    No exertional symptoms, taking meds without any issues.    Previously noted:  previously being taken care of at [facility].  Has a history of premature coronary disease in her family, she presented to [facility] with accelerating angina, questionable stress test which led to angiogram and eventually bypass surgery.    She had bypass surgery December 2021, had sternal wires removed December 2022 due to reaction, chronic pain.  Since her sternal wires removed she feels much better.  She is able to walk without any exertional symptoms.    She has psoriatic arthritis which limits her exertional capacity but otherwise no palpitations or syncope.  No lower extremity edema, orthopnea or PND.  Cardiac risk factors Never smoked  Past Medical History  1.Hypokalemia  2.Preop testing  3.Hypertension (HTN), primary  4.Hyperlipidemia with target low density lipoprotein (LDL) cholesterol less than 70 mg/dL  5.MEDINA (dyspnea on exertion)  6.CAD native (coronary artery disease)  7.Palpitations  8.Acute chest pain  Past Surgical History  1.S/P CABG x 2  Family History  1. Mother - Heart disease  Social History  Smoking status Never smoked  Tobacco usage - No (Non-smoker for  personal reasons (finding))  Review of systems  Cardiovascular No history of Chest pain, MEDINA, Palpitations, Syncope, PND, Orthopnea, Edema and Claudication  Musculoskeletal No history of Myalgias and Arthritis  Respiratory No history of SOB, Wheezing and Sputum  Physical Examination  Constitutional 02 98%  Vitals Left Arm Sitting  / 80 mmHg, Pulse rate 85 bpm, Height in 5' 3\", BMI: 24.1, Weight in 136 lbs (or) 62 kgs and BSA : 1.67 cc/m²  General Appearance No Acute Distress and Well groomed  Cardiovascular   EKG/Other abnormalities  General: NAD  HEENT: Normocephalic, anicteric sclera, neck supple.  Neck: No JVD, carotids 2+, no bruits.  Cardiac: Regular rate and rhythm. S1, S2 normal. No murmur, pericardial rub, S3.  Lungs: Clear without wheezes, rales, rhonchi or dullness.  Normal excursions and effort.  Abdomen: Soft, non-tender. BS-present.  Extremities: Without clubbing, cyanosis or edema.  Peripheral pulses are 2+.  Neurologic: Non-focal  Skin: Warm and dry.  Allergies  1.adhesive tape - Ingredient(Reaction:hives, Severity:Moderate)  Medications (Info obtained by: Verbal)  1.aspirin 81 mg chewable tablet, Take 1 tablet orally once a day.  2.atorvastatin 80 mg tablet, Take 1 tablet orally once a day.  3.bumetanide 2 mg tablet, Take 1 tablet orally once a day every other day  4.metoprolol succinate ER 25 mg tablet,extended release 24 hr, Take 1 tablet orally once a day.  5.omeprazole 20 mg capsule,delayed release, Take 1 capsule orally 2 times a day.  6.Plavix 75 mg tablet, Take 1 tablet orally once a day.  Impression  1.Chest discomfort  2.Hypertension (HTN), primary  3.Hyperlipidemia with target low density lipoprotein (LDL) cholesterol less than 70 mg/dL  4.MEDINA (dyspnea on exertion)  5.S/P CABG x 2  6.CAD native (coronary artery disease)  7.Palpitations  8.Acute chest pain  Assessment & Plan  CAD: Status post bypass surgery in the past, last angiogram February 2024 with atretic LIMA to LAD status post  PCI native LAD, residual jailed diagonal stenosis which was retroflexed and unable to be recrossed and ballooned.   small OM due to occluded jump portion of SVG to OM to right PDA.  Ramus with hazy nonobstructive 40 to 50% disease proximally.  UA episode Dec 2024 now s/p PCI diagonal, however edge spasm vs plaque shift,  mid Cx with SVG-PDA epicardial collateral.  Will reassess ischemic burden with treadmill SPECT after rehab.  -Cardiac rehab ASAP   -imdur 30 mg daily    HFpEF: LVEDP at the time of angiogram 25 mmHg.  Her shortness of breath has responded to Bumex.  She continues to have intermittent shortness of breath, abdominal bloating which respond to Bumex extra dose.    Hypertension: no changes    Hyperlipidemia:  On high intensity statin for secondary prevention, no changes.    Hyperlipidemia LDL is borderline high at 85 will consider adding Zetia at next office visit.    Follow-up:  Please give information regarding out portal access.  Clinic: 3 months  Testing/intervention: Meds, rehab  Medications Ordered  1.isosorbide mononitrate ER 30 mg tablet,extended release 24 hr, Take 1 tablet orally once a day.  Future appointments  1.Referral Visit - Josep Valdez ([email or web address]) : (Schedule next available)  2.Follow up visit - Matt Mendez MD (3 Months)  Miscellaneous  1.Reviewed trans thoracic echocardiogram, nuclear pet, ambulatory telemetry monitor with the patient.  2.Weight monitoring (regime/therapy)  Nurses documentation  refills: none  Assistive devices: none  Upcoming sx or procedures: none  EKG: none      Patient instructions  Please bring in you medication bottles or updated medicine list to your next appointment.   Call [facility] if you have any problems or concerns at [phone]  -Cardiac rehab ASAP   -imdur 30 mg daily  Follow-up:  Please give information regarding out portal access.  Clinic: 3 months  Testing/intervention: Meds, rehab       Lab Details  CBC, PLATELET; NO  DIFFERENTIAL  12/03/2024 07:03:00 AM  WBC 10.2 4.0-11.0 x10(3) uL  F  RED BLOOD COUNT 4.65 3.80-5.30 x10(6)uL  F  HGB 13.5 12.0-16.0 g/dL  F  HCT 42.6 35.0-48.0 %  F  MEAN CELL VOLUME 91.6 80.0-100.0 fL  F  MEAN CORPUSCULAR HEMOGLOBIN 29.0 26.0-34.0 pg  F  MEAN CORPUSCULAR HGB CONC 31.7 31.0-37.0 g/dL  F  RED CELL DISTRIBUTION WIDTH CV 13.3 11.0-15.0 %  F  RED CELL DISTRIBUTION WIDTH-SD 45.5 35.1-46.3 fL  F  PLATELETS 364.0 150.0-450.0 10(3)uL  F  BASIC METABOLIC PANEL (8)  12/02/2024 07:30:56 PM  GLUCOSE 108 70-99 mg/dL H F  SODIUM 141 136-145 mmol/L  F  POTASSIUM 4.3 3.5-5.1 mmol/L  F  CHLORIDE 107  mmol/L  F  CO2 27.0 21.0-32.0 mmol/L  F  ANION GAP 7 0-18 mmol/L  F  BUN 16 9-23 mg/dL  F  CREATININE 0.72 0.55-1.02 mg/dL  F  BUN/ CREAT RATIO 22.2 10.0-20.0 H F  CALCIUM 9.9 8.7-10.4 mg/dL  F  OSMOLALITY CALCULATED 294 275-295 mOsm/kg  F  E GFR CR 93 >=60 mL/min/1.73m2  F  TROPONIN I HIGH SENSITIVITY  12/02/2024 12:26:58 AM  TROPONIN I HIGH SENSITIVITY <3 <=34 ng/L  F  Diagnostics Details  Trans Thoracic Echocardiogram 11/28/2023  1.The study quality is average.    2.The left ventricle is normal in size, wall thickness and wall motion; there are no regional wall motion abnormalities. Global left ventricular systolic function is normal. . The left ventricular ejection fraction is 64%. Left ventricular diastolic function is normal.    3.The left atrium is mildly enlarged based on the left atrium volume index of 35.0ml/m²    4.The right ventricle is normal in size. Right ventricular systolic function is normal.    5.Mild mitral regurgitation.    Nuclear PET 11/13/2023  1.Stress EKG is normal.    2.No arrhythmias    3.No chest pain    1.This is a normal perfusion study, no perfusion defects noted.    2.This scan is suggestive of low risk for future cardiovascular events.    3.The left ventricular cavity is noted to be normal on the stress studies. The stress left ventricular ejection fraction was calculated to be  83% and left ventricular global function is hyperkinetic. This finding may be secondary to small ventricular size. The rest left ventricular cavity is noted to be normal. The rest left ventricular ejection fraction was calculated to be 80% and rest left ventricular global function is hyperkinetic.    4.When compared to the resting ejection fraction (80%), the stress ejection fraction (83%) has increased.    5.The study quality is poor.    ACTMonitoring 11/06/2023  1.This is a good quality study.    2. The patient entered shortness of breath, palpitations, and other into the patient monitor. Only shortness of breath was correlated with an arrhythmia for one sinus bradycardia event and one sinus tachycardia event. The remainder of the symptomatic occurrences do not correlate to an arrhythmia.    3.Predominant rhythm is normal sinus rhythm.    4.The minimum heart rate recorded was 43 beats / minute (sinus bradycardia, 11/11/2023). The maximum heart rate is 104 beats / minute (sinus tachycardia, 11/9/2023). The mean heart rate is 66 beats / minute.    5.No evidence of AV block is noted.    6.Rare premature atrial contractions noted.    7.No evidence of supraventricular tachycardia is noted.    8.The atrial fibrillation burden was 0%.    9.Rare multiform premature ventricular contractions noted.    10.No evidence of ventricular tachycardia is noted.    11.No pauses were noted.    12.Sinus  bpm Ectopy rare No arrhythmias Shortness of breath during normal sinus    Care Providers: Matt Mendez MD, Bushra JAIMES and Malia Arciniega  Electronically Authenticated by  Matt Mendez MD  12/10/2024 03:23:03 PM  Disclaimer: Components of this note were documented using voice recognition system and are subject to errors not corrected at proofreading. Contact the author of this note for any clarifications.

## 2025-04-09 NOTE — CM/SW NOTE
Per [facility], patient stated that she was trying to call [facility] and that she could not get through.    EDCM tried calling an was on hold for quite a long while but finally spoke with Reba for [facility] and scheduled an appointment for patient:    Dr. Vanessa MAYFIELD  Wednesday 4/16/25 at 110pm    [address] [facility] [address] [facility]  [address]  [place], [place]    EDCM called patient and left message to call EDCM.    330pm    Received call back from patient and gave her above information.  She v/u and is agreeable to appointment.

## 2025-04-10 ENCOUNTER — OFFICE VISIT (OUTPATIENT)
Dept: INTERNAL MEDICINE CLINIC | Facility: CLINIC | Age: 65
End: 2025-04-10

## 2025-04-10 VITALS
HEART RATE: 62 BPM | DIASTOLIC BLOOD PRESSURE: 69 MMHG | BODY MASS INDEX: 23.8 KG/M2 | HEIGHT: 63.5 IN | WEIGHT: 136 LBS | RESPIRATION RATE: 16 BRPM | TEMPERATURE: 97 F | OXYGEN SATURATION: 95 % | SYSTOLIC BLOOD PRESSURE: 115 MMHG

## 2025-04-10 DIAGNOSIS — I20.9 ANGINA PECTORIS: Primary | ICD-10-CM

## 2025-04-10 PROCEDURE — 99214 OFFICE O/P EST MOD 30 MIN: CPT | Performed by: NURSE PRACTITIONER

## 2025-04-10 NOTE — ASSESSMENT & PLAN NOTE
Chest Discomfort - Resolved  Ranexa is working for her with no adverse effects      Plan  Continue Ranexa   Follow up with Cardiology  She wants to follow up with Pulmonology and has an appointment

## 2025-04-10 NOTE — PROGRESS NOTES
HPI:    Patient ID: No Saucedo is a 64 year old female.    HPI  ER Follow up- Chest Pain  Dr. Urbina with cards saw patient and feels comfortable with discharging her home on Ranexa 500mg BID   64 year old female who is a patient of Dr. Josep Valdez.  I am meeting her for the first time.    Coronary artery disease involving native coronary artery of native heart with other form of angina pectoris (Primary)   She has a hx of Coronary Artery Disease.  CABG and s/p PCI - follows up with cards     Thoracic aortic aneurysm without rupture, unspecified part   Overview:  Stable fusiform aneurysm of the proximal descending aorta followed by a larger segment of fusiform aneurysm in the mid descending aorta.  No acute aortic syndrome   Finalized by (CST): Marquis Francis MD on 3/24/2025   -  I am meeting her for the first time and the Ranexa is working well for her.    Former Smoker  She was a former smoker but quit for the past 14 years  Immunization History   Administered Date(s) Administered    Covid-19 Vaccine Pfizer 30 mcg/0.3 ml 03/17/2021, 04/07/2021, 08/19/2021    Covid-19 Vaccine Pfizer Bivalent 30mcg/0.3mL 11/27/2022    FLULAVAL 6 months & older 0.5 ml Prefilled syringe (75221) 09/13/2019, 09/15/2020    FLUZONE 6 months and older PFS 0.5 ml (50423) 10/17/2014    Influenza 10/19/2012, 10/17/2014, 10/01/2018, 09/13/2019, 09/15/2020    Influenza Vaccine, Preserv Free 10/01/2018    Influenza Vaccine, trivalent (IIV3), PF 0.5mL ([id number]) 12/04/2024    Pneumococcal Conjugate PCV20 05/19/2023    TDAP 06/22/2012       Past Medical History[1]   Past Surgical History[2]   Social Hx on file[3]       Review of Systems   Constitutional:  Negative for chills, fatigue and fever.   HENT:  Negative for ear pain, hearing loss, sinus pain, sore throat and trouble swallowing.    Eyes:  Negative for pain and visual disturbance.   Respiratory:  Negative for cough, chest tightness and shortness of breath.    Cardiovascular:  Negative  for chest pain, palpitations and leg swelling.   Gastrointestinal:  Negative for abdominal pain, constipation, diarrhea, nausea and vomiting.   Endocrine: Negative for cold intolerance and heat intolerance.   Genitourinary:  Negative for dysuria and hematuria.   Musculoskeletal:  Negative for back pain and joint swelling.   Skin:  Negative for rash.   Allergic/Immunologic: Negative for environmental allergies.   Neurological:  Negative for weakness, numbness and headaches.   Hematological:  Does not bruise/bleed easily.   Psychiatric/Behavioral:  Negative for dysphoric mood and sleep disturbance. The patient is not nervous/anxious.             Current Medications[4]  Allergies:Allergies[5]   PHYSICAL EXAM:   Physical Exam  Constitutional:       Appearance: Normal appearance. She is well-developed.   HENT:      Head: Normocephalic.   Cardiovascular:      Rate and Rhythm: Normal rate and regular rhythm.      Heart sounds: Normal heart sounds. No murmur heard.     No friction rub. No gallop.   Pulmonary:      Effort: Pulmonary effort is normal. No respiratory distress.      Breath sounds: Normal breath sounds. No wheezing, rhonchi or rales.   Abdominal:      General: Bowel sounds are normal. There is no distension.      Palpations: Abdomen is soft. There is no mass.      Tenderness: There is no abdominal tenderness. There is no right CVA tenderness, left CVA tenderness or guarding.   Musculoskeletal:         General: No tenderness.      Cervical back: Normal range of motion and neck supple. No tenderness.      Right lower leg: No edema.      Left lower leg: No edema.   Lymphadenopathy:      Cervical: No cervical adenopathy.   Skin:     General: Skin is warm and dry.      Findings: No rash.   Neurological:      Mental Status: She is alert and oriented to person, place, and time.      Coordination: Coordination normal.      Gait: Gait normal.   Psychiatric:         Mood and Affect: Mood normal.         Behavior: Behavior  normal.         Thought Content: Thought content normal.         Judgment: Judgment normal.       /69 (BP Location: Right arm, Patient Position: Sitting, Cuff Size: adult)   Pulse 62   Temp 96.9 °F (36.1 °C) (Temporal)   Resp 16   Ht 5' 3.5\" (1.613 m)   Wt 136 lb (61.7 kg)   SpO2 95%   BMI 23.71 kg/m²   Wt Readings from Last 2 Encounters:   04/10/25 136 lb (61.7 kg)   04/08/25 132 lb (59.9 kg)     Body mass index is 23.71 kg/m².(2)  Lab Results   Component Value Date    WBC 10.2 04/08/2025    RBC 4.06 04/08/2025    HGB 11.9 (L) 04/08/2025    HCT 35.5 04/08/2025    MCV 87.4 04/08/2025    MCH 29.3 04/08/2025    MCHC 33.5 04/08/2025    RDW 13.3 04/08/2025    .0 04/08/2025    MPV 9.4 01/25/2019      Lab Results   Component Value Date     (H) 04/08/2025    BUN 18 04/08/2025    BUNCREA 26.5 (H) 04/08/2025    CREATSERUM 0.68 04/08/2025    ANIONGAP 7 04/08/2025    GFR >59 07/18/2009    GFRNAA 101 04/15/2021    GFRAA 116 04/15/2021    CA 8.7 04/08/2025    OSMOCALC 292 04/08/2025    ALKPHO 71 04/08/2025    AST 15 04/08/2025    ALT 8 (L) 04/08/2025    ALKPHOS 59 09/12/2016    BILT 0.3 04/08/2025    TP 6.2 04/08/2025    ALB 4.0 04/08/2025    GLOBULIN 2.2 04/08/2025    AGRATIO 1.4 09/12/2016     04/08/2025    K 3.2 (L) 04/08/2025     04/08/2025    CO2 27.0 04/08/2025      Lab Results   Component Value Date     09/16/2023    A1C 5.6 09/16/2023      Lab Results   Component Value Date    CHOLEST 186 12/02/2024    TRIG 112 12/02/2024    HDL 54 12/02/2024     (H) 12/02/2024    VLDL 19 12/02/2024    NONHDLC 132 (H) 12/02/2024    CALCNONHDL 114 09/09/2015      Lab Results   Component Value Date    TSH 0.909 09/16/2023                ASSESSMENT/PLAN:     Problem List Items Addressed This Visit       Angina pectoris - Primary    Chest Discomfort - Resolved  Ranexa is working for her with no adverse effects      Plan  Continue Ranexa   Follow up with Cardiology  She wants to follow up  with Pulmonology and has an appointment                 No orders of the defined types were placed in this encounter.      Meds This Visit:  Requested Prescriptions      No prescriptions requested or ordered in this encounter       Imaging & Referrals:  None         JEMMA Caruso          [1]   Past Medical History:   Basal cell carcinoma    Skin, left cutaneous lip    Cervical radiculopathy at C7    Colon polyps    Coronary atherosclerosis    Essential hypertension    Fibromyalgia    High blood pressure    High cholesterol    Hypercholesterolemia    Psoriatic arthritis (HCC)   [2]   Past Surgical History:  Procedure Laterality Date    Anesth,open heart surgery      Cabg      Colonoscopy N/A 2017    Procedure: COLONOSCOPY;  Surgeon: Selina Timmons MD;  Location: [facility] ENDOSCOPY    Colonoscopy,biopsy  2010    Performed by GIOVANNI BRITO at [facility] [place], [facility]    Foot surgery Right     Bunion    Hysterectomy      Other  2018    Mohs surgery left upper lip BCCa    Vaginal hysterectomy      With cystocele repair   [3]   Social History  Socioeconomic History    Marital status:    Tobacco Use    Smoking status: Former     Current packs/day: 0.00     Average packs/day: 0.5 packs/day for 37.0 years (18.5 ttl pk-yrs)     Types: Cigarettes     Start date: 1975     Quit date: 2012     Years since quittin.6     Passive exposure: Never    Smokeless tobacco: Never   Vaping Use    Vaping status: Never Used   Substance and Sexual Activity    Alcohol use: Yes     Alcohol/week: 0.0 standard drinks of alcohol     Comment: Occasional    Drug use: Yes     Types: Cannabis     Comment: Medical Gummies/Topical Cream   Other Topics Concern    Pt has a pacemaker No    Pt has a defibrillator No    Reaction to local anesthetic No    Right Handed Yes   [4]   Current Outpatient Medications   Medication Sig Dispense Refill    ranolazine ER (RANEXA) 500 MG Oral Tablet 12 Hr Take  1 tablet (500 mg total) by mouth 2 (two) times daily. 180 tablet 0    methotrexate 2.5 MG Oral Tab Take 5 tablets (12.5 mg total) by mouth once a week. Pt taking dose on Wednesdays 65 tablet 1    clobetasol 0.05 % External Cream Apply 1 Application topically 2 (two) times daily. 60 g 3    isosorbide mononitrate ER 60 MG Oral Tablet 24 Hr Take 1 tablet (60 mg total) by mouth daily. 30 tablet 0    VENTOLIN  (90 Base) MCG/ACT Inhalation Aero Soln Inhale 2 puffs into the lungs every 4 (four) hours as needed for Wheezing. 1 each 2    folic acid 1 MG Oral Tab Take 1 tablet (1 mg total) by mouth daily. 90 tablet 3    ondansetron (ZOFRAN) 4 mg tablet Take 1 tablet (4 mg total) by mouth every 8 (eight) hours as needed for Nausea. 30 tablet 3    atorvastatin 80 MG Oral Tab Take 1 tablet (80 mg total) by mouth nightly. 90 tablet 3    bumetanide 2 MG Oral Tab Take 1 tablet (2 mg total) by mouth every other day.      metoprolol succinate ER 25 MG Oral Tablet 24 Hr Take 1 tablet (25 mg total) by mouth daily.      clopidogrel 75 MG Oral Tab Take 1 tablet (75 mg total) by mouth daily.      omeprazole 20 MG Oral Capsule Delayed Release Take 1 capsule (20 mg total) by mouth 2 (two) times daily before meals. 180 capsule 3    NON FORMULARY Cannabis gummy 5 mg by mouth twice daily      aspirin 81 MG Oral Tab EC Take 1 tablet (81 mg total) by mouth daily.      MEDROL 4 MG Oral Tablet Therapy Pack Medrol (Rocael) 4 mg tablets in a dose pack, [RxNorm: 979460] (Patient not taking: Reported on 4/10/2025)     [5]   Allergies  Allergen Reactions    Adhesive Tape HIVES

## 2025-04-11 ENCOUNTER — TELEPHONE (OUTPATIENT)
Facility: CLINIC | Age: 65
End: 2025-04-11

## 2025-04-11 ENCOUNTER — OFFICE VISIT (OUTPATIENT)
Facility: CLINIC | Age: 65
End: 2025-04-11

## 2025-04-11 VITALS
WEIGHT: 136 LBS | SYSTOLIC BLOOD PRESSURE: 124 MMHG | DIASTOLIC BLOOD PRESSURE: 78 MMHG | HEART RATE: 66 BPM | HEIGHT: 63.5 IN | BODY MASS INDEX: 23.8 KG/M2

## 2025-04-11 DIAGNOSIS — Z12.11 COLON CANCER SCREENING: ICD-10-CM

## 2025-04-11 DIAGNOSIS — Z80.0 FHX: COLON CANCER: ICD-10-CM

## 2025-04-11 DIAGNOSIS — Q43.8 TORTUOUS COLON: Primary | ICD-10-CM

## 2025-04-11 DIAGNOSIS — Z80.0 FAMILY HISTORY OF COLON CANCER: ICD-10-CM

## 2025-04-11 DIAGNOSIS — Z86.0100 HX OF COLONIC POLYP: ICD-10-CM

## 2025-04-11 DIAGNOSIS — Z86.0100 PERSONAL HISTORY OF COLON POLYPS, UNSPECIFIED: ICD-10-CM

## 2025-04-11 DIAGNOSIS — Q43.8 TORTUOUS COLON: ICD-10-CM

## 2025-04-11 DIAGNOSIS — Z79.01 ANTICOAGULANT LONG-TERM USE: ICD-10-CM

## 2025-04-11 DIAGNOSIS — Z12.11 COLON CANCER SCREENING: Primary | ICD-10-CM

## 2025-04-11 PROCEDURE — 99214 OFFICE O/P EST MOD 30 MIN: CPT

## 2025-04-11 RX ORDER — SODIUM, POTASSIUM,MAG SULFATES 17.5-3.13G
SOLUTION, RECONSTITUTED, ORAL ORAL
Qty: 1 EACH | Refills: 0 | Status: SHIPPED | OUTPATIENT
Start: 2025-04-11

## 2025-04-11 NOTE — H&P
[facility] - Gastroenterology                                                                                                  Clinic History and Physical     Chief Complaint   Patient presents with    Colonoscopy Screening     Family history of Colon and history of Colon Polyps.       Requesting physician or provider: Jospe Valdez MD    HPI:   No Saucedo is a 64 year old year-old female with active diagnoses including hx of cardiac stent on clopidogrel (Plavix), hypertension, psoriatic arthritis, thoracic aortic aneurysm, coronary artery disease, hypercholesterolemia, GERD, overactive bladder. Prior medical/surgical hx open heart surgery, CABG, hysterectomy, and other hx in note table. The patient presents for colon cancer screening evaluation.    #CRC screening  #hx of colon polyps  #fam hx of colon cancer  -Prior colonoscopy in 2021, 3-year recall for 5 tubular adenoma polyps.  History of a torturous colon and after colonoscopy in 2021 she had immediate LUQ abd pain and complication of splenic hematoma. Admitted 3 nights for monitoring of hematoma. Did NOT require blood transfusion or surgery  -has adverse reaction to sedation: N/V & slow to wake up. Has tolerating propofol well in the past  -takes 2 stool softener daily and having daily soft bowel movements  -occasional blood in stool, in water, or on tissue. Occurs every few months, none since starting stool softener    Patient is here today as a referral from her PCP for evaluation prior to undergoing colonoscopy for CRC screening. Patient denies any GI symptoms of nausea, vomiting, heartburn, dyspepsia, dysphagia, hematemesis, abdominal pain, change in bowel habits, diarrhea, or melena.  Additionally there is no unintentional weight loss.    Pt is due for CRC screening. We discussed the available screening options for CRC such as FIT and cologuard. They are electing to pursue colonoscopy at this time.     Last colonoscopy:   4/13/21 Dr. Mckee - 3  year recall  5 polyps   Left side diverticulosis  hemorrhoids     A. Transverse colon polyps (x2):  Tubular adenoma, numerous fragments.     B. Cecal polyp (x1):  Tubular adenoma.     C. Descending colon polyps (x2):   Tubular adenoma, two fragments.    7/2017-- tortuous colon and hemorrhoids     Last EGD: none     NSAIDS: none   Tobacco: former  Alcohol: none current, stopped now  Marijuana: medical marijuana for chronic pain  Illicit drugs: none     FH GI malignancy: Mom - colon cancer in her 50-60s    YES history of adverse reaction to sedation - wakes up slow, N/V   No CHAYA  YES anticoagulants/antiplatelet - clopidogrel (Plavix)   No pacemaker/defibrillator  No pain medications and/or sleep aides    Wt Readings from Last 6 Encounters:   04/11/25 136 lb (61.7 kg)   04/10/25 136 lb (61.7 kg)   04/08/25 132 lb (59.9 kg)   04/07/25 136 lb 3.2 oz (61.8 kg)   04/02/25 132 lb (59.9 kg)   03/24/25 131 lb 6.4 oz (59.6 kg)          History, Medications, Allergies, ROS:      Past Medical History[1]   Past Surgical History[2]   Family Hx: Family History[3]   Social History: Short Social Hx on File[4]     Medications (Active prior to today's visit):  Current Medications[5]    Allergies:  Allergies[6]    ROS:   CONSTITUTIONAL: negative for fevers, chills, sweats  EYES Negative for scleral icterus or redness, and diplopia  HEENT: Negative for hoarseness  RESPIRATORY: Negative for cough and severe shortness of breath  CARDIOVASCULAR: Negative for crushing sub-sternal chest pain  GASTROINTESTINAL: See HPI  GENITOURINARY: Negative for dysuria  MUSCULOSKELETAL: Negative for arthralgias and myalgias  SKIN: Negative for jaundice, rash or pruritus  NEUROLOGICAL: Negative for dizziness and headaches  BEHAVIOR/PSYCH: Negative for psychotic behavior      PHYSICAL EXAM:   Blood pressure 124/78, pulse 66, height 5' 3.5\" (1.613 m), weight 136 lb (61.7 kg), not currently breastfeeding.    GEN: Alert, no acute distress, well-nourished    HEENT: anicteric sclera, neck supple, trachea midline, MMM, no palpable or tender neck or supraclavicular lymph nodes  CV: RRR, the extremities are warm and well perfused   LUNGS: No increased work of breathing, CTAB  ABDOMEN: Soft, symmetrical, non-tender without distention or guarding. No scars or lesions. Aorta is without bruit or visible pulsation. Umbilicus is midline without herniation. Normoactive bowel sounds are present, No masses, hepatomegaly or splenomegaly noted.  MSK: No erythema, no warmth, no swelling of joints  SKIN: No jaundice, no erythema, no rashes, no lesions  HEMATOLOGIC: No bleeding, no bruising  NEURO: Alert and interactive, BARROS  PSYCH: appropriate mood & affect    Labs/Imaging:     Patient's labs and imaging were reviewed and discussed with patient today.    .  ASSESSMENT/PLAN:   No Saucedo is a 64 year old year-old female with active diagnoses including hx of cardiac stent on clopidogrel (Plavix), hypertension, psoriatic arthritis, thoracic aortic aneurysm, coronary artery disease, hypercholesterolemia, GERD, overactive bladder. Prior medical/surgical hx open heart surgery, CABG, hysterectomy, and other hx in note table. The patient presents for colon cancer screening evaluation.    #CRC screening  #hx of colon polyps  #fam hx of colon cancer  -Prior colonoscopy in 2021, 3-year recall for 5 tubular adenoma polyps.  History of a torturous colon and after colonoscopy in 2021 she had immediate LUQ abd pain and complication of splenic hematoma. Admitted 3 nights for monitoring of hematoma. Did NOT require blood transfusion or surgery  -has adverse reaction to sedation: N/V & slow to wake up. Has tolerating propofol well in the past  -takes 2 stool softener daily and having daily soft bowel movements  -occasional blood in stool, in water, or on tissue. Occurs every few months, none since starting stool softener    -Today we discussed options for colorectal cancer screening.  Given her  personal and family history and symptoms including occasional blood in stool, a colonoscopy would be most appropriate.  However, with her complication of the splenic hematoma and tortuous colon colonoscopy is more high risk for perforation or incomplete evaluation if unable to advance the scope.  I discussed these risks with the patient today.  She elected to proceed with colonoscopy.    #anticoagulant/antiplatelet use  -the patient is on long term anticoagulant and/or antiplatelet use which impacts the clinical decision making for procedural planning. Followed by cardiology for management. Advised on anticoagulant/antiplatelet medication modification in the context of endoscopic procedure preparation.       Recommend:  -1. Schedule colonoscopy with Dr. Mackay requested  by patient (AFTER JULY 2/2 recent cardiac procedure)  Diagnosis: CRC screen, personal hx of colon polyps, family history of colon cancer, tortuous colon   Sedation: MAC at hospital   Prep: split dose suprep     -Buy over the counter miralax, mix one capful with water daily for 2 weeks prior to drinking the bowel prep.      2. MEDICATION CHANGES PRIOR TO PROCEDURE    5 days BEFORE procedure *HOLD  clopidogrel (Plavix)   GI RNs please reach out to cardiology, for approval to hold anticoagulant/antiplatelet prior to procedure. Also request cardiac clearance. To the patient: you are RESPONSIBLE for contacting your cardiologist to be sure blood thinner modifications are approved and no further cardiac testing is needed for cardiac clearance. Failure to obtain clearance can result in procedure cancellations.     -Diabetes meds: N/A     Endoscopy risk/benefit discussion: I have thoroughly discussed the risks, benefits, and alternatives of endoscopic evaluation with the patient, who demonstrated understanding. This includes the potential risks of bleeding, infection, pain, anesthesia complications, and perforation, which may result in prolonged  hospitalization or surgical intervention. All of the patient’s questions were addressed to their satisfaction. The patient has chosen to proceed with the endoscopic procedure, including any necessary interventions such as polypectomy, biopsy, control of bleeding.      Orders This Visit:  No orders of the defined types were placed in this encounter.      Meds This Visit:  Requested Prescriptions      No prescriptions requested or ordered in this encounter       Imaging & Referrals:  None       JEMMA Pacheco    [facility]  4/11/2025      The dictation was partially prepared using Dragon Medical voice recognition software. As a result, errors may occur. When identified, these errors have been corrected. While every attempt is made to correct errors during dictation, discrepancies may still exist.              [1]   Past Medical History:   Basal cell carcinoma    Skin, left cutaneous lip    Cervical radiculopathy at C7    Colon polyps    Coronary atherosclerosis    Essential hypertension    Fibromyalgia    High blood pressure    High cholesterol    Hypercholesterolemia    Psoriatic arthritis (HCC)   [2]   Past Surgical History:  Procedure Laterality Date    Anesth,open heart surgery  2021    Cabg      Colonoscopy N/A 07/07/2017    Procedure: COLONOSCOPY;  Surgeon: Selina Timmons MD;  Location: [facility] ENDOSCOPY    Colonoscopy,biopsy  02/12/2010    Performed by GIOVANNI BRITO at [facility] [place], [facility]    Foot surgery Right 1990    Bunion    Hysterectomy      Other  03/2018    Mohs surgery left upper lip [facility]    Vaginal hysterectomy  1987    With cystocele repair   [3]   Family History  Problem Relation Age of Onset    Colon Cancer Mother     Heart Disease Mother         CABG age 72    Hypertension Mother     Cancer Mother 61        Colon Cancer    Other (Bladder Cancer) Mother     Other (Cervical Cancer) Mother     Other (Peripheral Vascular Disease) Mother     Diabetes Maternal  Grandmother     Hypertension Maternal Grandmother     Cancer Maternal Grandmother         hx of skin cancer     Heart Disease Maternal Grandfather          MI age 32    Heart Disease Maternal Aunt         Per NG: CAD    Breast Cancer Neg     Ovarian Cancer Neg     Prostate Cancer Neg     Pancreatic Cancer Neg    [4]   Social History  Socioeconomic History    Marital status:    Tobacco Use    Smoking status: Former     Current packs/day: 0.00     Average packs/day: 0.5 packs/day for 37.0 years (18.5 ttl pk-yrs)     Types: Cigarettes     Start date: 1975     Quit date: 2012     Years since quittin.6     Passive exposure: Never    Smokeless tobacco: Never   Vaping Use    Vaping status: Never Used   Substance and Sexual Activity    Alcohol use: Yes     Alcohol/week: 0.0 standard drinks of alcohol     Comment: Occasional    Drug use: Yes     Types: Cannabis     Comment: Medical Gummies/Topical Cream   Other Topics Concern    Pt has a pacemaker No    Pt has a defibrillator No    Reaction to local anesthetic No    Right Handed Yes     Social Drivers of Health     Food Insecurity: No Food Insecurity (3/24/2025)    NCSS - Food Insecurity     Worried About Running Out of Food in the Last Year: No     Ran Out of Food in the Last Year: No   Transportation Needs: No Transportation Needs (3/24/2025)    NCSS - Transportation     Lack of Transportation: No   Housing Stability: Not At Risk (3/24/2025)    NCSS - Housing/Utilities     Has Housing: Yes     Worried About Losing Housing: No     Unable to Get Utilities: No   [5]   Current Outpatient Medications   Medication Sig Dispense Refill    ranolazine ER (RANEXA) 500 MG Oral Tablet 12 Hr Take 1 tablet (500 mg total) by mouth 2 (two) times daily. 180 tablet 0    methotrexate 2.5 MG Oral Tab Take 5 tablets (12.5 mg total) by mouth once a week. Pt taking dose on  65 tablet 1    MEDROL 4 MG Oral Tablet Therapy Pack Medrol (Rocael) 4 mg tablets in a dose  pack, [RxNorm: 444263]      clobetasol 0.05 % External Cream Apply 1 Application topically 2 (two) times daily. 60 g 3    isosorbide mononitrate ER 60 MG Oral Tablet 24 Hr Take 1 tablet (60 mg total) by mouth daily. 30 tablet 0    VENTOLIN  (90 Base) MCG/ACT Inhalation Aero Soln Inhale 2 puffs into the lungs every 4 (four) hours as needed for Wheezing. 1 each 2    folic acid 1 MG Oral Tab Take 1 tablet (1 mg total) by mouth daily. 90 tablet 3    ondansetron (ZOFRAN) 4 mg tablet Take 1 tablet (4 mg total) by mouth every 8 (eight) hours as needed for Nausea. 30 tablet 3    atorvastatin 80 MG Oral Tab Take 1 tablet (80 mg total) by mouth nightly. 90 tablet 3    bumetanide 2 MG Oral Tab Take 1 tablet (2 mg total) by mouth every other day.      metoprolol succinate ER 25 MG Oral Tablet 24 Hr Take 1 tablet (25 mg total) by mouth daily.      clopidogrel 75 MG Oral Tab Take 1 tablet (75 mg total) by mouth daily.      omeprazole 20 MG Oral Capsule Delayed Release Take 1 capsule (20 mg total) by mouth 2 (two) times daily before meals. 180 capsule 3    NON FORMULARY Cannabis gummy 5 mg by mouth twice daily      aspirin 81 MG Oral Tab EC Take 1 tablet (81 mg total) by mouth daily.     [6]   Allergies  Allergen Reactions    Adhesive Tape HIVES

## 2025-04-11 NOTE — PATIENT INSTRUCTIONS
1. Schedule colonoscopy with Dr. Mackay requested (AFTER JULY)  Diagnosis: CRC screen, personal hx of colon polyps, family history of colon cancer, tortuous colon   Sedation: MAC at hospital   Prep: split dose suprep     -Buy over the counter miralax, mix one capful with water daily for 2 weeks prior to drinking the bowel prep.      2. MEDICATION CHANGES PRIOR TO PROCEDURE    5 days BEFORE procedure *HOLD  clopidogrel (Plavix)   GI RNs please reach out to cardiology, for approval to hold anticoagulant/antiplatelet prior to procedure. Also request cardiac clearance. To the patient: you are RESPONSIBLE for contacting your cardiologist to be sure blood thinner modifications are approved and no further cardiac testing is needed for cardiac clearance. Failure to obtain clearance can result in procedure cancellations.     3.  bowel prep from pharmacy   You can pick the bowel prep up now and store in a cool, dry place in your home until your scheduled bowel prep start date.    4. Read all bowel prep instructions carefully. Bowel prep instructions can also be found online at:  www.[email or web address]     5. AVOID seeds, nuts, popcorn, raw fruits and vegetables for 5 days before procedure    6. If you start any NEW medication after your visit today, please notify us. Certain medications (like iron or weight loss medications) will need to be held before the procedure, or the procedure cannot be performed safely.

## 2025-04-11 NOTE — TELEPHONE ENCOUNTER
Scheduled for:  Colonoscopy [id number]  Provider Name:  Dr. Mackay   Date:  7/25/2025  Location:    [facility]   Sedation:  Mac  Time:  9:00 (pt is aware that ENDO will call the day before to confirm arrival time)  Prep:  suprep and Buy over the counter miralax, mix one capful with water daily for 2 weeks prior to drinking the bowel prep.   Meds/Allergies Reconciled?:  Physician reviewed   Diagnosis with codes:  CRC screen  Z12.11 , personal hx of colon polyps Z86.010 , family history of colon cancer Z80.0 , tortuous colon Q43.8    Was patient informed to call insurance with codes (Y/N):  Yes, I confirmed medicare insurance with the patient.   Referral sent?:  Referral was sent at the time of electronic surgical scheduling.  [facility] or [facility] notified?:  I sent an electronic request to Endo Scheduling and received a confirmation today.   Medication Orders:  This patient verbally confirmed that she is not taking:   Iron,  BP meds, and is not diabetic   Not latex allergy, Not PCN allergy and does not have a pacemaker  Misc Orders:    I discussed the prep instructions with the patient which she verbally understood and is aware that I will mychart the instructions today.    Further instructions given by staff:

## 2025-04-11 NOTE — TELEPHONE ENCOUNTER
Patient was seen in office today with JEMMA Pacheco and was given orders to schedule. Please call patient to schedule his/her procedure with the orders given below. Patient was given the phone number and prep instructions at the time of the office visit. The prep instructions was also explained to the patient at the time of the visit. The patient verbalized understanding the prep and that a GI  will call him/her for the procedure.  If patient calls before the 1 week turnaround please schedule with the orders given below, thank you!    Orders from JEMMA Pacheco    1. Schedule colonoscopy with Dr. Mackay requested (AFTER JULY)  Diagnosis: CRC screen, personal hx of colon polyps, family history of colon cancer, tortuous colon   Sedation: MAC at hospital   Prep: split dose suprep      -Buy over the counter miralax, mix one capful with water daily for 2 weeks prior to drinking the bowel prep.        2. MEDICATION CHANGES PRIOR TO PROCEDURE     5 days BEFORE procedure *HOLD  clopidogrel (Plavix)   GI RNs please reach out to cardiology, for approval to hold anticoagulant/antiplatelet prior to procedure. Also request cardiac clearance. To the patient: you are RESPONSIBLE for contacting your cardiologist to be sure blood thinner modifications are approved and no further cardiac testing is needed for cardiac clearance. Failure to obtain clearance can result in procedure cancellations.      3.  bowel prep from pharmacy   You can pick the bowel prep up now and store in a cool, dry place in your home until your scheduled bowel prep start date.     4. Read all bowel prep instructions carefully. Bowel prep instructions can also be found online at:  www.[email or web address]      5. AVOID seeds, nuts, popcorn, raw fruits and vegetables for 5 days before procedure     6. If you start any NEW medication after your visit today, please notify us. Certain medications (like iron or weight loss medications) will  need to be held before the procedure, or the procedure cannot be performed safely.

## 2025-04-11 NOTE — TELEPHONE ENCOUNTER
GI RNS-   Patient sees Dr. Mendez for cardiology. Please obtain clearance to hold Plavix for 5 days before the procedure.   Thank you!

## 2025-04-16 ENCOUNTER — LAB ENCOUNTER (OUTPATIENT)
Dept: LAB | Facility: HOSPITAL | Age: 65
End: 2025-04-16
Attending: INTERNAL MEDICINE
Payer: MEDICARE

## 2025-04-16 ENCOUNTER — TELEPHONE (OUTPATIENT)
Facility: CLINIC | Age: 65
End: 2025-04-16

## 2025-04-16 DIAGNOSIS — E87.6 HYPOKALEMIA: ICD-10-CM

## 2025-04-16 DIAGNOSIS — I50.30 DIASTOLIC HEART FAILURE (HCC): ICD-10-CM

## 2025-04-16 DIAGNOSIS — Z01.810 PRE-OPERATIVE CARDIOVASCULAR EXAMINATION: Primary | ICD-10-CM

## 2025-04-16 DIAGNOSIS — Z01.818 PREOP TESTING: ICD-10-CM

## 2025-04-16 LAB
ANION GAP SERPL CALC-SCNC: 8 MMOL/L (ref 0–18)
BUN BLD-MCNC: 16 MG/DL (ref 9–23)
BUN/CREAT SERPL: 17.2 (ref 10–20)
CALCIUM BLD-MCNC: 8.9 MG/DL (ref 8.7–10.4)
CHLORIDE SERPL-SCNC: 105 MMOL/L (ref 98–112)
CO2 SERPL-SCNC: 28 MMOL/L (ref 21–32)
CREAT BLD-MCNC: 0.93 MG/DL (ref 0.55–1.02)
EGFRCR SERPLBLD CKD-EPI 2021: 68 ML/MIN/1.73M2 (ref 60–?)
FASTING STATUS PATIENT QL REPORTED: NO
GLUCOSE BLD-MCNC: 116 MG/DL (ref 70–99)
NT-PROBNP SERPL-MCNC: 90 PG/ML (ref ?–125)
OSMOLALITY SERPL CALC.SUM OF ELEC: 294 MOSM/KG (ref 275–295)
POTASSIUM SERPL-SCNC: 4 MMOL/L (ref 3.5–5.1)
SODIUM SERPL-SCNC: 141 MMOL/L (ref 136–145)

## 2025-04-16 PROCEDURE — 80048 BASIC METABOLIC PNL TOTAL CA: CPT

## 2025-04-16 PROCEDURE — 83880 ASSAY OF NATRIURETIC PEPTIDE: CPT

## 2025-04-16 PROCEDURE — 36415 COLL VENOUS BLD VENIPUNCTURE: CPT

## 2025-04-16 NOTE — TELEPHONE ENCOUNTER
GI Staff:    Please add to my clinic on July 9th (Wednesday) for a pre-procedure check in. She is aware someone will be calling to add to the clinic.    If 7/9 does not work then will find a different day.    Thank you.

## 2025-04-17 VITALS — WEIGHT: 132 LBS | BODY MASS INDEX: 23.39 KG/M2 | HEIGHT: 63 IN

## 2025-04-17 RX ORDER — CHLORHEXIDINE GLUCONATE 40 MG/ML
SOLUTION TOPICAL
OUTPATIENT
Start: 2025-04-18 | End: 2025-04-18

## 2025-04-17 RX ORDER — SODIUM CHLORIDE 9 MG/ML
INJECTION, SOLUTION INTRAVENOUS
OUTPATIENT
Start: 2025-04-18 | End: 2025-04-18

## 2025-04-17 RX ORDER — BUMETANIDE 1 MG/1
1 TABLET ORAL EVERY OTHER DAY
COMMUNITY

## 2025-04-17 NOTE — TELEPHONE ENCOUNTER
I spoke to the pt    I scheduled her appt    Date time and [facility] confirmed with the pt    Your Appointments      Wednesday July 09, 2025 9:30 AM  Follow Up Visit with Javier Mackay MD  [facility] ([facility] [address] [address]  [phone]

## 2025-04-19 ENCOUNTER — OFFICE VISIT (OUTPATIENT)
Dept: FAMILY MEDICINE CLINIC | Facility: CLINIC | Age: 65
End: 2025-04-19
Payer: MEDICARE

## 2025-04-19 ENCOUNTER — NURSE ONLY (OUTPATIENT)
Dept: LAB | Age: 65
End: 2025-04-19
Attending: NURSE PRACTITIONER
Payer: MEDICARE

## 2025-04-19 VITALS
SYSTOLIC BLOOD PRESSURE: 126 MMHG | HEART RATE: 61 BPM | BODY MASS INDEX: 23.8 KG/M2 | TEMPERATURE: 98 F | OXYGEN SATURATION: 96 % | HEIGHT: 63.5 IN | RESPIRATION RATE: 24 BRPM | WEIGHT: 136 LBS | DIASTOLIC BLOOD PRESSURE: 68 MMHG

## 2025-04-19 DIAGNOSIS — J06.9 URI, ACUTE: ICD-10-CM

## 2025-04-19 DIAGNOSIS — J06.9 URI, ACUTE: Primary | ICD-10-CM

## 2025-04-19 LAB
OPERATOR ID: NORMAL
POCT INFLUENZA A: NEGATIVE
POCT INFLUENZA B: NEGATIVE
RAPID SARS-COV-2 BY PCR: NOT DETECTED

## 2025-04-19 PROCEDURE — 87502 INFLUENZA DNA AMP PROBE: CPT

## 2025-04-19 NOTE — PROGRESS NOTES
CHIEF COMPLAINT:     Chief Complaint   Patient presents with    Sore Throat     X2days sore throat,cough       HPI:   No Saucedo is a 64 year old female who presents for upper respiratory symptoms for  2 days. Patient reports sore throat, congestion, cough is keeping pt up at night, denies fever. Denies painful swallowing and exposure to strep throat. Symptoms have been stable since onset.  Treating symptoms with otc coricidin products.  Patient has significant cardiac history and due for angiogram this coming Friday.  Patient denies chest pain, palpitations, and ankle swelling.  Patient denies shortness of breath and difficulty breathing.    Current Medications[1]   Past Medical History[2]   Past Surgical History[3]      Short Social Hx on File[4]      REVIEW OF SYSTEMS:   Review of Systems   Constitutional:  Negative for chills and fever.   HENT:  Positive for congestion, postnasal drip, rhinorrhea, sinus pressure and sore throat. Negative for ear pain and trouble swallowing.    Eyes:  Negative for discharge and redness.   Respiratory:  Positive for cough. Negative for chest tightness, shortness of breath and wheezing.    Cardiovascular:  Negative for chest pain, palpitations and leg swelling.   Gastrointestinal:  Negative for diarrhea, nausea and vomiting.   Skin:  Negative for rash.   Neurological:  Negative for headaches.          EXAM:   /68   Pulse 61   Temp 97.6 °F (36.4 °C) (Temporal)   Resp 24   Ht 5' 3.5\" (1.613 m)   Wt 136 lb (61.7 kg)   SpO2 96%   BMI 23.71 kg/m²   Physical Exam  Vitals and nursing note reviewed.   Constitutional:       Appearance: Normal appearance. She is not ill-appearing.   HENT:      Head: Normocephalic and atraumatic.      Right Ear: Hearing, tympanic membrane, ear canal and external ear normal. No drainage. There is no impacted cerumen. Tympanic membrane is not injected, perforated, erythematous or bulging.      Left Ear: Hearing, tympanic membrane, ear canal  and external ear normal. No drainage. There is no impacted cerumen. Tympanic membrane is not injected, perforated, erythematous or bulging.      Nose: Mucosal edema, congestion and rhinorrhea present.      Right Sinus: No maxillary sinus tenderness or frontal sinus tenderness.      Left Sinus: No maxillary sinus tenderness or frontal sinus tenderness.      Mouth/Throat:      Lips: No lesions.      Mouth: Mucous membranes are moist. No oral lesions.      Palate: No lesions.      Pharynx: Oropharynx is clear. Uvula midline. No oropharyngeal exudate or posterior oropharyngeal erythema.      Tonsils: No tonsillar exudate or tonsillar abscesses. 1+ on the right. 1+ on the left.   Eyes:      General: No scleral icterus.        Right eye: No discharge.         Left eye: No discharge.      Conjunctiva/sclera: Conjunctivae normal.   Cardiovascular:      Rate and Rhythm: Normal rate and regular rhythm.      Heart sounds: Normal heart sounds. No murmur heard.  Pulmonary:      Effort: Pulmonary effort is normal. No tachypnea, accessory muscle usage, respiratory distress or retractions.      Breath sounds: Normal breath sounds. No wheezing.   Lymphadenopathy:      Cervical: No cervical adenopathy.      Right cervical: No superficial or posterior cervical adenopathy.     Left cervical: No superficial or posterior cervical adenopathy.   Skin:     General: Skin is warm and dry.   Neurological:      Mental Status: She is alert and oriented to person, place, and time.   Psychiatric:         Mood and Affect: Mood normal.         Behavior: Behavior normal.           ASSESSMENT AND PLAN:   No Saucedo is a 64 year old female who presents with upper respiratory symptoms that are consistent with    ASSESSMENT:   Encounter Diagnosis   Name Primary?    URI, acute Yes       PLAN: Patient has negative rapid COVID testing and negative rapid influenza A/influenza B testing in clinic today.  Patient may continue over-the-counter Coricidin  products, encourage patient to start daily Flonase to reduce nasal congestion and mucus production that may be contributing to sore throat.  Comfort care as described in Patient Instructions.    Meds & Refills for this Visit:  Requested Prescriptions      No prescriptions requested or ordered in this encounter     Risks, benefits, and side effects of medication explained and discussed.    The patient indicates understanding of these issues and agrees to the plan.  The patient is asked to f/u with PCP if sx's persist.  If symptoms worsen such as shortness of breath, difficulty breathing, ankle swelling, and/or chest pain, report to nearest emergency room for prompt reevaluation and treatment.         [1]   Current Outpatient Medications   Medication Sig Dispense Refill    bumetanide 1 MG Oral Tab Take 1 tablet (1 mg total) by mouth every other day.      Na Sulfate-K Sulfate-Mg Sulf (SUPREP BOWEL PREP KIT) 17.5-3.13-1.6 GM/177ML Oral Solution Take as directed by GI clinic. Okay to substitute for generic. 1 each 0    ranolazine ER (RANEXA) 500 MG Oral Tablet 12 Hr Take 1 tablet (500 mg total) by mouth 2 (two) times daily. 180 tablet 0    methotrexate 2.5 MG Oral Tab Take 5 tablets (12.5 mg total) by mouth once a week. Pt taking dose on Wednesdays 65 tablet 1    clobetasol 0.05 % External Cream Apply 1 Application topically 2 (two) times daily. (Patient taking differently: Apply 1 Application topically 2 (two) times daily as needed.) 60 g 3    isosorbide mononitrate ER 60 MG Oral Tablet 24 Hr Take 1 tablet (60 mg total) by mouth daily. 30 tablet 0    VENTOLIN  (90 Base) MCG/ACT Inhalation Aero Soln Inhale 2 puffs into the lungs every 4 (four) hours as needed for Wheezing. 1 each 2    folic acid 1 MG Oral Tab Take 1 tablet (1 mg total) by mouth daily. 90 tablet 3    ondansetron (ZOFRAN) 4 mg tablet Take 1 tablet (4 mg total) by mouth every 8 (eight) hours as needed for Nausea. 30 tablet 3    atorvastatin 80 MG  Oral Tab Take 1 tablet (80 mg total) by mouth nightly. 90 tablet 3    bumetanide 2 MG Oral Tab Take 1 tablet (2 mg total) by mouth every other day.      metoprolol succinate ER 25 MG Oral Tablet 24 Hr Take 1 tablet (25 mg total) by mouth daily.      clopidogrel 75 MG Oral Tab Take 1 tablet (75 mg total) by mouth daily.      omeprazole 20 MG Oral Capsule Delayed Release Take 1 capsule (20 mg total) by mouth 2 (two) times daily before meals. 180 capsule 3    NON FORMULARY Cannabis gummy 5 mg by mouth twice daily-takes at 5 pm and bedtime      aspirin 81 MG Oral Tab EC Take 1 tablet (81 mg total) by mouth daily.     [2]   Past Medical History:   Basal cell carcinoma    Skin, left cutaneous lip    Cervical radiculopathy at C7    Colon polyps    Congestive heart disease (HCC)    Coronary atherosclerosis    Essential hypertension    Fibromyalgia    High blood pressure    High cholesterol    Hypercholesterolemia    Psoriatic arthritis (HCC)   [3]   Past Surgical History:  Procedure Laterality Date    Anesth,open heart surgery      Cabg      Cath drug eluting stent      Colonoscopy N/A 2017    Procedure: COLONOSCOPY;  Surgeon: Selina Timmons MD;  Location: [facility] ENDOSCOPY    Colonoscopy,biopsy  2010    Performed by GIOVANNI BRITO at [facility], [facility]    Foot surgery Right     Bunion    Hysterectomy      Other  2018    Mohs surgery left upper lip BCCa    Vaginal hysterectomy      With cystocele repair   [4]   Social History  Socioeconomic History    Marital status:    Tobacco Use    Smoking status: Former     Current packs/day: 0.00     Average packs/day: 0.5 packs/day for 37.0 years (18.5 ttl pk-yrs)     Types: Cigarettes     Start date: 1975     Quit date: 2012     Years since quittin.6     Passive exposure: Never    Smokeless tobacco: Never   Vaping Use    Vaping status: Never Used   Substance and Sexual Activity    Alcohol use: Not Currently     Comment:  former occasional use    Drug use: Yes     Types: Cannabis     Comment: Medical Gummies/Topical Cream   Other Topics Concern    Pt has a pacemaker No    Pt has a defibrillator No    Reaction to local anesthetic No    Right Handed Yes     Social Drivers of Health     Food Insecurity: No Food Insecurity (3/24/2025)    NCSS - Food Insecurity     Worried About Running Out of Food in the Last Year: No     Ran Out of Food in the Last Year: No   Transportation Needs: No Transportation Needs (3/24/2025)    NCSS - Transportation     Lack of Transportation: No   Housing Stability: Not At Risk (3/24/2025)    NCSS - Housing/Utilities     Has Housing: Yes     Worried About Losing Housing: No     Unable to Get Utilities: No

## 2025-04-22 ENCOUNTER — TELEPHONE (OUTPATIENT)
Dept: INTERNAL MEDICINE CLINIC | Facility: CLINIC | Age: 65
End: 2025-04-22

## 2025-04-25 ENCOUNTER — HOSPITAL ENCOUNTER (OUTPATIENT)
Dept: INTERVENTIONAL RADIOLOGY/VASCULAR | Facility: HOSPITAL | Age: 65
Discharge: HOME | End: 2025-04-25
Attending: INTERNAL MEDICINE
Payer: MEDICARE

## 2025-04-25 RX ORDER — BUMETANIDE 1 MG/1
1 TABLET ORAL EVERY OTHER DAY
Qty: 45 TABLET | Refills: 0 | Status: SHIPPED | OUTPATIENT
Start: 2025-04-25

## 2025-04-25 RX ORDER — BUMETANIDE 2 MG/1
2 TABLET ORAL EVERY OTHER DAY
Qty: 45 TABLET | Refills: 0 | Status: SHIPPED | OUTPATIENT
Start: 2025-04-25

## 2025-04-25 NOTE — TELEPHONE ENCOUNTER
Patient contacts clinic reporting she was seen and treated in immediate care for a URI.  She continues to cough, coughing audibly on the phone.  She has been off her bumex for 3 days because she cannot get a refill from her cardiologist.  She has had great difficulty reaching anyone in their office.  She has trid multiple times, left messages, spoken with the manager, to no avail.  She believes her cough is worse being off the diuretic.  She had to cancel heart cath due to illness.  She is going to try and find a new cardiologist.    She inquires if Dr. Valdez will refill her bumex in the mean time.  Reviewed Dr. Mendez's note of 04/16:    increase bumex to 1 mg then 2 mg alternating days     Nurse did attempt to contact [facility] and was kept on hold for greater than 10 minutes.  Dr. Valdez please advise if you will refill bumex for the patient.  Nurse did advise her to report to emergency room for any increased swelling or breathing difficulty.  She verbalized understanding and compliance.

## 2025-04-29 ENCOUNTER — HOSPITAL ENCOUNTER (OUTPATIENT)
Dept: INTERVENTIONAL RADIOLOGY/VASCULAR | Facility: HOSPITAL | Age: 65
Discharge: HOME | End: 2025-04-29
Attending: INTERNAL MEDICINE | Admitting: INTERNAL MEDICINE
Payer: MEDICARE

## 2025-04-29 VITALS
SYSTOLIC BLOOD PRESSURE: 107 MMHG | RESPIRATION RATE: 19 BRPM | TEMPERATURE: 98 F | OXYGEN SATURATION: 96 % | HEIGHT: 63 IN | BODY MASS INDEX: 23.39 KG/M2 | DIASTOLIC BLOOD PRESSURE: 62 MMHG | WEIGHT: 132 LBS | HEART RATE: 64 BPM

## 2025-04-29 DIAGNOSIS — I50.30 (HFPEF) HEART FAILURE WITH PRESERVED EJECTION FRACTION (HCC): ICD-10-CM

## 2025-04-29 DIAGNOSIS — R06.09 DOE (DYSPNEA ON EXERTION): ICD-10-CM

## 2025-04-29 DIAGNOSIS — R06.02 SOB (SHORTNESS OF BREATH): ICD-10-CM

## 2025-04-29 LAB
AVOX TOTAL HEMOGLOBIN CONCENTRATION: 11.1 G/DL (ref 12–16)
AVOX TOTAL HEMOGLOBIN CONCENTRATION: 11.5 G/DL (ref 12–16)
COHGB MFR BLD: 61.5 % (ref 65–80)
COHGB MFR BLD: 64.1 % (ref 65–80)

## 2025-04-29 PROCEDURE — 93451 RIGHT HEART CATH: CPT | Performed by: INTERNAL MEDICINE

## 2025-04-29 RX ORDER — SODIUM CHLORIDE 9 MG/ML
INJECTION, SOLUTION INTRAVENOUS
Status: COMPLETED | OUTPATIENT
Start: 2025-04-29 | End: 2025-04-29

## 2025-04-29 RX ORDER — CHLORHEXIDINE GLUCONATE 40 MG/ML
SOLUTION TOPICAL
Status: COMPLETED | OUTPATIENT
Start: 2025-04-29 | End: 2025-04-29

## 2025-04-29 RX ORDER — LIDOCAINE HYDROCHLORIDE 20 MG/ML
INJECTION, SOLUTION EPIDURAL; INFILTRATION; INTRACAUDAL; PERINEURAL
Status: COMPLETED
Start: 2025-04-29 | End: 2025-04-29

## 2025-04-29 RX ADMIN — SODIUM CHLORIDE: 9 INJECTION, SOLUTION INTRAVENOUS at 08:30:00

## 2025-04-29 RX ADMIN — CHLORHEXIDINE GLUCONATE: 40 SOLUTION TOPICAL at 08:30:00

## 2025-04-29 NOTE — PROCEDURES
[facility]  part of [facility]    Cardiac Cath Procedure Note  No Saucedo Patient Status:  Outpatient    1960 MRN [id number]   Location [facility] INTERVENTIONAL SUITES Attending Matt Mendez MD   Hosp Day # 0 PCP Josep Valdez MD       Cardiologist: Matt Mendez MD  Primary Proceduralist: Matt Mendez MD  Procedure Performed: RHC  Date of Procedure: 2025   Indication: Heart failure    Summary of findings: Compensated filling pressures      Hemodynamics:   RA 4  RV 16/3  PA 14/5 mean 8  PCW 5    CO 4.2/ CI 2.6  SVR 1580/ PVR 96    Ao sat 94  PA sat 63      Assessment:  Compensated filling pressures  HFpEF: Sensitive to diuretics, currently feels well at current weight and current filling pressures  Any residual cough or shortness of breath is noncardiac  CAD status post bypass surgery and stents: Angiogram reviewed  circumflex to OM with SVG to PDA epicardial collateral.  Normal perfusion on PET stress 2025 suggesting small ischemic territory therefore also on likely to be related to shortness of breath.      Recommendations:  Pulmonary workup  Continue diuretics at current dose  Discharge home in an hour      Description of Procedure:   After written informed consent was obtained from the patient, patient was brought to the cardiac catheterization laboratory.  Patient was prepped and draped in the usual sterile fashion. Lidocaine 1% was used to infiltrate the right arm.  Under ultrasound guidance, micropuncture needle and 5 fr sheath was introduced into the right brachial vein.  5 [place] advanced to RA, RV, PA and wedge position obtaining pressures as described above.      Specimen sent to: No specimen collected  Estimated blood loss: 10 cc  Closure: Pressure      IV was maintained by RN and moderate conscious sedation of versed and fentanyl was given.  Patient was assessed and monitoring of oxygen, heart rate and blood pressure by nurse and myself  during the exam 35 minutes.      Matt Mendez MD  04/29/25

## 2025-04-29 NOTE — DISCHARGE INSTRUCTIONS
HOME CARE INSTRUCTIONS FOLLOWING VENOUS ACCESS PROCEDURES: RIGHT HEART CATHETERIZATION    Activity    DO NOT drive after the procedure. You may resume driving the following day according to the nurse or physician’s instructions    Plan on resting and relaxing tonight and tomorrow    Do not lift anything over 10 pounds for the next 24 hours    Avoid sexual activity for the next 24 hours    Avoid drinking alcohol for the next 24 hours    Resume your normal activity after 24 hours, or as instructed by your physician     What is Normal?    The procedure site may appear bruised or discolored    The procedure site may be tender to the touch    There may be a small amount of drainage on the bandage     Special Instructions    The bandage is to remain in place for 24 hours    After 24 hours, you must remove the bandage.TOMORROW AT______ You should shower after removing the bandage, and wash the procedure site gently with soap and water. (If you choose to wear a bandage for a few days, make sure it remains clean and dry and that it is changed daily.)     When you should NOTIFY YOUR PHYSICIAN    If you have shortness of breath or a persistent cough    If you have chest pain (angina)    If you have palpitations or irregular heart beats    If you have persistent pain at the procedure site    If you experience signs of a fever, temperature >101o, chills, infection (redness, swelling, thick yellow drainage, or a foul odor from the procedure site)     Other    You may resume your present diet, unless otherwise specified by your physician    You may resume all of your medications as prescribed, unless otherwise directed by your physician. A list of your medications was provided to you at discharge

## 2025-04-29 NOTE — IVS NOTE
DISCHARGE NOTE      Pt is able to sit up and ambulate without difficulty.   Pt voided and tolerated fluids and food.   Procedural site remains dry and intact with good circulation, motion, and sensation.   No signs and symptoms of bleeding/hematoma noted.   IV access removed  Instruction provided, patient/family verbalizes understanding.      Pt discharge via ambulating to [facility]      Follow up Appointment: JAMEL 5/12 1:30p     New Prescription: n/a

## 2025-04-29 NOTE — INTERVAL H&P NOTE
Pre-op Diagnosis: * No pre-op diagnosis entered *    The above referenced H&P was reviewed by Matt Mendez MD on 4/29/2025, the patient was examined and no significant changes have occurred in the patient's condition since the H&P was performed.  I discussed with the patient and/or legal representative the potential benefits, risks and side effects of this procedure; the likelihood of the patient achieving goals; and potential problems that might occur during recuperation.  I discussed reasonable alternatives to the procedure, including risks, benefits and side effects related to the alternatives and risks related to not receiving this procedure.  We will proceed with procedure as planned.

## 2025-05-27 RX ORDER — OMEPRAZOLE 20 MG/1
20 CAPSULE, DELAYED RELEASE ORAL
Qty: 180 CAPSULE | Refills: 3 | Status: SHIPPED | OUTPATIENT
Start: 2025-05-27

## 2025-05-27 NOTE — TELEPHONE ENCOUNTER
Refill passed [facility] protocol.     Please review due to High Drug-Drug: omeprazole and clopidogrel Omeprazole may diminish the antiplatelet effect of clopidogrel. Additionally, omeprazole may decrease serum concentrations of the active metabolite(s) of clopidogrel. Coadministration of these agents should be avoided according to clopidogrel prescribing information.

## 2025-06-06 ENCOUNTER — OFFICE VISIT (OUTPATIENT)
Dept: UROLOGY | Facility: HOSPITAL | Age: 65
End: 2025-06-06
Attending: OBSTETRICS & GYNECOLOGY
Payer: MEDICARE

## 2025-06-06 VITALS — WEIGHT: 132 LBS | BODY MASS INDEX: 23.39 KG/M2 | HEIGHT: 63 IN | RESPIRATION RATE: 18 BRPM

## 2025-06-06 DIAGNOSIS — N81.11 CYSTOCELE, MIDLINE: ICD-10-CM

## 2025-06-06 DIAGNOSIS — N81.6 RECTOCELE: ICD-10-CM

## 2025-06-06 DIAGNOSIS — N32.81 DETRUSOR INSTABILITY: ICD-10-CM

## 2025-06-06 DIAGNOSIS — N99.3 PROLAPSE OF VAGINAL VAULT AFTER HYSTERECTOMY: Primary | ICD-10-CM

## 2025-06-06 DIAGNOSIS — N39.41 URGE INCONTINENCE: ICD-10-CM

## 2025-06-06 DIAGNOSIS — N39.3 FEMALE STRESS INCONTINENCE: ICD-10-CM

## 2025-06-06 PROCEDURE — 99212 OFFICE O/P EST SF 10 MIN: CPT

## 2025-06-06 NOTE — PROGRESS NOTES
Patient presents to follow up bulge    471/70cc & 329/4cc  + DO @ 45cc  retirement 153cc  + YASMANI @100cc    Follows w/ urology    Interested in surgery    Bulge sx worsening  No longer taking meds, denies UUI complaints  Does reports YASMANI & UUI    Bowels irreg    Resp 18   Ht 63\"   Wt 132 lb (59.9 kg)   BMI 23.38 kg/m²     GEN: NAD  CV: RRR  Pulm: nl effort  Abd: soft    PELVIC EXAM:  Ext. Gen: no atrophy, no lesions  Urethra: +atrophy, nontender  Bladder:+fullness, +tender  Vagina: +atrophy, tenderness with exam  Cervix & Uterus: absent  Adnexa:no masses, nontender  Perineum: nontender  Anus: wnl  Rectum: defer     PELVIS FLOOR NEUROMUSCULAR FUNCTION:  Strength:  1 and Unable to hold greater than 3 sec  Perineal Sensation:  Normal     PELVIC SUPPORT:  [place]:  2  Ant:  2  Post:  2  CST:  negative  UVJ: not hypermobile    Impression/Plan:    ICD-10-CM    1. Prolapse of vaginal vault after hysterectomy  N99.3       2. Detrusor instability  N32.81       3. Urge incontinence  N39.41       4. Cystocele, midline  N81.11       5. Rectocele  N81.6       6. Female stress incontinence  N39.3           Discussion Items:   Urodynamics and cystoscopy for evaluation of LUTS  Behavioral and pharmacologic treatments for OAB  Nonsurgical and surgical treatments for Stress Urinary Incontinence  Nonsurgical and surgical treatments for POP  Pelvic muscle rehabilitation including pelvic floor PT  Topical estrogen therapy for treating UGA  Bowel routine/constipation regimen  Surgical Discussion: We discussed the risks, benefits, goals and alternatives to surgical approaches for pelvic floor disorders including, but not limited to, bleeding, infection, pelvic organ damage, recurrent prolapse, recurrent stress incontinence, de josé miguel OAB, pain, sexual dysfunction, voiding dysfunction, long-term catheterization and re-operation.  Post-op restrictions and expectations reviewed.    Discussed management of pelvic organ prolapse including but not limited  to behavioral modifications, conservative options, and surgical management.   Discussed pessary management including benefits and risks. Discussed importance of keeping regularly scheduled pessary checks in prevention of complications related to pessary use.     Discussed dietary and behavioral modifications for mgmt of urinary symptoms  Discussed weight management and benefits of weight loss on urinary symptoms  Reviewed AUA/SUFU guidelines on mgmt of non-neurogenic OAB  Discussed pharmacologic and nonpharmacologic mgmt options of urinary symptoms - reviewed risks, benefits, alternatives, and goals of treatment  Discussed specific risks related to OAB meds including, but not limited to dry mouth, constipation, blurry vision, cognitive changes, and BP elevation.    Discussed management options for YASMANI including expectant management, pelvic floor PT, pessary, and surgery  Discussed risks and benefits associated with each option  Discussed urodynamic testing & results    Bowel management reviewed. Discussed using fiber daily w/ addition of miralax as needed    Discussed mgmt of vulvovaginal atrophy with vaginal estrogen cream. Reviewed associated benefits, risks, alternatives, and goals. Recommend low dose twice weekly mgmt       Diagnostic Items:  Call with s/sx of UTI, will need ucx    Medications Discussed:  Fiber  Miralax  OAB Meds  Vag estrogen    Treatment Plan, Non-surgical:   RN teaching/pt education done  Fiber supplement  Stimulant:  MOM, Miralax  Estrace / Premarin cream    Treatment Plan, Surgical:   Anterior repair  Posterior repair  Uterosacral suspension  Mid-urethral slings (Trans Vaginal Taping, TOT, single-incision)    Will need full medical & cardiology clearance    All questions answered  She understands and agrees to plan    Return for post op.    Michelle Rivera, DO, FACOG, FACS    The 21st Century Cures Act makes medical notes like these available to patients in the interest of transparency.  However, be advised this is a medical document. It is intended as peer to peer communication. It is written in medical language and may contain abbreviations or verbiage that are unfamiliar. It may appear blunt or direct. Medical documents are intended to carry relevant information, facts as evident, and the clinical opinion of the practitioner.

## 2025-06-17 NOTE — TELEPHONE ENCOUNTER
I received a fax from [facility]    Most recent stress test was normal    May proceed with procedure at moderate risk given hx    May hold Plavix for 5 days then resume, may stay on ASA    MyChart message sent to the pt

## 2025-07-02 NOTE — TELEPHONE ENCOUNTER
Pt could not take my call at the moment and states will call back. Please transfer to [id number] any time. No

## 2025-07-09 ENCOUNTER — TELEPHONE (OUTPATIENT)
Dept: UROLOGY | Facility: HOSPITAL | Age: 65
End: 2025-07-09

## 2025-07-09 ENCOUNTER — OFFICE VISIT (OUTPATIENT)
Facility: CLINIC | Age: 65
End: 2025-07-09
Payer: MEDICARE

## 2025-07-09 ENCOUNTER — LAB ENCOUNTER (OUTPATIENT)
Dept: LAB | Facility: HOSPITAL | Age: 65
End: 2025-07-09
Attending: INTERNAL MEDICINE
Payer: MEDICARE

## 2025-07-09 ENCOUNTER — OFFICE VISIT (OUTPATIENT)
Dept: INTERNAL MEDICINE CLINIC | Facility: CLINIC | Age: 65
End: 2025-07-09

## 2025-07-09 VITALS
HEIGHT: 63 IN | HEART RATE: 64 BPM | DIASTOLIC BLOOD PRESSURE: 68 MMHG | WEIGHT: 130 LBS | SYSTOLIC BLOOD PRESSURE: 118 MMHG | OXYGEN SATURATION: 98 % | BODY MASS INDEX: 23.04 KG/M2 | TEMPERATURE: 97 F

## 2025-07-09 VITALS
SYSTOLIC BLOOD PRESSURE: 118 MMHG | BODY MASS INDEX: 22.68 KG/M2 | WEIGHT: 128 LBS | DIASTOLIC BLOOD PRESSURE: 63 MMHG | HEIGHT: 63 IN | HEART RATE: 52 BPM

## 2025-07-09 DIAGNOSIS — Z80.0 FAMILY HISTORY OF COLON CANCER: Primary | ICD-10-CM

## 2025-07-09 DIAGNOSIS — L40.50 PSORIATIC ARTHRITIS (HCC): Primary | Chronic | ICD-10-CM

## 2025-07-09 DIAGNOSIS — N32.81 OVERACTIVE BLADDER: ICD-10-CM

## 2025-07-09 DIAGNOSIS — E78.00 HYPERCHOLESTEROLEMIA: ICD-10-CM

## 2025-07-09 DIAGNOSIS — I10 ESSENTIAL HYPERTENSION: ICD-10-CM

## 2025-07-09 DIAGNOSIS — Z51.81 THERAPEUTIC DRUG MONITORING: ICD-10-CM

## 2025-07-09 DIAGNOSIS — L40.50 PSORIATIC ARTHRITIS (HCC): ICD-10-CM

## 2025-07-09 DIAGNOSIS — I25.118 CORONARY ARTERY DISEASE INVOLVING NATIVE CORONARY ARTERY OF NATIVE HEART WITH OTHER FORM OF ANGINA PECTORIS: ICD-10-CM

## 2025-07-09 DIAGNOSIS — Z01.818 PREOPERATIVE CLEARANCE: ICD-10-CM

## 2025-07-09 PROBLEM — I20.9 ANGINA PECTORIS: Status: RESOLVED | Noted: 2025-03-24 | Resolved: 2025-07-09

## 2025-07-09 LAB
ALBUMIN SERPL-MCNC: 4.4 G/DL (ref 3.2–4.8)
ALBUMIN/GLOB SERPL: 2.2 {RATIO} (ref 1–2)
ALP LIVER SERPL-CCNC: 77 U/L (ref 50–130)
ALT SERPL-CCNC: <7 U/L (ref 10–49)
ANION GAP SERPL CALC-SCNC: 6 MMOL/L (ref 0–18)
AST SERPL-CCNC: 17 U/L (ref ?–34)
ATRIAL RATE: 59 BPM
BILIRUB SERPL-MCNC: 0.3 MG/DL (ref 0.2–1.1)
BUN BLD-MCNC: 16 MG/DL (ref 9–23)
BUN/CREAT SERPL: 16.8 (ref 10–20)
CALCIUM BLD-MCNC: 9.2 MG/DL (ref 8.7–10.4)
CHLORIDE SERPL-SCNC: 104 MMOL/L (ref 98–112)
CO2 SERPL-SCNC: 29 MMOL/L (ref 21–32)
CREAT BLD-MCNC: 0.95 MG/DL (ref 0.55–1.02)
CRP SERPL-MCNC: <0.5 MG/DL (ref ?–0.5)
DEPRECATED RDW RBC AUTO: 43.1 FL (ref 35.1–46.3)
EGFRCR SERPLBLD CKD-EPI 2021: 66 ML/MIN/1.73M2 (ref 60–?)
ERYTHROCYTE [DISTWIDTH] IN BLOOD BY AUTOMATED COUNT: 13.1 % (ref 11–15)
ERYTHROCYTE [SEDIMENTATION RATE] IN BLOOD: 4 MM/HR (ref 0–30)
FASTING STATUS PATIENT QL REPORTED: NO
GLOBULIN PLAS-MCNC: 2 G/DL (ref 2–3.5)
GLUCOSE BLD-MCNC: 109 MG/DL (ref 70–99)
HCT VFR BLD AUTO: 36 % (ref 35–48)
HGB BLD-MCNC: 11.9 G/DL (ref 12–16)
MCH RBC QN AUTO: 30 PG (ref 26–34)
MCHC RBC AUTO-ENTMCNC: 33.1 G/DL (ref 31–37)
MCV RBC AUTO: 90.7 FL (ref 80–100)
OSMOLALITY SERPL CALC.SUM OF ELEC: 290 MOSM/KG (ref 275–295)
P AXIS: 41 DEGREES
P-R INTERVAL: 222 MS
PLATELET # BLD AUTO: 343 10(3)UL (ref 150–450)
POTASSIUM SERPL-SCNC: 3.8 MMOL/L (ref 3.5–5.1)
PROT SERPL-MCNC: 6.4 G/DL (ref 5.7–8.2)
Q-T INTERVAL: 416 MS
QRS DURATION: 74 MS
QTC CALCULATION (BEZET): 411 MS
R AXIS: -18 DEGREES
RBC # BLD AUTO: 3.97 X10(6)UL (ref 3.8–5.3)
SODIUM SERPL-SCNC: 139 MMOL/L (ref 136–145)
T AXIS: 38 DEGREES
VENTRICULAR RATE: 59 BPM
WBC # BLD AUTO: 8.1 X10(3) UL (ref 4–11)

## 2025-07-09 PROCEDURE — 99214 OFFICE O/P EST MOD 30 MIN: CPT | Performed by: STUDENT IN AN ORGANIZED HEALTH CARE EDUCATION/TRAINING PROGRAM

## 2025-07-09 PROCEDURE — 93005 ELECTROCARDIOGRAM TRACING: CPT

## 2025-07-09 PROCEDURE — 80053 COMPREHEN METABOLIC PANEL: CPT

## 2025-07-09 PROCEDURE — 36415 COLL VENOUS BLD VENIPUNCTURE: CPT

## 2025-07-09 PROCEDURE — 85027 COMPLETE CBC AUTOMATED: CPT

## 2025-07-09 PROCEDURE — 85652 RBC SED RATE AUTOMATED: CPT

## 2025-07-09 PROCEDURE — 86140 C-REACTIVE PROTEIN: CPT

## 2025-07-09 PROCEDURE — 93010 ELECTROCARDIOGRAM REPORT: CPT | Performed by: INTERNAL MEDICINE

## 2025-07-09 RX ORDER — RANOLAZINE 500 MG/1
500 TABLET, EXTENDED RELEASE ORAL 2 TIMES DAILY
COMMUNITY
Start: 2025-07-04

## 2025-07-09 NOTE — TELEPHONE ENCOUNTER
Patient presents to office during office hours.  Patient inquiring what pre-op testing is indicated.  Informed patient of the following:  CBC, CMP, EKG and medical clearance is needed 30 days prior to surgery.   Encouraged to follow [facility] recommendations for Plavix and ASA.   Patient verbalized understanding.   Encouraged to call with questions or concerns.

## 2025-07-09 NOTE — PROGRESS NOTES
Subjective:   No Saucedo is a 64 year old female who presents for a Medicare Subsequent Annual Wellness visit (Pt already had Initial Annual Wellness) and scheduled follow up of multiple significant but stable problems and acute uncomplicated problem.   History of Present Illness    64-year-old pleasant lady here for Medicare annual wellness visit and also for preoperative clearance for urogynecology procedure.  She denies any chest pain or shortness of breath.  She is able to walk more than 2 blocks.  She does have psoriatic arthritis.  She does have a history of coronary disease status post CABG.  She follows with cardiology.  No abuse no depression no falls reported.  History/Other:   Fall Risk Assessment:   She has been screened for Falls and is High Risk. Fall Prevention information provided to patient in After Visit Summary.    Do you feel unsteady when standing or walking?: Yes  Do you worry about falling?: No  Have you fallen in the past year?: No     Cognitive Assessment:   She had a completely normal cognitive assessment - see flowsheet entries     Functional Ability/Status:   No Saucedo has some abnormal functions as listed below:  She has Dressing and/or Bathing issues based on screening of functional status.  Difficulty dressing or bathing?: Yes  Bathing or Showering: Able without help  Dressing: Able without help  She has Vision problems based on screening of functional status. She has Walking problems based on screening of functional status. She has problems with Daily Activities based on screening of functional status.       Depression Screening (PHQ):  PHQ-2 SCORE: 0  , done 7/9/2025        <5 minutes spent screening and counseling for depression    Advanced Directives:   She does NOT have a Living Will. [Do you have a living will?: No]  She has a Power of  for Health Care on file in [facility].  Discussed Advance Care Planning with patient (and family/surrogate if present). Standard  forms made available to patient in After Visit Summary.      Problem List[1]  Allergies:  She is allergic to adhesive tape.    Current Medications:  Active Meds, Sig Only[2]    Medical History:  She  has a past medical history of Basal cell carcinoma (02/01/2018), Cervical radiculopathy at C7 (09/29/2017), Colon polyps (02/2010), Congestive heart disease (HCC), Coronary atherosclerosis, Essential hypertension (2019), Fibromyalgia, High blood pressure, High cholesterol, Hypercholesterolemia, and Psoriatic arthritis (HCC).  Surgical History:  She  has a past surgical history that includes colonoscopy,biopsy (02/12/2010); vaginal hysterectomy (1987); foot surgery (Right, 1990); other (03/2018); colonoscopy (N/A, 07/07/2017); anesth,open heart surgery (2021); cabg; hysterectomy; and cath drug eluting stent.   Family History:  Her family history includes Bladder Cancer in her mother; Cancer in her maternal grandmother; Cancer (age of onset: 61) in her mother; Cervical Cancer in her mother; Colon Cancer in her mother; Diabetes in her maternal grandmother; Heart Disease in her maternal aunt, maternal grandfather, and mother; Hypertension in her maternal grandmother and mother; Peripheral Vascular Disease in her mother.  Social History:  She  reports that she quit smoking about 12 years ago. Her smoking use included cigarettes. She started smoking about 49 years ago. She has a 18.5 pack-year smoking history. She has never been exposed to tobacco smoke. She has never used smokeless tobacco. She reports that she does not currently use alcohol. She reports current drug use. Drug: Cannabis.    Tobacco:  She smoked tobacco in the past but quit greater than 12 months ago.  Tobacco Use[3]     CAGE Alcohol Screen:   CAGE screening score of 0 on 7/9/2025, showing low risk of alcohol abuse.      Patient Care Team:  Josep Valdez MD as PCP - General (Internal Medicine)  Trevor Garcia DO as Consulting Physician (Physical  Medicine)  Adrian Banks, PT, DPT, OCS, Cert MDT  as Physical Therapist (Physical Therapy)    Review of Systems   Constitutional:  Negative for activity change, appetite change and fever.   HENT:  Negative for congestion and voice change.    Respiratory:  Negative for cough and shortness of breath.    Cardiovascular:  Negative for chest pain.   Gastrointestinal:  Negative for abdominal distention, abdominal pain and vomiting.   Genitourinary:  Negative for hematuria.   Skin:  Negative for wound.   Psychiatric/Behavioral:  Negative for behavioral problems.          Objective:   Physical Exam  Constitutional:       Appearance: Normal appearance.   HENT:      Head: Normocephalic.   Eyes:      Conjunctiva/sclera: Conjunctivae normal.   Cardiovascular:      Rate and Rhythm: Normal rate and regular rhythm.      Heart sounds: Normal heart sounds. No murmur heard.  Pulmonary:      Effort: Pulmonary effort is normal.      Breath sounds: Normal breath sounds. No rhonchi or rales.   Abdominal:      General: Bowel sounds are normal.      Palpations: Abdomen is soft.      Tenderness: There is no abdominal tenderness.   Musculoskeletal:      Cervical back: Neck supple.      Right lower leg: No edema.      Left lower leg: No edema.   Skin:     General: Skin is warm and dry.   Neurological:      General: No focal deficit present.      Mental Status: She is alert and oriented to person, place, and time. Mental status is at baseline.   Psychiatric:         Mood and Affect: Mood normal.         Behavior: Behavior normal.         /68   Pulse 64   Temp 96.7 °F (35.9 °C) (Temporal)   Ht 5' 3\" (1.6 m)   Wt 130 lb (59 kg)   SpO2 98%   BMI 23.03 kg/m²  Estimated body mass index is 23.03 kg/m² as calculated from the following:    Height as of this encounter: 5' 3\" (1.6 m).    Weight as of this encounter: 130 lb (59 kg).    Medicare Hearing Assessment:   Hearing Screening    Time taken: 7/9/2025  1:10 PM  Screening Method:  Questionnaire  I have a problem hearing over the telephone: No I have trouble following the conversations when two or more people are talking at the same time: No   I have trouble understanding things on the TV: No I have to strain to understand conversations: No   I have to worry about missing the telephone ring or doorbell: No I have trouble hearing conversations in a noisy background such as a crowded room or restaurant: No   I get confused about where sounds come from: No I misunderstand some words in a sentence and need to ask people to repeat themselves: No   I especially have trouble understanding the speech of women and children: No I have trouble understanding the speaker in a large room such as at a meeting or place of [place]: No   Many people I talk to seem to mumble (or don't speak clearly): No People get annoyed because I misunderstand what they say: No   I misunderstand what others are saying and make inappropriate responses: No I avoid social activities because I cannot hear well and fear I will reply improperly: No   Family members and friends have told me they think I may have hearing loss: No                   Assessment & Plan:   No Saucedo is a 64 year old female who presents for a Medicare Assessment.     1. Psoriatic arthritis (HCC) (Primary) stable.  Continue to follow with rheumatology.  Overview:  Follows up with Dr EDGAR  2. Hypercholesterolemia continue statins  3. Essential hypertension controlled  4. Coronary artery disease involving native coronary artery of native heart with other form of angina pectoris stable with unremarkable cardiac review of systems.  Overview:  CABG and s/p PCI - follows up with cards  5. Overactive bladder planning surgery  6. Preoperative clearance  Patient has no major clinical predictors going for urogynecology  surgery.  Patient has reasonable functional capacity as described above.  I have ordered blood works, EKG as requested by surgeon.  If they are  acceptable, I will consider patient as low risk for perioperative cardiac events with routine perioperative care.  DVT prophylaxis as protocol  The above risk assessment was discussed with the patient and would like to proceed with the surgery.  Patient will discuss the risk and benefit of the surgery with the surgeon.  The above note will be faxed to surgeon's office.    Surgeons name Dr. Rivera  Location of surgery [facility]  Date of surgery July 24  Fax number to surgeon's office-surgeon uses epic    -     CBC, Platelet; No Differential; Future; Expected date: 07/09/2025  -     Comp Metabolic Panel (14); Future; Expected date: 07/09/2025  -     EKG 12 Lead; Future; Expected date: 07/09/2025    Assessment & Plan    The patient indicates understanding of these issues and agrees to the plan.  Reinforced healthy diet, lifestyle, and exercise.      No follow-ups on file.     Josep Valdez MD, 7/9/2025   Addendum dated 7/9/2025 at 3:19 PM  EKG, CBC, CMP reviewed.  Risk stratification as above.  Supplementary Documentation:   General Health:  In the past six months, have you lost more than 10 pounds without trying?: 2 - No  Has your appetite been poor?: No  Type of Diet: Balanced, Low Salt  How does the patient maintain a good energy level?: Daily Walks  How would you describe your daily physical activity?: Moderate  How would you describe your current health state?: Good  How do you maintain positive mental well-being?: Visiting Family, Social Interaction, Visiting Friends, Puzzles  On a scale of 0 to 10, with 0 being no pain and 10 being severe pain, what is your pain level?: 4 - (Moderate)  In the past six months, have you experienced urine leakage?: 1-Yes  At any time do you feel concerned for the safety/well-being of yourself and/or your children, in your home or elsewhere?: No  Have you had any immunizations at another office such as Influenza, Hepatitis B, Tetanus, or Pneumococcal?: No    Health Maintenance    Topic Date Due    Annual Physical  Never done    Zoster Vaccines (1 of 2) Never done    Colorectal Cancer Screening  04/13/2024    COVID-19 Vaccine (5 - 2024-25 season) 09/01/2024    DEXA Scan  Never done    Influenza Vaccine (1) 10/01/2025    Mammogram  03/06/2026    Annual Depression Screening  Completed    Fall Risk Screening (Annual)  Completed    Pneumococcal Vaccine: 50+ Years  Completed    Meningococcal B Vaccine  Aged Out            [1]   Patient Active Problem List  Diagnosis    Psoriatic arthritis (HCC)    Hypercholesterolemia    Colon polyps    Diverticulosis    Cervical radiculopathy at C7    Neoplasm of uncertain behavior of skin    Sun-damaged skin    History of basal cell carcinoma (BCC)    Essential hypertension    Burning reflux    Hemoperitoneum    Gastroesophageal reflux disease without esophagitis    CAD (coronary artery disease)    Overactive bladder    Thoracic aortic aneurysm without rupture, unspecified part    Anemia   [2]   Outpatient Medications Marked as Taking for the 7/9/25 encounter (Office Visit) with Josep Valdez MD   Medication Sig    ranolazine  MG Oral Tablet 12 Hr Take 1 tablet (500 mg total) by mouth 2 (two) times daily.    omeprazole 20 MG Oral Capsule Delayed Release Take 1 capsule (20 mg total) by mouth 2 (two) times daily before meals.    bumetanide 1 MG Oral Tab Take 1 tablet (1 mg total) by mouth every other day. Alternate every other day with 2 mg    bumetanide 2 MG Oral Tab Take 1 tablet (2 mg total) by mouth every other day. Alternate every other day with 1 mg    methotrexate 2.5 MG Oral Tab Take 5 tablets (12.5 mg total) by mouth once a week. Pt taking dose on Wednesdays    clobetasol 0.05 % External Cream Apply 1 Application topically 2 (two) times daily.    isosorbide mononitrate ER 60 MG Oral Tablet 24 Hr Take 1 tablet (60 mg total) by mouth daily.    folic acid 1 MG Oral Tab Take 1 tablet (1 mg total) by mouth daily.    ondansetron (ZOFRAN) 4 mg tablet  Take 1 tablet (4 mg total) by mouth every 8 (eight) hours as needed for Nausea.    atorvastatin 80 MG Oral Tab Take 1 tablet (80 mg total) by mouth nightly.    metoprolol succinate ER 25 MG Oral Tablet 24 Hr Take 1 tablet (25 mg total) by mouth in the morning.    clopidogrel 75 MG Oral Tab Take 1 tablet (75 mg total) by mouth in the morning.    NON FORMULARY Cannabis gummy 5 mg by mouth twice daily-takes at 5 pm and bedtime    aspirin 81 MG Oral Tab EC Take 1 tablet (81 mg total) by mouth in the morning.   [3]   Social History  Tobacco Use   Smoking Status Former    Current packs/day: 0.00    Average packs/day: 0.5 packs/day for 37.0 years (18.5 ttl pk-yrs)    Types: Cigarettes    Start date: 1975    Quit date: 2012    Years since quittin.9    Passive exposure: Never   Smokeless Tobacco Never

## 2025-07-09 NOTE — PATIENT INSTRUCTIONS
Jaswinder Ambrocio M.D. (eye doctor)  Address: [address] , [place] [address]  Phone: [phone]

## 2025-07-11 ENCOUNTER — TELEPHONE (OUTPATIENT)
Facility: CLINIC | Age: 65
End: 2025-07-11

## 2025-07-11 DIAGNOSIS — Z12.11 COLON CANCER SCREENING: Primary | ICD-10-CM

## 2025-07-11 DIAGNOSIS — Z80.0 FAMILY HISTORY OF COLON CANCER: ICD-10-CM

## 2025-07-11 DIAGNOSIS — Q43.8 TORTUOUS COLON: ICD-10-CM

## 2025-07-11 NOTE — TELEPHONE ENCOUNTER
GI Staff:    Can you please refer them/help schedule CT colonography.    Not sure what needs to be done on my end.    Thank you.    Likely [facility]?

## 2025-07-11 NOTE — PROGRESS NOTES
[facility] - Gastroenterology                                                                                                      Clinic Follow-up Visit    Chief Complaint   Patient presents with    Follow - Up     Subjective/HPI:   64 year old with hx of psoriatic arthritis, HLD, HTN, CAD s/p CABG and PCI (12/2024) here today to discuss colonoscopy.    Patient has a family hx of colon cancer (mother).     Patient had colonoscopy 2017 -- notably difficult, procedure complicated by vasovagal response and bradycardia, procedure was transiently aborted but ultimately completed. Repeat colonoscopy 2021 complicated by splenic laceration/hematoma requiring hospitalization for 3 nights.    Had several small adenomas 2021 -- given 3 year recall.     Overall patient is doing well. No nausea, vomiting. No blood in stool.    Asking if she should schedule colonoscopy given multiple complications in the past.    Wt Readings from Last 6 Encounters:   07/09/25 130 lb (59 kg)   07/09/25 128 lb (58.1 kg)   06/06/25 132 lb (59.9 kg)   04/22/25 132 lb (59.9 kg)   04/17/25 132 lb (59.9 kg)   04/19/25 136 lb (61.7 kg)      History, Medications, Allergies, ROS:      Past Medical History[1]   Past Surgical History[2]   Family History[3]   Social History: Short Social Hx on File[4]     Medications (Active prior to today's visit):  Current Medications[5]    Allergies:  Allergies[6]    ROS:   CONSTITUTIONAL:  negative for fevers, chills  EYES:  negative for change in vision  RESPIRATORY:  negative for  shortness of breath  CARDIOVASCULAR:  negative for  chest pain  GASTROINTESTINAL:  see HPI  GENITOURINARY:  negative for dysuria  INTEGUMENT/BREAST:  SKIN:  negative for  rash  ALLERGIC/IMMUNOLOGIC:  negative for hay fever  ENDOCRINE:  negative for cold intolerance and heat intolerance  MUSCULOSKELETAL:  negative for  joint stiffness and joint  swelling  BEHAVIOR/PSYCH:  negative for depressed mood    PHYSICAL EXAM:   Blood pressure 118/63, pulse 52, height 5' 3\" (1.6 m), weight 128 lb (58.1 kg), not currently breastfeeding.    Gen- Patient appears comfortable and in no acute discomfort  HEENT: the sclera appears anicteric, oropharynx clear, mucus membranes appear moist  CV- regular rate and rhythm, the extremities are warm and well perfused   Lung- Moves air well; No labored breathing  Abdomen- soft, non-tender exam in all quadrants without rigidity or guarding, non-distended, no masses are palpated  Skin- No jaundice  Ext: no edema is evident.   Neuro- Alert and oriented x4, and patient is having movement of all 4 extremities   Psych - appropriate, non-agitated    Labs/Imaging:     Patient's labs and imaging were reviewed and discussed with patient today.     ASSESSMENT/PLAN:   64 year old with hx of psoriatic arthritis, HLD, HTN, CAD s/p CABG and PCI (12/2024) here today to discuss colonoscopy.    #Hx of colon polyps  #Family hx of colon cancer  #Difficult colonoscopy c/b splenic hematoma (2021)  - The patient is here today to discuss a surveillance colonoscopy given complications in the past.  Her colonoscopy in 2021 was complicated by splenic hematoma requiring 3 nights in the hospital.  Colonoscopy prior to this in 2017 was complicated by vasovagal response and bradycardia but the procedure was completed and unremarkable.    The patient does have a family history of colon cancer and colon adenomas so she is at an increased risk of colon cancer.  However, her left colon is notably tortuous and difficult and she has had multiple complications and hospitalizations as a result of colonoscopies in the past.    We discussed today that we could proceed with a colonoscopy and obviously be cautious and use water immersion with left lower quadrant abdominal pressure.  We also discussed we can consider CT colonography and if anything abnormal shows up then  would proceed with colonoscopy.    She would like to start with CT colonography for now, if abnormal will do colonoscopy.    Orders This Visit:  No orders of the defined types were placed in this encounter.    Meds This Visit:  Requested Prescriptions      No prescriptions requested or ordered in this encounter     Imaging & Referrals:  None     Javier Mackay MD  [facility] Gastroenterology  2025       [1]   Past Medical History:   Basal cell carcinoma    Skin, left cutaneous lip    Cervical radiculopathy at C7    Colon polyps    Congestive heart disease (HCC)    Coronary atherosclerosis    Essential hypertension    Fibromyalgia    High blood pressure    High cholesterol    Hypercholesterolemia    Psoriatic arthritis (HCC)   [2]   Past Surgical History:  Procedure Laterality Date    Anesth,open heart surgery      Cabg      Cath drug eluting stent      Colonoscopy N/A 2017    Procedure: COLONOSCOPY;  Surgeon: Selina Timmons MD;  Location: [facility] ENDOSCOPY    Colonoscopy,biopsy  2010    Performed by GIOVANNI BRITO at [facility]    Foot surgery Right     Bunion    Hysterectomy      Other  2018    Mohs surgery left upper lip BCCa    Vaginal hysterectomy      With cystocele repair   [3]   Family History  Problem Relation Age of Onset    Colon Cancer Mother     Heart Disease Mother         CABG age 72    Hypertension Mother     Cancer Mother 61        Colon Cancer    Other (Bladder Cancer) Mother     Other (Cervical Cancer) Mother     Other (Peripheral Vascular Disease) Mother     Diabetes Maternal Grandmother     Hypertension Maternal Grandmother     Cancer Maternal Grandmother         hx of skin cancer     Heart Disease Maternal Grandfather          MI age 32    Heart Disease Maternal Aunt         Per NG: CAD    Breast Cancer Neg     Ovarian Cancer Neg     Prostate Cancer Neg     Pancreatic Cancer Neg    [4]   Social History  Socioeconomic History     Marital status:    Tobacco Use    Smoking status: Former     Current packs/day: 0.00     Average packs/day: 0.5 packs/day for 37.0 years (18.5 ttl pk-yrs)     Types: Cigarettes     Start date: 1975     Quit date: 2012     Years since quittin.9     Passive exposure: Never    Smokeless tobacco: Never   Vaping Use    Vaping status: Never Used   Substance and Sexual Activity    Alcohol use: Not Currently     Comment: former occasional use    Drug use: Yes     Types: Cannabis     Comment: Medical Gummies/Topical Cream   Other Topics Concern    Pt has a pacemaker No    Pt has a defibrillator No    Reaction to local anesthetic No    Right Handed Yes     Social Drivers of Health     Food Insecurity: No Food Insecurity (3/24/2025)    NCSS - Food Insecurity     Worried About Running Out of Food in the Last Year: No     Ran Out of Food in the Last Year: No   Transportation Needs: No Transportation Needs (3/24/2025)    NCSS - Transportation     Lack of Transportation: No   Housing Stability: Not At Risk (3/24/2025)    NCSS - Housing/Utilities     Has Housing: Yes     Worried About Losing Housing: No     Unable to Get Utilities: No   [5]   Current Outpatient Medications   Medication Sig Dispense Refill    omeprazole 20 MG Oral Capsule Delayed Release Take 1 capsule (20 mg total) by mouth 2 (two) times daily before meals. 180 capsule 3    bumetanide 1 MG Oral Tab Take 1 tablet (1 mg total) by mouth every other day. Alternate every other day with 2 mg 45 tablet 0    bumetanide 2 MG Oral Tab Take 1 tablet (2 mg total) by mouth every other day. Alternate every other day with 1 mg 45 tablet 0    bumetanide 1 MG Oral Tab Take 1 tablet (1 mg total) by mouth every other day. (Patient not taking: Reported on 2025)      methotrexate 2.5 MG Oral Tab Take 5 tablets (12.5 mg total) by mouth once a week. Pt taking dose on  65 tablet 1    clobetasol 0.05 % External Cream Apply 1 Application topically 2  (two) times daily. 60 g 3    isosorbide mononitrate ER 60 MG Oral Tablet 24 Hr Take 1 tablet (60 mg total) by mouth daily. 30 tablet 0    folic acid 1 MG Oral Tab Take 1 tablet (1 mg total) by mouth daily. 90 tablet 3    ondansetron (ZOFRAN) 4 mg tablet Take 1 tablet (4 mg total) by mouth every 8 (eight) hours as needed for Nausea. 30 tablet 3    atorvastatin 80 MG Oral Tab Take 1 tablet (80 mg total) by mouth nightly. 90 tablet 3    metoprolol succinate ER 25 MG Oral Tablet 24 Hr Take 1 tablet (25 mg total) by mouth in the morning.      clopidogrel 75 MG Oral Tab Take 1 tablet (75 mg total) by mouth in the morning.      NON FORMULARY Cannabis gummy 5 mg by mouth twice daily-takes at 5 pm and bedtime      aspirin 81 MG Oral Tab EC Take 1 tablet (81 mg total) by mouth in the morning.      ranolazine  MG Oral Tablet 12 Hr Take 1 tablet (500 mg total) by mouth 2 (two) times daily.      Na Sulfate-K Sulfate-Mg Sulf (SUPREP BOWEL PREP KIT) 17.5-3.13-1.6 GM/177ML Oral Solution Take as directed by GI clinic. Okay to substitute for generic. (Patient not taking: Reported on 7/9/2025) 1 each 0   [6]   Allergies  Allergen Reactions    Adhesive Tape HIVES

## 2025-07-11 NOTE — TELEPHONE ENCOUNTER
Dr. Mackay    I pended referral/order below  I put diagnosis colon cancer screen, family history of colon cancer, tortuous colon and put the issues she had in the past with colonoscopy in the comments.  Is that correct?  If so, please sign off    Once you sign it we can fax it over to [facility] radiology at [phone]  The patient then needs to call Radiology scheduling at [phone] to review their screening questions which then gets reviewed by Dr. White then they call patient to schedule the CT colonography.    Thank you

## 2025-07-14 NOTE — TELEPHONE ENCOUNTER
I spoke to the pt to let her kno I faxed her order and insurance information to [facility]/Radiology    She will call in a day or two to make an appointment    She will call us with any questions or concerns

## 2025-07-17 ENCOUNTER — TELEPHONE (OUTPATIENT)
Facility: CLINIC | Age: 65
End: 2025-07-17

## 2025-07-17 NOTE — TELEPHONE ENCOUNTER
Patient was called to confirm upcoming procedure.  Per pt Request she want to cancelled the procedure because she want to do the CT colonography at [facility]   SEE TE 7/111/2025

## 2025-07-18 ENCOUNTER — MED REC SCAN ONLY (OUTPATIENT)
Dept: INTERNAL MEDICINE CLINIC | Facility: CLINIC | Age: 65
End: 2025-07-18

## 2025-07-21 RX ORDER — DOCUSATE SODIUM 100 MG/1
100 CAPSULE, LIQUID FILLED ORAL DAILY
COMMUNITY

## 2025-07-23 NOTE — DISCHARGE INSTRUCTIONS
[facility]     Post-Operative Guidelines      AVOID CONSTIPATION - Take Miralax: one capful in water or juice each morning.  You can also take each evening if needed. Use milk of magnesia daily as needed to avoid straining with BMs  No lifting over 10 lbs. (1 gallon of milk is 8 lbs.)  It is okay to walk up and down stairs and to walk outside, but be careful not to become fatigued.  Showers and tub baths are okay, even if you have a catheter or abdominal incision.  You may ride in a car immediately.  You may drive after 1-2 weeks.    Please call us for any of the following:  Temperature above 100.5 for 4 hours or above 101.0 at any time  Chest pain or trouble breathing  Vaginal bleeding heavier than a period  Redness, tenderness or swelling of your legs  Pain or burning when you urinate  Redness, pain or foul discharge from incision    Office telephone, [phone], after hours [phone]     98

## 2025-07-23 NOTE — H&P
65 y/o female with vaginal vault prolapse and stress urinary incontinence for surgical mgmt  Bulge sx worsening, interested in surgical mgmt    Pre operative clearance with Dr Valdez, pt seen & examined without changes.    Cardiology clearance obtained (hard copy provided)    Thorough discussion of surgical risks, benefits, and alternatives including, but not limited to bleeding/clots, infection, injury to nearby organs (urethra, bladder, ureters, bowel, blood vessels), mesh erosion, dyspareunia, de josé miguel UI, worsening UI, recurrence, voiding dysfunction, and pain.    Discussed pain mgmt and potential need for narcotics. Discussed addiction potential with narcotics. IL  reviewed.    Plan to proceed with [facility] APE repair, MUS, cysto (+cath) as consented  All questions answered.   Michelle Rivera

## 2025-07-24 ENCOUNTER — ANESTHESIA (OUTPATIENT)
Dept: SURGERY | Facility: HOSPITAL | Age: 65
End: 2025-07-24
Payer: MEDICARE

## 2025-07-24 ENCOUNTER — ANESTHESIA EVENT (OUTPATIENT)
Dept: SURGERY | Facility: HOSPITAL | Age: 65
End: 2025-07-24
Payer: MEDICARE

## 2025-07-24 ENCOUNTER — HOSPITAL ENCOUNTER (OUTPATIENT)
Facility: HOSPITAL | Age: 65
Discharge: HOME | End: 2025-07-25
Attending: OBSTETRICS & GYNECOLOGY | Admitting: OBSTETRICS & GYNECOLOGY

## 2025-07-24 DIAGNOSIS — G89.18 POST-OP PAIN: Primary | ICD-10-CM

## 2025-07-24 PROBLEM — N81.9 VAGINAL VAULT PROLAPSE: Status: ACTIVE | Noted: 2025-07-24

## 2025-07-24 PROBLEM — I50.30 (HFPEF) HEART FAILURE WITH PRESERVED EJECTION FRACTION (HCC): Status: ACTIVE | Noted: 2025-07-24

## 2025-07-24 PROCEDURE — 99214 OFFICE O/P EST MOD 30 MIN: CPT | Performed by: HOSPITALIST

## 2025-07-24 DEVICE — SYSTEM SLNG BLU PELV FLR MID URETH TRNSVAG: Type: IMPLANTABLE DEVICE | Status: FUNCTIONAL

## 2025-07-24 RX ORDER — HYDROMORPHONE HYDROCHLORIDE 1 MG/ML
0.4 INJECTION, SOLUTION INTRAMUSCULAR; INTRAVENOUS; SUBCUTANEOUS EVERY 5 MIN PRN
Status: DISCONTINUED | OUTPATIENT
Start: 2025-07-24 | End: 2025-07-24 | Stop reason: HOSPADM

## 2025-07-24 RX ORDER — PANTOPRAZOLE SODIUM 40 MG/1
40 TABLET, DELAYED RELEASE ORAL
Status: DISCONTINUED | OUTPATIENT
Start: 2025-07-25 | End: 2025-07-25

## 2025-07-24 RX ORDER — HYDROMORPHONE HYDROCHLORIDE 1 MG/ML
0.4 INJECTION, SOLUTION INTRAMUSCULAR; INTRAVENOUS; SUBCUTANEOUS EVERY 2 HOUR PRN
Status: DISCONTINUED | OUTPATIENT
Start: 2025-07-24 | End: 2025-07-25

## 2025-07-24 RX ORDER — METOPROLOL SUCCINATE 25 MG/1
25 TABLET, EXTENDED RELEASE ORAL EVERY MORNING
Status: DISCONTINUED | OUTPATIENT
Start: 2025-07-25 | End: 2025-07-25

## 2025-07-24 RX ORDER — SODIUM CHLORIDE, SODIUM LACTATE, POTASSIUM CHLORIDE, CALCIUM CHLORIDE 600; 310; 30; 20 MG/100ML; MG/100ML; MG/100ML; MG/100ML
INJECTION, SOLUTION INTRAVENOUS CONTINUOUS
Status: DISCONTINUED | OUTPATIENT
Start: 2025-07-24 | End: 2025-07-24 | Stop reason: HOSPADM

## 2025-07-24 RX ORDER — DEXAMETHASONE SODIUM PHOSPHATE 4 MG/ML
VIAL (ML) INJECTION AS NEEDED
Status: DISCONTINUED | OUTPATIENT
Start: 2025-07-24 | End: 2025-07-24 | Stop reason: SURG

## 2025-07-24 RX ORDER — MORPHINE SULFATE 4 MG/ML
2 INJECTION, SOLUTION INTRAMUSCULAR; INTRAVENOUS EVERY 10 MIN PRN
Status: DISCONTINUED | OUTPATIENT
Start: 2025-07-24 | End: 2025-07-24 | Stop reason: HOSPADM

## 2025-07-24 RX ORDER — SODIUM CHLORIDE, SODIUM LACTATE, POTASSIUM CHLORIDE, CALCIUM CHLORIDE 600; 310; 30; 20 MG/100ML; MG/100ML; MG/100ML; MG/100ML
INJECTION, SOLUTION INTRAVENOUS CONTINUOUS
Status: DISCONTINUED | OUTPATIENT
Start: 2025-07-24 | End: 2025-07-25

## 2025-07-24 RX ORDER — RANOLAZINE 500 MG/1
500 TABLET, EXTENDED RELEASE ORAL 2 TIMES DAILY
Status: DISCONTINUED | OUTPATIENT
Start: 2025-07-24 | End: 2025-07-24

## 2025-07-24 RX ORDER — ROCURONIUM BROMIDE 10 MG/ML
INJECTION, SOLUTION INTRAVENOUS AS NEEDED
Status: DISCONTINUED | OUTPATIENT
Start: 2025-07-24 | End: 2025-07-24 | Stop reason: SURG

## 2025-07-24 RX ORDER — ACETAMINOPHEN 500 MG
1000 TABLET ORAL ONCE
Status: COMPLETED | OUTPATIENT
Start: 2025-07-24 | End: 2025-07-24

## 2025-07-24 RX ORDER — ONDANSETRON 2 MG/ML
4 INJECTION INTRAMUSCULAR; INTRAVENOUS EVERY 6 HOURS PRN
Status: DISCONTINUED | OUTPATIENT
Start: 2025-07-24 | End: 2025-07-24 | Stop reason: HOSPADM

## 2025-07-24 RX ORDER — GLYCOPYRROLATE 0.2 MG/ML
INJECTION, SOLUTION INTRAMUSCULAR; INTRAVENOUS AS NEEDED
Status: DISCONTINUED | OUTPATIENT
Start: 2025-07-24 | End: 2025-07-24 | Stop reason: SURG

## 2025-07-24 RX ORDER — IBUPROFEN 600 MG/1
600 TABLET, FILM COATED ORAL EVERY 6 HOURS SCHEDULED
Status: DISCONTINUED | OUTPATIENT
Start: 2025-07-25 | End: 2025-07-25

## 2025-07-24 RX ORDER — ATORVASTATIN CALCIUM 40 MG/1
80 TABLET, FILM COATED ORAL NIGHTLY
Status: DISCONTINUED | OUTPATIENT
Start: 2025-07-24 | End: 2025-07-25

## 2025-07-24 RX ORDER — HYDROCODONE BITARTRATE AND ACETAMINOPHEN 5; 325 MG/1; MG/1
1 TABLET ORAL EVERY 4 HOURS PRN
Status: DISCONTINUED | OUTPATIENT
Start: 2025-07-24 | End: 2025-07-25

## 2025-07-24 RX ORDER — ONDANSETRON 2 MG/ML
INJECTION INTRAMUSCULAR; INTRAVENOUS AS NEEDED
Status: DISCONTINUED | OUTPATIENT
Start: 2025-07-24 | End: 2025-07-24 | Stop reason: SURG

## 2025-07-24 RX ORDER — BUMETANIDE 1 MG/1
1 TABLET ORAL EVERY OTHER DAY
Status: DISCONTINUED | OUTPATIENT
Start: 2025-07-25 | End: 2025-07-25

## 2025-07-24 RX ORDER — MORPHINE SULFATE 4 MG/ML
4 INJECTION, SOLUTION INTRAMUSCULAR; INTRAVENOUS EVERY 10 MIN PRN
Status: DISCONTINUED | OUTPATIENT
Start: 2025-07-24 | End: 2025-07-24 | Stop reason: HOSPADM

## 2025-07-24 RX ORDER — METOCLOPRAMIDE HYDROCHLORIDE 5 MG/ML
10 INJECTION INTRAMUSCULAR; INTRAVENOUS EVERY 8 HOURS PRN
Status: DISCONTINUED | OUTPATIENT
Start: 2025-07-24 | End: 2025-07-24 | Stop reason: HOSPADM

## 2025-07-24 RX ORDER — LIDOCAINE HYDROCHLORIDE 10 MG/ML
INJECTION, SOLUTION EPIDURAL; INFILTRATION; INTRACAUDAL; PERINEURAL AS NEEDED
Status: DISCONTINUED | OUTPATIENT
Start: 2025-07-24 | End: 2025-07-24 | Stop reason: SURG

## 2025-07-24 RX ORDER — KETOROLAC TROMETHAMINE 30 MG/ML
INJECTION, SOLUTION INTRAMUSCULAR; INTRAVENOUS AS NEEDED
Status: DISCONTINUED | OUTPATIENT
Start: 2025-07-24 | End: 2025-07-24 | Stop reason: SURG

## 2025-07-24 RX ORDER — METOCLOPRAMIDE HYDROCHLORIDE 5 MG/ML
INJECTION INTRAMUSCULAR; INTRAVENOUS AS NEEDED
Status: DISCONTINUED | OUTPATIENT
Start: 2025-07-24 | End: 2025-07-24 | Stop reason: SURG

## 2025-07-24 RX ORDER — ONDANSETRON 2 MG/ML
4 INJECTION INTRAMUSCULAR; INTRAVENOUS EVERY 8 HOURS PRN
Status: DISCONTINUED | OUTPATIENT
Start: 2025-07-24 | End: 2025-07-25

## 2025-07-24 RX ORDER — KETOROLAC TROMETHAMINE 15 MG/ML
15 INJECTION, SOLUTION INTRAMUSCULAR; INTRAVENOUS EVERY 6 HOURS
Status: COMPLETED | OUTPATIENT
Start: 2025-07-24 | End: 2025-07-25

## 2025-07-24 RX ORDER — METOPROLOL TARTRATE 25 MG/1
25 TABLET, FILM COATED ORAL ONCE AS NEEDED
Status: DISCONTINUED | OUTPATIENT
Start: 2025-07-24 | End: 2025-07-24 | Stop reason: HOSPADM

## 2025-07-24 RX ORDER — HYDROMORPHONE HYDROCHLORIDE 1 MG/ML
0.1 INJECTION, SOLUTION INTRAMUSCULAR; INTRAVENOUS; SUBCUTANEOUS EVERY 2 HOUR PRN
Status: DISCONTINUED | OUTPATIENT
Start: 2025-07-24 | End: 2025-07-25

## 2025-07-24 RX ORDER — HYDROMORPHONE HYDROCHLORIDE 1 MG/ML
0.2 INJECTION, SOLUTION INTRAMUSCULAR; INTRAVENOUS; SUBCUTANEOUS EVERY 5 MIN PRN
Status: DISCONTINUED | OUTPATIENT
Start: 2025-07-24 | End: 2025-07-24 | Stop reason: HOSPADM

## 2025-07-24 RX ORDER — BUPIVACAINE HCL/EPINEPHRINE 0.25-.0005
VIAL (ML) INJECTION AS NEEDED
Status: DISCONTINUED | OUTPATIENT
Start: 2025-07-24 | End: 2025-07-24 | Stop reason: HOSPADM

## 2025-07-24 RX ORDER — HYDROMORPHONE HYDROCHLORIDE 1 MG/ML
0.6 INJECTION, SOLUTION INTRAMUSCULAR; INTRAVENOUS; SUBCUTANEOUS EVERY 5 MIN PRN
Status: DISCONTINUED | OUTPATIENT
Start: 2025-07-24 | End: 2025-07-24 | Stop reason: HOSPADM

## 2025-07-24 RX ORDER — HYDROMORPHONE HYDROCHLORIDE 1 MG/ML
0.2 INJECTION, SOLUTION INTRAMUSCULAR; INTRAVENOUS; SUBCUTANEOUS EVERY 2 HOUR PRN
Status: DISCONTINUED | OUTPATIENT
Start: 2025-07-24 | End: 2025-07-25

## 2025-07-24 RX ORDER — NALOXONE HYDROCHLORIDE 0.4 MG/ML
0.08 INJECTION, SOLUTION INTRAMUSCULAR; INTRAVENOUS; SUBCUTANEOUS AS NEEDED
Status: DISCONTINUED | OUTPATIENT
Start: 2025-07-24 | End: 2025-07-24 | Stop reason: HOSPADM

## 2025-07-24 RX ORDER — MORPHINE SULFATE 10 MG/ML
6 INJECTION, SOLUTION INTRAMUSCULAR; INTRAVENOUS EVERY 10 MIN PRN
Status: DISCONTINUED | OUTPATIENT
Start: 2025-07-24 | End: 2025-07-24 | Stop reason: HOSPADM

## 2025-07-24 RX ORDER — RANOLAZINE 500 MG/1
500 TABLET, EXTENDED RELEASE ORAL 2 TIMES DAILY
Status: DISCONTINUED | OUTPATIENT
Start: 2025-07-24 | End: 2025-07-25

## 2025-07-24 RX ORDER — ISOSORBIDE MONONITRATE 30 MG/1
30 TABLET, EXTENDED RELEASE ORAL DAILY
Status: DISCONTINUED | OUTPATIENT
Start: 2025-07-25 | End: 2025-07-25

## 2025-07-24 RX ORDER — BUMETANIDE 1 MG/1
2 TABLET ORAL EVERY OTHER DAY
Status: DISCONTINUED | OUTPATIENT
Start: 2025-07-24 | End: 2025-07-25

## 2025-07-24 RX ORDER — METOCLOPRAMIDE HYDROCHLORIDE 5 MG/ML
10 INJECTION INTRAMUSCULAR; INTRAVENOUS EVERY 6 HOURS PRN
Status: DISCONTINUED | OUTPATIENT
Start: 2025-07-24 | End: 2025-07-25

## 2025-07-24 RX ORDER — ONDANSETRON 4 MG/1
4 TABLET, FILM COATED ORAL EVERY 8 HOURS PRN
Status: DISCONTINUED | OUTPATIENT
Start: 2025-07-24 | End: 2025-07-25

## 2025-07-24 RX ADMIN — KETOROLAC TROMETHAMINE 15 MG: 30 INJECTION, SOLUTION INTRAMUSCULAR; INTRAVENOUS at 11:41:00

## 2025-07-24 RX ADMIN — ROCURONIUM BROMIDE 20 MG: 10 INJECTION, SOLUTION INTRAVENOUS at 10:46:00

## 2025-07-24 RX ADMIN — METOCLOPRAMIDE HYDROCHLORIDE 10 MG: 5 INJECTION INTRAMUSCULAR; INTRAVENOUS at 10:46:00

## 2025-07-24 RX ADMIN — DEXAMETHASONE SODIUM PHOSPHATE 4 MG: 4 MG/ML VIAL (ML) INJECTION at 10:41:00

## 2025-07-24 RX ADMIN — SODIUM CHLORIDE, SODIUM LACTATE, POTASSIUM CHLORIDE, CALCIUM CHLORIDE: 600; 310; 30; 20 INJECTION, SOLUTION INTRAVENOUS at 10:36:00

## 2025-07-24 RX ADMIN — LIDOCAINE HYDROCHLORIDE 50 MG: 10 INJECTION, SOLUTION EPIDURAL; INFILTRATION; INTRACAUDAL; PERINEURAL at 10:41:00

## 2025-07-24 RX ADMIN — GLYCOPYRROLATE 0.2 MG: 0.2 INJECTION, SOLUTION INTRAMUSCULAR; INTRAVENOUS at 10:58:00

## 2025-07-24 RX ADMIN — ROCURONIUM BROMIDE 10 MG: 10 INJECTION, SOLUTION INTRAVENOUS at 10:41:00

## 2025-07-24 RX ADMIN — ONDANSETRON 4 MG: 2 INJECTION INTRAMUSCULAR; INTRAVENOUS at 11:05:00

## 2025-07-24 RX ADMIN — ONDANSETRON 4 MG: 2 INJECTION INTRAMUSCULAR; INTRAVENOUS at 11:31:00

## 2025-07-24 NOTE — OPERATIVE REPORT
PRE-OP DIAGNOSIS:  Vaginal vault prolapse; stress incontinence    POST-OP DIAGNOSIS:  Same    PROCEDURE:  Repair of enterocele; uterosacral ligament suspension; anterior colporrhaphy; posterior colporrhaphy; midurethral sling; cystourethroscopy    SURGEON:  Michelle Rivera DO    ASSISTANT:  Kindra    ANESTHESIA:  General    EBL:  100 mL  UOP: 100 mL    Specimen: none    Findings: bilateral ureteral efflux, without evidence of bladder, ureteral, or urethral injury. Hemostasis.    DESCRIPTION OF PROCEDURE:   The patient was taken to the operating room, with IV running after informed consent was obtained.   She was placed in the supine position and induced under general anesthesia.    The patient was then placed in the dorsal lithotomy position and prepped and draped in the usual sterile fashion.    The [place] retractor was utilized and retraction was placed into the vagina.  The vaginal apex was identified and was grasped with Allis clamps. Cautery was used to make an incision after infiltration local solution (.25% marcaine with epinepherine).    The peritoneum was entered sharply, with care taken to avoid injury to surrounding structures.    A lap sponge was placed into the peritoneal cavity.    Due to the presence of significant apical prolapse and associated enterocele, it was necessary to proceed with suspension of the vaginal apex and enterocele repair. A lap sponge was placed into the peritoneal cavity and the uterosacral ligaments identified. A Mcclendon stitch was placed and tagged to repair the enterocele. This suture was placed through the posterior vaginal wall, through the left uterosacral ligament, across the anterior rectum, through the right uterosacral ligament and then back through the posterior vaginal wall. Two uterosacral suspension stitches were then placed on each side of the pelvis. These were placed high on the uterosacral ligaments at and just proximal to the ischial spine. Uterosacral  suspension stitches were then placed using 1-0 vicryl sutures on each side of the pelvis for apical suspension. Once these sutures were placed, the urethra was dilated to accommodate a 23 [place]ish caliber cystoscope sheath and cystoscopy was undertaken. Cystoscopy confirmed that there had been no injury to the bladder. Urine was seen from both ureteral orifices confirming that the ureters were patent.     Allis clamps were used to grasp the redundant anterior vaginal epithelium in the midline and a midline incision was made after infiltration of .25% marcaine with epinepherine.  The redundant epithelium was dissected from the underlying endopelvic connective tissue of the bladder and 0 Vicryl suture was then used to plicate the endopelvic connective tissue, obliterating the cystocele. The excess vaginal epithelium was excised and the midline incision was then closed with 2-0 Vicryl suture in running locking fashion.     The lap sponge was removed from the peritoneal cavity, hemostasis confirmed, and the vaginal apex was then closed with 2-0 Vicryl suture in a running locking fashion. The uterosacral sutures were then tied down resulting in satisfactory elevation of the vaginal apex.     Allis clamps were then used to grasp the vaginal epithelium suburethrally and a scalpel was used to make the midline incision after infiltration of .25% marcaine with epinepherine. Sharp dissection with Metzenbaum scissors was performed to dissect the periurethral tissues towards the pubic rami bilaterally. The bladder was emptied, the Advantage Fit trocars were then used to place the guide sleeves into the retropubic space.  Once the sleeves were in position, cystoscopy was undertaken. No bladder, urethral, or ureteral injuries identified. Urine seen from both UOs.    The sleeves were then brought through the suprapubic skin, bringing the mesh into position at the level of the midurethra.  Care was taken to avoid placing tension on  the urethra as the covering sheath was removed from the mesh. The excess mesh was cut away at the suprapubic skin and those incisions closed with skin glue. The vaginal incision was then closed with 2-0 Vicryl suture in a running locking fashion.    Attention was then directed to the posterior compartment.  The redundant posterior vaginal epithelium and perineal skin was excised after infiltration of .25% marcaine with epinepherine. 0 Vicryl suture was then used to plicate the rectovaginal connective tissue, obliterating the rectocele.  Additional sutures of 0 Vicryl were utilized to reconstruct the perineal body.  Rectal examination confirmed that none of these sutures had impinged the rectum.  2-0 Vicryl sutures were then used to reapproximate the vaginal epithelium in the midline in a running locking fashion.     Inspection at this point revealed a well-supported vaginal apex, anterior, and posterior compartments, with good hemostasis.  A Temple catheter was placed transurethrally.  The patient was then removed from the dorsal lithotomy position, awakened, extubated, and transferred from the operating room to the recovery room in stable condition, having tolerated the procedure well. Sponge, lap, & needle counts were correct.

## 2025-07-24 NOTE — ANESTHESIA POSTPROCEDURE EVALUATION
97.2Patient: No Saucedo    Procedure Summary       Date: 07/24/25 Room / Location: [facility] MAIN OR 01 / EM MAIN OR    Anesthesia Start: 1036 Anesthesia Stop: 1150    Procedures:       Uterosacral vaginal vault suspension, anterior / posterior / enterocele repair, placement of mid urethral sling, cystoscopy (Vagina )      ANTERIOR POSTERIOR REPAIR (Vagina )      PUBO VAGINAL SLING (Vagina )      CYSTOSCOPY (Urethra) Diagnosis: (Vaginal prolapse, stress urinary incontinenc)    Surgeons: Michelle iRvera DO Anesthesiologist: Valentina Cota MD    Anesthesia Type: general ASA Status: 3            Anesthesia Type: No value filed.    Vitals Value Taken Time   /82 07/24/25 11:51   Temp 97.2 07/24/25 11:56   Pulse 68 07/24/25 11:55   Resp 16 07/24/25 11:55   SpO2 99 % 07/24/25 11:55   Vitals shown include unfiled device data.    [facility] AN Post Evaluation:   Patient Evaluated in PACU  Patient Participation: complete - patient participated  Level of Consciousness: awake  Pain Management: adequate  Airway Patency:patent  Yes    Nausea/Vomiting: none  Cardiovascular Status: hemodynamically stable  Respiratory Status: acceptable  Postoperative Hydration stable      Valentina Cota MD  7/24/2025 11:56 AM

## 2025-07-24 NOTE — ANESTHESIA PREPROCEDURE EVALUATION
Anesthesia PreOp Note    HPI:     No Saucedo is a 64 year old female who presents for preoperative consultation requested by: Michelle Rivera DO    Date of Surgery: 7/24/2025    Procedure(s):  Uterosacral vaginal vault suspension, anterior / posterior / enterocele repair, placement of mid urethral sling, cystoscopy  ANTERIOR POSTERIOR REPAIR  PUBO VAGINAL SLING  CYSTOSCOPY  Indication: Vaginal vault prolapse, stress incontinence    Relevant Problems   No relevant active problems       NPO:                         History Review:  Patient Active Problem List    Diagnosis Date Noted    Anemia 03/24/2025    Thoracic aortic aneurysm without rupture, unspecified part 12/23/2024    Overactive bladder 05/19/2023    Gastroesophageal reflux disease without esophagitis 02/16/2023    CAD (coronary artery disease) 02/16/2023    Hemoperitoneum 04/13/2021    Burning reflux 04/07/2021    Essential hypertension 01/01/2019    History of basal cell carcinoma (BCC) 08/23/2018    Neoplasm of uncertain behavior of skin 02/01/2018    Sun-damaged skin 02/01/2018    Cervical radiculopathy at C7 09/29/2017    Diverticulosis 07/07/2017    Colon polyps 05/15/2015    Hypercholesterolemia 05/01/2015    Psoriatic arthritis (HCC) 08/16/2014   History of postop nausea and vomiting  History of CAD, history of CABG, stent    Past Medical History[1]    Past Surgical History[2]    Prescriptions Prior to Admission[3]  Current Medications and Prescriptions Ordered in Epic[4]    Allergies[5]    Family History[6]  Social Hx on file[7]    Available pre-op labs reviewed.  Lab Results   Component Value Date    WBC 8.1 07/09/2025    RBC 3.97 07/09/2025    HGB 11.9 (L) 07/09/2025    HCT 36.0 07/09/2025    MCV 90.7 07/09/2025    MCH 30.0 07/09/2025    MCHC 33.1 07/09/2025    RDW 13.1 07/09/2025    .0 07/09/2025     Lab Results   Component Value Date     07/09/2025    K 3.8 07/09/2025     07/09/2025    CO2 29.0 07/09/2025    BUN 16  07/09/2025    CREATSERUM 0.95 07/09/2025     (H) 07/09/2025    CA 9.2 07/09/2025          Vital Signs:  Body mass index is 23.03 kg/m².   height is 1.6 m (5' 3\") and weight is 59 kg (130 lb).   Vitals:    07/21/25 1142   Weight: 59 kg (130 lb)   Height: 1.6 m (5' 3\")        Anesthesia Evaluation      History of anesthetic complications   Airway   Mallampati: II  TM distance: >3 FB  Neck ROM: full  Dental - Dentition appears grossly intact     Pulmonary - normal exam     ROS comment: Former tobacco use  Cardiovascular - normal exam  (+) hypertension, CAD, CHF    ROS comment: History of thoracic aneurysm    Neuro/Psych    (+)  neuromuscular disease,        GI/Hepatic/Renal    (+) GERD    Endo/Other      Comments: Fibromyalgia  Abdominal             EKG   Sinus bradycardia with 1st degree A-V block  Otherwise normal ECG  When compared with ECG of 08-APR-2025 13:17,  No significant change was found  Confirmed by NELSON HALL ([id number]) on 7/9/2025 3:12:40 PM     Specimen Collected: 07/09/25 14:18   Stress test 3/28/2025 normal  ECHO 3/25/2025  Conclusions:   Left ventricle: The cavity size was normal. Wall thickness was normal.   Systolic function was normal. The estimated ejection fraction was 50-55%, by   visual assessment. Wall motion is normal; there are no regional wall motion   abnormalities. Doppler parameters are consistent with abnormal left   ventricular relaxation - grade 1 diastolic dysfunction.        Anesthesia Plan:   ASA:  3  Plan:   General  Airway:  ETT  Informed Consent Plan and Risks Discussed With:  Patient      I have informed No Saucedo and/or legal guardian or family member of the nature of the anesthetic plan, benefits, risks including possible dental damage if relevant, major complications, and any alternative forms of anesthetic management.   All of the patient's questions were answered to the best of my ability. The patient desires the anesthetic management as planned.  Valentina Cota,  MD  7/24/2025 8:35 AM  Present on Admission:  **None**           [1]   Past Medical History:   Basal cell carcinoma    Skin, left cutaneous lip    Cataract    Cervical radiculopathy at C7    Colon polyps    Congestive heart disease (HCC)    Coronary atherosclerosis    Essential hypertension    Fibromyalgia    High blood pressure    High cholesterol    Hx of motion sickness    Hypercholesterolemia    PONV (postoperative nausea and vomiting)    Psoriatic arthritis (HCC)   [2]   Past Surgical History:  Procedure Laterality Date    Anesth,open heart surgery  2021    Cabg      Cath drug eluting stent      Colonoscopy N/A 07/07/2017    Procedure: COLONOSCOPY;  Surgeon: Selina Timmons MD;  Location: [facility] ENDOSCOPY    Colonoscopy,biopsy  02/12/2010    Performed by GIOVANNI BRITO at [facility], [facility]    Foot surgery Right 1990    Bunion    Hysterectomy      Other  03/2018    Mohs surgery left upper lip BCCa    Vaginal hysterectomy  1987    With cystocele repair   [3]   No medications prior to admission.   [4]   No current Epic-ordered facility-administered medications on file.     Current Outpatient Medications Ordered in Epic   Medication Sig Dispense Refill    docusate sodium 100 MG Oral Cap Take 1 capsule (100 mg total) by mouth daily.      ranolazine  MG Oral Tablet 12 Hr Take 1 tablet (500 mg total) by mouth 2 (two) times daily.      omeprazole 20 MG Oral Capsule Delayed Release Take 1 capsule (20 mg total) by mouth 2 (two) times daily before meals. 180 capsule 3    bumetanide 1 MG Oral Tab Take 1 tablet (1 mg total) by mouth every other day. Alternate every other day with 2 mg 45 tablet 0    bumetanide 2 MG Oral Tab Take 1 tablet (2 mg total) by mouth every other day. Alternate every other day with 1 mg 45 tablet 0    methotrexate 2.5 MG Oral Tab Take 5 tablets (12.5 mg total) by mouth once a week. Pt taking dose on Wednesdays 65 tablet 1    clobetasol 0.05 % External Cream Apply 1 Application  topically 2 (two) times daily. 60 g 3    isosorbide mononitrate ER 60 MG Oral Tablet 24 Hr Take 1 tablet (60 mg total) by mouth daily. (Patient taking differently: Take 0.5 tablets (30 mg total) by mouth in the morning.) 30 tablet 0    folic acid 1 MG Oral Tab Take 1 tablet (1 mg total) by mouth daily. 90 tablet 3    ondansetron (ZOFRAN) 4 mg tablet Take 1 tablet (4 mg total) by mouth every 8 (eight) hours as needed for Nausea. 30 tablet 3    atorvastatin 80 MG Oral Tab Take 1 tablet (80 mg total) by mouth nightly. 90 tablet 3    metoprolol succinate ER 25 MG Oral Tablet 24 Hr Take 1 tablet (25 mg total) by mouth in the morning.      clopidogrel 75 MG Oral Tab Take 1 tablet (75 mg total) by mouth in the morning.      NON FORMULARY Cannabis gummy 5 mg by mouth twice daily-takes at 5 pm and bedtime      aspirin 81 MG Oral Tab EC Take 1 tablet (81 mg total) by mouth in the morning.      bumetanide 1 MG Oral Tab Take 1 tablet (1 mg total) by mouth every other day. (Patient not taking: Reported on 2025)      Na Sulfate-K Sulfate-Mg Sulf (SUPREP BOWEL PREP KIT) 17.5-3.13-1.6 GM/177ML Oral Solution Take as directed by GI clinic. Okay to substitute for generic. (Patient not taking: Reported on 2025) 1 each 0   [5]   Allergies  Allergen Reactions    Adhesive Tape HIVES   [6]   Family History  Problem Relation Age of Onset    Colon Cancer Mother     Heart Disease Mother         CABG age 72    Hypertension Mother     Cancer Mother 61        Colon Cancer    Other (Bladder Cancer) Mother     Other (Cervical Cancer) Mother     Other (Peripheral Vascular Disease) Mother     Diabetes Maternal Grandmother     Hypertension Maternal Grandmother     Cancer Maternal Grandmother         hx of skin cancer     Heart Disease Maternal Grandfather          MI age 32    Heart Disease Maternal Aunt         Per NG: CAD    Breast Cancer Neg     Ovarian Cancer Neg     Prostate Cancer Neg     Pancreatic Cancer Neg    [7]   Social  History  Socioeconomic History    Marital status:    Tobacco Use    Smoking status: Former     Current packs/day: 0.00     Average packs/day: 0.5 packs/day for 37.0 years (18.5 ttl pk-yrs)     Types: Cigarettes     Start date: 1975     Quit date: 2012     Years since quittin.9     Passive exposure: Never    Smokeless tobacco: Never   Vaping Use    Vaping status: Never Used   Substance and Sexual Activity    Alcohol use: Not Currently     Comment: former occasional use    Drug use: Yes     Types: Cannabis     Comment: Medical Gummies/Topical Cream   Other Topics Concern    Pt has a pacemaker No    Pt has a defibrillator No    Reaction to local anesthetic No    Right Handed Yes

## 2025-07-24 NOTE — CONSULTS
[facility]    PATIENT'S NAME: VICENTA SHAH   ATTENDING PHYSICIAN: Michelle Rivera DO   CONSULTING PHYSICIAN: Luciana Mejia MD   PATIENT ACCOUNT#:   [id number]    LOCATION:  [facility] PACU 9 [facility] 10  MEDICAL RECORD #:   [id number]       YOB: 1960  ADMISSION DATE:       07/24/2025      CONSULT DATE:  07/24/2025    REPORT OF CONSULTATION    REASON FOR ADMISSION:  Post vaginal hysterectomy.    HISTORY OF PRESENT ILLNESS:  Patient is a 64-year-old  female with history of total abdominal hysterectomy with recent diagnosis of vaginal vault prolapse, urge incontinence, cystocele and rectocele, evaluated by Urogynecology, and scheduled today for above-mentioned procedure by Dr. Rivera.  Postoperatively, transferred to PACU for further monitoring.    PAST MEDICAL HISTORY:  Coronary artery disease status post coronary artery bypass graft surgery, LAD and first diagonal PCI interventions, chronically on dual antiplatelet therapy with aspirin and Plavix plus Ranexa; hypertension; hyperlipidemia; psoriatic arthritis; fibromyalgia; heart failure with preserved ejection fraction; generalized osteoarthritis; degenerative joint disease of cervical and lumbar spine.     PAST SURGICAL HISTORY:  Coronary artery bypass graft surgery, total abdominal hysterectomy, right foot bunionectomy, left upper lip basal cell carcinoma resection.    MEDICATIONS:  Please see medication reconciliation list.    ALLERGIES:  Adhesive tape.    SOCIAL HISTORY:  Ex-tobacco user.  No current tobacco, alcohol, or drug use.  Independent in her basic activities of daily living.    FAMILY HISTORY:  Mother had colon cancer and coronary artery disease.    REVIEW OF SYSTEMS:  Currently resting in bed.  Lower abdominal discomfort.  No chest pain.  No shortness of breath.  Other 12-point review of systems is negative.      PHYSICAL EXAMINATION:    GENERAL:  Alert and oriented to time, place, and person.  Mild to moderate  distress.  VITAL SIGNS:  Temperature 97.2, pulse 67, respiratory rate 13, blood pressure 176/80, pulse ox 99% on 2L nasal cannula oxygen.  HEENT:  Atraumatic.  Oropharynx clear.  Moist mucous membranes.  Ears, nose normal.  Eyes:  Anicteric sclerae.  NECK:  Supple.  No lymphadenopathy.  Trachea midline.  LUNGS:  Clear to auscultation bilaterally.  Normal respiratory effort.  HEART:  Regular rate, rhythm.  S1 and S2 auscultated.  No murmur.  ABDOMEN:  Soft, nondistended.  Vaginal dressing.  EXTREMITIES:  No peripheral edema, clubbing, or cyanosis.  NEUROLOGIC:  Motor and sensory intact.    ASSESSMENT AND PLAN:    1.   Vaginal vault prolapse with urine stress incontinence status post uterosacral vaginal vault suspension, anterior-posterior enterocele repair, placement of midurethral sling and cystoscopy.  Pain control.  Monitor hemodynamic status.  DVT prophylaxis.  Temple catheter.  Patient could be discharged home tomorrow with a Temple catheter per Urogynecology.  2.   Heart failure with preserved ejection fraction.  Continue home medications.  3.   Coronary artery disease.  Continue aspirin.  Hold Plavix.  Resume when okay with Urogynecology.  4.   Essential hypertension.  Continue home medications and monitor.  5.   Hyperlipidemia.  Continue home medications.  6.   Psoriatic arthritis.  Hold methotrexate.    Dictated By Luciana Mejia MD  d: 07/24/2025 12:30:09  t: 07/24/2025 13:18:39  Job [id number]  FB/

## 2025-07-24 NOTE — H&P
63 y/o female with vaginal vault prolapse and stress urinary incontinence for surgical mgmt  Bulge sx worsening, interested in surgical mgmt     Pre operative clearance with Dr Valdez, pt seen & examined without changes.     Cardiology clearance obtained (hard copy provided)     Thorough discussion of surgical risks, benefits, and alternatives including, but not limited to bleeding/clots, infection, injury to nearby organs (urethra, bladder, ureters, bowel, blood vessels), mesh erosion, dyspareunia, de josé miguel UI, worsening UI, recurrence, voiding dysfunction, and pain.     Discussed pain mgmt and potential need for narcotics. Discussed addiction potential with narcotics. IL  reviewed.     Plan to proceed with [facility] APE repair, MUS, cysto (+cath) as consented  All questions answered.   Michelle Rivera

## 2025-07-25 VITALS
HEIGHT: 63 IN | OXYGEN SATURATION: 99 % | RESPIRATION RATE: 17 BRPM | HEART RATE: 55 BPM | SYSTOLIC BLOOD PRESSURE: 148 MMHG | WEIGHT: 130 LBS | BODY MASS INDEX: 23.04 KG/M2 | DIASTOLIC BLOOD PRESSURE: 73 MMHG | TEMPERATURE: 99 F

## 2025-07-25 PROBLEM — G89.18 POST-OP PAIN: Status: ACTIVE | Noted: 2025-07-25

## 2025-07-25 LAB
BASOPHILS # BLD AUTO: 0.03 X10(3) UL (ref 0–0.2)
BASOPHILS NFR BLD AUTO: 0.2 %
DEPRECATED RDW RBC AUTO: 40.9 FL (ref 35.1–46.3)
EOSINOPHIL # BLD AUTO: 0.01 X10(3) UL (ref 0–0.7)
EOSINOPHIL NFR BLD AUTO: 0.1 %
ERYTHROCYTE [DISTWIDTH] IN BLOOD BY AUTOMATED COUNT: 13.2 % (ref 11–15)
HCT VFR BLD AUTO: 36.2 % (ref 35–48)
HGB BLD-MCNC: 12.2 G/DL (ref 12–16)
IMM GRANULOCYTES # BLD AUTO: 0.04 X10(3) UL (ref 0–1)
IMM GRANULOCYTES NFR BLD: 0.3 %
LYMPHOCYTES # BLD AUTO: 1.59 X10(3) UL (ref 1–4)
LYMPHOCYTES NFR BLD AUTO: 11.9 %
MCH RBC QN AUTO: 29.2 PG (ref 26–34)
MCHC RBC AUTO-ENTMCNC: 33.7 G/DL (ref 31–37)
MCV RBC AUTO: 86.6 FL (ref 80–100)
MONOCYTES # BLD AUTO: 1.29 X10(3) UL (ref 0.1–1)
MONOCYTES NFR BLD AUTO: 9.6 %
NEUTROPHILS # BLD AUTO: 10.43 X10 (3) UL (ref 1.5–7.7)
NEUTROPHILS # BLD AUTO: 10.43 X10(3) UL (ref 1.5–7.7)
NEUTROPHILS NFR BLD AUTO: 77.9 %
PLATELET # BLD AUTO: 353 10(3)UL (ref 150–450)
RBC # BLD AUTO: 4.18 X10(6)UL (ref 3.8–5.3)
WBC # BLD AUTO: 13.4 X10(3) UL (ref 4–11)

## 2025-07-25 PROCEDURE — 99214 OFFICE O/P EST MOD 30 MIN: CPT | Performed by: HOSPITALIST

## 2025-07-25 RX ORDER — TRAMADOL HYDROCHLORIDE 50 MG/1
50 TABLET ORAL EVERY 6 HOURS PRN
Status: DISCONTINUED | OUTPATIENT
Start: 2025-07-25 | End: 2025-07-25

## 2025-07-25 RX ORDER — RANOLAZINE 500 MG/1
500 TABLET, EXTENDED RELEASE ORAL 2 TIMES DAILY
Status: SHIPPED | COMMUNITY
Start: 2025-07-25

## 2025-07-25 RX ORDER — TRAMADOL HYDROCHLORIDE 50 MG/1
50 TABLET ORAL EVERY 6 HOURS PRN
Qty: 20 TABLET | Refills: 0 | Status: SHIPPED | OUTPATIENT
Start: 2025-07-25

## 2025-07-25 RX ORDER — IBUPROFEN 600 MG/1
600 TABLET, FILM COATED ORAL EVERY 6 HOURS SCHEDULED
Qty: 30 TABLET | Refills: 3 | Status: SHIPPED | OUTPATIENT
Start: 2025-07-25

## 2025-07-25 RX ORDER — ACETAMINOPHEN 500 MG
1000 TABLET ORAL EVERY 8 HOURS PRN
Status: DISCONTINUED | OUTPATIENT
Start: 2025-07-25 | End: 2025-07-25

## 2025-07-25 NOTE — DISCHARGE SUMMARY
[facility]  part of formerly [facility]    Discharge Summary    No Saucedo Patient Status:  Outpatient in a Bed    1960 MRN [id number]   Location [facility] Attending Pat Sauceda MD   Hosp Day # 0 PCP Josep Valdez MD     Date of Admission: 2025 Disposition: Home or Self Care     Date of Discharge: 25      Admitting Diagnosis: Vaginal vault prolapse, stress incontinence  Vaginal vault prolapse    Hospital Discharge Diagnoses: Vaginal Vault Prolapse    Lace+ Score: 53  59-90 High Risk  29-58 Medium Risk  0-28   Low Risk.    TCM Follow-Up Recommendation:  LACE 29-58: Moderate Risk of readmission after discharge from the hospital.      Reason for Admission:   Vaginal Vault Prolapse  Hx. Of HFpEF  Hx. Of CAD  HTN  HL  Psoriatric Arthritis    Physical Exam:   General appearance: alert, appears stated age and cooperative  Pulmonary:  clear to auscultation bilaterally  Cardiovascular: S1, S2 normal, no murmur, click, rub or gallop, regular rate and rhythm  Abdominal: soft, non-tender; bowel sounds normal; no masses,  no organomegaly  Extremities: extremities normal, atraumatic, no cyanosis or edema  Psychiatric: calm      History of Present Illness:   Please refer to pre-op H+P for details      Hospital Course:   Vaginal vault prolapse with urine stress incontinence   -status post uterosacral vaginal vault suspension, anterior-posterior enterocele repair, placement of midurethral sling and cystoscopy.    -Pain control.    -Hemodynamic status stable  -decath trial for haywood in office, plan home with haywood catheter     Heart failure with preserved ejection fraction.    -Continue home medications.    Coronary artery disease.    -resume aspirin and plavix when okay with Urogynecology.    Essential hypertension.    -Continue home medications and monitor.    Hyperlipidemia.    -Continue home medications.    Psoriatic arthritis.    -resume home methotrexate per urogyne        Consultations:   urogynecology    Procedures:  s/p USVVS APE repair, MUS, cysto     Complications: n/a    Discharge Condition: Good    Discharge Medications:      Medication List        START taking these medications      ibuprofen 600 MG Tabs  Commonly known as: Motrin  Take 1 tablet (600 mg total) by mouth every 6 (six) hours.     traMADol 50 MG Tabs  Commonly known as: Ultram  Take 1 tablet (50 mg total) by mouth every 6 (six) hours as needed.            CONTINUE taking these medications      aspirin 81 MG Tbec     atorvastatin 80 MG Tabs  Commonly known as: Lipitor  Take 1 tablet (80 mg total) by mouth nightly.     * bumetanide 1 MG Tabs  Commonly known as: Bumex  Take 1 tablet (1 mg total) by mouth every other day. Alternate every other day with 2 mg     * bumetanide 2 MG Tabs  Commonly known as: Bumex  Take 1 tablet (2 mg total) by mouth every other day. Alternate every other day with 1 mg     clobetasol 0.05 % Crea  Commonly known as: Temovate  Apply 1 Application topically 2 (two) times daily.     clopidogrel 75 MG Tabs  Commonly known as: Plavix     docusate sodium 100 MG Caps  Commonly known as: Colace     folic acid 1 MG Tabs  Commonly known as: Folvite  Take 1 tablet (1 mg total) by mouth daily.     isosorbide mononitrate ER 60 MG Tb24  Commonly known as: Imdur  Take 1 tablet (60 mg total) by mouth daily.     methotrexate 2.5 MG Tabs  Commonly known as: Rheumatrex  Take 5 tablets (12.5 mg total) by mouth once a week. Pt taking dose on Wednesdays     metoprolol succinate ER 25 MG Tb24  Commonly known as: Toprol XL     NON FORMULARY     omeprazole 20 MG Cpdr  Commonly known as: PriLOSEC  Take 1 capsule (20 mg total) by mouth 2 (two) times daily before meals.     ondansetron 4 mg tablet  Commonly known as: Zofran  Take 1 tablet (4 mg total) by mouth every 8 (eight) hours as needed for Nausea.     Ranexa 500 MG Tb12  Generic drug: ranolazine ER           * This list has 2 medication(s) that are the same  as other medications prescribed for you. Read the directions carefully, and ask your doctor or other care provider to review them with you.                ASK your doctor about these medications      Na Sulfate-K Sulfate-Mg Sulf 17.5-3.13-1.6 GM/177ML Soln  Commonly known as: Suprep Bowel Prep Kit  Take as directed by GI clinic. Okay to substitute for generic.               Where to Get Your Medications        These medications were sent to [id number] - [place], [place] - [address] [place], [place] [id number], [phone]  [address] [place] [address]      Phone: [phone]   ibuprofen 600 MG Tabs  traMADol 50 MG Tabs       Follow up Visits: Follow-up with pcp as needed    Follow up Labs: n/a     Other Discharge Instructions: follow-up with Dr. Rivera as instructed  Office ivanna SARGENT MD  7/25/2025  7:59 AM    > 35 min

## 2025-07-25 NOTE — PROGRESS NOTES
64 year old y/o female s/p USVVS APE repair, MUS, cysto POD #1  Pt doing well. Denies CP or SOB. Pain well controlled with meds. Tolerates PO diet, some nausea and emesis - improving. Up to chair.    /81 (BP Location: Left arm)   Pulse 74   Temp 98.8 °F (37.1 °C) (Oral)   Resp 16   Ht 63\"   Wt 130 lb (59 kg)   SpO2 94%   BMI 23.03 kg/m²   UOP: >1000cc/overnight  Gen: NAD  Abd: soft, NT/ND  : Temple catheter to gravity, no active bleeding  Ext: SCDs b/l, no calf tenderness    Labs:   Recent Labs   Lab 07/25/25  0554   RBC 4.18   HGB 12.2   HCT 36.2   MCV 86.6   MCH 29.2   MCHC 33.7   RDW 13.2   NEPRELIM 10.43*   WBC 13.4*   .0           A/P: 64 year old y/o female s/p USVVS APE repair, MUS, cysto POD #1  - voiding trial in office. Home with catheter  - PO pain meds, IBP & tylenol, may consider tramadol prn  - nausea meds prn  - home meds as prescribed  - General diet as tolerated  - Ambulate  - bowel mgmt   - Discharge later today  - Follow up in two weeks, sooner as needed    Dr. Michelle Rivera

## 2025-07-25 NOTE — PLAN OF CARE
Problem: Patient Centered Care  Goal: Patient preferences are identified and integrated in the patient's plan of care  Description: Interventions:  - What would you like us to know as we care for you?   - Provide timely, complete, and accurate information to patient/family  - Incorporate patient and family knowledge, values, beliefs, and cultural backgrounds into the planning and delivery of care  - Encourage patient/family to participate in care and decision-making at the level they choose  - Honor patient and family perspectives and choices  Outcome: Progressing     Problem: PAIN - ADULT  Goal: Verbalizes/displays adequate comfort level or patient's stated pain goal  Description: INTERVENTIONS:  - Encourage pt to monitor pain and request assistance  - Assess pain using appropriate pain scale  - Administer analgesics based on type and severity of pain and evaluate response  - Implement non-pharmacological measures as appropriate and evaluate response  - Consider cultural and social influences on pain and pain management  - Manage/alleviate anxiety  - Utilize distraction and/or relaxation techniques  - Monitor for opioid side effects  - Notify MD/LIP if interventions unsuccessful or patient reports new pain  - Anticipate increased pain with activity and pre-medicate as appropriate  Outcome: Progressing     Problem: SAFETY ADULT - FALL  Goal: Free from fall injury  Description: INTERVENTIONS:  - Assess pt frequently for physical needs  - Identify cognitive and physical deficits and behaviors that affect risk of falls.  - [place] fall precautions as indicated by assessment.  - Educate pt/family on patient safety including physical limitations  - Instruct pt to call for assistance with activity based on assessment  - Modify environment to reduce risk of injury  - Provide assistive devices as appropriate  - Consider OT/PT consult to assist with strengthening/mobility  - Encourage toileting schedule  Outcome:  Progressing     Problem: GASTROINTESTINAL - ADULT  Goal: Minimal or absence of nausea and vomiting  Description: INTERVENTIONS:  - Maintain adequate hydration with IV or PO as ordered and tolerated  - Nasogastric tube to low intermittent suction as ordered  - Evaluate effectiveness of ordered antiemetic medications  - Provide nonpharmacologic comfort measures as appropriate  - Advance diet as tolerated, if ordered  - Obtain nutritional consult as needed  - Evaluate fluid balance  Outcome: Progressing     Problem: SKIN/TISSUE INTEGRITY - ADULT  Goal: Incision(s), wounds(s) or drain site(s) healing without S/S of infection  Description: INTERVENTIONS:  - Assess and document risk factors for pressure ulcer development  - Assess and document skin integrity  - Assess and document dressing/incision, wound bed, drain sites and surrounding tissue  - Implement wound care per orders  - Initiate isolation precautions as appropriate  - Initiate Pressure Ulcer prevention bundle as indicated  Outcome: Progressing

## 2025-07-28 ENCOUNTER — TELEPHONE (OUTPATIENT)
Dept: UROLOGY | Facility: HOSPITAL | Age: 65
End: 2025-07-28

## 2025-07-28 ENCOUNTER — NURSE ONLY (OUTPATIENT)
Dept: UROLOGY | Facility: HOSPITAL | Age: 65
End: 2025-07-28
Attending: OBSTETRICS & GYNECOLOGY
Payer: MEDICARE

## 2025-07-28 VITALS — RESPIRATION RATE: 16 BRPM | WEIGHT: 130 LBS | BODY MASS INDEX: 23 KG/M2

## 2025-07-28 DIAGNOSIS — R82.90: Primary | ICD-10-CM

## 2025-07-28 PROCEDURE — 51702 INSERT TEMP BLADDER CATH: CPT

## 2025-07-28 PROCEDURE — 87086 URINE CULTURE/COLONY COUNT: CPT

## 2025-07-28 PROCEDURE — 99212 OFFICE O/P EST SF 10 MIN: CPT

## 2025-07-28 RX ORDER — NITROFURANTOIN 25; 75 MG/1; MG/1
100 CAPSULE ORAL 2 TIMES DAILY
Qty: 14 CAPSULE | Refills: 0 | Status: SHIPPED | OUTPATIENT
Start: 2025-07-28 | End: 2025-08-04

## 2025-07-28 NOTE — TELEPHONE ENCOUNTER
TC to pt following surgery  Called RN this AM with report of gross hematuria  Had BM for the first time since surgery this AM (took mag citrate today)  -- discussed daily miralax  Reports haywood catheter draining, urine currently clear  Resuming blood thinners today (plavix, ASA)  Pain controlled with PO meds (tylenol)  Reviewed bowel mgmt and activity restrictions  Tolerates ambulation  No heavy vaginal bleeding, some spotting  No CP or SOB  All questions answered  Encouraged her to call with questions or concerns  Follow up as scheduled  She understands and agrees to plan    Adwoa Monzon PA-C

## 2025-07-28 NOTE — PROGRESS NOTES
S:   Patient presents with signs and symptoms of UTI. Indwelling catheter in place from surgery on 7/24/25 for A/P/E repair, USLS, MUS and cysto.  Patient reports cloudy urine, and blood in urine.  Symptoms started 2 days ago.  She is not taking medication for suppression of recurrent UTIs  She is requesting empiric antibiotics.    O:   Vitals:    07/28/25 1406   Resp: 16     Light cherry red urine noted in haywood bag, with several small clots.  Urine in tubing is slightly cloudy and very light pink.  : Haywood discontinued without difficulty.  Sterile technique used to prep urethra, insert new 16 Fr haywood catheter and collect specimen. Urine sent for culture.      A:   Dx   Encounter Diagnosis   Name Primary?    Slightly cloudy urine Yes         P:   Urine culture sent, will follow results   TORB empiric  mg po bid x 7 days sent to patient's preferred pharmacy   Encouraged PO hydration.  All questions answered    Has appointment scheduled on 7/31/25 for voiding trial.  Follow up as scheduled, sooner prn  She understands and agrees to plan

## 2025-07-28 NOTE — TELEPHONE ENCOUNTER
Call from pt stating she has increased blood in haywood bag.  Had surgery 7/24/25 for USVVS, A/P/E repair, MUS and cysto.  Reports increased bleeding in urine over past 2 days.  Encouraged to come to office today for eval.  Verbalized understanding and agrees to plan.

## 2025-07-30 ENCOUNTER — TELEPHONE (OUTPATIENT)
Dept: UROLOGY | Facility: HOSPITAL | Age: 65
End: 2025-07-30

## 2025-07-31 ENCOUNTER — OFFICE VISIT (OUTPATIENT)
Dept: UROLOGY | Facility: HOSPITAL | Age: 65
End: 2025-07-31
Attending: OBSTETRICS & GYNECOLOGY
Payer: MEDICARE

## 2025-07-31 VITALS — RESPIRATION RATE: 16 BRPM | TEMPERATURE: 98 F

## 2025-07-31 DIAGNOSIS — Z98.890 POST-OPERATIVE STATE: Primary | ICD-10-CM

## 2025-07-31 PROCEDURE — 99212 OFFICE O/P EST SF 10 MIN: CPT

## 2025-08-13 ENCOUNTER — OFFICE VISIT (OUTPATIENT)
Dept: UROLOGY | Facility: HOSPITAL | Age: 65
End: 2025-08-13
Attending: OBSTETRICS & GYNECOLOGY

## 2025-08-13 VITALS — BODY MASS INDEX: 23.04 KG/M2 | HEIGHT: 63 IN | WEIGHT: 130 LBS | RESPIRATION RATE: 18 BRPM

## 2025-08-13 DIAGNOSIS — N95.2 POST-MENOPAUSAL ATROPHIC VAGINITIS: Primary | ICD-10-CM

## 2025-08-13 DIAGNOSIS — Z98.890 POST-OPERATIVE STATE: ICD-10-CM

## 2025-08-13 PROCEDURE — 99212 OFFICE O/P EST SF 10 MIN: CPT

## 2025-08-13 RX ORDER — ESTRADIOL 0.1 MG/G
CREAM VAGINAL
Qty: 42.5 G | Refills: 3 | Status: SHIPPED | OUTPATIENT
Start: 2025-08-13

## 2025-08-25 ENCOUNTER — OFFICE VISIT (OUTPATIENT)
Dept: DERMATOLOGY CLINIC | Facility: CLINIC | Age: 65
End: 2025-08-25

## 2025-08-25 DIAGNOSIS — L81.4 LENTIGO: ICD-10-CM

## 2025-08-25 DIAGNOSIS — D22.9 MULTIPLE NEVI: ICD-10-CM

## 2025-08-25 DIAGNOSIS — D23.9 BENIGN NEOPLASM OF SKIN, UNSPECIFIED LOCATION: ICD-10-CM

## 2025-08-25 DIAGNOSIS — Z12.83 SKIN CANCER SCREENING: ICD-10-CM

## 2025-08-25 DIAGNOSIS — L82.0 INFLAMED SEBORRHEIC KERATOSIS: ICD-10-CM

## 2025-08-25 DIAGNOSIS — L82.1 SK (SEBORRHEIC KERATOSIS): ICD-10-CM

## 2025-08-25 DIAGNOSIS — L40.8 OTHER PSORIASIS: Primary | ICD-10-CM

## 2025-08-25 PROCEDURE — 99203 OFFICE O/P NEW LOW 30 MIN: CPT | Performed by: DERMATOLOGY

## 2025-08-25 RX ORDER — TAPINAROF 10 MG/1000MG
1 CREAM TOPICAL DAILY
Qty: 60 G | Refills: 3 | Status: SHIPPED | OUTPATIENT
Start: 2025-08-25

## 2025-08-25 RX ORDER — CLOBETASOL PROPIONATE 0.5 MG/G
1 CREAM TOPICAL 2 TIMES DAILY
Qty: 60 G | Refills: 3 | Status: SHIPPED | OUTPATIENT
Start: 2025-08-25

## (undated) DEVICE — SOLUTION IRRIG 1000ML 0.9% NACL USP BTL

## (undated) DEVICE — Device

## (undated) DEVICE — ENDOSCOPY PACK - LOWER: Brand: MEDLINE INDUSTRIES, INC.

## (undated) DEVICE — HANDLE SUCT REG CAP FLANGETIP SMOOTH YANK

## (undated) DEVICE — GLOVE SUR 6.5 SENSICARE PI PIP GRN PWD F

## (undated) DEVICE — RETRACTOR SURG W18.3XL32.5CM PLAS SELF RET

## (undated) DEVICE — SUT VCRL 2-0 36IN CT-1 ABSRB UD L36MM 1/2 CIR

## (undated) DEVICE — GLOVE SUR 6 SENSICARE PI MIC PIP CRM PWD F

## (undated) DEVICE — DRAPE SUR W53XL77IN STD POLYPR SMS 3 QTR SHT

## (undated) DEVICE — TUR Y CYSTO TUBING SET IRRIG L96IN

## (undated) DEVICE — SLEEVE COMPR MD KNEE LEN SGL USE KENDALL SCD

## (undated) DEVICE — SUT COAT VCRL+ 0 27IN CT-1 ABSRB VLT ANTIBACT

## (undated) DEVICE — SUT COAT VCRL 1 27IN CT-1 ABSRB VLT 36MM 1/2

## (undated) DEVICE — SOLUTION IV 1000ML DIL ST H2O

## (undated) DEVICE — UROLOGY DRAIN BAG

## (undated) DEVICE — PAD SANIT L11IN MAXI FLUF POLYMR HVY ABSRB

## (undated) DEVICE — TRAY CATH FOLEY 16FR INCLUDE BARDX IC COMPLT CARE

## (undated) DEVICE — TRAY PREP WET SKIN 4 COMPARTMENT 6 SPNG 2 TWL

## (undated) DEVICE — Device: Brand: DEFENDO AIR/WATER/SUCTION AND BIOPSY VALVE

## (undated) DEVICE — GLOVE SUR 6.5 SENSICARE PI MIC PIP CRM PWD F

## (undated) DEVICE — PACK CDS MINOR GENERAL

## (undated) NOTE — LETTER
4/26/2021              [address] [facility] [address]         To Whom It May Concern,    Ms. Catie Orellana is medically able to return to her usual duties at work without restriction on Wednesday, April 28.     Sincerel

## (undated) NOTE — MR AVS SNAPSHOT
[facility] [address] [facility]  [address] [place] [address] [phone]               Thank you for choosing us [facility] We are glad to serve you and happy to provide you with this summary of your visit.   Pl taking this medication, and follow the directions you see here. Commonly known as:  LIPITOR           Etanercept 50 MG/ML Soaj   Inject 50 mg into the skin once a week.    Commonly known as:  ENBREL SURECLICK           folic acid 1 MG Tabs   Take 2 tablet your doctor or other care provider to review them with you. MyChart     Visit [employer]  You can access your MyChart to more actively manage your health care and view more details from this visit by going to [email or web address].   If you'v

## (undated) NOTE — LETTER
12/18/2017      Re: Deborah Amanda [id number]    To Whom It May Concern:    Marva Devi is currently under my medical care and will not be able to perform jury duty due to her medical condition.  She has been a long standing patient of mine and has a condition whe

## (undated) NOTE — LETTER
Consent to Procedure/Sedation    Date: __5/21/2018_____    Time: ___8:42 AM ___    1. I authorize the performance upon Gabriela Schmidt the following:  [employer] (UDS)      2.  Shelia Signs Jirschele(and whomever is designated as the [employer] ___________________________    ___________________    Witness: ____________________     Date: ______________    Printed: 2018   8:42 AM    Patient Name: Debora Montgomery        : 1960       Medical Record #: [id number]

## (undated) NOTE — LETTER
12/30/2020          To Whom It May Concern:    Mary Larry is currently under my medical care. She may return to work on January 5, 2021. If you require additional information please contact our office.         Sincerely,          Erin Lujan,

## (undated) NOTE — LETTER
Date & Time: 3/16/2023, 1:12 PM  Patient: Bear Osuna  Encounter Provider(s):    Lucía Gutierrez MD       To Whom It May Concern:    Marcellus Agee was seen and treated in our department on 3/16/2023. She should not return to work until 3/20/2023.     If you have any questions or concerns, please do not hesitate to call.        _____________________________  Physician/APC Signature

## (undated) NOTE — MR AVS SNAPSHOT
[facility]  [address] [place], [place] [address] [address] [phone]               Thank you for choosing us for your health care visit with Eber Pepper. Bre Toscano MD.  We are glad to serve you and happy to provide you with this summary of your visit. Albuterol Sulfate  (90 Base) MCG/ACT Aers   Inhale 2 puffs into the lungs every 4 (four) hours as needed for Wheezing.            atorvastatin 40 MG Tabs   TAKE 1 TABLET BY MOUTH EVERY NIGHT AT BEDTIME   Commonly known as:  LIPITOR           azithro Take 3 tabs (30mg) daily for 3 days, then take 2 tabs (20mg) daily for 3 days, then take 1 tab (10mg) daily for 3 days.    What changed:    - medication strength  - additional instructions   Commonly known as:  DELTASONE           TraMADol HCl 50 MG Tabs

## (undated) NOTE — LETTER
[place] [address]  Authorization for Invasive Procedures  Date: 12/2/2024           Time: 0925    I hereby authorize                                               , my physician and his/her assistants (if applicable), which may include medical students, residents, and/or fellows, to perform the following surgical operation/ procedure and administer such anesthesia as may be determined necessary by my physician:  Operation/Procedure name (s)  Cardiac Catheterization, Left Ventricular Cineangiography, Bilateral Selective Coronary Angiography and/or Right Heart Catheterization; possible Percutaneous Transluminal Coronary Angioplasty, Coronary Atherectomy, Coronary Stent, Intracoronary Thrombolytic therapy, Antiplatelet therapy and/or Intravascular Ultrasound on No Saucedo   2.   I recognize that during the surgical operation/procedure, unforeseen conditions may necessitate additional or different procedures than those listed above.  I, therefore, further authorize and request that the above-named surgeon, assistants, or designees perform such procedures as are, in their judgment, necessary and desirable.    3.   My surgeon/physician has discussed prior to my surgery the potential benefits, risks and side effects of this procedure; the likelihood of achieving goals; and potential problems that might occur during recuperation.  They also discussed reasonable alternatives to the procedure, including risks, benefits, and side effects related to the alternatives and risks related to not receiving this procedure.  I have had all my questions answered and I acknowledge that no guarantee has been made as to the result that may be obtained.    4.   Should the need arise during my operation/procedure, which includes change of level of care prior to discharge, I also consent to the administration of blood and/or blood products.  Further, I understand that despite careful testing and screening of  blood or blood products by collecting agencies, I may still be subject to ill effects as a result of receiving a blood transfusion and/or blood products.  The following are some, but not all, of the potential risks that can occur: fever and allergic reactions, hemolytic reactions, transmission of diseases such as Hepatitis, AIDS and Cytomegalovirus (CMV) and fluid overload.  In the event that I wish to have an autologous transfusion of my own blood, or a directed donor transfusion, I will discuss this with my physician.  Check only if Refusing Blood or Blood Products  I understand refusal of blood or blood products as deemed necessary by my physician may have serious consequences to my condition to include possible death. I hereby assume responsibility for my refusal and release the hospital, its personnel, and my physicians from any responsibility for the consequences of my refusal.          o  Refuse      5.   I authorize the use of any specimen, organs, tissues, body parts or foreign objects that may be removed from my body during the operation/procedure for diagnosis, research or teaching purposes and their subsequent disposal by hospital authorities.  I also authorize the release of specimen test results and/or written reports to my treating physician on the hospital medical staff or other referring or consulting physicians involved in my care, at the discretion of the Pathologist or my treating physician.    6.   I consent to the photographing or videotaping of the operations or procedures to be performed, including appropriate portions of my body for medical, scientific, or educational purposes, provided my identity is not revealed by the pictures or by descriptive texts accompanying them.  If the procedure has been photographed/videotaped, the surgeon will obtain the original picture, image, videotape or CD.  The hospital will not be responsible for storage, release or maintenance of the picture, image, tape  or CD.    7.   I consent to the presence of a  or observers in the operating room as deemed necessary by my physician or their designees.    8.   I recognize that in the event my procedure results in extended X-Ray/fluoroscopy time, I may develop a skin reaction.    9. If I have a Do Not Attempt Resuscitation (DNAR) order in place, that status will be suspended while in the operating room, procedural suite, and during the recovery period unless otherwise explicitly stated by me (or a person authorized to consent on my behalf). The surgeon or my attending physician will determine when the applicable recovery period ends for purposes of reinstating the DNAR order.  10. Patients having a sterilization procedure: I understand that if the procedure is successful the results will be permanent and it will therefore be impossible for me to inseminate, conceive, or bear children.  I also understand that the procedure is intended to result in sterility, although the result has not been guaranteed.   11. I acknowledge that my physician has explained sedation/analgesia administration to me including the risk and benefits I consent to the administration of sedation/analgesia as may be necessary or desirable in the judgment of my physician.    I CERTIFY THAT I HAVE READ AND FULLY UNDERSTAND THE ABOVE CONSENT TO OPERATION and/or OTHER PROCEDURE.        ____________________________________       _________________________________      ______________________________  Signature of Patient         Signature of Responsible Person        Printed Name of Responsible Person    ____________________________________      _________________________________      ______________________________       Signature of Witness          Relationship to Patient                       Date                                       Time  Patient Name: No Nunolai  : 1960    Reviewed: 2024   Printed: 2024  Medical  Record #: [id number] Page 1  2           STATEMENT OF PHYSICIAN My signature below affirms that prior to the time of the procedure; I have explained to the patient and/or his/her legal representative, the risks and benefits involved in the proposed treatment and any reasonable alternative to the proposed treatment. I have also explained the risks and benefits involved in refusal of the proposed treatment and alternatives to the proposed treatment and have answered the patient's questions. If I have a significant financial interest in a co-management agreement or a significant financial interest in any product or implant, or other significant relationship used in this procedure/surgery, I have disclosed this and had a discussion with my patient.     _______________________________________________________________ _____________________________  (Signature of Physician)                                                                                         (Date)                                   (Time)  Patient Name: No Saucedo  : 1960    Reviewed: 2024   Printed: 2024  Medical Record #: [id number] Page 2 of 2

## (undated) NOTE — Clinical Note
Galina Zambrano I saw [facility] today with suprapubic pain. I've recommended pelvic imaging w/ ultrasound and office cysto, if w/u neg will plan for pelvic floor PT. I will work to manage her sx. I appreciate the opportunity to participate in her care.  Thanks, Kimo Titus

## (undated) NOTE — MR AVS SNAPSHOT
[facility] [place] [address] [address]  [address] [phone]               Thank you for choosing us for your health care visit with Daylin Rutledge MD.  We are glad to serve you and happy to provide you with this summary o 4.5mg a day , compounded (Do not apply powder)           naproxen 500 MG Tabs   Take 1 tablet (500 mg total) by mouth 2 (two) times daily with meals.    What changed:  additional instructions   Commonly known as:  NAPROSYN           omeprazole 20 MG Cpdr Lifestyle Modification Recommendations:    Modification Recommendation   Weight Reduction Maintain normal body weight (body mass index 18.5-24.9 kg/m2)   DASH eating plan Adopt a diet rich in fruits, vegetables, and low fat dairy products with reduced cont

## (undated) NOTE — Clinical Note
Hi - I did an office cysto today on Beatrice Orourke with suprapubic pain and identified a bladder lesion. I've recommended urology for biopsy. Can you please help coordinate appropriate referrals.  Thanks, Katelyn Lujan

## (undated) NOTE — LETTER
1/20/2024              No Saucedo        [address] [facility] [address]         To Whom It May Concern,     No Saucedo is currently under my care. She has had a flare of her arthritis and had left shoulder injection for it . Until further notice,  because she is limited with movement of her left arm .   please allow her to not do the compression stockings at work due to the repetitive motion     Sincerely,          Maria Del Carmen Dye MD  [address] [id number] [phone]        Document electronically generated by:  Maria Del Carmen Dye MD

## (undated) NOTE — LETTER
23      Patient: Mike Rhodes  : 1960 Visit date: 2023    Dear Lior John,      I examined your patient in consultation today. She has a painful A2 ganglion of the left ring finger. We will plan on excision. Thank you for your kind referral. If I may answer any questions, please feel free to contact me.      Sincerely,   Trent Abdi MD     CC: Markie Bateman MD

## (undated) NOTE — LETTER
12/18/2017      Re: Sergio Avila [id number]    To Whom It May Concern:    Erendira Pizarro is currently under my medical care and will not be able to perform jury duty due to her medical condition.  She has been a long standing patient of mine and has a condition whe

## (undated) NOTE — MR AVS SNAPSHOT
[address] [address]  [phone]               Thank you for choosing us for your health care visit with Heather Polanco MD.  We are glad to serve you and happy to provide you with this serrano Medical Issues Discussed Today     PSA (psoriatic arthritis)    -  Primary    Therapeutic drug monitoring        Screening-pulmonary TB          Instructions and Information about Your Health    1. methotrexate  7 tablets a week  - zofran given - switch to What changed: You were already taking a medication with the same name, and this prescription was added. Make sure you understand how and when to take each.            Naltrexone HCl Powd   4.5mg a day , compounded (Do not apply powder)           naproxen 5 discharge instructions in [facility] by going to Visits < Admission Summaries. If you've been to the Emergency Department or your doctor's office, you can view your past visit information in [facility] by going to Visits < Visit Summaries. [facility] questions?

## (undated) NOTE — LETTER
12/22/2023    No Saucedo        [address] [place] [address]            Dear No Saucedo,      Our records indicate that you are due for an appointment for a Colonoscopy with Eduarda Mckee MD. Our doctors are booking out about 3-6 months in advance for procedures.     Please call our office to schedule a phone screening appointment to plan for the procedure(s).   Your medical well-being is important to us.    If your insurance requires a referral, please call your primary care office to request one.      Thank you,      The Physicians and Staff at [facility]

## (undated) NOTE — [place] [address]    Consent for Diagnostic Services    Your physician has ordered one of the following tests to determine the function of your heart:  CARDIAC NUCLEAR STRESS TEST LEXISCAN . The information obtained will aid your physician in detecting the possible presence of heart disease, to determine why you may have such symptoms such as chest pain or shortness of breath and to determine an appropriate plan of medical management.    The risks associated with any exercise test or pharmacological stress test are similar to the risks associated with exercise, including an abnormal blood pressure, dizziness, fainting, abnormal heart rate and in very rear instances heart attach, stroke, or death. During the test you must report any unusual feeling or symptoms you experience during the test. Every effort will be made to minimize such risks by careful observation during the test. Emergency equipment and trained personnel are available to deal with unusual situations, which may arise and these personnel have permission to treat you.     During the treadmill or nuclear stress test, the exercise intensity begins at a low level and advances in stages depending on your fitness level. We may stop the test at any time because of signs of fatigue or changes in your heart rate, electrocardiogram, or blood pressure, or other symptoms you may experience.     If your doctor has ordered a pharmacological stress test, you may experience a headache, shortness of breath, flushing, warmth, rapid heart rate, or a bitter taste in your mouth. Sometimes you may not experience any symptoms. Your prompt reporting of any symptoms is very important.     During a Regadenoson stress test, you are given an injection of Regadenoson. Immediately after the Regadenoson is given, you will be injected with a radioactive isotope. During a Dobutamine stress test, Dobutamine infuses over approximately fifteen minutes. A  radioactive isotope is injected during the infusion. After either pharmacological stress test, you will have either a nuclear scan or an echocardiogram.    The information that is obtained during your testing will be treated as privileged and confidential. The information obtained will be given to your doctor and may be used for insurance purposes or to track and trend data as part of  with your privacy retained.    I have read and understand the above information. I voluntarily agree and consent to the above ordered test. Furthermore, no guarantees or assurances have been given by anyone as to the results that may be obtained from this procedure. Any questions that may have occurred to me have been answered to my satisfaction.     Signature of Patient:   ____________________________________________________________    Signature of Witness: _______________________________Date: ___________Time: ________

## (undated) NOTE — MR AVS SNAPSHOT
After Visit Summary   6/18/2021    Geno Matta    MRN: [id number]           Visit Information     Date & Time  6/18/2021  9:30 AM Provider  DO Yaya Huynh  [facility] Dept.  Phone  [phone] as allergies, colds, cough, fever, rash, sore throat, headache and pink eye. The cost for a Video Visit is currently $35.         If you receive a survey from [employer], please take a few minutes to complete it and provide feedback.  We strive to deliver attention, but are   non-life-threatening. Also available by appointment Average cost  $120*     EMERGENCY ROOM Life-threatening emergencies needing immediate intervention at a hospital emergency room.  Average cost  $2,300*   *Cost varies based on your

## (undated) NOTE — LETTER
4/9/2024          To Whom It May Concern:    No Saucedo is currently under my medical care and may not return to work at this time.    Please excuse No for 1 days.  She may return to work on 4/10/2024.  Activity is restricted as follows: none.    If you require additional information please contact our office.        Sincerely,         JEMMA George          Document generated by:  JEMMA George

## (undated) NOTE — LETTER
12/18/2017          To Whom It May Concern:    Abram Smalls is currently under my medical care and will not be able to perform jury duty due to her medical condition.  She has been a long standing patient of mine and has a condition where she will not be

## (undated) NOTE — Clinical Note
Ian Baez is interested in surgery to manage her bulge sx. I've recommended vag estrogen and bladder testing. I'll follow those results and we'll go from there. I appreciate the opportunity to participate in her care. Thank you!  Enedina Morel

## (undated) NOTE — LETTER
8/27/2018              Sadia Saucedo        [address] [facility] [address]         Dear Audrey Lombardi,      The report of the Biopsy done on 08/23/18 shows a Compound nevus .   This is a benign (not cancerous) growth, and requires no further tr